# Patient Record
Sex: MALE | Race: WHITE | Employment: OTHER | URBAN - METROPOLITAN AREA
[De-identification: names, ages, dates, MRNs, and addresses within clinical notes are randomized per-mention and may not be internally consistent; named-entity substitution may affect disease eponyms.]

---

## 2020-11-05 ENCOUNTER — HOSPITAL ENCOUNTER (OUTPATIENT)
Age: 75
Discharge: HOME | End: 2020-11-05
Payer: MEDICARE

## 2020-11-05 VITALS
WEIGHT: 230 LBS | TEMPERATURE: 98 F | HEIGHT: 72 IN | SYSTOLIC BLOOD PRESSURE: 125 MMHG | OXYGEN SATURATION: 96 % | DIASTOLIC BLOOD PRESSURE: 72 MMHG | RESPIRATION RATE: 18 BRPM | BODY MASS INDEX: 31.15 KG/M2 | HEART RATE: 66 BPM

## 2020-11-05 DIAGNOSIS — L72.3 INFECTED SEBACEOUS CYST: Primary | ICD-10-CM

## 2020-11-05 DIAGNOSIS — L08.9 INFECTED SEBACEOUS CYST: Primary | ICD-10-CM

## 2020-11-05 PROCEDURE — 10060 I&D ABSCESS SIMPLE/SINGLE: CPT | Performed by: PHYSICIAN ASSISTANT

## 2020-11-05 PROCEDURE — 99214 OFFICE O/P EST MOD 30 MIN: CPT | Performed by: PHYSICIAN ASSISTANT

## 2020-11-05 RX ORDER — HYDROCORTISONE ACETATE 0.5 %
2 CREAM (GRAM) TOPICAL DAILY
COMMUNITY
End: 2021-07-19

## 2020-11-05 RX ORDER — ASCORBIC ACID 500 MG
1 TABLET ORAL DAILY
COMMUNITY
End: 2021-07-19

## 2020-11-05 RX ORDER — UBIDECARENONE 50 MG
1 CAPSULE ORAL DAILY
COMMUNITY
End: 2021-01-26 | Stop reason: ALTCHOICE

## 2020-11-05 RX ORDER — CEPHALEXIN 500 MG/1
500 CAPSULE ORAL 2 TIMES DAILY
Qty: 14 CAPSULE | Refills: 0 | Status: SHIPPED | OUTPATIENT
Start: 2020-11-05 | End: 2020-11-11

## 2020-11-05 RX ORDER — GUARN/MA-HUANG/P.GIN/S.GINSENG
1 TABLET ORAL DAILY
COMMUNITY
End: 2021-07-19

## 2020-11-05 NOTE — ED INITIAL ASSESSMENT (HPI)
The patient is here for evaluation of an abscess/cyst to the right shoulder/upper back area that started draining around Sunday. Patient states he has had a lot of drainage to the area that is white/creamy in color.  He states it has been there for years bu

## 2020-11-06 NOTE — ED PROVIDER NOTES
Patient Seen in: [facility] [address]      History   Patient presents with:  Abscess    Stated Complaint: INFECTED CYST ON BACK/SHOULDER    HPI    Jody Magi is a pleasant 49-year-old male who comes in today regarding an infected sebaceous cyst on h distress. Breath sounds: Normal breath sounds. No wheezing or rales. Skin:     General: Skin is warm and dry. Capillary Refill: Capillary refill takes less than 2 seconds. Coloration: Skin is not pale. Findings: No erythema or rash. Antwan  [address] [facility] [address]  [phone]  In 2 days  For wound re-check          Medications Prescribed:  Discharge Medication List as of 11/5/2020  2:49 PM    START taking these medications    cephALEXin 500 MG Oral Cap  Jose L

## 2020-11-07 ENCOUNTER — HOSPITAL ENCOUNTER (OUTPATIENT)
Age: 75
Discharge: HOME | End: 2020-11-07
Payer: MEDICARE

## 2020-11-07 VITALS
OXYGEN SATURATION: 96 % | HEIGHT: 71 IN | DIASTOLIC BLOOD PRESSURE: 106 MMHG | BODY MASS INDEX: 32.2 KG/M2 | WEIGHT: 230 LBS | TEMPERATURE: 97 F | RESPIRATION RATE: 20 BRPM | HEART RATE: 60 BPM | SYSTOLIC BLOOD PRESSURE: 143 MMHG

## 2020-11-07 DIAGNOSIS — L72.3 INFECTED SEBACEOUS CYST: Primary | ICD-10-CM

## 2020-11-07 DIAGNOSIS — L08.9 INFECTED SEBACEOUS CYST: Primary | ICD-10-CM

## 2020-11-07 PROCEDURE — 99024 POSTOP FOLLOW-UP VISIT: CPT | Performed by: PHYSICIAN ASSISTANT

## 2020-11-07 NOTE — ED INITIAL ASSESSMENT (HPI)
Pt here for follow up on Abscess to right upper back that was I & D & packed here 2 days ago at [facility]. Denies any complaints with it.

## 2020-11-07 NOTE — ED PROVIDER NOTES
Patient Seen in: [facility]      History   Patient presents with:   Follow - Up    Stated Complaint: follow up from appt x 2 days    HPI    CHIEF COMPLAINT: Follow-up sebaceous cyst drainage    HISTORY OF PRESENT ILLNESS: Patient is a Current:BP (!) 143/106   Pulse 60   Temp 97 °F (36.1 °C) (Temporal)   Resp 20   SpO2 96%         Physical Exam  Vitals signs and nursing note reviewed. Constitutional:       Appearance: He is well-developed.    Musculoskeletal:        Arms:    Skin:     G Schedule an appointment as soon as possible for a visit in 3 days  For reevaluation          Medications Prescribed:  Current Discharge Medication List

## 2020-11-08 NOTE — PROGRESS NOTES
Pt. notified of test results. States feeling better, wound healing. Instructed to complete antibiotic  tx plan and follow-up care.

## 2020-11-11 ENCOUNTER — OFFICE VISIT (OUTPATIENT)
Dept: INTERNAL MEDICINE CLINIC | Facility: CLINIC | Age: 75
End: 2020-11-11
Payer: MEDICARE

## 2020-11-11 VITALS
HEIGHT: 71.25 IN | DIASTOLIC BLOOD PRESSURE: 80 MMHG | RESPIRATION RATE: 16 BRPM | OXYGEN SATURATION: 95 % | TEMPERATURE: 96 F | WEIGHT: 229.38 LBS | HEART RATE: 59 BPM | SYSTOLIC BLOOD PRESSURE: 144 MMHG | BODY MASS INDEX: 31.76 KG/M2

## 2020-11-11 DIAGNOSIS — F41.9 ANXIETY: ICD-10-CM

## 2020-11-11 DIAGNOSIS — Z13.29 SCREENING FOR THYROID DISORDER: ICD-10-CM

## 2020-11-11 DIAGNOSIS — Z12.5 SCREENING FOR PROSTATE CANCER: ICD-10-CM

## 2020-11-11 DIAGNOSIS — I10 BENIGN ESSENTIAL HTN: ICD-10-CM

## 2020-11-11 DIAGNOSIS — Z00.00 PHYSICAL EXAM: ICD-10-CM

## 2020-11-11 DIAGNOSIS — Z13.220 SCREENING FOR CHOLESTEROL LEVEL: ICD-10-CM

## 2020-11-11 DIAGNOSIS — L72.3 SEBACEOUS CYST: Primary | ICD-10-CM

## 2020-11-11 PROBLEM — M15.9 PRIMARY OSTEOARTHRITIS INVOLVING MULTIPLE JOINTS: Status: ACTIVE | Noted: 2020-11-11

## 2020-11-11 PROBLEM — M15.0 PRIMARY OSTEOARTHRITIS INVOLVING MULTIPLE JOINTS: Status: ACTIVE | Noted: 2020-11-11

## 2020-11-11 PROCEDURE — 99204 OFFICE O/P NEW MOD 45 MIN: CPT | Performed by: NURSE PRACTITIONER

## 2020-11-11 RX ORDER — CEPHALEXIN 500 MG/1
500 CAPSULE ORAL 2 TIMES DAILY
Qty: 14 CAPSULE | Refills: 0 | Status: SHIPPED | OUTPATIENT
Start: 2020-11-11 | End: 2020-11-18

## 2020-11-11 RX ORDER — CHOLECALCIFEROL (VITAMIN D3) 50 MCG
1 TABLET ORAL DAILY
COMMUNITY
End: 2021-07-19

## 2020-11-11 NOTE — PROGRESS NOTES
[address] [facility]    CHIEF COMPLAINT:  Patient presents with:   Follow - Up: UC 11/5/2020 Cyst on Upper Right Back - Below Right Shoulder and BP CK        HISTORY OF PRESENT ILLNESS:  The patient is a 66year old year old male new to our office who prese • ascorbic acid 500 MG Oral Tab Take 1 tablet by mouth 2 (two) times daily. • Coenzyme Q10 50 MG Oral Cap Take 1 capsule by mouth daily. • cephALEXin 500 MG Oral Cap Take 1 capsule (500 mg total) by mouth 2 (two) times daily for 7 days.  14 capsule Physically abused: Not on file        Forced sexual activity: Not on file    Other Topics      Concerns:        Caffeine Concern: Yes        Exercise: No        Seat Belt: Yes        Special Diet: No        Stress Concern: Yes        Weight Concern: EXTREMITIES: no cyanosis, clubbing or edema  NEURO: Oriented times three          Labs:   No results found for: WBC, HGB, PLT   No results found for: GLU, NA, K, CL, CO2, CREATSERUM, CA, ALB, TP, ALKPHO, AST, ALT, BILT, TSH, T4F     No results found for: C - CBC WITH DIFFERENTIAL WITH PLATELET; Future  - COMP METABOLIC PANEL (14); Future    7.  Screening for prostate cancer  - PSA SCREEN; Future        Moe Joaquin APRN

## 2020-11-11 NOTE — PATIENT INSTRUCTIONS
Continue your antibiotic. Take antibiotic completely as ordered     Take antibiotic with food    Eat yogurt twice a day while on antibiotic take an oral probiotic    Monitor for diarrhea    See general surgery if your wound is not improving.      If it i · Elevated blood pressure is systolic of 293 to 152 and diastolic less than 80  · Stage 1 high blood pressure is systolic is 349 to 375 or diastolic between 80 to 89  · Stage 2 high blood pressure is when systolic is 659 or higher or the diastolic is 90 or · Stop smoking. If you are a long-time smoker, this can be hard. Enroll in a stop-smoking program to make it more likely that you will quit for good. · Learn how to handle stress. This is an important part of any program to lower blood pressure.  Learn abo · Call your provider if you have several high readings. Don't be frightened by a single high blood pressure reading, but if you get several high readings, check in with your healthcare provider.   · Note: When blood pressure reaches a systolic (top number)

## 2020-11-17 ENCOUNTER — LAB ENCOUNTER (OUTPATIENT)
Dept: LAB | Age: 75
End: 2020-11-17
Attending: NURSE PRACTITIONER
Payer: MEDICARE

## 2020-11-17 DIAGNOSIS — Z13.220 SCREENING FOR CHOLESTEROL LEVEL: ICD-10-CM

## 2020-11-17 DIAGNOSIS — Z00.00 PHYSICAL EXAM: ICD-10-CM

## 2020-11-17 DIAGNOSIS — Z12.5 SCREENING FOR PROSTATE CANCER: ICD-10-CM

## 2020-11-17 DIAGNOSIS — Z13.29 SCREENING FOR THYROID DISORDER: ICD-10-CM

## 2020-11-17 PROCEDURE — 80061 LIPID PANEL: CPT

## 2020-11-17 PROCEDURE — 36415 COLL VENOUS BLD VENIPUNCTURE: CPT

## 2020-11-17 PROCEDURE — 80053 COMPREHEN METABOLIC PANEL: CPT

## 2020-11-17 PROCEDURE — 84443 ASSAY THYROID STIM HORMONE: CPT

## 2020-11-17 PROCEDURE — 85025 COMPLETE CBC W/AUTO DIFF WBC: CPT

## 2020-11-18 ENCOUNTER — TELEPHONE (OUTPATIENT)
Dept: INTERNAL MEDICINE CLINIC | Facility: CLINIC | Age: 75
End: 2020-11-18

## 2020-11-18 NOTE — TELEPHONE ENCOUNTER
Per Tereza's request, patient scheduled an appt for an AWV and to discuss labs for 1/26/21 with Dr Mata Big

## 2020-11-24 ENCOUNTER — OFFICE VISIT (OUTPATIENT)
Dept: INTERNAL MEDICINE CLINIC | Facility: CLINIC | Age: 75
End: 2020-11-24
Payer: MEDICARE

## 2020-11-24 VITALS
SYSTOLIC BLOOD PRESSURE: 126 MMHG | TEMPERATURE: 97 F | BODY MASS INDEX: 31.9 KG/M2 | HEIGHT: 71.25 IN | DIASTOLIC BLOOD PRESSURE: 66 MMHG | WEIGHT: 230.38 LBS | OXYGEN SATURATION: 94 % | HEART RATE: 51 BPM | RESPIRATION RATE: 14 BRPM

## 2020-11-24 DIAGNOSIS — E78.00 PURE HYPERCHOLESTEROLEMIA: ICD-10-CM

## 2020-11-24 DIAGNOSIS — L72.3 SEBACEOUS CYST: ICD-10-CM

## 2020-11-24 DIAGNOSIS — R35.1 BPH ASSOCIATED WITH NOCTURIA: ICD-10-CM

## 2020-11-24 DIAGNOSIS — I10 BENIGN ESSENTIAL HTN: Primary | ICD-10-CM

## 2020-11-24 DIAGNOSIS — N40.1 BPH ASSOCIATED WITH NOCTURIA: ICD-10-CM

## 2020-11-24 PROCEDURE — 99214 OFFICE O/P EST MOD 30 MIN: CPT | Performed by: NURSE PRACTITIONER

## 2020-11-24 RX ORDER — TAMSULOSIN HYDROCHLORIDE 0.4 MG/1
0.4 CAPSULE ORAL DAILY
Qty: 30 CAPSULE | Refills: 0 | Status: SHIPPED | OUTPATIENT
Start: 2020-11-24 | End: 2020-12-21

## 2020-11-24 NOTE — PROGRESS NOTES
Tawanna Cheatham is a 66year old male. CHIEF COMPLAINT   Urine frequency at night, urgency, BP and cyst FU    HPI:   The patient is here for FU for BP. Also had labs done. He has been taking his BP daily. He is cutting down on his caffeine intake.  No vision /66 (BP Location: Left arm, Patient Position: Sitting, Cuff Size: adult)   Pulse 51   Temp 97.3 °F (36.3 °C) (Temporal)   Resp 14   Ht 71.25\"   Wt 230 lb 6.4 oz (104.5 kg)   SpO2 94%   BMI 31.91 kg/m²   Body mass index is 31.91 kg/m².    GENERAL: wel -The patient has history of elevated blood pressure. He brought his blood pressure readings today that are in the 744J systolically at times and as high as 160 other times. On average it is around 130/80.   Today at this appointment he is 126/66 which is -The sebaceous cyst on his back is healing. There is no open wound at this time there is no wheezing noted on exam.  He did not see general surgery yet. He feels like it is getting better and he does not need to.  -He was advised to continue to monitor.

## 2020-11-24 NOTE — PATIENT INSTRUCTIONS
Start Flomax in the evening once a day. Monitor for effectiveness and side effects. If your urine flow improves please start hydrating better.     Monitor your blood pressure daily in the morning after you have urinated for the first time in your sittin

## 2020-12-21 DIAGNOSIS — N40.1 BPH ASSOCIATED WITH NOCTURIA: ICD-10-CM

## 2020-12-21 DIAGNOSIS — R35.1 BPH ASSOCIATED WITH NOCTURIA: ICD-10-CM

## 2020-12-21 RX ORDER — TAMSULOSIN HYDROCHLORIDE 0.4 MG/1
0.4 CAPSULE ORAL DAILY
Qty: 30 CAPSULE | Refills: 0 | Status: SHIPPED | OUTPATIENT
Start: 2020-12-21 | End: 2021-01-22

## 2020-12-21 NOTE — TELEPHONE ENCOUNTER
Last refill #30 on 11/24/2020  Last office visit pertaining to refill on 11/24/2020  Future Appointments   Date Time Provider Rachel Arevalo   1/26/2021 12:40 PM Ezio Arroyo MD [facility]

## 2021-01-21 DIAGNOSIS — N40.1 BPH ASSOCIATED WITH NOCTURIA: ICD-10-CM

## 2021-01-21 DIAGNOSIS — R35.1 BPH ASSOCIATED WITH NOCTURIA: ICD-10-CM

## 2021-01-22 RX ORDER — TAMSULOSIN HYDROCHLORIDE 0.4 MG/1
0.4 CAPSULE ORAL DAILY
Qty: 30 CAPSULE | Refills: 0 | Status: SHIPPED | OUTPATIENT
Start: 2021-01-22 | End: 2021-02-21

## 2021-01-22 NOTE — TELEPHONE ENCOUNTER
Last OV relevant to medication: 11/24/2020, FU  Last refill date: 12/21/2020     #/refills: 30-0  When pt was asked to return for OV: return in Jan for AWV  Upcoming appt/reason: 1/26/21, AWV

## 2021-01-26 ENCOUNTER — OFFICE VISIT (OUTPATIENT)
Dept: INTERNAL MEDICINE CLINIC | Facility: CLINIC | Age: 76
End: 2021-01-26
Payer: MEDICARE

## 2021-01-26 VITALS
BODY MASS INDEX: 31.15 KG/M2 | TEMPERATURE: 98 F | WEIGHT: 225 LBS | RESPIRATION RATE: 16 BRPM | SYSTOLIC BLOOD PRESSURE: 108 MMHG | HEIGHT: 71.1 IN | OXYGEN SATURATION: 100 % | DIASTOLIC BLOOD PRESSURE: 74 MMHG | HEART RATE: 77 BPM

## 2021-01-26 DIAGNOSIS — Z00.00 ENCOUNTER FOR ANNUAL HEALTH EXAMINATION: Primary | ICD-10-CM

## 2021-01-26 DIAGNOSIS — R39.9 LOWER URINARY TRACT SYMPTOMS (LUTS): ICD-10-CM

## 2021-01-26 PROBLEM — L72.3 SEBACEOUS CYST: Status: RESOLVED | Noted: 2020-11-11 | Resolved: 2021-01-26

## 2021-01-26 PROBLEM — I10 BENIGN ESSENTIAL HTN: Status: RESOLVED | Noted: 2020-11-11 | Resolved: 2021-01-26

## 2021-01-26 PROBLEM — E78.00 PURE HYPERCHOLESTEROLEMIA: Status: RESOLVED | Noted: 2020-11-24 | Resolved: 2021-01-26

## 2021-01-26 PROCEDURE — G0438 PPPS, INITIAL VISIT: HCPCS | Performed by: INTERNAL MEDICINE

## 2021-01-26 NOTE — PROGRESS NOTES
HPI:   Karina Perry is a 66year old male who presents for a Medicare Initial Annual Wellness visit (Once after 12 month Medicare anniversary) .     His main complaint is joint stiffness in his knees, left hip  He uses 2 -3 aleve daily  He has been monit Smoking status: Former Smoker        Packs/day: 1.50        Years: 50.00        Pack years: 76      Smokeless tobacco: Never Used      Tobacco comment: Has not smoked for about 20 years         CAGE Alcohol screening   Gabrielle Estrin was screened for Al He  has a past medical history of Anxiety, Arthritis, Calculus of kidney, and Essential hypertension. He  has a past surgical history that includes appendectomy; appendectomy; cyst removal (N/A, 1990); cyst removal (Right, 11/05/2020); and colonoscopy. Administered Date(s) Administered   • Fluzone Vaccine Medicare () 10/18/2013, 09/20/2014, 10/09/2015, 09/13/2016, 11/06/2017, 09/11/2018, 10/09/2019   • Influenza 10/07/2008   • Pneumococcal (Prevnar 13) 10/09/2015   • Pneumovax 23 09/11/2018        A EKG - w/ Initial Preventative Physical Exam only, or if medically necessary Electrocardiogram date    Colorectal Cancer Screening      Colonoscopy Screen every 10 years There are no preventive care reminders to display for this patient.  Update [employer]

## 2021-01-26 NOTE — PATIENT INSTRUCTIONS
Alexsander Orellana's SCREENING SCHEDULE   Tests on this list are recommended by your physician but may not be covered, or covered at this frequency, by your insurer. Please check with your insurance carrier before scheduling to verify coverage.     PREVENTATIV abnormal There are no preventive care reminders to display for this patient. Update Health Maintenance if applicable    Flex Sigmoidoscopy Screen  Covered every 5 years No results found for this or any previous visit. No flowsheet data found.      Fecal Occ with your prescription benefits, but Medicare does not cover unless Medically needed    Zoster (Not covered by Medicare Part B) No orders found for this or any previous visit.  This may be covered with your pharmacy  prescription benefits     Recommended We intervals for prediabetic patients        Cardiovascular Disease Screening     Cholesterol, covered every 5 yrs including Total, LDL and Trigs LDL Cholesterol (mg/dL)   Date Value   11/17/2020 122 (H)     Cholesterol, Total (mg/dL)   Date Value   11/17/202 provided on same date    Immunizations      Influenza  Covered Annually No orders found for this or any previous visit. Please get every year    Pneumococcal 13 (Prevnar)  Covered Once after 65 No orders found for this or any previous visit.  Please get onc

## 2021-03-09 DIAGNOSIS — Z23 NEED FOR VACCINATION: ICD-10-CM

## 2021-03-12 ENCOUNTER — IMMUNIZATION (OUTPATIENT)
Dept: LAB | Facility: HOSPITAL | Age: 76
End: 2021-03-12
Attending: HOSPITALIST
Payer: MEDICARE

## 2021-03-12 DIAGNOSIS — Z23 NEED FOR VACCINATION: Primary | ICD-10-CM

## 2021-03-12 PROCEDURE — 0011A SARSCOV2 VAC 100MCG/0.5ML IM: CPT

## 2021-04-09 ENCOUNTER — IMMUNIZATION (OUTPATIENT)
Dept: LAB | Facility: HOSPITAL | Age: 76
End: 2021-04-09
Attending: EMERGENCY MEDICINE
Payer: MEDICARE

## 2021-04-09 DIAGNOSIS — Z23 NEED FOR VACCINATION: Primary | ICD-10-CM

## 2021-04-09 PROCEDURE — 0012A SARSCOV2 VAC 100MCG/0.5ML IM: CPT

## 2021-06-18 ENCOUNTER — MED REC SCAN ONLY (OUTPATIENT)
Dept: INTERNAL MEDICINE CLINIC | Facility: CLINIC | Age: 76
End: 2021-06-18

## 2021-07-15 ENCOUNTER — TELEPHONE (OUTPATIENT)
Dept: INTERNAL MEDICINE CLINIC | Facility: CLINIC | Age: 76
End: 2021-07-15

## 2021-07-15 NOTE — TELEPHONE ENCOUNTER
Noted below. Dr Griselda Stuart, does pt need appt for handicap placard? He is scheduled for this only on 7/19/21. Noted he has OA and stiffness in knees. Due for AWV January 2022.

## 2021-07-15 NOTE — TELEPHONE ENCOUNTER
PT calling and said that he would like to apply for a handicap placard because his mobility is not the greatest.    Please advise    Thank you

## 2021-07-19 ENCOUNTER — OFFICE VISIT (OUTPATIENT)
Dept: INTERNAL MEDICINE CLINIC | Facility: CLINIC | Age: 76
End: 2021-07-19
Payer: MEDICARE

## 2021-07-19 VITALS
DIASTOLIC BLOOD PRESSURE: 68 MMHG | SYSTOLIC BLOOD PRESSURE: 116 MMHG | HEART RATE: 88 BPM | HEIGHT: 71 IN | BODY MASS INDEX: 30.75 KG/M2 | RESPIRATION RATE: 14 BRPM | TEMPERATURE: 98 F | OXYGEN SATURATION: 96 % | WEIGHT: 219.63 LBS

## 2021-07-19 DIAGNOSIS — R26.9 GAIT DISORDER: ICD-10-CM
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
## 2021-07-19 DIAGNOSIS — M25.561 ACUTE PAIN OF BOTH KNEES: ICD-10-CM

## 2021-07-19 DIAGNOSIS — M25.562 ACUTE PAIN OF BOTH KNEES: ICD-10-CM

## 2021-07-19 PROCEDURE — 99214 OFFICE O/P EST MOD 30 MIN: CPT | Performed by: NURSE PRACTITIONER

## 2021-07-19 NOTE — PROGRESS NOTES
Sherren Brunner is a 64year old male. CHIEF COMPLAINT   Fall, knee pain    HPI:   Harriett Sharma out of bed a couple months ago. He reports acute knee pain to both knees since then but also had chronic pain previously to his bilateral knees.   His pain currently is or edema  NEURO: Oriented times three    LABS:      Lab Results   Component Value Date    WBC 8.6 11/17/2020    RBC 4.82 11/17/2020    HGB 14.7 11/17/2020    HCT 44.0 11/17/2020    MCV 91.3 11/17/2020    MCH 30.5 11/17/2020    MCHC 33.4 11/17/2020    RDW 1 plan.  The patient is asked to return in 6 months for annual visit or sooner as needed

## 2021-07-26 ENCOUNTER — MED REC SCAN ONLY (OUTPATIENT)
Dept: INTERNAL MEDICINE CLINIC | Facility: CLINIC | Age: 76
End: 2021-07-26

## 2021-09-11 ENCOUNTER — TELEPHONE (OUTPATIENT)
Dept: INTERNAL MEDICINE CLINIC | Facility: CLINIC | Age: 76
End: 2021-09-11

## 2021-09-11 NOTE — TELEPHONE ENCOUNTER
Incoming (mail or fax):  fax  Received from:  [employer]  Documentation given to:  Triage incoming bin    Physical Therapy recertification

## 2021-09-13 NOTE — TELEPHONE ENCOUNTER
Received PT Recert Note from [employer] for review & signature(s). To fax back to # [phone] & send to scanning.

## 2021-09-15 ENCOUNTER — MED REC SCAN ONLY (OUTPATIENT)
Dept: INTERNAL MEDICINE CLINIC | Facility: CLINIC | Age: 76
End: 2021-09-15

## 2022-02-21 ENCOUNTER — TELEPHONE (OUTPATIENT)
Dept: INTERNAL MEDICINE CLINIC | Facility: CLINIC | Age: 77
End: 2022-02-21

## 2022-02-21 NOTE — TELEPHONE ENCOUNTER
Provider's Name?:  Rich Peters Specialty?: urology   Reason for Visit?:  First visit   Diagnosis?:  frequent urination  Number of Visits Requested?: 4   Last Visit with Specialist?:  none  Is Appt.  Already Scheduled?:  No        If so, Date?:    Does pt need call back?: yes

## 2022-02-21 NOTE — TELEPHONE ENCOUNTER
Patient was previously referred to Dr. Maher, reentered referral. Patient notified, verbalized understanding.

## 2022-03-04 ENCOUNTER — OFFICE VISIT (OUTPATIENT)
Dept: INTERNAL MEDICINE CLINIC | Facility: CLINIC | Age: 77
End: 2022-03-04
Payer: MEDICARE

## 2022-03-04 ENCOUNTER — TELEPHONE (OUTPATIENT)
Dept: INTERNAL MEDICINE CLINIC | Facility: CLINIC | Age: 77
End: 2022-03-04

## 2022-03-04 VITALS
HEART RATE: 91 BPM | OXYGEN SATURATION: 97 % | SYSTOLIC BLOOD PRESSURE: 126 MMHG | HEIGHT: 71 IN | WEIGHT: 214 LBS | DIASTOLIC BLOOD PRESSURE: 80 MMHG | BODY MASS INDEX: 29.96 KG/M2 | RESPIRATION RATE: 16 BRPM | TEMPERATURE: 97 F

## 2022-03-04 DIAGNOSIS — K40.90 NON-RECURRENT UNILATERAL INGUINAL HERNIA WITHOUT OBSTRUCTION OR GANGRENE: ICD-10-CM

## 2022-03-04 DIAGNOSIS — Z12.5 ENCOUNTER FOR PROSTATE CANCER SCREENING: ICD-10-CM

## 2022-03-04 DIAGNOSIS — Z12.11 COLON CANCER SCREENING: ICD-10-CM

## 2022-03-04 DIAGNOSIS — Z00.00 ENCOUNTER FOR ANNUAL HEALTH EXAMINATION: Primary | ICD-10-CM

## 2022-03-04 PROCEDURE — 99213 OFFICE O/P EST LOW 20 MIN: CPT | Performed by: INTERNAL MEDICINE

## 2022-03-04 PROCEDURE — G0439 PPPS, SUBSEQ VISIT: HCPCS | Performed by: INTERNAL MEDICINE

## 2022-03-04 RX ORDER — ACETAMINOPHEN 500 MG
500 TABLET ORAL EVERY 6 HOURS PRN
COMMUNITY
End: 2022-04-04

## 2022-03-04 NOTE — TELEPHONE ENCOUNTER
Spoke w/pt   Informed pt of CT and advised to schedule   Gave pt number for central scheduling   Pt v/u and had no additional questions

## 2022-03-11 ENCOUNTER — HOSPITAL ENCOUNTER (OUTPATIENT)
Dept: CT IMAGING | Age: 77
Discharge: HOME | End: 2022-03-11
Attending: INTERNAL MEDICINE
Payer: MEDICARE

## 2022-03-11 DIAGNOSIS — K40.90 NON-RECURRENT UNILATERAL INGUINAL HERNIA WITHOUT OBSTRUCTION OR GANGRENE: ICD-10-CM

## 2022-03-11 PROCEDURE — 74176 CT ABD & PELVIS W/O CONTRAST: CPT | Performed by: INTERNAL MEDICINE

## 2022-03-14 ENCOUNTER — LAB ENCOUNTER (OUTPATIENT)
Dept: LAB | Age: 77
End: 2022-03-14
Attending: INTERNAL MEDICINE
Payer: MEDICARE

## 2022-03-14 DIAGNOSIS — Z12.5 ENCOUNTER FOR PROSTATE CANCER SCREENING: ICD-10-CM

## 2022-03-14 DIAGNOSIS — K40.90 NON-RECURRENT UNILATERAL INGUINAL HERNIA WITHOUT OBSTRUCTION OR GANGRENE: ICD-10-CM

## 2022-03-14 LAB
ALBUMIN SERPL-MCNC: 3.7 G/DL (ref 3.4–5)
ALBUMIN/GLOB SERPL: 1.2 {RATIO} (ref 1–2)
ALP LIVER SERPL-CCNC: 63 U/L
ALT SERPL-CCNC: 24 U/L
ANION GAP SERPL CALC-SCNC: 6 MMOL/L (ref 0–18)
AST SERPL-CCNC: 13 U/L (ref 15–37)
BASOPHILS # BLD AUTO: 0.03 X10(3) UL (ref 0–0.2)
BASOPHILS NFR BLD AUTO: 0.4 %
BILIRUB SERPL-MCNC: 0.7 MG/DL (ref 0.1–2)
BUN BLD-MCNC: 23 MG/DL (ref 7–18)
CALCIUM BLD-MCNC: 9 MG/DL (ref 8.5–10.1)
CHLORIDE SERPL-SCNC: 106 MMOL/L (ref 98–112)
CO2 SERPL-SCNC: 30 MMOL/L (ref 21–32)
COMPLEXED PSA SERPL-MCNC: 2.69 NG/ML (ref ?–4)
EOSINOPHIL # BLD AUTO: 0.14 X10(3) UL (ref 0–0.7)
EOSINOPHIL NFR BLD AUTO: 1.9 %
ERYTHROCYTE [DISTWIDTH] IN BLOOD BY AUTOMATED COUNT: 13 %
FASTING STATUS PATIENT QL REPORTED: NO
GLOBULIN PLAS-MCNC: 3 G/DL (ref 2.8–4.4)
GLUCOSE BLD-MCNC: 104 MG/DL (ref 70–99)
HCT VFR BLD AUTO: 47.1 %
HGB BLD-MCNC: 15.8 G/DL
IMM GRANULOCYTES # BLD AUTO: 0.02 X10(3) UL (ref 0–1)
IMM GRANULOCYTES NFR BLD: 0.3 %
LYMPHOCYTES # BLD AUTO: 1.42 X10(3) UL (ref 1–4)
LYMPHOCYTES NFR BLD AUTO: 19.2 %
MCH RBC QN AUTO: 31.1 PG (ref 26–34)
MCHC RBC AUTO-ENTMCNC: 33.5 G/DL (ref 31–37)
MCV RBC AUTO: 92.7 FL
MONOCYTES # BLD AUTO: 0.75 X10(3) UL (ref 0.1–1)
MONOCYTES NFR BLD AUTO: 10.2 %
NEUTROPHILS # BLD AUTO: 5.02 X10 (3) UL (ref 1.5–7.7)
NEUTROPHILS # BLD AUTO: 5.02 X10(3) UL (ref 1.5–7.7)
NEUTROPHILS NFR BLD AUTO: 68 %
OSMOLALITY SERPL CALC.SUM OF ELEC: 298 MOSM/KG (ref 275–295)
PLATELET # BLD AUTO: 275 10(3)UL (ref 150–450)
POTASSIUM SERPL-SCNC: 4.5 MMOL/L (ref 3.5–5.1)
PROT SERPL-MCNC: 6.7 G/DL (ref 6.4–8.2)
RBC # BLD AUTO: 5.08 X10(6)UL
SODIUM SERPL-SCNC: 142 MMOL/L (ref 136–145)
WBC # BLD AUTO: 7.4 X10(3) UL (ref 4–11)

## 2022-03-14 PROCEDURE — 36415 COLL VENOUS BLD VENIPUNCTURE: CPT

## 2022-03-14 PROCEDURE — 85025 COMPLETE CBC W/AUTO DIFF WBC: CPT

## 2022-03-14 PROCEDURE — 80053 COMPREHEN METABOLIC PANEL: CPT

## 2022-03-29 ENCOUNTER — OFFICE VISIT (OUTPATIENT)
Dept: SURGERY | Facility: CLINIC | Age: 77
End: 2022-03-29
Payer: MEDICARE

## 2022-03-29 DIAGNOSIS — R82.90 URINE FINDING: Primary | ICD-10-CM

## 2022-03-29 DIAGNOSIS — R39.12 WEAK URINARY STREAM: ICD-10-CM

## 2022-03-29 DIAGNOSIS — N40.0 ENLARGED PROSTATE: ICD-10-CM

## 2022-03-29 DIAGNOSIS — R39.9 LOWER URINARY TRACT SYMPTOMS: ICD-10-CM

## 2022-03-29 DIAGNOSIS — N20.0 RENAL CALCULUS, LEFT: ICD-10-CM

## 2022-03-29 DIAGNOSIS — R35.1 NOCTURIA: ICD-10-CM

## 2022-03-29 LAB
APPEARANCE: CLEAR
BILIRUB UR QL: NEGATIVE
BILIRUBIN: NEGATIVE
CLARITY UR: CLEAR
COLOR UR: YELLOW
GLUCOSE (URINE DIPSTICK): NEGATIVE MG/DL
GLUCOSE UR-MCNC: NEGATIVE MG/DL
KETONES (URINE DIPSTICK): NEGATIVE MG/DL
KETONES UR-MCNC: NEGATIVE MG/DL
LEUKOCYTE ESTERASE UR QL STRIP.AUTO: NEGATIVE
LEUKOCYTES: NEGATIVE
MULTISTIX LOT#: ABNORMAL NUMERIC
NITRITE UR QL STRIP.AUTO: NEGATIVE
NITRITE, URINE: NEGATIVE
PH UR: 5 [PH] (ref 5–8)
PH, URINE: 5.5 (ref 4.5–8)
PROT UR-MCNC: NEGATIVE MG/DL
PROTEIN (URINE DIPSTICK): NEGATIVE MG/DL
RBC #/AREA URNS AUTO: >10 /HPF
SP GR UR STRIP: 1.02 (ref 1–1.03)
SPECIFIC GRAVITY: 1.02 (ref 1–1.03)
URINE-COLOR: YELLOW
UROBILINOGEN UR STRIP-ACNC: <2
UROBILINOGEN,SEMI-QN: 0.2 MG/DL (ref 0–1.9)
VIT C UR-MCNC: NEGATIVE MG/DL

## 2022-03-29 PROCEDURE — 51798 US URINE CAPACITY MEASURE: CPT | Performed by: UROLOGY

## 2022-03-29 PROCEDURE — 99203 OFFICE O/P NEW LOW 30 MIN: CPT | Performed by: UROLOGY

## 2022-03-29 PROCEDURE — 81003 URINALYSIS AUTO W/O SCOPE: CPT | Performed by: UROLOGY

## 2022-03-29 RX ORDER — ACETAMINOPHEN 500 MG
500 TABLET ORAL DAILY
COMMUNITY
Start: 2022-03-01

## 2022-04-04 ENCOUNTER — OFFICE VISIT (OUTPATIENT)
Dept: SURGERY | Facility: CLINIC | Age: 77
End: 2022-04-04
Payer: MEDICARE

## 2022-04-04 VITALS — HEART RATE: 88 BPM | DIASTOLIC BLOOD PRESSURE: 83 MMHG | SYSTOLIC BLOOD PRESSURE: 131 MMHG | TEMPERATURE: 97 F

## 2022-04-04 DIAGNOSIS — K40.20 BILATERAL INGUINAL HERNIA WITHOUT OBSTRUCTION OR GANGRENE, RECURRENCE NOT SPECIFIED: Primary | ICD-10-CM

## 2022-04-04 DIAGNOSIS — Z12.11 ENCOUNTER FOR SCREENING COLONOSCOPY: ICD-10-CM

## 2022-04-04 PROCEDURE — 99204 OFFICE O/P NEW MOD 45 MIN: CPT | Performed by: COLON & RECTAL SURGERY

## 2022-04-04 RX ORDER — GARLIC EXTRACT 500 MG
1 CAPSULE ORAL DAILY
COMMUNITY

## 2022-04-04 RX ORDER — POLYETHYLENE GLYCOL 3350, SODIUM CHLORIDE, SODIUM BICARBONATE, POTASSIUM CHLORIDE 420; 11.2; 5.72; 1.48 G/4L; G/4L; G/4L; G/4L
POWDER, FOR SOLUTION ORAL
Qty: 1 EACH | Refills: 0 | Status: SHIPPED | OUTPATIENT
Start: 2022-04-04

## 2022-04-04 NOTE — PATIENT INSTRUCTIONS
The patient presents in consultation of Dr. Nettie Burt for a colonoscopy and for bilateral inguinal hernias. The patient states he has not had a colonoscopy in 12 years. His last colonoscopy was performed by me. He has no history of colon polyps or family history of colon polyps/cancer. The patient denies blood, mucus, black/tarry stools. The patient states he is chronically constipated. He typically has 2-3 bowel movements per week. He takes a combination of MiraLAX, Dulcolax and Metamucil for his constipation. He states he only takes the stool softeners approximately 1 time per week. The patient denies abdominal pain or distention. He denies fevers, chills, nausea or vomiting. The patient is also here for bilateral inguinal hernias. The patient is unsure how long he has had these hernias, but states that he became symptomatic in January 2022. The patient states his right inguinal hernia is more symptomatic than his left inguinal hernia. He has intermittent achy pain. The pain does not radiate. The pain is worse after sleeping on his abdomen or right side. He takes Tylenol for his pain. The patient states he does notice a small bulge in his groin bilaterally. He states he is unsure if it has gotten bigger. The bulge reduces manually. He denies strangulation of his hernias. The patient had a CT of his abdomen and pelvis on 3/11/2022. I have personally reviewed his CT scan and provided my own interpretation. He has large bilateral inguinal hernias. His left inguinal hernia contains a portion of the sigmoid colon. His right renal hernia contains portions of the small bowel and is significantly larger than the left inguinal hernia. There is no evidence of strangulation or obstruction. The patient has a past medical history significant for benign prostatic hyperplasia, hypertension and arthritis. The patient has had an appendectomy.   He has had no other prior abdominal operations. He is not currently taking any blood thinners. The patient has no first or second-degree relatives with a history of colon polyps or colon cancer. The patient has no first or second-degree relatives with a history of uterine cancer. Clinical examination of the abdomen reveals it to be soft, nondistended, nontender, bowel sounds are normal activity normal pitch. There  is no rebounding tenderness or guarding. There are no signs of ascites or peritonitis. The liver and spleen are nonpalpable. There are no palpable masses. There are no umbilical or incisional hernias. The patient has bilateral inguinal hernias, with the right being larger than the left. Testes are descended, equal, and normal size bilaterally. Penis is normal and circumcised. He has well-healed laparoscopic incisions from a prior appendectomy. The patient will be scheduled to undergo a colonoscopy. Following his colonoscopy he will be scheduled to undergo a laparoscopic bilateral inguinal hernia repair with mesh. All risks, benefits, complications and alternatives to the proposed procedure(s) were fully discussed with the patient. All questions from the patient were answered in detail. A description of the procedure(s) possible outcomes was fully discussed. The patient seemed to understand the conversation and its details. Consent for the procedure(s) was confirmed with the patient.

## 2022-04-28 ENCOUNTER — PROCEDURE (OUTPATIENT)
Dept: SURGERY | Facility: CLINIC | Age: 77
End: 2022-04-28
Payer: MEDICARE

## 2022-04-28 DIAGNOSIS — N40.0 ENLARGED PROSTATE: ICD-10-CM

## 2022-04-28 DIAGNOSIS — R39.12 WEAK URINARY STREAM: ICD-10-CM

## 2022-04-28 DIAGNOSIS — R82.90 URINE FINDING: Primary | ICD-10-CM

## 2022-04-28 DIAGNOSIS — R39.9 LOWER URINARY TRACT SYMPTOMS: ICD-10-CM

## 2022-04-28 LAB
BILIRUBIN: NEGATIVE
GLUCOSE (URINE DIPSTICK): NEGATIVE MG/DL
KETONES (URINE DIPSTICK): NEGATIVE MG/DL
LEUKOCYTES: NEGATIVE
MULTISTIX LOT#: ABNORMAL NUMERIC
NITRITE, URINE: NEGATIVE
PH, URINE: 5 (ref 4.5–8)
PROTEIN (URINE DIPSTICK): NEGATIVE MG/DL
SPECIFIC GRAVITY: >=1.03 (ref 1–1.03)
UROBILINOGEN,SEMI-QN: 0.2 MG/DL (ref 0–1.9)

## 2022-04-28 RX ORDER — CIPROFLOXACIN 500 MG/1
500 TABLET, FILM COATED ORAL ONCE
Status: COMPLETED | OUTPATIENT
Start: 2022-04-28 | End: 2022-04-28

## 2022-04-28 RX ADMIN — CIPROFLOXACIN 500 MG: 500 TABLET, FILM COATED ORAL at 08:37:00

## 2022-05-02 ENCOUNTER — TELEPHONE (OUTPATIENT)
Dept: SURGERY | Facility: CLINIC | Age: 77
End: 2022-05-02

## 2022-05-02 RX ORDER — TAMSULOSIN HYDROCHLORIDE 0.4 MG/1
0.4 CAPSULE ORAL DAILY
Qty: 30 CAPSULE | Refills: 0 | Status: SHIPPED | OUTPATIENT
Start: 2022-05-02 | End: 2022-06-01

## 2022-05-02 RX ORDER — MIRABEGRON 50 MG/1
50 TABLET, FILM COATED, EXTENDED RELEASE ORAL DAILY
Qty: 30 TABLET | Refills: 3 | Status: SHIPPED | OUTPATIENT
Start: 2022-05-02 | End: 2022-06-01

## 2022-05-02 NOTE — TELEPHONE ENCOUNTER
Per pt had a cystoscopy 4/28 and a prescription was supposed to be sent to [facility], but they have not received any.  Please advise

## 2022-05-03 ENCOUNTER — ANESTHESIA (OUTPATIENT)
Dept: ENDOSCOPY | Facility: HOSPITAL | Age: 77
End: 2022-05-03
Payer: MEDICARE

## 2022-05-03 ENCOUNTER — APPOINTMENT (OUTPATIENT)
Dept: GENERAL RADIOLOGY | Facility: HOSPITAL | Age: 77
End: 2022-05-03
Attending: INTERNAL MEDICINE
Payer: MEDICARE

## 2022-05-03 ENCOUNTER — HOSPITAL ENCOUNTER (OUTPATIENT)
Facility: HOSPITAL | Age: 77
Setting detail: OBSERVATION
LOS: 1 days | Discharge: HOME | End: 2022-05-04
Attending: COLON & RECTAL SURGERY | Admitting: COLON & RECTAL SURGERY
Payer: MEDICARE

## 2022-05-03 ENCOUNTER — ANESTHESIA EVENT (OUTPATIENT)
Dept: ENDOSCOPY | Facility: HOSPITAL | Age: 77
End: 2022-05-03
Payer: MEDICARE

## 2022-05-03 DIAGNOSIS — Z12.11 ENCOUNTER FOR SCREENING COLONOSCOPY: ICD-10-CM

## 2022-05-03 PROBLEM — I48.91 ATRIAL FIBRILLATION WITH RVR (HCC): Status: ACTIVE | Noted: 2022-05-03

## 2022-05-03 PROBLEM — K55.20 ARTERIOVENOUS MALFORMATION OF COLON: Status: ACTIVE | Noted: 2022-05-03

## 2022-05-03 PROBLEM — I10 HYPERTENSION: Status: ACTIVE | Noted: 2020-11-11

## 2022-05-03 LAB
ALBUMIN SERPL-MCNC: 3.3 G/DL (ref 3.4–5)
ALBUMIN/GLOB SERPL: 1.1 {RATIO} (ref 1–2)
ALP LIVER SERPL-CCNC: 58 U/L
ALT SERPL-CCNC: 24 U/L
ANION GAP SERPL CALC-SCNC: 4 MMOL/L (ref 0–18)
APTT PPP: 33.1 SECONDS (ref 23.3–35.6)
APTT PPP: 93.7 SECONDS (ref 23.3–35.6)
AST SERPL-CCNC: 16 U/L (ref 15–37)
ATRIAL RATE: 113 BPM
BASOPHILS # BLD AUTO: 0.02 X10(3) UL (ref 0–0.2)
BASOPHILS NFR BLD AUTO: 0.2 %
BILIRUB SERPL-MCNC: 0.7 MG/DL (ref 0.1–2)
BUN BLD-MCNC: 17 MG/DL (ref 7–18)
CALCIUM BLD-MCNC: 8.6 MG/DL (ref 8.5–10.1)
CHLORIDE SERPL-SCNC: 109 MMOL/L (ref 98–112)
CO2 SERPL-SCNC: 26 MMOL/L (ref 21–32)
CREAT BLD-MCNC: 1.08 MG/DL
EOSINOPHIL # BLD AUTO: 0.05 X10(3) UL (ref 0–0.7)
EOSINOPHIL NFR BLD AUTO: 0.6 %
ERYTHROCYTE [DISTWIDTH] IN BLOOD BY AUTOMATED COUNT: 12.8 %
GLOBULIN PLAS-MCNC: 3.1 G/DL (ref 2.8–4.4)
GLUCOSE BLD-MCNC: 86 MG/DL (ref 70–99)
HCT VFR BLD AUTO: 49.5 %
HGB BLD-MCNC: 15.9 G/DL
IMM GRANULOCYTES # BLD AUTO: 0.03 X10(3) UL (ref 0–1)
IMM GRANULOCYTES NFR BLD: 0.4 %
INR BLD: 1.15 (ref 0.8–1.2)
LYMPHOCYTES # BLD AUTO: 1.24 X10(3) UL (ref 1–4)
LYMPHOCYTES NFR BLD AUTO: 15 %
MAGNESIUM SERPL-MCNC: 2.1 MG/DL (ref 1.6–2.6)
MCH RBC QN AUTO: 31.3 PG (ref 26–34)
MCHC RBC AUTO-ENTMCNC: 32.1 G/DL (ref 31–37)
MCV RBC AUTO: 97.4 FL
MONOCYTES # BLD AUTO: 0.52 X10(3) UL (ref 0.1–1)
MONOCYTES NFR BLD AUTO: 6.3 %
NEUTROPHILS # BLD AUTO: 6.41 X10 (3) UL (ref 1.5–7.7)
NEUTROPHILS # BLD AUTO: 6.41 X10(3) UL (ref 1.5–7.7)
NEUTROPHILS NFR BLD AUTO: 77.5 %
OSMOLALITY SERPL CALC.SUM OF ELEC: 289 MOSM/KG (ref 275–295)
PLATELET # BLD AUTO: 216 10(3)UL (ref 150–450)
POTASSIUM SERPL-SCNC: 4.7 MMOL/L (ref 3.5–5.1)
PROT SERPL-MCNC: 6.4 G/DL (ref 6.4–8.2)
PROTHROMBIN TIME: 14.7 SECONDS (ref 11.6–14.8)
Q-T INTERVAL: 344 MS
QRS DURATION: 78 MS
QTC CALCULATION (BEZET): 450 MS
R AXIS: 90 DEGREES
RBC # BLD AUTO: 5.08 X10(6)UL
SODIUM SERPL-SCNC: 139 MMOL/L (ref 136–145)
T AXIS: 62 DEGREES
TSI SER-ACNC: 0.49 MIU/ML (ref 0.36–3.74)
VENTRICULAR RATE: 103 BPM
WBC # BLD AUTO: 8.3 X10(3) UL (ref 4–11)

## 2022-05-03 PROCEDURE — 0DJD8ZZ INSPECTION OF LOWER INTESTINAL TRACT, VIA NATURAL OR ARTIFICIAL OPENING ENDOSCOPIC: ICD-10-PCS | Performed by: COLON & RECTAL SURGERY

## 2022-05-03 PROCEDURE — 71045 X-RAY EXAM CHEST 1 VIEW: CPT | Performed by: INTERNAL MEDICINE

## 2022-05-03 PROCEDURE — 74018 RADEX ABDOMEN 1 VIEW: CPT | Performed by: INTERNAL MEDICINE

## 2022-05-03 PROCEDURE — 99220 INITIAL OBSERVATION CARE,LEVL III: CPT | Performed by: INTERNAL MEDICINE

## 2022-05-03 RX ORDER — METOPROLOL TARTRATE 5 MG/5ML
5 INJECTION INTRAVENOUS ONCE
Status: DISCONTINUED | OUTPATIENT
Start: 2022-05-03 | End: 2022-05-03

## 2022-05-03 RX ORDER — HEPARIN SODIUM AND DEXTROSE 10000; 5 [USP'U]/100ML; G/100ML
1000 INJECTION INTRAVENOUS ONCE
Status: COMPLETED | OUTPATIENT
Start: 2022-05-03 | End: 2022-05-03

## 2022-05-03 RX ORDER — ACETAMINOPHEN 325 MG/1
650 TABLET ORAL EVERY 6 HOURS PRN
Status: DISCONTINUED | OUTPATIENT
Start: 2022-05-03 | End: 2022-05-04

## 2022-05-03 RX ORDER — ONDANSETRON 2 MG/ML
4 INJECTION INTRAMUSCULAR; INTRAVENOUS EVERY 6 HOURS PRN
Status: DISCONTINUED | OUTPATIENT
Start: 2022-05-03 | End: 2022-05-04

## 2022-05-03 RX ORDER — NALOXONE HYDROCHLORIDE 0.4 MG/ML
80 INJECTION, SOLUTION INTRAMUSCULAR; INTRAVENOUS; SUBCUTANEOUS AS NEEDED
Status: DISCONTINUED | OUTPATIENT
Start: 2022-05-03 | End: 2022-05-03 | Stop reason: HOSPADM

## 2022-05-03 RX ORDER — ESMOLOL HYDROCHLORIDE 10 MG/ML
INJECTION INTRAVENOUS AS NEEDED
Status: DISCONTINUED | OUTPATIENT
Start: 2022-05-03 | End: 2022-05-03 | Stop reason: SURG

## 2022-05-03 RX ORDER — SODIUM CHLORIDE, SODIUM LACTATE, POTASSIUM CHLORIDE, CALCIUM CHLORIDE 600; 310; 30; 20 MG/100ML; MG/100ML; MG/100ML; MG/100ML
INJECTION, SOLUTION INTRAVENOUS CONTINUOUS
Status: DISCONTINUED | OUTPATIENT
Start: 2022-05-03 | End: 2022-05-03

## 2022-05-03 RX ORDER — TAMSULOSIN HYDROCHLORIDE 0.4 MG/1
0.4 CAPSULE ORAL DAILY
Status: DISCONTINUED | OUTPATIENT
Start: 2022-05-03 | End: 2022-05-04

## 2022-05-03 RX ORDER — HEPARIN SODIUM 1000 [USP'U]/ML
5000 INJECTION, SOLUTION INTRAVENOUS; SUBCUTANEOUS ONCE
Status: COMPLETED | OUTPATIENT
Start: 2022-05-03 | End: 2022-05-03

## 2022-05-03 RX ORDER — LIDOCAINE HYDROCHLORIDE 10 MG/ML
INJECTION, SOLUTION EPIDURAL; INFILTRATION; INTRACAUDAL; PERINEURAL AS NEEDED
Status: DISCONTINUED | OUTPATIENT
Start: 2022-05-03 | End: 2022-05-03 | Stop reason: SURG

## 2022-05-03 RX ORDER — ALPRAZOLAM 0.25 MG/1
0.25 TABLET ORAL 2 TIMES DAILY PRN
Status: DISCONTINUED | OUTPATIENT
Start: 2022-05-03 | End: 2022-05-04

## 2022-05-03 RX ORDER — LABETALOL HYDROCHLORIDE 5 MG/ML
INJECTION, SOLUTION INTRAVENOUS AS NEEDED
Status: DISCONTINUED | OUTPATIENT
Start: 2022-05-03 | End: 2022-05-03 | Stop reason: SURG

## 2022-05-03 RX ORDER — HEPARIN SODIUM AND DEXTROSE 10000; 5 [USP'U]/100ML; G/100ML
INJECTION INTRAVENOUS CONTINUOUS
Status: DISCONTINUED | OUTPATIENT
Start: 2022-05-03 | End: 2022-05-04

## 2022-05-03 RX ADMIN — LIDOCAINE HYDROCHLORIDE 25 MG: 10 INJECTION, SOLUTION EPIDURAL; INFILTRATION; INTRACAUDAL; PERINEURAL at 11:09:00

## 2022-05-03 RX ADMIN — SODIUM CHLORIDE, SODIUM LACTATE, POTASSIUM CHLORIDE, CALCIUM CHLORIDE: 600; 310; 30; 20 INJECTION, SOLUTION INTRAVENOUS at 11:06:00

## 2022-05-03 RX ADMIN — ESMOLOL HYDROCHLORIDE 20 MG: 10 INJECTION INTRAVENOUS at 11:17:00

## 2022-05-03 RX ADMIN — LABETALOL HYDROCHLORIDE 10 MG: 5 INJECTION, SOLUTION INTRAVENOUS at 11:19:00

## 2022-05-03 NOTE — ANESTHESIA POSTPROCEDURE EVALUATION
[address] [facility] Patient Status:  Hospital Outpatient Surgery   Age/Gender 76year old male MRN [id number]   Location [address] [address] Attending Gregg Horvath MD   [facility] Day # 0 PCP Romy Marks MD       Anesthesia Post-op Note    COLONOSCOPY    Procedure Summary     Date: 05/03/22 Room / Location: [facility] ENDOSCOPY 04 / [facility] ENDOSCOPY    Anesthesia Start: 1107 Anesthesia Stop: 9348    Procedure: COLONOSCOPY (N/A ) Diagnosis:       Encounter for screening colonoscopy      (diverticulosis, ascending AVM)    Surgeons: Gregg Horvath MD Anesthesiologist: Elmer Peraza MD    Anesthesia Type: MAC ASA Status: 2          Anesthesia Type: MAC    Vitals Value Taken Time   /65 05/03/22 1135   Temp  05/03/22 1135   Pulse 48 05/03/22 1134   Resp 17 05/03/22 1134   SpO2 99 % 05/03/22 1134   Vitals shown include unvalidated device data. Patient Location: Endoscopy    Anesthesia Type: MAC    Airway Patency: patent    Postop Pain Control: adequate    Mental Status: preanesthetic baseline    Nausea/Vomiting: none    Cardiopulmonary/Hydration status: stable euvolemic    Complications: no apparent anesthesia related complications    Postop vital signs: stable    Dental Exam: Unchanged from Preop    Patient to be discharged from PACU when criteria met.

## 2022-05-03 NOTE — INTERVAL H&P NOTE
Pre-op Diagnosis: Encounter for screening colonoscopy [Z12.11]    The above referenced H&P was reviewed by Hollice Schaumann, MD on 5/3/2022, the patient was examined and no significant changes have occurred in the patient's condition since the H&P was performed. I discussed with the patient and/or legal representative the potential benefits, risks and side effects of this procedure; the likelihood of the patient achieving goals; and potential problems that might occur during recuperation. I discussed reasonable alternatives to the procedure, including risks, benefits and side effects related to the alternatives and risks related to not receiving this procedure. We will proceed with procedure as planned.

## 2022-05-03 NOTE — PLAN OF CARE
Admitted, vitals stable, patient was here for OP colonoscopy and went into afib rvr afterwards. Started on a cardizem gtt at 10mg/hr per cards, titrated down to 5mg/hr for HR in the 70's. Tolerating well,  systolic, -018 when standing upon admission for orthostatic bp's. Started heparin gtt as well, echo ordered for AM.  Also needs abdominal and chest xray. Wife and daughter present in the room for the entire admission and rest of the shift, all questions answered, frequent rounding, bed alarm on.         Problem: Patient/Family Goals  Goal: Patient/Family Long Term Goal  Description: Patient's Long Term Goal: get hernia surgery in a couple weeks as planned    Interventions:  - general surgery on consult, they just completed his pre-op colonoscopy, but patient went into new afib after.    -Surgery okay with AC dosed by cardiology, they will adjust hernia sx accordingly.  - See additional Care Plan goals for specific interventions  5/3/2022 1539 by Tadeo Webster RN  Outcome: Progressing  5/3/2022 1539 by Tadeo Webster RN  Outcome: Progressing  Goal: Patient/Family Short Term Goal  Description: Patient's Short Term Goal: get answers on afib    Interventions:   - HR already responding to cardizem gtt at 10mg/hr, HR upon admission was 100-106, currently 70bpm a few hours after gtt placement  - See additional Care Plan goals for specific interventions  5/3/2022 1539 by Tadeo Webster RN  Outcome: Progressing  5/3/2022 1539 by Tadeo Webster RN  Outcome: Progressing     Problem: CARDIOVASCULAR - ADULT  Goal: Maintains optimal cardiac output and hemodynamic stability  Description: INTERVENTIONS:  - Monitor vital signs, rhythm, and trends  - Monitor for bleeding, hypotension and signs of decreased cardiac output  - Evaluate effectiveness of vasoactive medications to optimize hemodynamic stability  - Monitor arterial and/or venous puncture sites for bleeding and/or hematoma  - Assess quality of pulses, skin color and temperature  - Assess for signs of decreased coronary artery perfusion - ex.  Angina  - Evaluate fluid balance, assess for edema, trend weights  Outcome: Progressing  Goal: Absence of cardiac arrhythmias or at baseline  Description: INTERVENTIONS:  - Continuous cardiac monitoring, monitor vital signs, obtain 12 lead EKG if indicated  - Evaluate effectiveness of antiarrhythmic and heart rate control medications as ordered  - Initiate emergency measures for life threatening arrhythmias  - Monitor electrolytes and administer replacement therapy as ordered  Outcome: Progressing

## 2022-05-03 NOTE — OPERATIVE REPORT
[facility]  Op Note    Anneliese Bender Location: OR   [facility] [id number] MRN [id number]   Admission Date 5/3/2022 Operation Date 5/3/2022   Attending Physician Holly Severe, MD Operating Physician Sharan Rowe MD     Pre-Operative Diagnosis: Encounter for screening colonoscopy [Z12.11]    Post-Operative Diagnosis: Arteriovenous malformation ascending colon, minimal diverticulosis    Procedure Performed: Colonoscopy    Surgeon(s) and Role:     Miles Ramos MD - Primary    Anesthesia: MAC       History of present illness: The patient states he has not had a colonoscopy in 12 years. His last colonoscopy was performed by me. He has no history of colon polyps or family history of colon polyps/cancer. The patient denies blood, mucus, black/tarry stools. The patient states he is chronically constipated. He typically has 2-3 bowel movements per week. He takes a combination of MiraLAX, Dulcolax and Metamucil for his constipation. He states he only takes the stool softeners approximately 1 time per week. The patient denies abdominal pain or distention. He denies fevers, chills, nausea or vomiting. ASA score: 2  1. Normal healthy patient  2. Patient with mild systemic disease  3. Patient with severe systemic disease  4. Patient with severe systemic disease that is a constant threat to life  5. Moribund patient who is not expected to survive without the procedure    Operative findings:  Small patch of arteriovenous malformation 1 fold above the ileocecal valve. It is mainly on the arterial side with arterial telangiectasias. It is a slightly unusual appearance, I will want to see it again in 1 year. It is approximately 1.5 cm. There are few scattered diverticuli in the descending and sigmoid colon. There is small pocket. No polyps, colitis, neoplasms, or inflammation.     Operative summary:  Following adequate IV sedation, the patient was placed in the left lateral decubitus position on the operating room table. Digital rectal examination was performed. The colonoscope was inserted, and passed to the end of the colon. Upon withdrawal of the scope, the prep was rated as follows and the [facility] bowel prep scale:  3    0. Unprepared colon segment with stool but cannot be cleared  1. Portion of mucosa in segments seen after cleaning, but other areas not seen because of retained material  2. Minor residual material after cleaning, but mucosa of segment generally well seen  3. Entire mucosa of segment well seen after cleaning        Digital rectal examination was performed, the rectal exam had the following findings:   Normal    Landmarks were identified confirming the location of the colonoscope in the cecum, including the transverse cecal fold, and the ileocecal valve. Upon withdrawal of the scope, the patient had the following findings:  The patient does not have polyps. The patient does have diverticulosis. The patient does not have inflammation. This patient has not had a previous colon resection. The scope was retroverted in the anal canal, the anal canal had the following findings:    The patient does not have internal hemorrhoids. The patient does not have anal canal polyps. The patient does not have hypertrophied anal papillae. 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 I will want to see this again in 1 year. Minimal scattered diverticuli of the distal descending and sigmoid colon. The scope was withdrawn, the patient was taken to the recovery room in stable postoperative condition. Pathologic specimens:  None    Complications: None      Addendum: This patient will require further colonoscopy in 1 years based on the history of an unusual AVM in the ascending colon.        Barry Aguillon MD  5/3/2022  11:30 AM

## 2022-05-04 ENCOUNTER — APPOINTMENT (OUTPATIENT)
Dept: CV DIAGNOSTICS | Facility: HOSPITAL | Age: 77
End: 2022-05-04
Attending: NURSE PRACTITIONER
Payer: MEDICARE

## 2022-05-04 VITALS
HEIGHT: 71 IN | RESPIRATION RATE: 18 BRPM | DIASTOLIC BLOOD PRESSURE: 74 MMHG | HEART RATE: 74 BPM | BODY MASS INDEX: 29.4 KG/M2 | SYSTOLIC BLOOD PRESSURE: 123 MMHG | TEMPERATURE: 98 F | WEIGHT: 210 LBS | OXYGEN SATURATION: 96 %

## 2022-05-04 LAB
ANION GAP SERPL CALC-SCNC: 6 MMOL/L (ref 0–18)
APTT PPP: 53.4 SECONDS (ref 23.3–35.6)
BUN BLD-MCNC: 23 MG/DL (ref 7–18)
CALCIUM BLD-MCNC: 8.8 MG/DL (ref 8.5–10.1)
CHLORIDE SERPL-SCNC: 109 MMOL/L (ref 98–112)
CO2 SERPL-SCNC: 26 MMOL/L (ref 21–32)
CREAT BLD-MCNC: 1.09 MG/DL
ERYTHROCYTE [DISTWIDTH] IN BLOOD BY AUTOMATED COUNT: 12.7 %
GLUCOSE BLD-MCNC: 109 MG/DL (ref 70–99)
HCT VFR BLD AUTO: 41.2 %
HGB BLD-MCNC: 13.7 G/DL
MAGNESIUM SERPL-MCNC: 2.2 MG/DL (ref 1.6–2.6)
MCH RBC QN AUTO: 31.1 PG (ref 26–34)
MCHC RBC AUTO-ENTMCNC: 33.3 G/DL (ref 31–37)
MCV RBC AUTO: 93.4 FL
OSMOLALITY SERPL CALC.SUM OF ELEC: 296 MOSM/KG (ref 275–295)
PHOSPHATE SERPL-MCNC: 3.2 MG/DL (ref 2.5–4.9)
PLATELET # BLD AUTO: 216 10(3)UL (ref 150–450)
POTASSIUM SERPL-SCNC: 4 MMOL/L (ref 3.5–5.1)
RBC # BLD AUTO: 4.41 X10(6)UL
SODIUM SERPL-SCNC: 141 MMOL/L (ref 136–145)
WBC # BLD AUTO: 8.1 X10(3) UL (ref 4–11)

## 2022-05-04 PROCEDURE — 99217 OBSERVATION CARE DISCHARGE: CPT | Performed by: INTERNAL MEDICINE

## 2022-05-04 PROCEDURE — 93306 TTE W/DOPPLER COMPLETE: CPT | Performed by: NURSE PRACTITIONER

## 2022-05-04 RX ORDER — ESCITALOPRAM OXALATE 5 MG/1
5 TABLET ORAL DAILY
Status: DISCONTINUED | OUTPATIENT
Start: 2022-05-04 | End: 2022-05-04

## 2022-05-04 RX ORDER — ESCITALOPRAM OXALATE 5 MG/1
5 TABLET ORAL DAILY
Qty: 30 TABLET | Refills: 0 | Status: SHIPPED | OUTPATIENT
Start: 2022-05-04 | End: 2022-05-09

## 2022-05-04 RX ORDER — ALPRAZOLAM 0.25 MG/1
0.25 TABLET ORAL 2 TIMES DAILY PRN
Qty: 10 TABLET | Refills: 0 | Status: SHIPPED | OUTPATIENT
Start: 2022-05-04

## 2022-05-04 NOTE — PLAN OF CARE
Assumed care of patient at 1. Pt A/Ox 4. O2 sats maintained on room air. Afib on tele. HR between 60-70s. Heparin drip infusing per protocol. Cardizem drip infusing at 2.5mg/hr per MD orders. Last BM today. Voiding without difficulty. Pt denies any pain. Pt up standby assist. Pt updated on plan of care. Care needs met. Bed in lowest position, Call light within reach. Bed alarm on. Problem: Patient/Family Goals  Goal: Patient/Family Long Term Goal  Description: Patient's Long Term Goal: get hernia surgery in a couple weeks as planned    Interventions:  - general surgery on consult, they just completed his pre-op colonoscopy, but patient went into new afib after.    -Surgery okay with AC dosed by cardiology, they will adjust hernia sx accordingly.  - See additional Care Plan goals for specific interventions  Outcome: Progressing  Goal: Patient/Family Short Term Goal  Description: Patient's Short Term Goal: get answers on afib    Interventions:   - Heparin and Cardizem drip  - See additional Care Plan goals for specific interventions  Outcome: Progressing     Problem: CARDIOVASCULAR - ADULT  Goal: Maintains optimal cardiac output and hemodynamic stability  Description: INTERVENTIONS:  - Monitor vital signs, rhythm, and trends  - Monitor for bleeding, hypotension and signs of decreased cardiac output  - Evaluate effectiveness of vasoactive medications to optimize hemodynamic stability  - Monitor arterial and/or venous puncture sites for bleeding and/or hematoma  - Assess quality of pulses, skin color and temperature  - Assess for signs of decreased coronary artery perfusion - ex.  Angina  - Evaluate fluid balance, assess for edema, trend weights  Outcome: Progressing  Goal: Absence of cardiac arrhythmias or at baseline  Description: INTERVENTIONS:  - Continuous cardiac monitoring, monitor vital signs, obtain 12 lead EKG if indicated  - Evaluate effectiveness of antiarrhythmic and heart rate control medications as ordered  - Initiate emergency measures for life threatening arrhythmias  - Monitor electrolytes and administer replacement therapy as ordered  Outcome: Progressing

## 2022-05-04 NOTE — CM/SW NOTE
Xarelto priced at [employer] [employer] [facility] 30 day coupon provided over the phone. Rep at [facility] said the coupon discounted the entire medication so it is free. Will give coupon card to RN to give to pt.      HAFSA Ko, [facility]   / Discharge Planner  [id number]

## 2022-05-04 NOTE — PLAN OF CARE
Assumed care of patient at 0730. Alert and oriented. Vital signs stable. Atrial fibrillation on telemetry. Rec'd patient with heparin gtt and cardizem gtt infusing. Echo at bedside. Cardizem gtt stopped after oral beta blocker given. Plan of care updated with patient and family. Will cont to monitor. Problem: Patient/Family Goals  Goal: Patient/Family Long Term Goal  Description: Patient's Long Term Goal: get hernia surgery in a couple weeks as planned    Interventions:  - general surgery on consult, they just completed his pre-op colonoscopy, but patient went into new afib after.    -Surgery okay with AC dosed by cardiology, they will adjust hernia sx accordingly.  - See additional Care Plan goals for specific interventions  Outcome: Progressing  Goal: Patient/Family Short Term Goal  Description: Patient's Short Term Goal: get answers on afib    Interventions:   - Heparin and Cardizem drip  - See additional Care Plan goals for specific interventions  Outcome: Progressing     Problem: CARDIOVASCULAR - ADULT  Goal: Maintains optimal cardiac output and hemodynamic stability  Description: INTERVENTIONS:  - Monitor vital signs, rhythm, and trends  - Monitor for bleeding, hypotension and signs of decreased cardiac output  - Evaluate effectiveness of vasoactive medications to optimize hemodynamic stability  - Monitor arterial and/or venous puncture sites for bleeding and/or hematoma  - Assess quality of pulses, skin color and temperature  - Assess for signs of decreased coronary artery perfusion - ex.  Angina  - Evaluate fluid balance, assess for edema, trend weights  Outcome: Progressing  Goal: Absence of cardiac arrhythmias or at baseline  Description: INTERVENTIONS:  - Continuous cardiac monitoring, monitor vital signs, obtain 12 lead EKG if indicated  - Evaluate effectiveness of antiarrhythmic and heart rate control medications as ordered  - Initiate emergency measures for life threatening arrhythmias  - Monitor electrolytes and administer replacement therapy as ordered  Outcome: Progressing

## 2022-05-04 NOTE — CM/SW NOTE
Patient failed inpatient criteria. Second level of review completed and supports observation. UR committee in agreement. Discussed with Dr. Magi Bear  who approves observation status. MOON given to the patient and order written.

## 2022-05-05 ENCOUNTER — PATIENT OUTREACH (OUTPATIENT)
Dept: CASE MANAGEMENT | Age: 77
End: 2022-05-05

## 2022-05-06 ENCOUNTER — PATIENT MESSAGE (OUTPATIENT)
Dept: INTERNAL MEDICINE CLINIC | Facility: CLINIC | Age: 77
End: 2022-05-06

## 2022-05-09 ENCOUNTER — OFFICE VISIT (OUTPATIENT)
Dept: INTERNAL MEDICINE CLINIC | Facility: CLINIC | Age: 77
End: 2022-05-09
Payer: MEDICARE

## 2022-05-09 VITALS
OXYGEN SATURATION: 98 % | BODY MASS INDEX: 29.4 KG/M2 | RESPIRATION RATE: 16 BRPM | WEIGHT: 210 LBS | DIASTOLIC BLOOD PRESSURE: 76 MMHG | HEIGHT: 71 IN | TEMPERATURE: 97 F | HEART RATE: 78 BPM | SYSTOLIC BLOOD PRESSURE: 122 MMHG

## 2022-05-09 DIAGNOSIS — I48.91 ATRIAL FIBRILLATION WITH RVR (HCC): ICD-10-CM

## 2022-05-09 DIAGNOSIS — K40.20 BILATERAL INGUINAL HERNIA WITHOUT OBSTRUCTION OR GANGRENE, RECURRENCE NOT SPECIFIED: ICD-10-CM

## 2022-05-09 DIAGNOSIS — N40.1 BPH WITH OBSTRUCTION/LOWER URINARY TRACT SYMPTOMS: ICD-10-CM

## 2022-05-09 DIAGNOSIS — N13.8 BPH WITH OBSTRUCTION/LOWER URINARY TRACT SYMPTOMS: ICD-10-CM

## 2022-05-09 DIAGNOSIS — Z09 HOSPITAL DISCHARGE FOLLOW-UP: Primary | ICD-10-CM

## 2022-05-09 DIAGNOSIS — K55.20 ARTERIOVENOUS MALFORMATION OF COLON: ICD-10-CM

## 2022-05-09 DIAGNOSIS — F41.9 ANXIETY: ICD-10-CM

## 2022-05-09 DIAGNOSIS — I10 PRIMARY HYPERTENSION: ICD-10-CM

## 2022-05-09 DIAGNOSIS — K55.20 AVM (ARTERIOVENOUS MALFORMATION) OF COLON: ICD-10-CM

## 2022-05-09 RX ORDER — ESCITALOPRAM OXALATE 5 MG/1
10 TABLET ORAL DAILY
Qty: 30 TABLET | Refills: 0 | COMMUNITY
Start: 2022-05-09

## 2022-05-09 RX ORDER — ESCITALOPRAM OXALATE 10 MG/1
10 TABLET ORAL DAILY
Qty: 30 TABLET | Refills: 0 | Status: SHIPPED | OUTPATIENT
Start: 2022-05-09

## 2022-05-12 NOTE — TELEPHONE ENCOUNTER
Pt has appoitment with cardiologist on 5/13 for clearance and management of eloquis. Pt to inform us on Monday if he needs to reschedule his surgery for Wed. 5-18

## 2022-05-16 ENCOUNTER — MED REC SCAN ONLY (OUTPATIENT)
Dept: INTERNAL MEDICINE CLINIC | Facility: CLINIC | Age: 77
End: 2022-05-16

## 2022-05-16 ENCOUNTER — TELEPHONE (OUTPATIENT)
Dept: SURGERY | Facility: CLINIC | Age: 77
End: 2022-05-16

## 2022-05-18 DIAGNOSIS — K40.20 BILATERAL INGUINAL HERNIA WITHOUT OBSTRUCTION OR GANGRENE, RECURRENCE NOT SPECIFIED: Primary | ICD-10-CM

## 2022-05-27 DIAGNOSIS — R35.1 NOCTURIA: ICD-10-CM

## 2022-05-27 DIAGNOSIS — R39.9 LOWER URINARY TRACT SYMPTOMS: ICD-10-CM

## 2022-05-27 DIAGNOSIS — R39.12 WEAK URINARY STREAM: ICD-10-CM

## 2022-05-27 DIAGNOSIS — N40.0 ENLARGED PROSTATE: ICD-10-CM

## 2022-05-31 DIAGNOSIS — R39.12 WEAK URINARY STREAM: ICD-10-CM

## 2022-05-31 DIAGNOSIS — R35.1 NOCTURIA: ICD-10-CM

## 2022-05-31 DIAGNOSIS — N40.0 ENLARGED PROSTATE: ICD-10-CM

## 2022-05-31 DIAGNOSIS — R39.9 LOWER URINARY TRACT SYMPTOMS: ICD-10-CM

## 2022-05-31 NOTE — TELEPHONE ENCOUNTER
Last OV relevant to medication: 5/9/22  Last refill date: 5/9/22     #/refills: 30/0  When pt was asked to return for OV: . Return in about 4 weeks (around 6/6/2022) for med check, Anxiety- increase lexapro to 10 mg , take 2 of the 5 mg tabs until finished then fill 10 mg , f/u 1 month  Upcoming appt/reason:   Future Appointments   Date Time Provider Rachel Arevalo   6/6/2022  1:20 PM Russell Arroyo MD EMG 29 EMG N Zandra   6/9/2022  1:00 PM Trish Spencer., PAARI [id number] EC Nap 4     Was pt informed of any over due labs: n/a    Would you like to send this rx now or have med refilled at appt on 6/6/22

## 2022-06-06 ENCOUNTER — TELEPHONE (OUTPATIENT)
Dept: INTERNAL MEDICINE CLINIC | Facility: CLINIC | Age: 77
End: 2022-06-06

## 2022-06-06 PROBLEM — K40.90 NON-RECURRENT UNILATERAL INGUINAL HERNIA WITHOUT OBSTRUCTION OR GANGRENE: Status: RESOLVED | Noted: 2022-03-04 | Resolved: 2022-06-06

## 2022-06-06 PROBLEM — Z12.11 ENCOUNTER FOR SCREENING COLONOSCOPY: Status: RESOLVED | Noted: 2022-04-04 | Resolved: 2022-06-06

## 2022-06-06 RX ORDER — TAMSULOSIN HYDROCHLORIDE 0.4 MG/1
0.4 CAPSULE ORAL DAILY
Qty: 90 CAPSULE | Refills: 3 | Status: SHIPPED | OUTPATIENT
Start: 2022-06-06 | End: 2023-06-01

## 2022-06-06 RX ORDER — TAMSULOSIN HYDROCHLORIDE 0.4 MG/1
CAPSULE ORAL
Qty: 30 CAPSULE | Refills: 0 | OUTPATIENT
Start: 2022-06-06

## 2022-06-06 NOTE — TELEPHONE ENCOUNTER
Refill request for Tamsulosin was sent in 6/6/2022. This is a duplicate request and will be denied at this time.

## 2022-06-07 NOTE — TELEPHONE ENCOUNTER
Faxed medical record request to [facility] to retrieve recent ov notes for pt   [phone], confirmation received   Awaiting records

## 2022-06-08 NOTE — TELEPHONE ENCOUNTER
Incoming (mail or fax):  fax  Received from:  [facility]  Documentation given to:  Triage

## 2022-06-08 NOTE — TELEPHONE ENCOUNTER
Received recent ov notes for pt from [facility] 5/13/22  Copy sent to scan   To dr Loren Bennett for review   Recent ekg is attached (last page)

## 2022-06-09 ENCOUNTER — OFFICE VISIT (OUTPATIENT)
Dept: SURGERY | Facility: CLINIC | Age: 77
End: 2022-06-09
Payer: MEDICARE

## 2022-06-09 DIAGNOSIS — N40.1 BPH WITH OBSTRUCTION/LOWER URINARY TRACT SYMPTOMS: Primary | ICD-10-CM

## 2022-06-09 DIAGNOSIS — N13.8 BPH WITH OBSTRUCTION/LOWER URINARY TRACT SYMPTOMS: Primary | ICD-10-CM

## 2022-06-09 DIAGNOSIS — R82.90 URINE FINDING: ICD-10-CM

## 2022-06-09 LAB
APPEARANCE: CLEAR
BILIRUBIN: NEGATIVE
GLUCOSE (URINE DIPSTICK): NEGATIVE MG/DL
KETONES (URINE DIPSTICK): NEGATIVE MG/DL
LEUKOCYTES: NEGATIVE
MULTISTIX LOT#: ABNORMAL NUMERIC
NITRITE, URINE: NEGATIVE
PH, URINE: 6 (ref 4.5–8)
PROTEIN (URINE DIPSTICK): NEGATIVE MG/DL
SPECIFIC GRAVITY: 1.02 (ref 1–1.03)
URINE-COLOR: YELLOW
UROBILINOGEN,SEMI-QN: 0.2 MG/DL (ref 0–1.9)

## 2022-06-09 PROCEDURE — 99213 OFFICE O/P EST LOW 20 MIN: CPT | Performed by: PHYSICIAN ASSISTANT

## 2022-06-09 PROCEDURE — 81003 URINALYSIS AUTO W/O SCOPE: CPT | Performed by: PHYSICIAN ASSISTANT

## 2022-06-09 RX ORDER — TAMSULOSIN HYDROCHLORIDE 0.4 MG/1
0.8 CAPSULE ORAL EVERY EVENING
Qty: 180 CAPSULE | Refills: 5 | Status: SHIPPED | OUTPATIENT
Start: 2022-06-09 | End: 2022-09-07

## 2022-06-09 NOTE — PATIENT INSTRUCTIONS
tamsulosin  - increase to 2 capsules daily. Take miralax, one capful twice daily until having daily soft bowel movements. Then decrease to taking it once daily with breakfast.  Take supplemental fiber daily (Citrucel or similar) with breakfast.  Increase vegetables and fruit in the diet, and try to decrease meat and dairy. Drink plenty of water. Try to keep the urine clear or light yellow during the day. Remember if the bowels are not moving well, the bladder will not work well either. Follow up in 6-8 weeks.

## 2022-06-14 ENCOUNTER — TELEPHONE (OUTPATIENT)
Dept: INTERNAL MEDICINE CLINIC | Facility: CLINIC | Age: 77
End: 2022-06-14

## 2022-06-14 ENCOUNTER — MED REC SCAN ONLY (OUTPATIENT)
Dept: INTERNAL MEDICINE CLINIC | Facility: CLINIC | Age: 77
End: 2022-06-14

## 2022-06-14 NOTE — TELEPHONE ENCOUNTER
Incoming (mail or fax):  fax  Received from:  [facility]  Documentation given to:  Triage incoming bin    Request for home health orders

## 2022-06-15 NOTE — TELEPHONE ENCOUNTER
Received request for home health care orders from [facility] home care  form is requesting we send recent ov notes to [phone]  Are you ok with pt having new home health orders? This fax was slightly difficult to read   There is a pending folder I will put this in for you to look at tomorrow.  It should be next to where I was sitting today

## 2022-06-16 NOTE — TELEPHONE ENCOUNTER
Received request for home health care orders from [facility] home care  form is requesting we send recent ov notes to [phone]  Are you ok with pt having new home health orders?  This patient was recently seen so home health care is requesting the most recent ov notes to be sent as well as a order for home health services

## 2022-06-22 ENCOUNTER — ANESTHESIA EVENT (OUTPATIENT)
Dept: SURGERY | Facility: HOSPITAL | Age: 77
End: 2022-06-22
Payer: MEDICARE

## 2022-06-22 ENCOUNTER — LAB ENCOUNTER (OUTPATIENT)
Dept: LAB | Facility: HOSPITAL | Age: 77
End: 2022-06-22
Attending: COLON & RECTAL SURGERY
Payer: MEDICARE

## 2022-06-22 DIAGNOSIS — Z01.812 ENCOUNTER FOR PREOPERATIVE SCREENING LABORATORY TESTING FOR COVID-19 VIRUS: ICD-10-CM

## 2022-06-22 DIAGNOSIS — Z20.822 ENCOUNTER FOR PREOPERATIVE SCREENING LABORATORY TESTING FOR COVID-19 VIRUS: ICD-10-CM

## 2022-06-22 NOTE — TELEPHONE ENCOUNTER
Called and spoke with willy at [address] [place] [address] home care and informed her we do not have a medicare diagnosis that would qualify the patient for home health. She informed me the Pts daughter reached out to their company to have home health start. She stated understanding and has no further questions at this time since there is no qualifying diagnosis that can be applied for the patient.

## 2022-06-23 LAB — SARS-COV-2 RNA RESP QL NAA+PROBE: NOT DETECTED

## 2022-06-24 ENCOUNTER — HOSPITAL ENCOUNTER (OUTPATIENT)
Facility: HOSPITAL | Age: 77
Setting detail: HOSPITAL OUTPATIENT SURGERY
Discharge: HOME | End: 2022-06-24
Attending: COLON & RECTAL SURGERY | Admitting: COLON & RECTAL SURGERY
Payer: MEDICARE

## 2022-06-24 ENCOUNTER — ANESTHESIA (OUTPATIENT)
Dept: SURGERY | Facility: HOSPITAL | Age: 77
End: 2022-06-24
Payer: MEDICARE

## 2022-06-24 VITALS
TEMPERATURE: 97 F | SYSTOLIC BLOOD PRESSURE: 154 MMHG | OXYGEN SATURATION: 98 % | HEIGHT: 71 IN | HEART RATE: 103 BPM | DIASTOLIC BLOOD PRESSURE: 85 MMHG | RESPIRATION RATE: 15 BRPM | BODY MASS INDEX: 29.2 KG/M2 | WEIGHT: 208.56 LBS

## 2022-06-24 DIAGNOSIS — Z01.812 ENCOUNTER FOR PREOPERATIVE SCREENING LABORATORY TESTING FOR COVID-19 VIRUS: Primary | ICD-10-CM

## 2022-06-24 DIAGNOSIS — K40.20 BILATERAL INGUINAL HERNIA WITHOUT OBSTRUCTION OR GANGRENE, RECURRENCE NOT SPECIFIED: ICD-10-CM

## 2022-06-24 DIAGNOSIS — Z20.822 ENCOUNTER FOR PREOPERATIVE SCREENING LABORATORY TESTING FOR COVID-19 VIRUS: Primary | ICD-10-CM

## 2022-06-24 PROCEDURE — 0YUA4JZ SUPPLEMENT BILATERAL INGUINAL REGION WITH SYNTHETIC SUBSTITUTE, PERCUTANEOUS ENDOSCOPIC APPROACH: ICD-10-PCS | Performed by: COLON & RECTAL SURGERY

## 2022-06-24 DEVICE — BARD MESH
Type: IMPLANTABLE DEVICE | Site: GROIN | Status: FUNCTIONAL
Brand: BARD MESH

## 2022-06-24 RX ORDER — GLYCOPYRROLATE 0.2 MG/ML
INJECTION, SOLUTION INTRAMUSCULAR; INTRAVENOUS AS NEEDED
Status: DISCONTINUED | OUTPATIENT
Start: 2022-06-24 | End: 2022-06-24 | Stop reason: SURG

## 2022-06-24 RX ORDER — MIDAZOLAM HYDROCHLORIDE 1 MG/ML
1 INJECTION INTRAMUSCULAR; INTRAVENOUS EVERY 5 MIN PRN
Status: DISCONTINUED | OUTPATIENT
Start: 2022-06-24 | End: 2022-06-24

## 2022-06-24 RX ORDER — ACETAMINOPHEN 500 MG
1000 TABLET ORAL ONCE AS NEEDED
Status: COMPLETED | OUTPATIENT
Start: 2022-06-24 | End: 2022-06-24

## 2022-06-24 RX ORDER — MEPERIDINE HYDROCHLORIDE 25 MG/ML
12.5 INJECTION INTRAMUSCULAR; INTRAVENOUS; SUBCUTANEOUS AS NEEDED
Status: DISCONTINUED | OUTPATIENT
Start: 2022-06-24 | End: 2022-06-24

## 2022-06-24 RX ORDER — HYDROCODONE BITARTRATE AND ACETAMINOPHEN 5; 325 MG/1; MG/1
1 TABLET ORAL ONCE AS NEEDED
Status: COMPLETED | OUTPATIENT
Start: 2022-06-24 | End: 2022-06-24

## 2022-06-24 RX ORDER — DEXAMETHASONE SODIUM PHOSPHATE 4 MG/ML
VIAL (ML) INJECTION AS NEEDED
Status: DISCONTINUED | OUTPATIENT
Start: 2022-06-24 | End: 2022-06-24 | Stop reason: SURG

## 2022-06-24 RX ORDER — HYDROMORPHONE HYDROCHLORIDE 1 MG/ML
0.6 INJECTION, SOLUTION INTRAMUSCULAR; INTRAVENOUS; SUBCUTANEOUS EVERY 5 MIN PRN
Status: DISCONTINUED | OUTPATIENT
Start: 2022-06-24 | End: 2022-06-24

## 2022-06-24 RX ORDER — HEPARIN SODIUM 5000 [USP'U]/ML
5000 INJECTION, SOLUTION INTRAVENOUS; SUBCUTANEOUS ONCE
Status: COMPLETED | OUTPATIENT
Start: 2022-06-24 | End: 2022-06-24

## 2022-06-24 RX ORDER — HYDROCODONE BITARTRATE AND ACETAMINOPHEN 5; 325 MG/1; MG/1
2 TABLET ORAL ONCE AS NEEDED
Status: COMPLETED | OUTPATIENT
Start: 2022-06-24 | End: 2022-06-24

## 2022-06-24 RX ORDER — ROCURONIUM BROMIDE 10 MG/ML
INJECTION, SOLUTION INTRAVENOUS AS NEEDED
Status: DISCONTINUED | OUTPATIENT
Start: 2022-06-24 | End: 2022-06-24 | Stop reason: SURG

## 2022-06-24 RX ORDER — BUPIVACAINE HYDROCHLORIDE AND EPINEPHRINE 5; 5 MG/ML; UG/ML
INJECTION, SOLUTION EPIDURAL; INTRACAUDAL; PERINEURAL AS NEEDED
Status: DISCONTINUED | OUTPATIENT
Start: 2022-06-24 | End: 2022-06-24 | Stop reason: HOSPADM

## 2022-06-24 RX ORDER — PHENYLEPHRINE HCL 10 MG/ML
VIAL (ML) INJECTION AS NEEDED
Status: DISCONTINUED | OUTPATIENT
Start: 2022-06-24 | End: 2022-06-24 | Stop reason: SURG

## 2022-06-24 RX ORDER — METOPROLOL TARTRATE 5 MG/5ML
INJECTION INTRAVENOUS
Status: DISCONTINUED
Start: 2022-06-24 | End: 2022-06-24 | Stop reason: WASHOUT

## 2022-06-24 RX ORDER — SODIUM CHLORIDE, SODIUM LACTATE, POTASSIUM CHLORIDE, CALCIUM CHLORIDE 600; 310; 30; 20 MG/100ML; MG/100ML; MG/100ML; MG/100ML
INJECTION, SOLUTION INTRAVENOUS CONTINUOUS
Status: DISCONTINUED | OUTPATIENT
Start: 2022-06-24 | End: 2022-06-24

## 2022-06-24 RX ORDER — DIPHENHYDRAMINE HYDROCHLORIDE 50 MG/ML
12.5 INJECTION INTRAMUSCULAR; INTRAVENOUS AS NEEDED
Status: DISCONTINUED | OUTPATIENT
Start: 2022-06-24 | End: 2022-06-24

## 2022-06-24 RX ORDER — METOPROLOL TARTRATE 5 MG/5ML
INJECTION INTRAVENOUS AS NEEDED
Status: DISCONTINUED | OUTPATIENT
Start: 2022-06-24 | End: 2022-06-24 | Stop reason: SURG

## 2022-06-24 RX ORDER — NEOSTIGMINE METHYLSULFATE 1 MG/ML
INJECTION INTRAVENOUS AS NEEDED
Status: DISCONTINUED | OUTPATIENT
Start: 2022-06-24 | End: 2022-06-24 | Stop reason: SURG

## 2022-06-24 RX ORDER — CEFAZOLIN SODIUM/WATER 2 G/20 ML
2 SYRINGE (ML) INTRAVENOUS ONCE
Status: COMPLETED | OUTPATIENT
Start: 2022-06-24 | End: 2022-06-24

## 2022-06-24 RX ORDER — HYDROMORPHONE HYDROCHLORIDE 1 MG/ML
0.4 INJECTION, SOLUTION INTRAMUSCULAR; INTRAVENOUS; SUBCUTANEOUS EVERY 5 MIN PRN
Status: DISCONTINUED | OUTPATIENT
Start: 2022-06-24 | End: 2022-06-24

## 2022-06-24 RX ORDER — ACETAMINOPHEN 500 MG
1000 TABLET ORAL ONCE
Status: DISCONTINUED | OUTPATIENT
Start: 2022-06-24 | End: 2022-06-24 | Stop reason: HOSPADM

## 2022-06-24 RX ORDER — ONDANSETRON 2 MG/ML
4 INJECTION INTRAMUSCULAR; INTRAVENOUS EVERY 6 HOURS PRN
Status: DISCONTINUED | OUTPATIENT
Start: 2022-06-24 | End: 2022-06-24

## 2022-06-24 RX ORDER — HYDROCODONE BITARTRATE AND ACETAMINOPHEN 5; 325 MG/1; MG/1
1 TABLET ORAL EVERY 6 HOURS PRN
Qty: 15 TABLET | Refills: 0 | Status: SHIPPED | OUTPATIENT
Start: 2022-06-24

## 2022-06-24 RX ORDER — HYDROMORPHONE HYDROCHLORIDE 1 MG/ML
0.2 INJECTION, SOLUTION INTRAMUSCULAR; INTRAVENOUS; SUBCUTANEOUS EVERY 5 MIN PRN
Status: DISCONTINUED | OUTPATIENT
Start: 2022-06-24 | End: 2022-06-24

## 2022-06-24 RX ORDER — LABETALOL HYDROCHLORIDE 5 MG/ML
10 INJECTION, SOLUTION INTRAVENOUS EVERY 10 MIN PRN
Status: DISCONTINUED | OUTPATIENT
Start: 2022-06-24 | End: 2022-06-24

## 2022-06-24 RX ORDER — HYDROMORPHONE HYDROCHLORIDE 1 MG/ML
INJECTION, SOLUTION INTRAMUSCULAR; INTRAVENOUS; SUBCUTANEOUS
Status: COMPLETED
Start: 2022-06-24 | End: 2022-06-24

## 2022-06-24 RX ORDER — METOCLOPRAMIDE HYDROCHLORIDE 5 MG/ML
10 INJECTION INTRAMUSCULAR; INTRAVENOUS EVERY 8 HOURS PRN
Status: DISCONTINUED | OUTPATIENT
Start: 2022-06-24 | End: 2022-06-24

## 2022-06-24 RX ORDER — ONDANSETRON 2 MG/ML
INJECTION INTRAMUSCULAR; INTRAVENOUS AS NEEDED
Status: DISCONTINUED | OUTPATIENT
Start: 2022-06-24 | End: 2022-06-24 | Stop reason: SURG

## 2022-06-24 RX ORDER — NALOXONE HYDROCHLORIDE 0.4 MG/ML
80 INJECTION, SOLUTION INTRAMUSCULAR; INTRAVENOUS; SUBCUTANEOUS AS NEEDED
Status: DISCONTINUED | OUTPATIENT
Start: 2022-06-24 | End: 2022-06-24

## 2022-06-24 RX ORDER — LIDOCAINE HYDROCHLORIDE 10 MG/ML
INJECTION, SOLUTION EPIDURAL; INFILTRATION; INTRACAUDAL; PERINEURAL AS NEEDED
Status: DISCONTINUED | OUTPATIENT
Start: 2022-06-24 | End: 2022-06-24 | Stop reason: SURG

## 2022-06-24 RX ORDER — ESMOLOL HYDROCHLORIDE 10 MG/ML
INJECTION INTRAVENOUS AS NEEDED
Status: DISCONTINUED | OUTPATIENT
Start: 2022-06-24 | End: 2022-06-24 | Stop reason: SURG

## 2022-06-24 RX ORDER — METOPROLOL TARTRATE 5 MG/5ML
2.5 INJECTION INTRAVENOUS ONCE
Status: DISCONTINUED | OUTPATIENT
Start: 2022-06-24 | End: 2022-06-24

## 2022-06-24 RX ADMIN — ROCURONIUM BROMIDE 40 MG: 10 INJECTION, SOLUTION INTRAVENOUS at 09:43:00

## 2022-06-24 RX ADMIN — PHENYLEPHRINE HCL 100 MCG: 10 MG/ML VIAL (ML) INJECTION at 09:59:00

## 2022-06-24 RX ADMIN — PHENYLEPHRINE HCL 100 MCG: 10 MG/ML VIAL (ML) INJECTION at 10:02:00

## 2022-06-24 RX ADMIN — CEFAZOLIN SODIUM/WATER 2 G: 2 G/20 ML SYRINGE (ML) INTRAVENOUS at 09:50:00

## 2022-06-24 RX ADMIN — ONDANSETRON 4 MG: 2 INJECTION INTRAMUSCULAR; INTRAVENOUS at 11:02:00

## 2022-06-24 RX ADMIN — METOPROLOL TARTRATE 3 MG: 5 INJECTION INTRAVENOUS at 10:20:00

## 2022-06-24 RX ADMIN — LIDOCAINE HYDROCHLORIDE 50 MG: 10 INJECTION, SOLUTION EPIDURAL; INFILTRATION; INTRACAUDAL; PERINEURAL at 09:43:00

## 2022-06-24 RX ADMIN — DEXAMETHASONE SODIUM PHOSPHATE 4 MG: 4 MG/ML VIAL (ML) INJECTION at 09:54:00

## 2022-06-24 RX ADMIN — ESMOLOL HYDROCHLORIDE 20 MG: 10 INJECTION INTRAVENOUS at 10:15:00

## 2022-06-24 RX ADMIN — NEOSTIGMINE METHYLSULFATE 3 MG: 1 INJECTION INTRAVENOUS at 11:02:00

## 2022-06-24 RX ADMIN — ROCURONIUM BROMIDE 10 MG: 10 INJECTION, SOLUTION INTRAVENOUS at 10:10:00

## 2022-06-24 RX ADMIN — GLYCOPYRROLATE 0.4 MG: 0.2 INJECTION, SOLUTION INTRAMUSCULAR; INTRAVENOUS at 11:02:00

## 2022-06-24 RX ADMIN — METOPROLOL TARTRATE 2 MG: 5 INJECTION INTRAVENOUS at 11:06:00

## 2022-06-24 NOTE — OPERATIVE REPORT
[facility]  Operative Note    Elva Dey Location: OR   CSN [id number] MRN [id number]   Admission Date 6/24/2022 Operation Date 6/24/2022   Attending Physician Rhett No MD Operating Physician Cassie Hicks MD     Pre-Operative Diagnosis: Bilateral inguinal hernia without obstruction or gangrene, recurrence not specified [K40.20]    Post-Operative Diagnosis: Same as above    Procedure Performed:  Bilateral Laparoscopic Inguinal Hernia Repair with Mesh    Surgeon(s) and Role:     Andrew Small MD - Primary    Assistant: Dillon Ren. LESVIA Mason  The assistance of the physician assistant was absolutely essential to the proper conduct of this case and further assisted with exact proper positioning of the mesh during tac application and complex dissection within the groin structures and anatomy. Anesthesia: General    History of Present Illness: This patient has bilateral inguinal hernias that are becoming symptomatic. Operative Findings: This patient was found to have a right indirect incarcerated inguinal hernia with small bowel contents; and a left indirect sliding inguinal hernia with incarcerated sigmoid colon  Diagnostic laparoscopy revealed no significant other findings  Liver within normal limits  No significant adhesions  The patient was delivered to recovery room in stable condition    Description of Procedure: Following adequate general anesthesia, the patient was placed in the supine position on the operating room table. The abdomen was scrubbed with Chlorhexidine. Sterile drapes were placed with wide exposure of the abdomen from xiphoid to pubis. The umbilicus was inverted. A supraumbilical incision was made. A Veress needle was inserted into the abdominal cavity and allowed to insufflate with CO2. An 11-mm trocar was placed via this incision and used for diagnostic laparoscopy. The laparoscopic findings are noted above.       Two other trocars were placed in a symmetric fashion lateral to the rectus sheath near the level of the umbilicus. Each hernia was approached in an identical fashion. We began each procedure with a transverse incision through the peritoneum at the level of the pelvic floor, above the hernia defect, and above the pelvic floor structures. The peritoneum was then dissected back, inverting the hernia sac into the abdominal cavity. We then performed careful dissection identifying the pubic tubercle, Jacques's ligament, the iliopubic tract, and the posterior rectus fascia. The Marlex mesh grafts were then fashioned to fit the hernia defect. They were secured medially to the pubic tubercle, posteriorly to Jacques's ligament, laterally above the iliopubic tract, and anteriorly to the posterior rectus fascia. Once this was accomplished, the peritoneum was then closed over the mesh repair. All of this was accomplished using the hernia tacker device. We then began with closure of the abdomen. All laparoscopic instruments were removed from the patient and accounted for on a side table. The CO2 was suctioned from the abdominal cavity. The umbilical fascial defect was closed with 0 Maxon. The skin ports were all closed with 5-0 undyed Vicryl in a subcuticular fashion, and 0.5% Marcaine with epinephrine was injected into the wound margins. Sterile dressings were placed, and the patient was transported to recovery in stable postoperative condition.       Complications: None    EBL: 5 cc    Pathologic specimens:  none    Barry Aguillon MD  6/24/2022  11:02 AM

## 2022-06-24 NOTE — ANESTHESIA PROCEDURE NOTES
Airway  Date/Time: 6/24/2022 9:45 AM  Urgency: elective    Airway not difficult    General Information and Staff    Patient location during procedure: OR  Anesthesiologist: Cony Sykes MD  Performed: anesthesiologist     Indications and Patient Condition  Indications for airway management: anesthesia  Sedation level: deep  Preoxygenated: yes  Patient position: sniffing  Mask difficulty assessment: 1 - vent by mask    Final Airway Details  Final airway type: endotracheal airway      Successful airway: ETT  Cuffed: yes   Successful intubation technique: direct laryngoscopy  Endotracheal tube insertion site: oral  Blade: Santos  Blade size: #3  ETT size (mm): 7.5    Cormack-Lehane Classification: grade I - full view of glottis  Placement verified by: chest auscultation and capnometry   Cuff volume (mL): 8  Measured from: lips  ETT to lips (cm): 21  Number of attempts at approach: 1

## 2022-06-27 ENCOUNTER — TELEPHONE (OUTPATIENT)
Facility: LOCATION | Age: 77
End: 2022-06-27

## 2022-06-30 ENCOUNTER — OFFICE VISIT (OUTPATIENT)
Facility: LOCATION | Age: 77
End: 2022-06-30

## 2022-06-30 VITALS
HEART RATE: 62 BPM | SYSTOLIC BLOOD PRESSURE: 125 MMHG | TEMPERATURE: 97 F | WEIGHT: 208 LBS | BODY MASS INDEX: 29.12 KG/M2 | HEIGHT: 71 IN | DIASTOLIC BLOOD PRESSURE: 85 MMHG

## 2022-06-30 DIAGNOSIS — K59.00 CONSTIPATION, UNSPECIFIED CONSTIPATION TYPE: ICD-10-CM

## 2022-06-30 DIAGNOSIS — Z98.890 POST-OPERATIVE STATE: Primary | ICD-10-CM

## 2022-06-30 DIAGNOSIS — K40.20 BILATERAL INGUINAL HERNIA WITHOUT OBSTRUCTION OR GANGRENE, RECURRENCE NOT SPECIFIED: ICD-10-CM

## 2022-06-30 PROCEDURE — 99024 POSTOP FOLLOW-UP VISIT: CPT

## 2022-07-30 ENCOUNTER — MED REC SCAN ONLY (OUTPATIENT)
Dept: INTERNAL MEDICINE CLINIC | Facility: CLINIC | Age: 77
End: 2022-07-30

## 2022-08-04 ENCOUNTER — OFFICE VISIT (OUTPATIENT)
Dept: SURGERY | Facility: CLINIC | Age: 77
End: 2022-08-04
Payer: MEDICARE

## 2022-08-04 DIAGNOSIS — R82.90 URINE FINDING: ICD-10-CM

## 2022-08-04 DIAGNOSIS — N13.8 BPH WITH OBSTRUCTION/LOWER URINARY TRACT SYMPTOMS: Primary | ICD-10-CM

## 2022-08-04 DIAGNOSIS — N40.1 BPH WITH OBSTRUCTION/LOWER URINARY TRACT SYMPTOMS: Primary | ICD-10-CM

## 2022-08-04 LAB
APPEARANCE: CLEAR
BILIRUBIN: NEGATIVE
GLUCOSE (URINE DIPSTICK): NEGATIVE MG/DL
KETONES (URINE DIPSTICK): NEGATIVE MG/DL
LEUKOCYTES: NEGATIVE
MULTISTIX LOT#: ABNORMAL NUMERIC
NITRITE, URINE: NEGATIVE
PH, URINE: 5.5 (ref 4.5–8)
SPECIFIC GRAVITY: 1.02 (ref 1–1.03)
URINE-COLOR: YELLOW
UROBILINOGEN,SEMI-QN: 0.2 MG/DL (ref 0–1.9)

## 2022-08-04 PROCEDURE — 81003 URINALYSIS AUTO W/O SCOPE: CPT | Performed by: PHYSICIAN ASSISTANT

## 2022-08-04 PROCEDURE — 99213 OFFICE O/P EST LOW 20 MIN: CPT | Performed by: PHYSICIAN ASSISTANT

## 2022-08-04 RX ORDER — FINASTERIDE 5 MG/1
5 TABLET, FILM COATED ORAL DAILY
Qty: 90 TABLET | Refills: 3 | Status: SHIPPED | OUTPATIENT
Start: 2022-08-04

## 2022-08-29 ENCOUNTER — TELEPHONE (OUTPATIENT)
Dept: INTERNAL MEDICINE CLINIC | Facility: CLINIC | Age: 77
End: 2022-08-29

## 2022-08-29 NOTE — TELEPHONE ENCOUNTER
PSR attempted to call patient to move appt from 9/1/22 to 8/30/22 per Dr Mayra Bonilla request.  No answer so PSR was unabe to leave a message.

## 2022-10-28 ENCOUNTER — TELEPHONE (OUTPATIENT)
Dept: SURGERY | Facility: CLINIC | Age: 77
End: 2022-10-28

## 2022-11-29 ENCOUNTER — LAB ENCOUNTER (OUTPATIENT)
Dept: LAB | Age: 77
End: 2022-11-29
Attending: NURSE PRACTITIONER
Payer: MEDICARE

## 2022-11-29 DIAGNOSIS — Z13.29 SCREENING FOR THYROID DISORDER: ICD-10-CM

## 2022-11-29 DIAGNOSIS — I48.91 ATRIAL FIBRILLATION, UNSPECIFIED TYPE (HCC): ICD-10-CM

## 2022-11-29 DIAGNOSIS — E55.9 VITAMIN D DEFICIENCY: ICD-10-CM

## 2022-11-29 DIAGNOSIS — R53.83 FATIGUE, UNSPECIFIED TYPE: ICD-10-CM

## 2022-11-29 PROBLEM — R00.2 PALPITATIONS: Status: ACTIVE | Noted: 2022-11-14

## 2022-11-29 PROBLEM — K55.20: Status: ACTIVE | Noted: 2022-05-13

## 2022-11-29 LAB
BASOPHILS # BLD AUTO: 0.03 X10(3) UL (ref 0–0.2)
BASOPHILS NFR BLD AUTO: 0.4 %
DEPRECATED HBV CORE AB SER IA-ACNC: 238 NG/ML
EOSINOPHIL # BLD AUTO: 0.14 X10(3) UL (ref 0–0.7)
EOSINOPHIL NFR BLD AUTO: 1.9 %
ERYTHROCYTE [DISTWIDTH] IN BLOOD BY AUTOMATED COUNT: 13.1 %
HCT VFR BLD AUTO: 41.7 %
HGB BLD-MCNC: 14 G/DL
IMM GRANULOCYTES # BLD AUTO: 0.03 X10(3) UL (ref 0–1)
IMM GRANULOCYTES NFR BLD: 0.4 %
IRON SATN MFR SERPL: 37 %
IRON SERPL-MCNC: 115 UG/DL
LYMPHOCYTES # BLD AUTO: 1.32 X10(3) UL (ref 1–4)
LYMPHOCYTES NFR BLD AUTO: 17.9 %
MCH RBC QN AUTO: 31.4 PG (ref 26–34)
MCHC RBC AUTO-ENTMCNC: 33.6 G/DL (ref 31–37)
MCV RBC AUTO: 93.5 FL
MONOCYTES # BLD AUTO: 0.65 X10(3) UL (ref 0.1–1)
MONOCYTES NFR BLD AUTO: 8.8 %
NEUTROPHILS # BLD AUTO: 5.22 X10 (3) UL (ref 1.5–7.7)
NEUTROPHILS # BLD AUTO: 5.22 X10(3) UL (ref 1.5–7.7)
NEUTROPHILS NFR BLD AUTO: 70.6 %
PLATELET # BLD AUTO: 226 10(3)UL (ref 150–450)
RBC # BLD AUTO: 4.46 X10(6)UL
TIBC SERPL-MCNC: 308 UG/DL (ref 240–450)
TRANSFERRIN SERPL-MCNC: 207 MG/DL (ref 200–360)
TSI SER-ACNC: 0.59 MIU/ML (ref 0.36–3.74)
VIT B12 SERPL-MCNC: 372 PG/ML (ref 193–986)
VIT D+METAB SERPL-MCNC: 24.5 NG/ML (ref 30–100)
WBC # BLD AUTO: 7.4 X10(3) UL (ref 4–11)

## 2022-11-29 PROCEDURE — 85025 COMPLETE CBC W/AUTO DIFF WBC: CPT

## 2022-11-29 PROCEDURE — 82306 VITAMIN D 25 HYDROXY: CPT

## 2022-11-29 PROCEDURE — 36415 COLL VENOUS BLD VENIPUNCTURE: CPT

## 2022-11-29 PROCEDURE — 84443 ASSAY THYROID STIM HORMONE: CPT

## 2022-11-29 PROCEDURE — 83540 ASSAY OF IRON: CPT

## 2022-11-29 PROCEDURE — 83550 IRON BINDING TEST: CPT

## 2022-11-29 PROCEDURE — 82728 ASSAY OF FERRITIN: CPT

## 2022-11-29 PROCEDURE — 82607 VITAMIN B-12: CPT

## 2022-12-29 ENCOUNTER — OFFICE VISIT (OUTPATIENT)
Dept: SURGERY | Facility: CLINIC | Age: 77
End: 2022-12-29
Payer: COMMERCIAL

## 2022-12-29 DIAGNOSIS — N40.1 BPH WITH OBSTRUCTION/LOWER URINARY TRACT SYMPTOMS: Primary | ICD-10-CM

## 2022-12-29 DIAGNOSIS — N13.8 BPH WITH OBSTRUCTION/LOWER URINARY TRACT SYMPTOMS: Primary | ICD-10-CM

## 2022-12-29 DIAGNOSIS — R82.90 URINE FINDING: ICD-10-CM

## 2022-12-29 LAB — RBC #/AREA URNS AUTO: >10 /HPF

## 2022-12-29 PROCEDURE — 99212 OFFICE O/P EST SF 10 MIN: CPT | Performed by: PHYSICIAN ASSISTANT

## 2023-01-19 ENCOUNTER — TELEPHONE (OUTPATIENT)
Dept: INTERNAL MEDICINE CLINIC | Facility: CLINIC | Age: 78
End: 2023-01-19

## 2023-01-19 NOTE — TELEPHONE ENCOUNTER
Date of pre-op appt:  TBGISELL  Provider pre-op scheduled with: TBD  Surgeon's name:  Dr Hiro Brady    Phone #:  [phone]   Fax #:  [phone]  Surgery date:  2/6/23  Procedure/diagnosis:  Rt eye cataract     LM for Pt to CB to schedule pre op.  Placed in pre op bin

## 2023-01-23 NOTE — TELEPHONE ENCOUNTER
Incoming (mail or fax):  fax  Received from:  Dr Joanie Crawford office  Documentation given to:  Triage preop bin

## 2023-01-23 NOTE — TELEPHONE ENCOUNTER
Future Appointments   Date Time Provider Rachel Arevalo   1/25/2023  3:40 PM Ksenia Hoffman, DREW EMG 29 EMG N Lanie Jackson   1/30/2023  2:40 PM Kalyn Hoffman APRN EMG 29 EMG N Zandra   4/6/2023  3:00 PM Naina Zabala, LESVIA [id number] EC Nap 4     In [facility]

## 2023-01-25 ENCOUNTER — OFFICE VISIT (OUTPATIENT)
Dept: INTERNAL MEDICINE CLINIC | Facility: CLINIC | Age: 78
End: 2023-01-25
Payer: MEDICARE

## 2023-01-25 VITALS
TEMPERATURE: 97 F | RESPIRATION RATE: 22 BRPM | HEART RATE: 90 BPM | WEIGHT: 213 LBS | HEIGHT: 71 IN | SYSTOLIC BLOOD PRESSURE: 128 MMHG | DIASTOLIC BLOOD PRESSURE: 76 MMHG | BODY MASS INDEX: 29.82 KG/M2 | OXYGEN SATURATION: 97 %

## 2023-01-25 DIAGNOSIS — Z01.818 PREOP EXAM FOR INTERNAL MEDICINE: Primary | ICD-10-CM

## 2023-01-25 DIAGNOSIS — N13.8 BPH WITH OBSTRUCTION/LOWER URINARY TRACT SYMPTOMS: ICD-10-CM

## 2023-01-25 DIAGNOSIS — N40.1 BPH WITH OBSTRUCTION/LOWER URINARY TRACT SYMPTOMS: ICD-10-CM

## 2023-01-25 DIAGNOSIS — H25.9 SENILE CATARACT OF RIGHT EYE, UNSPECIFIED AGE-RELATED CATARACT TYPE: ICD-10-CM

## 2023-01-25 DIAGNOSIS — H54.7 POOR VISION: ICD-10-CM

## 2023-01-25 DIAGNOSIS — M15.9 PRIMARY OSTEOARTHRITIS INVOLVING MULTIPLE JOINTS: ICD-10-CM

## 2023-01-25 DIAGNOSIS — I10 PRIMARY HYPERTENSION: ICD-10-CM

## 2023-01-25 DIAGNOSIS — I48.91 ATRIAL FIBRILLATION WITH RVR (HCC): ICD-10-CM

## 2023-01-25 PROCEDURE — 99215 OFFICE O/P EST HI 40 MIN: CPT | Performed by: NURSE PRACTITIONER

## 2023-01-25 RX ORDER — PREDNISOLONE/MOXIFLOXACIN HCL 1 %-0.5 %
1 SUSPENSION, DROPS(FINAL DOSAGE FORM)(ML) OPHTHALMIC (EYE)
COMMUNITY
Start: 2023-01-19

## 2023-01-27 ENCOUNTER — MED REC SCAN ONLY (OUTPATIENT)
Dept: INTERNAL MEDICINE CLINIC | Facility: CLINIC | Age: 78
End: 2023-01-27

## 2023-01-30 ENCOUNTER — OFFICE VISIT (OUTPATIENT)
Dept: INTERNAL MEDICINE CLINIC | Facility: CLINIC | Age: 78
End: 2023-01-30
Payer: MEDICARE

## 2023-01-30 VITALS
BODY MASS INDEX: 29.82 KG/M2 | HEIGHT: 71 IN | RESPIRATION RATE: 14 BRPM | HEART RATE: 67 BPM | WEIGHT: 213 LBS | SYSTOLIC BLOOD PRESSURE: 120 MMHG | TEMPERATURE: 98 F | OXYGEN SATURATION: 98 % | DIASTOLIC BLOOD PRESSURE: 70 MMHG

## 2023-01-30 DIAGNOSIS — F32.A ANXIETY AND DEPRESSION: ICD-10-CM

## 2023-01-30 DIAGNOSIS — F41.9 ANXIETY AND DEPRESSION: ICD-10-CM

## 2023-01-30 DIAGNOSIS — R53.83 FATIGUE, UNSPECIFIED TYPE: ICD-10-CM

## 2023-01-30 DIAGNOSIS — R41.3 MEMORY CHANGE: Primary | ICD-10-CM

## 2023-01-30 PROCEDURE — 99214 OFFICE O/P EST MOD 30 MIN: CPT | Performed by: NURSE PRACTITIONER

## 2023-01-31 NOTE — PATIENT INSTRUCTIONS
Monitor the memory change closely. If improved once fatigue is better after heart procedure and anxiety and depression are stable no need to see neurology.  If continues with less fatigue, anxiety and depression and is ongoing then see neurology for further eval.     Follow up in March for your annual visit or sooner as needed

## 2023-02-27 ENCOUNTER — ORDER TRANSCRIPTION (OUTPATIENT)
Dept: ADMINISTRATIVE | Facility: HOSPITAL | Age: 78
End: 2023-02-27

## 2023-02-27 DIAGNOSIS — Z01.818 PRE-OP TESTING: Primary | ICD-10-CM

## 2023-03-01 RX ORDER — MULTIVIT-MIN/IRON FUM/FOLIC AC 7.5 MG-4
1 TABLET ORAL DAILY
COMMUNITY

## 2023-03-01 RX ORDER — DIPHENHYDRAMINE HCL 25 MG
25 TABLET ORAL NIGHTLY PRN
COMMUNITY

## 2023-03-03 ENCOUNTER — LAB ENCOUNTER (OUTPATIENT)
Dept: LAB | Age: 78
End: 2023-03-03
Attending: INTERNAL MEDICINE
Payer: MEDICARE

## 2023-03-03 DIAGNOSIS — Z01.818 PRE-OP TESTING: ICD-10-CM

## 2023-03-03 LAB
ANION GAP SERPL CALC-SCNC: 2 MMOL/L (ref 0–18)
BUN BLD-MCNC: 23 MG/DL (ref 7–18)
CALCIUM BLD-MCNC: 9.2 MG/DL (ref 8.5–10.1)
CHLORIDE SERPL-SCNC: 108 MMOL/L (ref 98–112)
CO2 SERPL-SCNC: 30 MMOL/L (ref 21–32)
CREAT BLD-MCNC: 1.07 MG/DL
FASTING STATUS PATIENT QL REPORTED: YES
GFR SERPLBLD BASED ON 1.73 SQ M-ARVRAT: 71 ML/MIN/1.73M2 (ref 60–?)
GLUCOSE BLD-MCNC: 100 MG/DL (ref 70–99)
OSMOLALITY SERPL CALC.SUM OF ELEC: 294 MOSM/KG (ref 275–295)
POTASSIUM SERPL-SCNC: 4.9 MMOL/L (ref 3.5–5.1)
SODIUM SERPL-SCNC: 140 MMOL/L (ref 136–145)

## 2023-03-03 PROCEDURE — 80048 BASIC METABOLIC PNL TOTAL CA: CPT

## 2023-03-03 PROCEDURE — 36415 COLL VENOUS BLD VENIPUNCTURE: CPT

## 2023-03-04 LAB — SARS-COV-2 RNA RESP QL NAA+PROBE: NOT DETECTED

## 2023-03-06 ENCOUNTER — HOSPITAL ENCOUNTER (OUTPATIENT)
Dept: INTERVENTIONAL RADIOLOGY/VASCULAR | Facility: HOSPITAL | Age: 78
Discharge: HOME | End: 2023-03-06
Attending: INTERNAL MEDICINE | Admitting: INTERNAL MEDICINE
Payer: MEDICARE

## 2023-03-06 VITALS
SYSTOLIC BLOOD PRESSURE: 160 MMHG | HEART RATE: 64 BPM | HEIGHT: 71 IN | TEMPERATURE: 97 F | DIASTOLIC BLOOD PRESSURE: 90 MMHG | BODY MASS INDEX: 29.4 KG/M2 | OXYGEN SATURATION: 94 % | WEIGHT: 210 LBS | RESPIRATION RATE: 21 BRPM

## 2023-03-06 DIAGNOSIS — I48.91 ATRIAL FIBRILLATION, UNSPECIFIED TYPE (HCC): ICD-10-CM

## 2023-03-06 PROCEDURE — 5A2204Z RESTORATION OF CARDIAC RHYTHM, SINGLE: ICD-10-PCS | Performed by: INTERNAL MEDICINE

## 2023-03-06 PROCEDURE — 92960 CARDIOVERSION ELECTRIC EXT: CPT | Performed by: INTERNAL MEDICINE

## 2023-03-06 RX ORDER — SODIUM CHLORIDE 9 MG/ML
INJECTION, SOLUTION INTRAVENOUS CONTINUOUS
Status: DISCONTINUED | OUTPATIENT
Start: 2023-03-06 | End: 2023-03-06

## 2023-03-06 RX ADMIN — Medication 40 MG: at 12:22:00

## 2023-03-06 RX ADMIN — SODIUM CHLORIDE: 9 INJECTION, SOLUTION INTRAVENOUS at 11:39:00

## 2023-03-06 NOTE — PROGRESS NOTES
Pt received for bedside CV with Dr. Bin Roque. Procedure completed. EKG done to confirm SB. Recovery completed. Discharge instructions given to pt and pt's wife and son in law. IV discontinued, pt taken down to [facility] via wheelchair for discharge. Pt's son in law driving pt home.

## 2023-03-06 NOTE — PROCEDURES
Electrophysiology Procedure Note    Amaury Sosa Location: Cath Lab   CSN [id number] MRN [id number]   Admission Date 3/6/2023  Operation Date 03/06/23    Attending Physician Issac Medina MD Operating Physician Issac Medina MD     Pre-Operative Diagnosis: Afib    Post-Operative Diagnosis: Same as above  PROCEDURE(S) PERFORMED:    1. Cardioversion. 2.     Sedation      :  Issac Medina MD     ANESTHESIA:  IV sedation. Moderate conscious sedation for this procedure was administered and personally monitored. Brevital 40 mg  Sedation time: 10 minutes     INDICATION:  Persistent Atrial Fibrillation     COMPLICATIONS:  None. METHODS:  The patient was brought to the outpatient cardiac telemetry unit in a fasting and nonsedated state after providing informed consent. IV sedation was administered during continuous ECG, pulse oximeter and noninvasive hemodynamic monitoring. After administering IV Brevital in intermittent boluses, the desired level of sedation was achieved. Cardioversion Energy:  200J    Pad Position: [place]  Pre- Cardioversion Rhythm- Afib  Post Cardioversion Rhythm - NSR    CONCLUSIONS:  1.     Successful cardioversion       Plan:  1- Rhythm/Rate Control Meds: No changes  2) Anticoagulation- No changes  3) Follow Up- same day                    Issac Medina MD     Cardiac Electrophysiololgy  [address] [place] [address]  7/14/2021

## 2023-03-20 ENCOUNTER — HOSPITAL ENCOUNTER (OUTPATIENT)
Facility: HOSPITAL | Age: 78
Setting detail: OBSERVATION
Discharge: HOME | End: 2023-03-21
Attending: EMERGENCY MEDICINE | Admitting: INTERNAL MEDICINE
Payer: MEDICARE

## 2023-03-20 ENCOUNTER — APPOINTMENT (OUTPATIENT)
Dept: GENERAL RADIOLOGY | Facility: HOSPITAL | Age: 78
End: 2023-03-20
Attending: INTERNAL MEDICINE
Payer: MEDICARE

## 2023-03-20 ENCOUNTER — APPOINTMENT (OUTPATIENT)
Dept: CT IMAGING | Facility: HOSPITAL | Age: 78
End: 2023-03-20
Attending: EMERGENCY MEDICINE
Payer: MEDICARE

## 2023-03-20 DIAGNOSIS — G45.9 TIA (TRANSIENT ISCHEMIC ATTACK): Primary | ICD-10-CM

## 2023-03-20 LAB
ALBUMIN SERPL-MCNC: 3.3 G/DL (ref 3.4–5)
ALBUMIN/GLOB SERPL: 1.1 {RATIO} (ref 1–2)
ALP LIVER SERPL-CCNC: 47 U/L
ALT SERPL-CCNC: 33 U/L
ANION GAP SERPL CALC-SCNC: 5 MMOL/L (ref 0–18)
AST SERPL-CCNC: 29 U/L (ref 15–37)
BASOPHILS # BLD AUTO: 0.03 X10(3) UL (ref 0–0.2)
BASOPHILS NFR BLD AUTO: 0.4 %
BILIRUB SERPL-MCNC: 0.6 MG/DL (ref 0.1–2)
BUN BLD-MCNC: 24 MG/DL (ref 7–18)
CALCIUM BLD-MCNC: 8.8 MG/DL (ref 8.5–10.1)
CHLORIDE SERPL-SCNC: 108 MMOL/L (ref 98–112)
CHOLEST SERPL-MCNC: 148 MG/DL (ref ?–200)
CO2 SERPL-SCNC: 28 MMOL/L (ref 21–32)
CREAT BLD-MCNC: 0.95 MG/DL
EOSINOPHIL # BLD AUTO: 0.17 X10(3) UL (ref 0–0.7)
EOSINOPHIL NFR BLD AUTO: 2.5 %
ERYTHROCYTE [DISTWIDTH] IN BLOOD BY AUTOMATED COUNT: 12.9 %
EST. AVERAGE GLUCOSE BLD GHB EST-MCNC: 117 MG/DL (ref 68–126)
GFR SERPLBLD BASED ON 1.73 SQ M-ARVRAT: 81 ML/MIN/1.73M2 (ref 60–?)
GLOBULIN PLAS-MCNC: 3.1 G/DL (ref 2.8–4.4)
GLUCOSE BLD-MCNC: 74 MG/DL (ref 70–99)
GLUCOSE BLD-MCNC: 92 MG/DL (ref 70–99)
GLUCOSE BLD-MCNC: 93 MG/DL (ref 70–99)
HBA1C MFR BLD: 5.7 % (ref ?–5.7)
HCT VFR BLD AUTO: 44 %
HDLC SERPL-MCNC: 47 MG/DL (ref 40–59)
HGB BLD-MCNC: 14.7 G/DL
IMM GRANULOCYTES # BLD AUTO: 0.02 X10(3) UL (ref 0–1)
IMM GRANULOCYTES NFR BLD: 0.3 %
LDLC SERPL CALC-MCNC: 90 MG/DL (ref ?–100)
LYMPHOCYTES # BLD AUTO: 1.25 X10(3) UL (ref 1–4)
LYMPHOCYTES NFR BLD AUTO: 18.6 %
MCH RBC QN AUTO: 31.3 PG (ref 26–34)
MCHC RBC AUTO-ENTMCNC: 33.4 G/DL (ref 31–37)
MCV RBC AUTO: 93.8 FL
MONOCYTES # BLD AUTO: 0.68 X10(3) UL (ref 0.1–1)
MONOCYTES NFR BLD AUTO: 10.1 %
NEUTROPHILS # BLD AUTO: 4.58 X10 (3) UL (ref 1.5–7.7)
NEUTROPHILS # BLD AUTO: 4.58 X10(3) UL (ref 1.5–7.7)
NEUTROPHILS NFR BLD AUTO: 68.1 %
NONHDLC SERPL-MCNC: 101 MG/DL (ref ?–130)
OSMOLALITY SERPL CALC.SUM OF ELEC: 296 MOSM/KG (ref 275–295)
PLATELET # BLD AUTO: 212 10(3)UL (ref 150–450)
POTASSIUM SERPL-SCNC: 4.8 MMOL/L (ref 3.5–5.1)
PROT SERPL-MCNC: 6.4 G/DL (ref 6.4–8.2)
RBC # BLD AUTO: 4.69 X10(6)UL
SARS-COV-2 RNA RESP QL NAA+PROBE: NOT DETECTED
SODIUM SERPL-SCNC: 141 MMOL/L (ref 136–145)
TRIGL SERPL-MCNC: 49 MG/DL (ref 30–149)
TSI SER-ACNC: 0.65 MIU/ML (ref 0.36–3.74)
VLDLC SERPL CALC-MCNC: 8 MG/DL (ref 0–30)
WBC # BLD AUTO: 6.7 X10(3) UL (ref 4–11)

## 2023-03-20 PROCEDURE — 70450 CT HEAD/BRAIN W/O DYE: CPT | Performed by: EMERGENCY MEDICINE

## 2023-03-20 PROCEDURE — 99223 1ST HOSP IP/OBS HIGH 75: CPT | Performed by: INTERNAL MEDICINE

## 2023-03-20 PROCEDURE — 71046 X-RAY EXAM CHEST 2 VIEWS: CPT | Performed by: INTERNAL MEDICINE

## 2023-03-20 RX ORDER — SODIUM PHOSPHATE, DIBASIC AND SODIUM PHOSPHATE, MONOBASIC 7; 19 G/133ML; G/133ML
1 ENEMA RECTAL ONCE AS NEEDED
Status: DISCONTINUED | OUTPATIENT
Start: 2023-03-20 | End: 2023-03-21

## 2023-03-20 RX ORDER — BISACODYL 10 MG
10 SUPPOSITORY, RECTAL RECTAL
Status: DISCONTINUED | OUTPATIENT
Start: 2023-03-20 | End: 2023-03-21

## 2023-03-20 RX ORDER — ACETAMINOPHEN 650 MG/1
650 SUPPOSITORY RECTAL EVERY 4 HOURS PRN
Status: DISCONTINUED | OUTPATIENT
Start: 2023-03-20 | End: 2023-03-21

## 2023-03-20 RX ORDER — POLYETHYLENE GLYCOL 3350 17 G/17G
17 POWDER, FOR SOLUTION ORAL DAILY PRN
Status: DISCONTINUED | OUTPATIENT
Start: 2023-03-20 | End: 2023-03-21

## 2023-03-20 RX ORDER — MELATONIN
3 NIGHTLY PRN
Status: DISCONTINUED | OUTPATIENT
Start: 2023-03-20 | End: 2023-03-21

## 2023-03-20 RX ORDER — ACETAMINOPHEN 500 MG
500 TABLET ORAL EVERY 4 HOURS PRN
Status: DISCONTINUED | OUTPATIENT
Start: 2023-03-20 | End: 2023-03-21

## 2023-03-20 RX ORDER — SENNOSIDES 8.6 MG
17.2 TABLET ORAL NIGHTLY PRN
Status: DISCONTINUED | OUTPATIENT
Start: 2023-03-20 | End: 2023-03-21

## 2023-03-20 RX ORDER — ASPIRIN 325 MG
325 TABLET ORAL DAILY
Status: DISCONTINUED | OUTPATIENT
Start: 2023-03-20 | End: 2023-03-21

## 2023-03-20 RX ORDER — HYDRALAZINE HYDROCHLORIDE 20 MG/ML
10 INJECTION INTRAMUSCULAR; INTRAVENOUS EVERY 2 HOUR PRN
Status: DISCONTINUED | OUTPATIENT
Start: 2023-03-20 | End: 2023-03-21

## 2023-03-20 RX ORDER — ACETAMINOPHEN 325 MG/1
650 TABLET ORAL EVERY 4 HOURS PRN
Status: DISCONTINUED | OUTPATIENT
Start: 2023-03-20 | End: 2023-03-21

## 2023-03-20 RX ORDER — METOCLOPRAMIDE HYDROCHLORIDE 5 MG/ML
10 INJECTION INTRAMUSCULAR; INTRAVENOUS EVERY 8 HOURS PRN
Status: DISCONTINUED | OUTPATIENT
Start: 2023-03-20 | End: 2023-03-21

## 2023-03-20 RX ORDER — TAMSULOSIN HYDROCHLORIDE 0.4 MG/1
0.4 CAPSULE ORAL DAILY
Status: DISCONTINUED | OUTPATIENT
Start: 2023-03-20 | End: 2023-03-21

## 2023-03-20 RX ORDER — ASPIRIN 300 MG/1
300 SUPPOSITORY RECTAL DAILY
Status: DISCONTINUED | OUTPATIENT
Start: 2023-03-20 | End: 2023-03-21

## 2023-03-20 RX ORDER — LABETALOL HYDROCHLORIDE 5 MG/ML
10 INJECTION, SOLUTION INTRAVENOUS EVERY 10 MIN PRN
Status: DISCONTINUED | OUTPATIENT
Start: 2023-03-20 | End: 2023-03-21

## 2023-03-20 RX ORDER — ESCITALOPRAM OXALATE 10 MG/1
10 TABLET ORAL DAILY
Status: DISCONTINUED | OUTPATIENT
Start: 2023-03-20 | End: 2023-03-21

## 2023-03-20 RX ORDER — ONDANSETRON 2 MG/ML
4 INJECTION INTRAMUSCULAR; INTRAVENOUS EVERY 6 HOURS PRN
Status: DISCONTINUED | OUTPATIENT
Start: 2023-03-20 | End: 2023-03-21

## 2023-03-20 RX ORDER — ONDANSETRON 2 MG/ML
4 INJECTION INTRAMUSCULAR; INTRAVENOUS EVERY 6 HOURS PRN
Status: DISCONTINUED | OUTPATIENT
Start: 2023-03-20 | End: 2023-03-20

## 2023-03-20 RX ORDER — FINASTERIDE 5 MG/1
5 TABLET, FILM COATED ORAL DAILY
Status: DISCONTINUED | OUTPATIENT
Start: 2023-03-20 | End: 2023-03-21

## 2023-03-20 RX ORDER — AMLODIPINE BESYLATE 5 MG/1
5 TABLET ORAL ONCE
Status: COMPLETED | OUTPATIENT
Start: 2023-03-20 | End: 2023-03-20

## 2023-03-20 RX ORDER — SODIUM CHLORIDE 9 MG/ML
INJECTION, SOLUTION INTRAVENOUS CONTINUOUS
Status: DISCONTINUED | OUTPATIENT
Start: 2023-03-20 | End: 2023-03-21

## 2023-03-20 NOTE — ED INITIAL ASSESSMENT (HPI)
Reports around 1200 pt had difficulty finding words, was babbling. Wife states this lasted about 5-10 minutes. AxKIRAN x4 at this time.

## 2023-03-20 NOTE — ED QUICK NOTES
Orders for admission, patient is aware of plan and ready to go upstairs. Any questions, please call ED RN alicia at extension [phone]. Patient Covid vaccination status: Fully vaccinated     COVID Test Ordered in ED: Rapid SARS-CoV-2 by PCR    COVID Suspicion at Admission: Low clinical suspicion for COVID    Running Infusions:  None    Mental Status/LOC at time of transport: AxO X4    Other pertinent information: On room air. Continent x2.  Up with supervision  CIWA score: N/A   NIH score:  0

## 2023-03-21 ENCOUNTER — APPOINTMENT (OUTPATIENT)
Dept: MRI IMAGING | Facility: HOSPITAL | Age: 78
End: 2023-03-21
Attending: INTERNAL MEDICINE
Payer: MEDICARE

## 2023-03-21 ENCOUNTER — APPOINTMENT (OUTPATIENT)
Dept: MRI IMAGING | Facility: HOSPITAL | Age: 78
End: 2023-03-21
Attending: NURSE PRACTITIONER
Payer: MEDICARE

## 2023-03-21 ENCOUNTER — APPOINTMENT (OUTPATIENT)
Dept: CV DIAGNOSTICS | Facility: HOSPITAL | Age: 78
End: 2023-03-21
Attending: INTERNAL MEDICINE
Payer: MEDICARE

## 2023-03-21 ENCOUNTER — TELEPHONE (OUTPATIENT)
Dept: NEUROLOGY | Facility: CLINIC | Age: 78
End: 2023-03-21

## 2023-03-21 VITALS
TEMPERATURE: 99 F | OXYGEN SATURATION: 98 % | DIASTOLIC BLOOD PRESSURE: 96 MMHG | HEIGHT: 70 IN | RESPIRATION RATE: 18 BRPM | WEIGHT: 208 LBS | HEART RATE: 57 BPM | BODY MASS INDEX: 29.78 KG/M2 | SYSTOLIC BLOOD PRESSURE: 117 MMHG

## 2023-03-21 LAB
ALBUMIN SERPL-MCNC: 3.3 G/DL (ref 3.4–5)
ALBUMIN/GLOB SERPL: 1.1 {RATIO} (ref 1–2)
ALP LIVER SERPL-CCNC: 45 U/L
ALT SERPL-CCNC: 29 U/L
ANION GAP SERPL CALC-SCNC: 7 MMOL/L (ref 0–18)
AST SERPL-CCNC: 12 U/L (ref 15–37)
ATRIAL RATE: 53 BPM
BILIRUB SERPL-MCNC: 0.6 MG/DL (ref 0.1–2)
BUN BLD-MCNC: 22 MG/DL (ref 7–18)
CALCIUM BLD-MCNC: 8.7 MG/DL (ref 8.5–10.1)
CHLORIDE SERPL-SCNC: 108 MMOL/L (ref 98–112)
CO2 SERPL-SCNC: 26 MMOL/L (ref 21–32)
CREAT BLD-MCNC: 0.92 MG/DL
ERYTHROCYTE [DISTWIDTH] IN BLOOD BY AUTOMATED COUNT: 13 %
GFR SERPLBLD BASED ON 1.73 SQ M-ARVRAT: 85 ML/MIN/1.73M2 (ref 60–?)
GLOBULIN PLAS-MCNC: 2.9 G/DL (ref 2.8–4.4)
GLUCOSE BLD-MCNC: 79 MG/DL (ref 70–99)
GLUCOSE BLD-MCNC: 86 MG/DL (ref 70–99)
GLUCOSE BLD-MCNC: 97 MG/DL (ref 70–99)
HCT VFR BLD AUTO: 38.3 %
HGB BLD-MCNC: 13.4 G/DL
MAGNESIUM SERPL-MCNC: 2.1 MG/DL (ref 1.6–2.6)
MCH RBC QN AUTO: 32.1 PG (ref 26–34)
MCHC RBC AUTO-ENTMCNC: 35 G/DL (ref 31–37)
MCV RBC AUTO: 91.8 FL
OSMOLALITY SERPL CALC.SUM OF ELEC: 295 MOSM/KG (ref 275–295)
P AXIS: 79 DEGREES
P-R INTERVAL: 172 MS
PHOSPHATE SERPL-MCNC: 3 MG/DL (ref 2.5–4.9)
PLATELET # BLD AUTO: 197 10(3)UL (ref 150–450)
POTASSIUM SERPL-SCNC: 4 MMOL/L (ref 3.5–5.1)
PROT SERPL-MCNC: 6.2 G/DL (ref 6.4–8.2)
Q-T INTERVAL: 432 MS
QRS DURATION: 78 MS
QTC CALCULATION (BEZET): 434 MS
R AXIS: 88 DEGREES
RBC # BLD AUTO: 4.17 X10(6)UL
SODIUM SERPL-SCNC: 141 MMOL/L (ref 136–145)
T AXIS: 61 DEGREES
VENTRICULAR RATE: 61 BPM
WBC # BLD AUTO: 6.4 X10(3) UL (ref 4–11)

## 2023-03-21 PROCEDURE — 70551 MRI BRAIN STEM W/O DYE: CPT | Performed by: INTERNAL MEDICINE

## 2023-03-21 PROCEDURE — 99223 1ST HOSP IP/OBS HIGH 75: CPT | Performed by: OTHER

## 2023-03-21 PROCEDURE — 93306 TTE W/DOPPLER COMPLETE: CPT | Performed by: INTERNAL MEDICINE

## 2023-03-21 PROCEDURE — 70544 MR ANGIOGRAPHY HEAD W/O DYE: CPT | Performed by: INTERNAL MEDICINE

## 2023-03-21 PROCEDURE — 70547 MR ANGIOGRAPHY NECK W/O DYE: CPT | Performed by: NURSE PRACTITIONER

## 2023-03-21 PROCEDURE — 99239 HOSP IP/OBS DSCHRG MGMT >30: CPT | Performed by: INTERNAL MEDICINE

## 2023-03-21 RX ORDER — ASPIRIN 81 MG/1
81 TABLET ORAL DAILY
Qty: 90 TABLET | Refills: 1 | Status: SHIPPED | OUTPATIENT
Start: 2023-03-22

## 2023-03-21 RX ORDER — ATORVASTATIN CALCIUM 40 MG/1
40 TABLET, FILM COATED ORAL NIGHTLY
Qty: 90 TABLET | Refills: 1 | Status: SHIPPED | OUTPATIENT
Start: 2023-03-21

## 2023-03-21 RX ORDER — ATORVASTATIN CALCIUM 40 MG/1
40 TABLET, FILM COATED ORAL NIGHTLY
Status: DISCONTINUED | OUTPATIENT
Start: 2023-03-21 | End: 2023-03-21

## 2023-03-21 RX ORDER — ASPIRIN 81 MG/1
81 TABLET ORAL DAILY
Status: DISCONTINUED | OUTPATIENT
Start: 2023-03-21 | End: 2023-03-21

## 2023-03-21 NOTE — PLAN OF CARE
Assumed care of pt 0730  A+Ox4, RA, NSR/SB, no c/o pain  MRI screening complete  NIH 0, neuro checks  MRI complete  Family updated on discharge plan   -ASA/Xarelto per neuro  AVS reviewed, all questions answered        NURSING DISCHARGE NOTE    Discharged Home via Wheelchair. Accompanied by Family member and Support staff  Belongings Taken by patient/family.

## 2023-03-21 NOTE — PLAN OF CARE
Assumed care at [address] [place] [address]. Assessment complete. Admission complete. Neuro q2h. No new deficits. Passed dysphagia screen. Aox4. NSR on tele. VSS. RA. No c/o pain at this time. No acute distress noted. Safety precautions in place. Bed lowered and locked. Call light in reach. All questions and concerns addressed. Cont with current POC.

## 2023-03-21 NOTE — OCCUPATIONAL THERAPY NOTE
OT orders received, chart reviewed. Attempted to see patient for OT eval this am, however patient currently off floor at MRI. Will reattempt as able and as patient appropriate.

## 2023-03-21 NOTE — PHYSICAL THERAPY NOTE
Order received for PT eval and chart reviewed. Attempted to see Pt this am, however, Pt currently off floor for MRI. PT will continue to follow.

## 2023-03-21 NOTE — PLAN OF CARE
Assumed care of pt 1830  A+Ox4, RA, NSR, no c/o pain   Admission navigator complete  Hospitalist consult  Per ED phsx note, Deepak smileyc'd TIA workup  Echo TBD  NPO  Stat CXR  Family/pt updated on plan of care  Report given to oncoming nurse

## 2023-03-21 NOTE — TELEPHONE ENCOUNTER
TIA CLINIC SCREENING    1. Date of ED visit/TIA diagnosis:  03/20/2023   Time of discharge from ED:      2. Is patient currently admitted? Yes   If YES - TIA Clinic Appointment not required.

## 2023-03-22 ENCOUNTER — PATIENT OUTREACH (OUTPATIENT)
Dept: CASE MANAGEMENT | Age: 78
End: 2023-03-22

## 2023-03-22 DIAGNOSIS — Z02.9 ENCOUNTERS FOR UNSPECIFIED ADMINISTRATIVE PURPOSE: ICD-10-CM

## 2023-03-22 PROCEDURE — 1111F DSCHRG MED/CURRENT MED MERGE: CPT

## 2023-03-24 ENCOUNTER — PATIENT OUTREACH (OUTPATIENT)
Dept: CASE MANAGEMENT | Age: 78
End: 2023-03-24

## 2023-03-24 NOTE — PROGRESS NOTES
Called pts to offer TCC and Neuro f/u apts and he is requesting we call on Monday and speak to his wife Kris Jane about scheduling apts.

## 2023-03-27 NOTE — PROGRESS NOTES
[facility]  [facility] Juanita Good [address] [facility] [address] [phone]  Apt made for Wed. March 29th @2pm    Corey RUSSELL  [address] [facility]  [address] [address] [phone]  Apt made for Wed.  May 3rd @2pm    Patient and wife Binh Alvarenga notified

## 2023-03-29 ENCOUNTER — OFFICE VISIT (OUTPATIENT)
Dept: INTERNAL MEDICINE CLINIC | Facility: CLINIC | Age: 78
End: 2023-03-29
Payer: MEDICARE

## 2023-03-29 VITALS
RESPIRATION RATE: 18 BRPM | WEIGHT: 207 LBS | SYSTOLIC BLOOD PRESSURE: 114 MMHG | OXYGEN SATURATION: 96 % | HEART RATE: 51 BPM | HEIGHT: 71 IN | BODY MASS INDEX: 28.98 KG/M2 | TEMPERATURE: 98 F | DIASTOLIC BLOOD PRESSURE: 60 MMHG

## 2023-03-29 DIAGNOSIS — R47.1 DYSARTHRIA: ICD-10-CM

## 2023-03-29 DIAGNOSIS — I48.20 CHRONIC ATRIAL FIBRILLATION (HCC): ICD-10-CM

## 2023-03-29 DIAGNOSIS — K59.09 INTERMITTENT CONSTIPATION: ICD-10-CM

## 2023-03-29 DIAGNOSIS — I10 PRIMARY HYPERTENSION: ICD-10-CM

## 2023-03-29 DIAGNOSIS — E78.49 OTHER HYPERLIPIDEMIA: ICD-10-CM
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
## 2023-03-29 DIAGNOSIS — G45.9 TIA (TRANSIENT ISCHEMIC ATTACK): Primary | ICD-10-CM

## 2023-03-29 PROCEDURE — 99495 TRANSJ CARE MGMT MOD F2F 14D: CPT | Performed by: NURSE PRACTITIONER

## 2023-03-29 PROCEDURE — 1111F DSCHRG MED/CURRENT MED MERGE: CPT | Performed by: NURSE PRACTITIONER

## 2023-03-29 NOTE — PROGRESS NOTES
Multiple attempts to reach pt and messages left with no return call. Patient went in for HFU appt with TCC on 3/29/23. Encounter closing.

## 2023-04-07 ENCOUNTER — TELEPHONE (OUTPATIENT)
Dept: INTERNAL MEDICINE CLINIC | Facility: CLINIC | Age: 78
End: 2023-04-07

## 2023-04-07 PROBLEM — R47.1 DYSARTHRIA: Status: ACTIVE | Noted: 2023-04-07

## 2023-04-07 PROBLEM — E78.49 OTHER HYPERLIPIDEMIA: Status: ACTIVE | Noted: 2023-04-07

## 2023-04-07 PROBLEM — I48.91 ATRIAL FIBRILLATION (HCC): Status: ACTIVE | Noted: 2023-04-07

## 2023-04-07 PROBLEM — K59.09 INTERMITTENT CONSTIPATION: Status: ACTIVE | Noted: 2023-04-07

## 2023-04-07 NOTE — TELEPHONE ENCOUNTER
Incoming (mail or fax):  fax  Received from:  [employer]  Documentation given to:  Triage incoming     Rehabilitation orders needing signature

## 2023-04-07 NOTE — TELEPHONE ENCOUNTER
Received PT plan of care orders from [facility]. In Tereza's bin for signature, thanks!

## 2023-04-14 ENCOUNTER — OFFICE VISIT (OUTPATIENT)
Dept: SURGERY | Facility: CLINIC | Age: 78
End: 2023-04-14

## 2023-04-14 DIAGNOSIS — N13.8 BPH WITH OBSTRUCTION/LOWER URINARY TRACT SYMPTOMS: Primary | ICD-10-CM

## 2023-04-14 DIAGNOSIS — R39.15 URINARY URGENCY: ICD-10-CM

## 2023-04-14 DIAGNOSIS — R82.90 URINE FINDING: ICD-10-CM

## 2023-04-14 DIAGNOSIS — N40.1 BPH WITH OBSTRUCTION/LOWER URINARY TRACT SYMPTOMS: Primary | ICD-10-CM

## 2023-04-14 LAB
APPEARANCE: CLEAR
BILIRUBIN: NEGATIVE
GLUCOSE (URINE DIPSTICK): NEGATIVE MG/DL
HYALINE CASTS #/AREA URNS AUTO: PRESENT /LPF
KETONES (URINE DIPSTICK): NEGATIVE MG/DL
LEUKOCYTES: NEGATIVE
MULTISTIX LOT#: ABNORMAL NUMERIC
NITRITE, URINE: NEGATIVE
PH, URINE: 5.5 (ref 4.5–8)
RBC #/AREA URNS AUTO: >10 /HPF
SPECIFIC GRAVITY: 1.03 (ref 1–1.03)
URINE-COLOR: YELLOW
UROBILINOGEN,SEMI-QN: 0.2 MG/DL (ref 0–1.9)

## 2023-04-14 PROCEDURE — 99213 OFFICE O/P EST LOW 20 MIN: CPT | Performed by: PHYSICIAN ASSISTANT

## 2023-04-14 PROCEDURE — 81003 URINALYSIS AUTO W/O SCOPE: CPT | Performed by: PHYSICIAN ASSISTANT

## 2023-04-14 PROCEDURE — 1111F DSCHRG MED/CURRENT MED MERGE: CPT | Performed by: PHYSICIAN ASSISTANT

## 2023-04-21 ENCOUNTER — TELEPHONE (OUTPATIENT)
Dept: INTERNAL MEDICINE CLINIC | Facility: CLINIC | Age: 78
End: 2023-04-21

## 2023-04-21 NOTE — TELEPHONE ENCOUNTER
Received PT re-certification orders requiring signature from [address] [place] [address]. In Tereza's bin for signature, thanks!

## 2023-04-21 NOTE — TELEPHONE ENCOUNTER
Incoming (mail or fax):  fax  Received from:  [facility] [employer]  Documentation given to:  Triage incoming    Therapy forms need signature

## 2023-05-01 ENCOUNTER — MED REC SCAN ONLY (OUTPATIENT)
Dept: INTERNAL MEDICINE CLINIC | Facility: CLINIC | Age: 78
End: 2023-05-01

## 2023-05-08 ENCOUNTER — OFFICE VISIT (OUTPATIENT)
Dept: INTERNAL MEDICINE CLINIC | Facility: CLINIC | Age: 78
End: 2023-05-08
Payer: MEDICARE

## 2023-05-08 VITALS
DIASTOLIC BLOOD PRESSURE: 68 MMHG | WEIGHT: 214.31 LBS | SYSTOLIC BLOOD PRESSURE: 126 MMHG | RESPIRATION RATE: 16 BRPM | BODY MASS INDEX: 30 KG/M2 | HEART RATE: 60 BPM | TEMPERATURE: 97 F | HEIGHT: 71 IN

## 2023-05-08 DIAGNOSIS — N40.1 BPH WITH OBSTRUCTION/LOWER URINARY TRACT SYMPTOMS: ICD-10-CM

## 2023-05-08 DIAGNOSIS — E78.49 OTHER HYPERLIPIDEMIA: ICD-10-CM

## 2023-05-08 DIAGNOSIS — G45.9 TIA (TRANSIENT ISCHEMIC ATTACK): Primary | ICD-10-CM

## 2023-05-08 DIAGNOSIS — N13.8 BPH WITH OBSTRUCTION/LOWER URINARY TRACT SYMPTOMS: ICD-10-CM

## 2023-05-08 DIAGNOSIS — I48.20 CHRONIC ATRIAL FIBRILLATION (HCC): ICD-10-CM

## 2023-05-08 DIAGNOSIS — R06.09 DOE (DYSPNEA ON EXERTION): ICD-10-CM

## 2023-05-08 DIAGNOSIS — F41.9 ANXIETY: ICD-10-CM

## 2023-05-08 DIAGNOSIS — I10 PRIMARY HYPERTENSION: ICD-10-CM

## 2023-05-08 PROBLEM — I48.91 ATRIAL FIBRILLATION WITH RVR (HCC): Status: RESOLVED | Noted: 2022-05-03 | Resolved: 2023-05-08

## 2023-05-08 PROCEDURE — 1111F DSCHRG MED/CURRENT MED MERGE: CPT | Performed by: INTERNAL MEDICINE

## 2023-05-08 PROCEDURE — 99214 OFFICE O/P EST MOD 30 MIN: CPT | Performed by: INTERNAL MEDICINE

## 2023-05-08 RX ORDER — FLUTICASONE FUROATE, UMECLIDINIUM BROMIDE AND VILANTEROL TRIFENATATE 100; 62.5; 25 UG/1; UG/1; UG/1
1 POWDER RESPIRATORY (INHALATION) DAILY
Qty: 2 EACH | Refills: 0 | COMMUNITY
Start: 2023-05-08

## 2023-05-19 ENCOUNTER — TELEPHONE (OUTPATIENT)
Dept: INTERNAL MEDICINE CLINIC | Facility: CLINIC | Age: 78
End: 2023-05-19

## 2023-05-19 NOTE — TELEPHONE ENCOUNTER
Incoming (mail or fax):  fax  Received from:  [address]   Documentation given to:  [facility]      PT Recertification

## 2023-06-01 ENCOUNTER — OFFICE VISIT (OUTPATIENT)
Dept: NEUROLOGY | Facility: CLINIC | Age: 78
End: 2023-06-01
Payer: MEDICARE

## 2023-06-01 VITALS
HEIGHT: 71 IN | SYSTOLIC BLOOD PRESSURE: 105 MMHG | RESPIRATION RATE: 17 BRPM | HEART RATE: 49 BPM | BODY MASS INDEX: 29.4 KG/M2 | WEIGHT: 210 LBS | DIASTOLIC BLOOD PRESSURE: 50 MMHG

## 2023-06-01 DIAGNOSIS — R00.1 BRADYCARDIA: ICD-10-CM

## 2023-06-01 DIAGNOSIS — G45.9 TIA (TRANSIENT ISCHEMIC ATTACK): ICD-10-CM

## 2023-06-01 DIAGNOSIS — R41.3 COMPLAINTS OF MEMORY DISTURBANCE: Primary | ICD-10-CM

## 2023-06-01 DIAGNOSIS — H53.2 DOUBLE VISION: ICD-10-CM

## 2023-06-01 PROBLEM — J44.9 CHRONIC OBSTRUCTIVE PULMONARY DISEASE, UNSPECIFIED (HCC): Status: ACTIVE | Noted: 2023-06-01

## 2023-06-01 PROCEDURE — 99213 OFFICE O/P EST LOW 20 MIN: CPT | Performed by: PHYSICIAN ASSISTANT

## 2023-06-06 ENCOUNTER — TELEPHONE (OUTPATIENT)
Dept: NEUROLOGY | Facility: CLINIC | Age: 78
End: 2023-06-06

## 2023-06-06 ENCOUNTER — LAB ENCOUNTER (OUTPATIENT)
Dept: LAB | Age: 78
End: 2023-06-06
Attending: PHYSICIAN ASSISTANT
Payer: MEDICARE

## 2023-06-06 DIAGNOSIS — R41.3 COMPLAINTS OF MEMORY DISTURBANCE: Primary | ICD-10-CM

## 2023-06-06 DIAGNOSIS — R90.89 ABNORMAL FINDING ON MRI OF BRAIN: ICD-10-CM

## 2023-06-06 DIAGNOSIS — R41.3 COMPLAINTS OF MEMORY DISTURBANCE: ICD-10-CM

## 2023-06-06 LAB
FOLATE SERPL-MCNC: 25 NG/ML (ref 8.7–?)
TSI SER-ACNC: 0.72 MIU/ML (ref 0.36–3.74)
VIT B12 SERPL-MCNC: 498 PG/ML (ref 193–986)

## 2023-06-06 PROCEDURE — 86381 MITOCHONDRIAL ANTIBODY EACH: CPT

## 2023-06-06 PROCEDURE — 84443 ASSAY THYROID STIM HORMONE: CPT

## 2023-06-06 PROCEDURE — 82607 VITAMIN B-12: CPT

## 2023-06-06 PROCEDURE — 82746 ASSAY OF FOLIC ACID SERUM: CPT

## 2023-06-06 PROCEDURE — 83519 RIA NONANTIBODY: CPT

## 2023-06-06 PROCEDURE — 36415 COLL VENOUS BLD VENIPUNCTURE: CPT

## 2023-06-06 NOTE — TELEPHONE ENCOUNTER
[facility] cardiology calling to clarify request to hold Xarelto. Per OV notes:    Reviewed MRI Brain images with Dr. Rae Garcia and there is enlargement of the ventricles seen. He does not have significant gait abnormality so to further evaluate would recommend cisternogram. Explained to patient that we would need clearance from cardiology for him to hold the xarelto to complete the cisternogram.    Please fax request form to [phone] [employer].

## 2023-06-09 NOTE — TELEPHONE ENCOUNTER
Request letter faxed to [facility] Cardiology for clearance letter to hold Gabe Man for CISTERNOGRAM.  Will await clearance letter.

## 2023-06-14 NOTE — TELEPHONE ENCOUNTER
[facility] who states clearance letter faxed to this office on 6/9/2023. Received pre-op eval via fax at this time. Spoke with Linda @ [facility] who states they do not need copy of this. Scanned to patient chart. Notified patient to call to schedule CISTERNOGRAM and LP with XR and to hold Brandon Beckers 48-72 hours prior to procedure.

## 2023-06-14 NOTE — TELEPHONE ENCOUNTER
Spoke with daughter Barbi Temple (per consent) regarding clearance received and ok to call to schedule. Daughter states family has questions regarding procedure and wife had many questions after OV. Main questions; What are they looking for with CISTERNOGRAM and what might potential treatment plan be per results. Are looking for risk vs benefits of testing. Would like to know what supplements, behaviors can patient be doing now for memory while waiting for testing. Neuro-psych testing scheduled for September 2023 per daughter Barbi Temple. Routed to provider.

## 2023-06-15 RX ORDER — DONEPEZIL HYDROCHLORIDE 5 MG/1
TABLET, FILM COATED ORAL
Qty: 30 TABLET | Refills: 1 | Status: SHIPPED | OUTPATIENT
Start: 2023-06-15

## 2023-06-15 NOTE — TELEPHONE ENCOUNTER
Spoke to daughter and explained that purpose of the cisternogram and that if shows NPH then would be the consideration of a shunt. She felt at this time they would not proceed with doing the shunt and thus they are going to hold off on the cisternogram. They have scheduled neuropsychological testing in September. The daughter would like to try something for his memory in the mean time.  Will start him on Aricept 5mg daily

## 2023-06-25 LAB
ACHR BINDING ABS: <0.03 NMOL/L
ACHR BLOCKING ABS: 18 %
ACHR-MODULATING AB: 0 %
MUSK ABS, SERUM: <1 U/ML
STRIATIONAL ABS, SERUM: NEGATIVE

## 2023-06-30 ENCOUNTER — TELEPHONE (OUTPATIENT)
Dept: INTERNAL MEDICINE CLINIC | Facility: CLINIC | Age: 78
End: 2023-06-30

## 2023-06-30 NOTE — TELEPHONE ENCOUNTER
Incoming (mail or fax):  fax   Received from:  [address]   Documentation given to:  Triage incoming bin    PT Recertification Form

## 2023-07-05 ENCOUNTER — HOSPITAL ENCOUNTER (OUTPATIENT)
Dept: GENERAL RADIOLOGY | Age: 78
Discharge: HOME | End: 2023-07-05
Attending: INTERNAL MEDICINE
Payer: MEDICARE

## 2023-07-05 ENCOUNTER — LAB ENCOUNTER (OUTPATIENT)
Dept: LAB | Age: 78
End: 2023-07-05
Attending: INTERNAL MEDICINE
Payer: MEDICARE

## 2023-07-05 DIAGNOSIS — I48.91 ATRIAL FIBRILLATION WITH RAPID VENTRICULAR RESPONSE (HCC): Primary | ICD-10-CM

## 2023-07-05 DIAGNOSIS — I48.91 ATRIAL FIBRILLATION (HCC): ICD-10-CM

## 2023-07-05 DIAGNOSIS — I10 HYPERTENSION: ICD-10-CM

## 2023-07-05 DIAGNOSIS — G45.9 TIA (TRANSIENT ISCHEMIC ATTACK): ICD-10-CM

## 2023-07-05 DIAGNOSIS — Z01.818 PREOP EXAMINATION: ICD-10-CM

## 2023-07-05 DIAGNOSIS — G45.9 TRANSIENT ISCHEMIC ATTACK: ICD-10-CM

## 2023-07-05 DIAGNOSIS — R00.2 PALPITATIONS: ICD-10-CM

## 2023-07-05 LAB
ANION GAP SERPL CALC-SCNC: 8 MMOL/L (ref 0–18)
BUN BLD-MCNC: 22 MG/DL (ref 7–18)
CALCIUM BLD-MCNC: 8.8 MG/DL (ref 8.5–10.1)
CHLORIDE SERPL-SCNC: 107 MMOL/L (ref 98–112)
CO2 SERPL-SCNC: 26 MMOL/L (ref 21–32)
CREAT BLD-MCNC: 1.2 MG/DL
FASTING STATUS PATIENT QL REPORTED: NO
GFR SERPLBLD BASED ON 1.73 SQ M-ARVRAT: 61 ML/MIN/1.73M2 (ref 60–?)
GLUCOSE BLD-MCNC: 96 MG/DL (ref 70–99)
OSMOLALITY SERPL CALC.SUM OF ELEC: 295 MOSM/KG (ref 275–295)
POTASSIUM SERPL-SCNC: 4.4 MMOL/L (ref 3.5–5.1)
SODIUM SERPL-SCNC: 141 MMOL/L (ref 136–145)

## 2023-07-05 PROCEDURE — 71046 X-RAY EXAM CHEST 2 VIEWS: CPT | Performed by: INTERNAL MEDICINE

## 2023-07-05 PROCEDURE — 80048 BASIC METABOLIC PNL TOTAL CA: CPT

## 2023-07-05 PROCEDURE — 36415 COLL VENOUS BLD VENIPUNCTURE: CPT

## 2023-07-08 ENCOUNTER — TELEPHONE (OUTPATIENT)
Dept: INTERNAL MEDICINE CLINIC | Facility: CLINIC | Age: 78
End: 2023-07-08

## 2023-07-09 NOTE — TELEPHONE ENCOUNTER
Paged by pt's family. Wife, daughter and pt all on the phone. Pt tested positive for covid on home test today. Symptoms started 7/5/2023 in the evening. Runny nose, congestion. Had a lot of fatigue. No fever. No diarrhea, no nausea, no vomiting. No shortness of breath. No wheezing. Just tired. They do not have a pulse oximeter. Pt with cardiac problems, copd, HR in the 40s and pacemaker has been recommended, he is waiting to follow up with cardiology for this. Per daughter he also had a tia. Asking for medications for covid. Today pt feels a bit better. Less tired than yesterday, has a bit more energy. He is on xarelto for afib. Does not qualify for paxlovid. Discussed molnupiravir. If interested offered to do a video visit once I was able to connect to a computer. Advised re molnupiravir having EUA at this time and not full FDA approval.   After discussion re potential adverse effects some of which we may not have data on and the fact that he is feeling a bit better today family prefers to watch him overnight and call me tomorrow morning with an update. If improved again tomorrow am we can hold off on molnupiravir. If worsens overnight or has chest pain or shortness of breath he should go to the ER, he is at high risk of complications from covid given his age and comorbid conditions. Pt's family verbalizes understanding and is in agreement with this plan. They will page me tomorrow morning.

## 2023-07-10 ENCOUNTER — TELEPHONE (OUTPATIENT)
Dept: INTERNAL MEDICINE CLINIC | Facility: CLINIC | Age: 78
End: 2023-07-10

## 2023-07-10 NOTE — TELEPHONE ENCOUNTER
Ordered by Dr Joe Cristina   Xray chest:   New right basilant pleural parenchymal opacity since 3/20/23.  In Dr bin for review

## 2023-07-10 NOTE — TELEPHONE ENCOUNTER
Talked to pt's wife. Pt feeling better. Denies SOB and chest pain.  Advised to call cardio  and Felecia Hardy dr regarding the xray chest     Patient's wife  verbalized understanding

## 2023-07-10 NOTE — TELEPHONE ENCOUNTER
Incoming (mail or fax):  fax  Received from:  ? Sent by Anselmo Riggs  Documentation given to:  triage test results    Xray results for provider review? ?

## 2023-07-10 NOTE — TELEPHONE ENCOUNTER
Ordered by cardiology but abnormal finding on chest xray done 7/5/2023. See me note from the weekend. He tested positive for covid over the weekend. Please make sure no worsening of symptoms, no shortness of breath. If yes go to the ER. This should be sent to his pulmonologist as well, he sees them for copd and emphysema. He should contact his pulmonologist for further management of these findings in the setting of recent covid. This is not how a covid pneumonia typically presents so unlikely related to that, especially as this cxr was done 7/5/2023.

## 2023-07-12 ENCOUNTER — TELEPHONE (OUTPATIENT)
Dept: NEUROLOGY | Facility: CLINIC | Age: 78
End: 2023-07-12

## 2023-07-12 NOTE — TELEPHONE ENCOUNTER
Spoke with wife per consent, states patient is tolerating ARICEPT. No notable difference in memory at this time to indicate medication is therapeutic at this dose. Wife would like to know if dose will be increased. Routed to provider. Current Outpatient Medications:     donepezil (ARICEPT) 5 MG Oral Tab, Take 1 tab po daily, Disp: 30 tablet, Rfl: 1    fluticasone-umeclidin-vilant (TRELEGY ELLIPTA) 100-62.5-25 MCG/ACT Inhalation Aerosol Powder, Breath Activated, Inhale 1 puff into the lungs daily. LOT-[id number] EXP-10/2024 UGI-1224-2618-30, Disp: 2 each, Rfl: 0    aspirin 81 MG Oral Tab EC, Take 1 tablet (81 mg total) by mouth daily. , Disp: 90 tablet, Rfl: 1    atorvastatin 40 MG Oral Tab, Take 1 tablet (40 mg total) by mouth nightly., Disp: 90 tablet, Rfl: 1    Multiple Vitamins-Minerals (MULTI-VITAMIN/MINERALS) Oral Tab, Take 1 tablet by mouth daily. , Disp: , Rfl:     escitalopram 10 MG Oral Tab, Take 1 tablet (10 mg total) by mouth daily. , Disp: 90 tablet, Rfl: 1    finasteride 5 MG Oral Tab, Take 1 tablet (5 mg total) by mouth daily. , Disp: 90 tablet, Rfl: 3    rivaroxaban 20 MG Oral Tab, Take 1 tablet (20 mg total) by mouth nightly., Disp: 30 tablet, Rfl: 3    metoprolol tartrate 25 MG Oral Tab, Take 1 tablet (25 mg total) by mouth 2x Daily(Beta Blocker). , Disp: 60 tablet, Rfl: 3    Acidophilus/Pectin Oral Cap, Take 1 capsule by mouth daily. , Disp: , Rfl:     acetaminophen 500 MG Oral Tab, Take 1 tablet (500 mg total) by mouth every 6 (six) hours as needed. , Disp: , Rfl:

## 2023-07-13 RX ORDER — DONEPEZIL HYDROCHLORIDE 10 MG/1
10 TABLET, FILM COATED ORAL NIGHTLY
Qty: 90 TABLET | Refills: 1 | Status: SHIPPED | OUTPATIENT
Start: 2023-07-13

## 2023-07-26 RX ORDER — CHOLECALCIFEROL (VITAMIN D3) 125 MCG
500 CAPSULE ORAL DAILY
COMMUNITY

## 2023-07-26 NOTE — PAT NURSING NOTE
Sent Via [employer] message to pt. Preprocedure Instructions     Visitor Instructions     Adult Patients: 2 Adult Care Partners can accompany the patient day of procedure. 2 Care Partners may visit [phone] during inpatient stay     PreOp Instructions     You are scheduled for: a Cardiac Procedure     Date of Procedure: 08/02/23 Wednesday     Diet Instructions: Do not eat or drink anything after midnight     Medications: Medications you are allowed to take can be taken with a sip of water the morning of your procedure     Do not take the following Blood Thinner(s): Do NOT take your Xarelto doses Monday 7/31, Tuesday 8/1, and Wednesday 8/2 morning of procedure     Medications to Stop: Hold herbal supplements and vitamins     Skin Prep: Shower with antibacterial soap using a clean washcloth, prior to procedure. Do not shave your chest or neck. Arrival Time: You will receive a call the afternoon (Tuesday) before your procedure after 3 pm on what time you should arrive the day of your procedure    Driving After Procedure: If sedation is given, you WILL NOT be able to drive home. You will need a responsible adult  to drive you home. ;Cannot take uber or cab unless approved by physician     Discharge Teaching: Your nurse will give you specific instructions before discharge; Any questions, please call the physician's office; Most people can resume normal activities in 2-3 days         parking is available starting at 6 am or park in the [address] [facility]. Check in at the [place] reception desk. Our  will be there to check you in for your procedure. Please bring your insurance cards and ID with you. Please DO NOT respond to this message, the inbasket is not monitored for messages. For any questions, please call the physician's office.

## 2023-07-31 ENCOUNTER — OFFICE VISIT (OUTPATIENT)
Dept: INTERNAL MEDICINE CLINIC | Facility: CLINIC | Age: 78
End: 2023-07-31
Payer: MEDICARE

## 2023-07-31 VITALS
DIASTOLIC BLOOD PRESSURE: 70 MMHG | HEIGHT: 71 IN | SYSTOLIC BLOOD PRESSURE: 132 MMHG | WEIGHT: 213.31 LBS | HEART RATE: 46 BPM | RESPIRATION RATE: 12 BRPM | BODY MASS INDEX: 29.86 KG/M2 | TEMPERATURE: 98 F

## 2023-07-31 DIAGNOSIS — E78.49 OTHER HYPERLIPIDEMIA: ICD-10-CM

## 2023-07-31 DIAGNOSIS — Z12.11 SCREENING FOR COLON CANCER: ICD-10-CM

## 2023-07-31 DIAGNOSIS — N13.8 BPH WITH OBSTRUCTION/LOWER URINARY TRACT SYMPTOMS: ICD-10-CM

## 2023-07-31 DIAGNOSIS — I10 PRIMARY HYPERTENSION: ICD-10-CM

## 2023-07-31 DIAGNOSIS — R41.3 MEMORY CHANGES: ICD-10-CM

## 2023-07-31 DIAGNOSIS — J44.9 CHRONIC OBSTRUCTIVE PULMONARY DISEASE, UNSPECIFIED COPD TYPE (HCC): ICD-10-CM

## 2023-07-31 DIAGNOSIS — I48.20 CHRONIC ATRIAL FIBRILLATION (HCC): ICD-10-CM

## 2023-07-31 DIAGNOSIS — F41.9 ANXIETY: ICD-10-CM

## 2023-07-31 DIAGNOSIS — Z00.00 ENCOUNTER FOR ANNUAL HEALTH EXAMINATION: ICD-10-CM

## 2023-07-31 DIAGNOSIS — N40.1 BPH WITH OBSTRUCTION/LOWER URINARY TRACT SYMPTOMS: ICD-10-CM

## 2023-07-31 RX ORDER — ESCITALOPRAM OXALATE 10 MG/1
10 TABLET ORAL DAILY
Qty: 90 TABLET | Refills: 0 | Status: SHIPPED | OUTPATIENT
Start: 2023-07-31

## 2023-07-31 RX ORDER — FINASTERIDE 5 MG/1
5 TABLET, FILM COATED ORAL DAILY
Qty: 90 TABLET | Refills: 0 | Status: SHIPPED | OUTPATIENT
Start: 2023-07-31

## 2023-07-31 NOTE — TELEPHONE ENCOUNTER
Last OV relevant to medication: 5/8/23  Last refill date: 11/29/22 #90/refills: 1  When pt was asked to return for OV: 8/8/23  Upcoming appt/reason:   Future Appointments   Date Time Provider Rachel Arevalo   7/31/2023  4:40 PM Tiffany Mohan MD [facility] [place]   8/2/2023  1:00 PM [address] [facility] IVS RM 2 EP [address] IVS Chely Deep   8/18/2023 12:30 PM [address] [facility] CT MAIN RM3 [address] [facility] CT Chely Deep   9/21/2023  2:40 PM Lynwood Aase, MD [facility] [place]   Was pt informed of any over due labs: michael

## 2023-08-02 ENCOUNTER — APPOINTMENT (OUTPATIENT)
Dept: GENERAL RADIOLOGY | Facility: HOSPITAL | Age: 78
End: 2023-08-02
Attending: INTERNAL MEDICINE
Payer: MEDICARE

## 2023-08-02 ENCOUNTER — HOSPITAL ENCOUNTER (OUTPATIENT)
Dept: INTERVENTIONAL RADIOLOGY/VASCULAR | Facility: HOSPITAL | Age: 78
Discharge: HOME | End: 2023-08-02
Attending: INTERNAL MEDICINE | Admitting: INTERNAL MEDICINE
Payer: MEDICARE

## 2023-08-02 VITALS
TEMPERATURE: 98 F | BODY MASS INDEX: 30.1 KG/M2 | HEIGHT: 71 IN | HEART RATE: 60 BPM | DIASTOLIC BLOOD PRESSURE: 123 MMHG | OXYGEN SATURATION: 95 % | SYSTOLIC BLOOD PRESSURE: 188 MMHG | RESPIRATION RATE: 16 BRPM | WEIGHT: 215 LBS

## 2023-08-02 DIAGNOSIS — I49.5 SINUS NODE DYSFUNCTION (HCC): ICD-10-CM

## 2023-08-02 LAB
BASOPHILS # BLD AUTO: 0.02 X10(3) UL (ref 0–0.2)
BASOPHILS NFR BLD AUTO: 0.3 %
EOSINOPHIL # BLD AUTO: 0.13 X10(3) UL (ref 0–0.7)
EOSINOPHIL NFR BLD AUTO: 1.7 %
ERYTHROCYTE [DISTWIDTH] IN BLOOD BY AUTOMATED COUNT: 13.1 %
HCT VFR BLD AUTO: 41 %
HGB BLD-MCNC: 13.8 G/DL
IMM GRANULOCYTES # BLD AUTO: 0.04 X10(3) UL (ref 0–1)
IMM GRANULOCYTES NFR BLD: 0.5 %
LYMPHOCYTES # BLD AUTO: 1.17 X10(3) UL (ref 1–4)
LYMPHOCYTES NFR BLD AUTO: 15 %
MCH RBC QN AUTO: 30.7 PG (ref 26–34)
MCHC RBC AUTO-ENTMCNC: 33.7 G/DL (ref 31–37)
MCV RBC AUTO: 91.1 FL
MONOCYTES # BLD AUTO: 0.63 X10(3) UL (ref 0.1–1)
MONOCYTES NFR BLD AUTO: 8.1 %
NEUTROPHILS # BLD AUTO: 5.81 X10 (3) UL (ref 1.5–7.7)
NEUTROPHILS # BLD AUTO: 5.81 X10(3) UL (ref 1.5–7.7)
NEUTROPHILS NFR BLD AUTO: 74.4 %
PLATELET # BLD AUTO: 223 10(3)UL (ref 150–450)
RBC # BLD AUTO: 4.5 X10(6)UL
WBC # BLD AUTO: 7.8 X10(3) UL (ref 4–11)

## 2023-08-02 PROCEDURE — 93005 ELECTROCARDIOGRAM TRACING: CPT

## 2023-08-02 PROCEDURE — 99153 MOD SED SAME PHYS/QHP EA: CPT | Performed by: INTERNAL MEDICINE

## 2023-08-02 PROCEDURE — 3E0132A INTRODUCTION OF ANTI-INFECTIVE ENVELOPE INTO SUBCUTANEOUS TISSUE, PERCUTANEOUS APPROACH: ICD-10-PCS | Performed by: INTERNAL MEDICINE

## 2023-08-02 PROCEDURE — 71046 X-RAY EXAM CHEST 2 VIEWS: CPT | Performed by: INTERNAL MEDICINE

## 2023-08-02 PROCEDURE — 85025 COMPLETE CBC W/AUTO DIFF WBC: CPT | Performed by: INTERNAL MEDICINE

## 2023-08-02 PROCEDURE — 0JH606Z INSERTION OF PACEMAKER, DUAL CHAMBER INTO CHEST SUBCUTANEOUS TISSUE AND FASCIA, OPEN APPROACH: ICD-10-PCS | Performed by: INTERNAL MEDICINE

## 2023-08-02 PROCEDURE — 02HK3JZ INSERTION OF PACEMAKER LEAD INTO RIGHT VENTRICLE, PERCUTANEOUS APPROACH: ICD-10-PCS | Performed by: INTERNAL MEDICINE

## 2023-08-02 PROCEDURE — 02H63JZ INSERTION OF PACEMAKER LEAD INTO RIGHT ATRIUM, PERCUTANEOUS APPROACH: ICD-10-PCS | Performed by: INTERNAL MEDICINE

## 2023-08-02 PROCEDURE — 99152 MOD SED SAME PHYS/QHP 5/>YRS: CPT | Performed by: INTERNAL MEDICINE

## 2023-08-02 PROCEDURE — 33208 INSRT HEART PM ATRIAL & VENT: CPT | Performed by: INTERNAL MEDICINE

## 2023-08-02 PROCEDURE — 93010 ELECTROCARDIOGRAM REPORT: CPT | Performed by: INTERNAL MEDICINE

## 2023-08-02 RX ORDER — SODIUM CHLORIDE 9 MG/ML
INJECTION, SOLUTION INTRAVENOUS
Status: COMPLETED | OUTPATIENT
Start: 2023-08-02 | End: 2023-08-02

## 2023-08-02 RX ORDER — CEFAZOLIN SODIUM/WATER 2 G/20 ML
SYRINGE (ML) INTRAVENOUS
Status: COMPLETED
Start: 2023-08-02 | End: 2023-08-02

## 2023-08-02 RX ORDER — CEFAZOLIN SODIUM/WATER 2 G/20 ML
2 SYRINGE (ML) INTRAVENOUS
Status: DISCONTINUED | OUTPATIENT
Start: 2023-08-02 | End: 2023-08-02

## 2023-08-02 RX ORDER — CEFAZOLIN SODIUM/WATER 2 G/20 ML
2 SYRINGE (ML) INTRAVENOUS EVERY 8 HOURS
Status: DISCONTINUED | OUTPATIENT
Start: 2023-08-02 | End: 2023-08-02

## 2023-08-02 RX ORDER — LIDOCAINE HYDROCHLORIDE 10 MG/ML
INJECTION, SOLUTION EPIDURAL; INFILTRATION; INTRACAUDAL; PERINEURAL
Status: COMPLETED
Start: 2023-08-02 | End: 2023-08-02

## 2023-08-02 RX ORDER — ONDANSETRON 2 MG/ML
4 INJECTION INTRAMUSCULAR; INTRAVENOUS EVERY 6 HOURS PRN
Status: DISCONTINUED | OUTPATIENT
Start: 2023-08-02 | End: 2023-08-02

## 2023-08-02 RX ORDER — CHLORHEXIDINE GLUCONATE 4 G/100ML
30 SOLUTION TOPICAL
Status: COMPLETED | OUTPATIENT
Start: 2023-08-02 | End: 2023-08-02

## 2023-08-02 RX ORDER — VANCOMYCIN HYDROCHLORIDE 1 G/20ML
INJECTION, POWDER, LYOPHILIZED, FOR SOLUTION INTRAVENOUS
Status: COMPLETED
Start: 2023-08-02 | End: 2023-08-02

## 2023-08-02 RX ORDER — MIDAZOLAM HYDROCHLORIDE 1 MG/ML
INJECTION INTRAMUSCULAR; INTRAVENOUS
Status: COMPLETED
Start: 2023-08-02 | End: 2023-08-02

## 2023-08-02 RX ADMIN — CHLORHEXIDINE GLUCONATE 30 ML: 4 SOLUTION TOPICAL at 12:30:00

## 2023-08-02 RX ADMIN — SODIUM CHLORIDE: 9 INJECTION, SOLUTION INTRAVENOUS at 12:30:00

## 2023-08-02 NOTE — IVS NOTE
Patient discharged home post PPM implant in stable condition. PO intake tolerated. VSS. EKG completed. Patient went down for PA and lateral XRAY. No pneumothorax noted on report. AVS reviewed with patient, wife and daughter. PIV removed. Discharge via wheelchair with all belongings.

## 2023-08-02 NOTE — DISCHARGE INSTRUCTIONS
Pacemaker and Defibrillator Discharge Instructions    Resume Xarelto Friday night  For tonight only aviva [phone] with questions or concerns     Activity  Plan to rest tonight and tomorrow. Avoid drinking alcohol for next 24 hours. Do not drive after procedure until 1-week device check appointment, unless otherwise instructed by your cardiologist.   Wear sling for next 24 hours, then only at night as needed for 30 days to help assist with arm restrictions while sleeping. Arm Restrictions: For 30 days after implant:  do not lift arm with implanted device above your head or behind your back. Arm is to remain below your shoulder and in front of your body. do not lift more than 10 lbs with affected arm   do not perform repetitive cycling motion with affected arm (swimming, golfing, bowling, tennis, exercise machines, raking, vacuuming, snow shoveling). Incision Care/Bathing  Keep incision site completely dry for 5 days after surgery. After day 5, you can remove top dressing and shower, letting clean soapy water run over incision area, patting dry. The white steri-strips placed directly over the incision will gradually fall off over the next few weeks and can be physically removed after 3 weeks. Do not take them off before this time. (If you have a zipline dressing, this will be removed by device nurse or MD in 4 weeks)   Keep procedure site clean and dry, do not apply any ointments, creams, lotions to the incision. Look at your incision daily to monitor for signs and symptoms of infection (redness, swelling, drainage, foul odor from procedure site)  Do not cover incision with extra dressings or gauze unless otherwise instructed. Do not submerge incision in water, take whirlpool baths or swim for 30 days or until site is completely healed.      When to notify your physician:   If you have chest pain, shortness of breath or persistent cough  If you experience any signs of fever (temperature >101), chills, infection (redness, swelling, thick yellow drainage, foul order from procedure site)  New or worsening swelling of arm with implanted device   If an ICD was inserted and you receive a shock and have no symptoms, call [facility] device clinic. If you have symptoms (fainting, near fainting, dizziness, lightheadedness, chest pain, shortness of breath, palpitations), call [phone] [address] Direct Line: [phone]. Monday-Friday.  8:30AM-4PM.   [facility] Office: [phone] Monday- Friday 8AM-5PM   [address] [place] [address] Office: [phone] Monday-Friday 8AM-5PM

## 2023-08-02 NOTE — PROCEDURES
[facility]  Pacemaker Implantation Procedure Note    Billy Hampton Patient Status:  Outpatient    1945 MRN [id number]   Location [address] [facility] Attending Ravi Isaacs MD   Hosp Day # 0 PCP Woody Weber MD     OPERATION(S) PERFORMED:   1. Abbott Dual-chamber pacemaker implant for sinus node dysfunction   2. Chest fluoroscopy. 3. Left upper extremity venography. : Gumaro Echols MD  INDICATION: Symptomatic sinus node dysfunction  COMPLICATIONS: None      ESTIMATED BLOOD LOSS: Minimal.  SEDATION: IV was maintained by RN. Patient was assessed by myself and the nursing staff, IV sedation (versed 4 mg and fentanyl 75 mcg) were administered during continuous ECG, pulse oximeter, and non-invasive hemodynamic monitoring. I was present from the time of sedation being started to the end of the procedure (2:56 PM to 4:30 PM). METHODS: The patient was brought to the EP lab in a fasting and nonsedated state after providing informed consent. IV sedation was administered during continuous ECG, pulse oximeter, and noninvasive hemodynamic monitoring. After administering 1% lidocaine for local anesthesia, an incision was made parallel to the left clavicle. The plane of the incision was extended to the prepectoral fascia. A pocket was formed. Access to the axillary vein was achieved via the extrathoracic approach with two separate punctures. The guidewires were advanced to the IVC. A right ventricular lead was positioned in the left bundle branch area. We achieved a QR in V1 and an LVAD of 78 with a QRS duration of 96 ms. An atrial lead was positioned in the RA appendage. Local electrograms and pacing thresholds were measured. Pacing was performed at 10 v to rule out phrenic nerve stimulation. The leads were tied to the pre-pectoral fascia with 2-0 Ethibond. The pocket was irrigated with antibiotic solution.  Bleeding was sought for until hemostasis was achieved. The leads were connected to a pulse generator. They were coiled and placed into the pocket along with the pulse generator. The incision was closed in 3 layers using 2-0 and 3-0 Vicryl and a 4-0 subcuticular. Steri-Strips were applied followed by a sterile dressing. The left arm was placed in a sling and the patient was transported to telemetry in stable condition. There were no apparent intraoperative complications. A Kangaroo pouch was used     CONCLUSIONS:   1. Status post successful implant of a dual-chamber permanent pacemaker-Abbott device with ventricular lead in the left bundle branch area for conduction system pacing   2. Adequate RA, RV pacing and sensing thresholds. 3.  Lateral chest x-rays a twelve-lead prior to discharge  4.   Resume Xarelto in 48 hours         Sima Morillo MD  Cardiac Electrophysiology  [address] [place] [address]

## 2023-08-03 LAB
ATRIAL RATE: 60 BPM
P AXIS: 68 DEGREES
P-R INTERVAL: 218 MS
Q-T INTERVAL: 456 MS
QRS DURATION: 128 MS
QTC CALCULATION (BEZET): 456 MS
R AXIS: 93 DEGREES
T AXIS: 37 DEGREES
VENTRICULAR RATE: 60 BPM

## 2023-08-18 ENCOUNTER — HOSPITAL ENCOUNTER (OUTPATIENT)
Dept: CT IMAGING | Facility: HOSPITAL | Age: 78
Discharge: HOME | End: 2023-08-18
Attending: INTERNAL MEDICINE
Payer: MEDICARE

## 2023-08-18 DIAGNOSIS — R93.89 ABNORMAL CXR: ICD-10-CM

## 2023-08-18 DIAGNOSIS — J90 PLEURAL EFFUSION: ICD-10-CM

## 2023-08-18 LAB
CREAT BLD-MCNC: 1.2 MG/DL
EGFRCR SERPLBLD CKD-EPI 2021: 61 ML/MIN/1.73M2 (ref 60–?)

## 2023-08-18 PROCEDURE — 82565 ASSAY OF CREATININE: CPT

## 2023-08-18 PROCEDURE — 71260 CT THORAX DX C+: CPT | Performed by: INTERNAL MEDICINE

## 2023-08-25 ENCOUNTER — TELEPHONE (OUTPATIENT)
Dept: INTERNAL MEDICINE CLINIC | Facility: CLINIC | Age: 78
End: 2023-08-25

## 2023-08-25 NOTE — TELEPHONE ENCOUNTER
Date of pre-op appt:  9/25/23  Provider pre-op scheduled with: Anthony Sanches  Surgeon's name: Dr Kandace Mahoney. Sara   Phone #:  [phone]   Fax #:  [phone]  Surgery date:  10/9/23  Procedure/diagnosis:  Nuclear sclerotic cataract of left eye.

## 2023-09-11 RX ORDER — ATORVASTATIN CALCIUM 40 MG/1
40 TABLET, FILM COATED ORAL NIGHTLY
Qty: 90 TABLET | Refills: 0 | Status: SHIPPED | OUTPATIENT
Start: 2023-09-11

## 2023-09-11 NOTE — TELEPHONE ENCOUNTER
Future Appointments   Date Time Provider Rachel Arevalo   9/21/2023  2:40 PM Mi Landaverde MD [facility] [place]   9/25/2023  2:00 PM Milton Hoffman, DREW [facility] [address] [place]     Per Dr note on 7/31/23:   4. Other hyperlipidemia  Continue statin.    Bobby pended

## 2023-09-11 NOTE — TELEPHONE ENCOUNTER
Pt wife would like to know if dr Orly Cruz will refill atorvastatin 40 MG Oral Tab.  Pt wife said it was prescribed at hospital.

## 2023-09-11 NOTE — TELEPHONE ENCOUNTER
Cholesterol Medication Protocol [id number]/11/2023 11:18 AM   Protocol Details ALT < 80    ALT resulted within past year    Lipid panel within past 12 months    Appointment within past 12 or next 3 months       Future Appointments   Date Time Provider Rachel Arevalo   9/21/2023  2:40 PM MD HERON Manzo [facility] [place]   9/25/2023  2:00 PM Giuliana Hoffman, DREW [facility] [place] to fill ?

## 2023-09-21 ENCOUNTER — OFFICE VISIT (OUTPATIENT)
Dept: NEUROLOGY | Facility: CLINIC | Age: 78
End: 2023-09-21
Payer: MEDICARE

## 2023-09-21 VITALS
SYSTOLIC BLOOD PRESSURE: 128 MMHG | RESPIRATION RATE: 16 BRPM | DIASTOLIC BLOOD PRESSURE: 60 MMHG | BODY MASS INDEX: 30.1 KG/M2 | HEART RATE: 74 BPM | WEIGHT: 215 LBS | HEIGHT: 71 IN

## 2023-09-21 DIAGNOSIS — R53.83 FEELING TIRED: ICD-10-CM

## 2023-09-21 DIAGNOSIS — R90.89 ABNORMAL FINDING ON MRI OF BRAIN: ICD-10-CM

## 2023-09-21 DIAGNOSIS — R41.3 COMPLAINTS OF MEMORY DISTURBANCE: Primary | ICD-10-CM

## 2023-09-21 DIAGNOSIS — G45.9 TIA (TRANSIENT ISCHEMIC ATTACK): ICD-10-CM

## 2023-09-21 PROCEDURE — 99213 OFFICE O/P EST LOW 20 MIN: CPT | Performed by: OTHER

## 2023-09-25 ENCOUNTER — OFFICE VISIT (OUTPATIENT)
Dept: INTERNAL MEDICINE CLINIC | Facility: CLINIC | Age: 78
End: 2023-09-25
Payer: MEDICARE

## 2023-09-25 VITALS
BODY MASS INDEX: 28.31 KG/M2 | SYSTOLIC BLOOD PRESSURE: 118 MMHG | HEART RATE: 76 BPM | HEIGHT: 72 IN | TEMPERATURE: 98 F | WEIGHT: 209 LBS | RESPIRATION RATE: 18 BRPM | OXYGEN SATURATION: 97 % | DIASTOLIC BLOOD PRESSURE: 60 MMHG

## 2023-09-25 DIAGNOSIS — I48.20 CHRONIC ATRIAL FIBRILLATION (HCC): ICD-10-CM

## 2023-09-25 DIAGNOSIS — N40.1 BPH WITH OBSTRUCTION/LOWER URINARY TRACT SYMPTOMS: ICD-10-CM

## 2023-09-25 DIAGNOSIS — G45.9 TIA (TRANSIENT ISCHEMIC ATTACK): ICD-10-CM

## 2023-09-25 DIAGNOSIS — J44.9 CHRONIC OBSTRUCTIVE PULMONARY DISEASE, UNSPECIFIED COPD TYPE (HCC): ICD-10-CM

## 2023-09-25 DIAGNOSIS — Z95.0 PACEMAKER: ICD-10-CM

## 2023-09-25 DIAGNOSIS — H25.9 SENILE CATARACT OF LEFT EYE, UNSPECIFIED AGE-RELATED CATARACT TYPE: ICD-10-CM

## 2023-09-25 DIAGNOSIS — E78.49 OTHER HYPERLIPIDEMIA: ICD-10-CM

## 2023-09-25 DIAGNOSIS — I10 PRIMARY HYPERTENSION: ICD-10-CM

## 2023-09-25 DIAGNOSIS — N13.8 BPH WITH OBSTRUCTION/LOWER URINARY TRACT SYMPTOMS: ICD-10-CM

## 2023-09-25 DIAGNOSIS — F39 MOOD DISORDER (HCC): ICD-10-CM

## 2023-09-25 DIAGNOSIS — Z01.818 PREOP EXAM FOR INTERNAL MEDICINE: Primary | ICD-10-CM

## 2023-09-25 PROCEDURE — 99215 OFFICE O/P EST HI 40 MIN: CPT | Performed by: NURSE PRACTITIONER

## 2023-09-25 RX ORDER — TAMSULOSIN HYDROCHLORIDE 0.4 MG/1
0.8 CAPSULE ORAL
COMMUNITY
Start: 2023-08-24

## 2023-09-25 RX ORDER — MOXIFLOXACIN 5 MG/ML
1 SOLUTION/ DROPS OPHTHALMIC 4 TIMES DAILY
COMMUNITY
Start: 2023-08-24

## 2023-09-25 RX ORDER — FLUTICASONE PROPIONATE 50 MCG
2 SPRAY, SUSPENSION (ML) NASAL DAILY
Qty: 1 EACH | Refills: 0 | Status: SHIPPED | OUTPATIENT
Start: 2023-09-25

## 2023-09-25 RX ORDER — PREDNISOLONE ACETATE 10 MG/ML
SUSPENSION/ DROPS OPHTHALMIC
COMMUNITY
Start: 2023-08-24

## 2023-09-25 NOTE — PATIENT INSTRUCTIONS
Start flonase for runny nose    Continue to see cardiology    Follow up in January for your med check or sooner as needed

## 2023-09-26 PROBLEM — F39 MOOD DISORDER: Status: ACTIVE | Noted: 2023-09-26

## 2023-09-26 PROBLEM — F39 MOOD DISORDER (HCC): Status: ACTIVE | Noted: 2023-09-26

## 2023-10-25 RX ORDER — ESCITALOPRAM OXALATE 10 MG/1
10 TABLET ORAL DAILY
Qty: 90 TABLET | Refills: 0 | Status: SHIPPED | OUTPATIENT
Start: 2023-10-25

## 2023-10-25 NOTE — TELEPHONE ENCOUNTER
Last OV relevant to medication: 9/25/23  Last refill date: 7/31/23 #90/refills: 0  When pt was asked to return for OV: January for med check  Upcoming appt/reason: no future apts at [facility]  Was pt informed of any over due labs: michael

## 2023-10-26 RX ORDER — FINASTERIDE 5 MG/1
5 TABLET, FILM COATED ORAL DAILY
Qty: 90 TABLET | Refills: 2 | Status: SHIPPED | OUTPATIENT
Start: 2023-10-26

## 2023-11-16 ENCOUNTER — HOSPITAL ENCOUNTER (OUTPATIENT)
Dept: LAB | Facility: HOSPITAL | Age: 78
Discharge: HOME | End: 2023-11-16
Attending: INTERNAL MEDICINE
Payer: MEDICARE

## 2023-11-16 LAB
ANION GAP SERPL CALC-SCNC: 2 MMOL/L (ref 0–18)
BUN BLD-MCNC: 26 MG/DL (ref 9–23)
CALCIUM BLD-MCNC: 9.1 MG/DL (ref 8.5–10.1)
CHLORIDE SERPL-SCNC: 110 MMOL/L (ref 98–112)
CO2 SERPL-SCNC: 32 MMOL/L (ref 21–32)
CREAT BLD-MCNC: 1.23 MG/DL
EGFRCR SERPLBLD CKD-EPI 2021: 59 ML/MIN/1.73M2 (ref 60–?)
FASTING STATUS PATIENT QL REPORTED: NO
GLUCOSE BLD-MCNC: 99 MG/DL (ref 70–99)
OSMOLALITY SERPL CALC.SUM OF ELEC: 303 MOSM/KG (ref 275–295)
POTASSIUM SERPL-SCNC: 4.4 MMOL/L (ref 3.5–5.1)
SODIUM SERPL-SCNC: 144 MMOL/L (ref 136–145)

## 2023-11-16 PROCEDURE — 36415 COLL VENOUS BLD VENIPUNCTURE: CPT | Performed by: INTERNAL MEDICINE

## 2023-11-16 PROCEDURE — 80048 BASIC METABOLIC PNL TOTAL CA: CPT | Performed by: INTERNAL MEDICINE

## 2023-11-17 RX ORDER — TAMSULOSIN HYDROCHLORIDE 0.4 MG/1
0.8 CAPSULE ORAL
Qty: 180 CAPSULE | Refills: 0 | Status: SHIPPED | OUTPATIENT
Start: 2023-11-17

## 2023-11-22 ENCOUNTER — TELEPHONE (OUTPATIENT)
Dept: INTERNAL MEDICINE CLINIC | Facility: CLINIC | Age: 78
End: 2023-11-22

## 2023-11-22 NOTE — TELEPHONE ENCOUNTER
I'm unable to update care everywhere today to view notes from cardiology. He has been on lexapro for a while. Flecainide is a new medication for him. He should reach out to cardiology for further advice regarding this interaction. I am not sure if they have concern for this, if so they may want to do an EKG for the qt prolongation risk.

## 2023-11-22 NOTE — TELEPHONE ENCOUNTER
Form faxed back with this note and   left detailed message for the pt to call cardiologist regarding this

## 2023-11-28 NOTE — TELEPHONE ENCOUNTER
He can make an appt to discuss new medication. At this time due to the interaction he can wean off the lexapro. Take every other day for 2 weeks then stop. New medication can be discussed at his visit.

## 2023-11-28 NOTE — TELEPHONE ENCOUNTER
Future Appointments   Date Time Provider Rachel Arevalo   11/30/2023 10:20 AM Jose Hoffman, DREW [facility] [address] [place]

## 2023-11-28 NOTE — TELEPHONE ENCOUNTER
Pt's wife calling back. Maximino Garsia that they talked to cardiologist and they said they are not changing the new medication. They want PCP to change the lexapro. Please advise   Pt denies any issues or symptoms now.

## 2023-11-29 LAB
ATRIAL RATE: 59 BPM
P AXIS: 88 DEGREES
P-R INTERVAL: 200 MS
Q-T INTERVAL: 442 MS
QRS DURATION: 82 MS
QTC CALCULATION (BEZET): 437 MS
R AXIS: 90 DEGREES
T AXIS: 74 DEGREES
VENTRICULAR RATE: 59 BPM

## 2023-12-07 RX ORDER — FLUTICASONE FUROATE, UMECLIDINIUM BROMIDE AND VILANTEROL TRIFENATATE 100; 62.5; 25 UG/1; UG/1; UG/1
1 POWDER RESPIRATORY (INHALATION) DAILY
COMMUNITY

## 2023-12-07 NOTE — PAT NURSING NOTE
Preprocedure Instructions     Pre-Procedure Instructions: Encouraged to check in electronically via [employer]     Visitor Instructions     Adult Patients: 2 Adult Care Partners can accompany the patient day of procedure. 2 Care Partners may visit [phone] during inpatient stay     PreOp Instructions     You are scheduled for: a Cardiac Procedure     Date of Procedure: 12/11/23- Monday     Diet Instructions: Do not eat or drink anything after midnight     Medications: Medications you are allowed to take can be taken with a sip of water the morning of your procedure     Medications to Stop: Hold herbal supplements and vitamins on day of procedure     Other Medications: do NOT miss any doses of Xarelto prior to cardioversion. Please notify Dr. Stephan Webb office for any missed doses before procedure. Sleep Apnea: If you have sleep apnea, please bring your mask and tubing     Arrival Time: You will receive a call the afternoon before your procedure after 3 pm on what time you should arrive the day of your procedure    Driving After Procedure: If sedation is given, you WILL NOT be able to drive home. You will need a responsible adult  to drive you home. ;Cannot take uber or cab unless approved by physician     Discharge Teaching: Your nurse will give you specific instructions before discharge; Most people can resume normal activities in 2-3 days; Any questions, please call the physician's office     [employer] parking is available starting at 6 am or park in the [place] parking garage at [facility] [address] in at the [place] reception desk. Our  will be there to check you in for your procedure. Please bring your insurance cards and ID with you. Please DO NOT respond to this message, the inbasket is not monitored for messages.  For any questions, please call the physician's office.

## 2023-12-11 ENCOUNTER — HOSPITAL ENCOUNTER (OUTPATIENT)
Dept: INTERVENTIONAL RADIOLOGY/VASCULAR | Facility: HOSPITAL | Age: 78
Discharge: HOME | End: 2023-12-11
Attending: INTERNAL MEDICINE | Admitting: INTERNAL MEDICINE
Payer: MEDICARE

## 2023-12-11 VITALS
TEMPERATURE: 98 F | DIASTOLIC BLOOD PRESSURE: 80 MMHG | RESPIRATION RATE: 17 BRPM | HEART RATE: 60 BPM | OXYGEN SATURATION: 99 % | WEIGHT: 205 LBS | BODY MASS INDEX: 28.7 KG/M2 | SYSTOLIC BLOOD PRESSURE: 141 MMHG | HEIGHT: 71 IN

## 2023-12-11 DIAGNOSIS — I48.91 A-FIB (HCC): ICD-10-CM

## 2023-12-11 LAB
ATRIAL RATE: 64 BPM
P AXIS: -14 DEGREES
P-R INTERVAL: 266 MS
Q-T INTERVAL: 492 MS
QRS DURATION: 136 MS
QTC CALCULATION (BEZET): 507 MS
R AXIS: 85 DEGREES
T AXIS: 113 DEGREES
VENTRICULAR RATE: 64 BPM

## 2023-12-11 PROCEDURE — 93005 ELECTROCARDIOGRAM TRACING: CPT

## 2023-12-11 PROCEDURE — 92960 CARDIOVERSION ELECTRIC EXT: CPT | Performed by: INTERNAL MEDICINE

## 2023-12-11 PROCEDURE — 5A2204Z RESTORATION OF CARDIAC RHYTHM, SINGLE: ICD-10-PCS | Performed by: INTERNAL MEDICINE

## 2023-12-11 PROCEDURE — 93010 ELECTROCARDIOGRAM REPORT: CPT | Performed by: INTERNAL MEDICINE

## 2023-12-11 RX ORDER — SODIUM CHLORIDE 9 MG/ML
INJECTION, SOLUTION INTRAVENOUS CONTINUOUS
Status: DISCONTINUED | OUTPATIENT
Start: 2023-12-11 | End: 2023-12-15

## 2023-12-11 RX ORDER — METHOHEXITAL IN WATER/PF 100MG/10ML
SYRINGE (ML) INTRAVENOUS
Status: COMPLETED
Start: 2023-12-11 | End: 2023-12-11

## 2023-12-11 NOTE — DISCHARGE INSTRUCTIONS
HOME CARE INSTRUCTIONS FOLLOWING CARDIOVERSION OR ICD EVALUATION      Activity:  - DO NOT drive after the procedure. You may resume driving after 24 hours. - DO NOT operate any machinery (including kitchen appliances or power tools). - Avoid drinking alcohol for 24 hours. - You may resume your normal activity after 24 hours. What is Normal:  - Your skin may be red where the patches were placed. - The redness may last 2 to 3 days and feel like a \"sunburn\". - You may treat the area with an over-the-counter cream that is used for sunburned skin. Special Instructions:  - If you were taking the medication Eliquis, Xarelto, Pradaxa or Coumadin, it is very important to continue taking it until your follow-up appointment with your physician. When you should NOTIFY YOUR PHYSICIAN:  - If you feel that you have returned to an irregular rhythm  - If you have an ICD, any time your ICD device fires    Other:  - You may resume your present diet, unless otherwise specified by your physician.  - You may resume all of your medications as prescribed, unless otherwise directed by your physician. - A list of your medications was provided to you at discharge. - Please call your physician's office for a follow-up appointment. You should be seen in 2 weeks.

## 2023-12-11 NOTE — PROGRESS NOTES
Pt post cardioversion    TO at 1227- after adequate sedation per Dr. Vernon Palomino, pt cardioverted. Pt in underlying NSR per PPM interrogation. Vss. Pt tolerated well. Pt awake, tolerating po intake. Recovery complete per protocol. Vss. Discharge instructions reviewed, iv dc'd and pt discharged home with wife driving.

## 2023-12-11 NOTE — PROCEDURES
Electrophysiology Procedure Note    Fidel Bauman Location: Cath Lab   CSN [id number] MRN [id number]   Admission Date 12/11/2023  Operation Date 12/11/23    Attending Physician Casi Dash MD Operating Physician Casi Dash MD     Pre-Operative Diagnosis: Atrial fibrillation    Post-Operative Diagnosis: Same as above  PROCEDURE(S) PERFORMED:    1. Cardioversion. 2.     Sedation      :  Casi Dash MD     ANESTHESIA:  IV sedation. Moderate conscious sedation for this procedure was administered and personally monitored. Brevital 40 mg  Sedation time: 10 minutes     INDICATION:  Persistent Atrial Fibrillation     COMPLICATIONS:  None. METHODS:  The patient was brought to the outpatient cardiac telemetry unit in a fasting and nonsedated state after providing informed consent. IV sedation was administered during continuous ECG, pulse oximeter and noninvasive hemodynamic monitoring. After administering IV Brevital in intermittent boluses, the desired level of sedation was achieved. Cardioversion Energy:  200J    Pad Position: [place]  Pre- Cardioversion Rhythm-atrial fibrillation  Post Cardioversion Rhythm -a paced V paced    -Patient device was checked post and precardioversion. Parameters were all stable    CONCLUSIONS:  1.     Successful cardioversion       Plan:  1- Rhythm/Rate Control Meds: No changes  2) Anticoagulation-no changes  3) Follow Up- 1 month                    Casi Dash MD     Cardiac Electrophysiololgy  [address] [place] [address]

## 2023-12-11 NOTE — H&P
[facility]  Pre Procedure History and Physical    Pramod Gale Patient Status:  Outpatient    1945 MRN [id number]   Location [address] Attending Andres Grullon MD   Hosp Day # 0 PCP Naida Guy MD     Consults      History of Present Illness:  Pramod Gale is a a(n) [de-identified]year old male here for DCCV      Prior H/P Reviewed with no changes    Abbott PPM      History:  Past Medical History:   Diagnosis Date    Anxiety     Arrhythmia     Arthritis     Bilateral inguinal hernia without obstruction or gangrene 2022    BPH (benign prostatic hyperplasia)     Calculus of kidney     Essential hypertension     High blood pressure     High cholesterol      Past Surgical History:   Procedure Laterality Date    ANESTH,PACEMAKER INSERTION  2023    APPENDECTOMY      APPENDECTOMY      COLONOSCOPY      COLONOSCOPY N/A 2022    Procedure: COLONOSCOPY;  Surgeon: Rene Kraus MD;  Location: [facility] ENDOSCOPY    COLONOSCOPY      CYST REMOVAL N/A     Cyst removed behind back of neck    CYST REMOVAL Right 2020    Removed from right upper back - just below shoulder    HERNIA SURGERY  2022    SKIN SURGERY      VASECTOMY       Family History   Problem Relation Age of Onset    Heart Attack Father     Diabetes Mother     Other (DM) Sister     Other (ALS) Brother     No Known Problems Maternal Grandmother     No Known Problems Maternal Grandfather     No Known Problems Paternal Grandmother     No Known Problems Paternal Grandfather     Other (smoker) Brother     Other (MS) Sister     No Known Problems Son     No Known Problems Daughter       reports that he quit smoking about 13 years ago. His smoking use included cigarettes. He has a 117.00 pack-year smoking history. He has never been exposed to tobacco smoke. He has never used smokeless tobacco. He reports that he does not drink alcohol and does not use drugs. Allergies:   Allergies   Allergen Reactions    Kiwi Extract ITCHING     Inner ears itch       Medications:  No current facility-administered medications for this encounter. OBJECTIVE      Physical Exam:  Physical Exam   Height 5' 11\" (1.803 m), weight 205 lb (93 kg). No data recorded. Wt Readings from Last 3 Encounters:   12/07/23 205 lb (93 kg)   11/29/23 200 lb 3.2 oz (90.8 kg)   09/25/23 209 lb (94.8 kg)       NAD  PERRLA/EOMI  Neck veins not elevated  Carotids- no bruits  CTA bilaterally  Cardiac- irreg irreg  Abdomen- Soft,Nontender, normal BS  Extremities- pulses normal  Edema- none  Mood /Affect Congruent  Skin- no lesions          Results:   No results for input(s): \"GLU\", \"BUN\", \"CREATSERUM\", \"GFRAA\", \"GFRNAA\", \"EGFRCR\", \"CA\", \"NA\", \"K\", \"CL\", \"CO2\" in the last 168 hours. No results for input(s): \"RBC\", \"HGB\", \"HCT\", \"MCV\", \"MCH\", \"MCHC\", \"RDW\", \"NEPRELIM\", \"WBC\", \"PLT\" in the last 168 hours. [unfilled]  No results for input(s): \"BNP\" in the last 168 hours. Lab Results   Component Value Date    INR 1.15 05/03/2022     No results found for: \"TROP\"      No results found. 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  Reverified on the day of the procedure    Procedure Planned: DCCV    Sedation Plan: brevital    Discharge Plan: Same day      Tonya Jackson MD  Cardiac Electrophysiology  [facility]  12/11/2023  7:56 AM

## 2023-12-19 ENCOUNTER — EXTERNAL FACILITY (OUTPATIENT)
Dept: FAMILY MEDICINE CLINIC | Facility: CLINIC | Age: 78
End: 2023-12-19

## 2023-12-19 VITALS
SYSTOLIC BLOOD PRESSURE: 124 MMHG | TEMPERATURE: 97 F | RESPIRATION RATE: 16 BRPM | DIASTOLIC BLOOD PRESSURE: 62 MMHG | HEART RATE: 60 BPM | OXYGEN SATURATION: 95 %

## 2023-12-19 DIAGNOSIS — E03.9 HYPOTHYROIDISM, UNSPECIFIED TYPE: ICD-10-CM

## 2023-12-19 DIAGNOSIS — J44.9 CHRONIC OBSTRUCTIVE PULMONARY DISEASE, UNSPECIFIED COPD TYPE (HCC): ICD-10-CM

## 2023-12-19 DIAGNOSIS — I10 PRIMARY HYPERTENSION: ICD-10-CM

## 2023-12-19 DIAGNOSIS — M15.9 PRIMARY OSTEOARTHRITIS INVOLVING MULTIPLE JOINTS: ICD-10-CM

## 2023-12-19 DIAGNOSIS — I48.20 CHRONIC ATRIAL FIBRILLATION (HCC): ICD-10-CM

## 2023-12-19 DIAGNOSIS — R53.1 GENERALIZED WEAKNESS: Primary | ICD-10-CM

## 2023-12-19 PROCEDURE — 1111F DSCHRG MED/CURRENT MED MERGE: CPT | Performed by: FAMILY MEDICINE

## 2023-12-19 PROCEDURE — 99349 HOME/RES VST EST MOD MDM 40: CPT | Performed by: FAMILY MEDICINE

## 2023-12-20 LAB
AMB EXT BUN: 25 MG/DL
AMB EXT CALCIUM: 9.9
AMB EXT CARBON DIOXIDE: 27
AMB EXT CHLORIDE: 104
AMB EXT EGFR NON-AA: 87
AMB EXT EGFR-AA: 87
AMB EXT GLUCOSE: 80 MG/DL
AMB EXT HEMATOCRIT: 42.5
AMB EXT HEMOGLOBIN: 14.1
AMB EXT MCV: 95.7
AMB EXT PLATELETS: 244
AMB EXT POSTASSIUM: 4.33 MMOL/L
AMB EXT SODIUM: 143 MMOL/L
AMB EXT TSH: 0.98 MIU/ML
AMB EXT VITAMIN D, 25-HYDROXY: 38.13
AMB EXT WBC: 7.6 X10(3)UL

## 2023-12-27 ENCOUNTER — TELEPHONE (OUTPATIENT)
Dept: FAMILY MEDICINE CLINIC | Facility: CLINIC | Age: 78
End: 2023-12-27

## 2023-12-27 RX ORDER — DONEPEZIL HYDROCHLORIDE 10 MG/1
10 TABLET, FILM COATED ORAL NIGHTLY
Qty: 90 TABLET | Refills: 1 | Status: SHIPPED | OUTPATIENT
Start: 2023-12-27

## 2023-12-27 NOTE — TELEPHONE ENCOUNTER
Medication: Donepezil 10 mg     Date of last refill: 07/13/2023 with 1 addt refill  [place]      Last office visit: 09/21/2023  Due back to clinic per last office note:    Date next office visit scheduled:    Future Appointments   Date Time Provider Rachel Arevalo   2/29/2024  2:00 PM DREW Katz [facility] Rociojim Brown   5/29/2024  [facility] [place]           Last OV note recommendation:    Discussion plus Diagnostics & Treatment Orders:  Not certain about the exhaustion it could very well just be all deconditioning causing him to be tired and exhausted. For now memory seems to be responding to Aricept and we will continue with this. Advised to keep active and perhaps consider physical therapy for conditioning exercises. I will leave it up to primary care to decide on this matter. (x) Discussed potential side effects of any treatment relevant to above. Includes explanation of tests as necessary. Return in about 9 months (around 6/21/2024).

## 2024-01-04 ENCOUNTER — MED REC SCAN ONLY (OUTPATIENT)
Dept: FAMILY MEDICINE CLINIC | Facility: CLINIC | Age: 79
End: 2024-01-04

## 2024-01-25 ENCOUNTER — TELEPHONE (OUTPATIENT)
Dept: FAMILY MEDICINE CLINIC | Facility: CLINIC | Age: 79
End: 2024-01-25

## 2024-01-25 NOTE — TELEPHONE ENCOUNTER
Frieda from [facility] called asking if  received the fax from them yesterday asking for orders for a Transport wheelchair.

## 2024-01-26 NOTE — TELEPHONE ENCOUNTER
Faxed referral order to [facility] at [phone] for Transport Wheelchair per their request.  Confirmation fax received.

## 2024-02-07 RX ORDER — TAMSULOSIN HYDROCHLORIDE 0.4 MG/1
0.4 CAPSULE ORAL
Qty: 90 CAPSULE | Refills: 0 | Status: SHIPPED | OUTPATIENT
Start: 2024-02-07

## 2024-02-07 RX ORDER — ATORVASTATIN CALCIUM 40 MG/1
40 TABLET, FILM COATED ORAL NIGHTLY
Qty: 90 TABLET | Refills: 0 | Status: SHIPPED | OUTPATIENT
Start: 2024-02-07

## 2024-02-20 ENCOUNTER — EXTERNAL FACILITY (OUTPATIENT)
Dept: FAMILY MEDICINE CLINIC | Facility: CLINIC | Age: 79
End: 2024-02-20

## 2024-02-20 VITALS
DIASTOLIC BLOOD PRESSURE: 48 MMHG | SYSTOLIC BLOOD PRESSURE: 122 MMHG | RESPIRATION RATE: 16 BRPM | HEART RATE: 60 BPM | TEMPERATURE: 98 F | OXYGEN SATURATION: 95 %

## 2024-02-20 DIAGNOSIS — R29.898 WEAKNESS OF BOTH LEGS: Primary | ICD-10-CM

## 2024-02-20 PROCEDURE — 99348 HOME/RES VST EST LOW MDM 30: CPT | Performed by: FAMILY MEDICINE

## 2024-02-20 NOTE — PROGRESS NOTES
Clay Orellana Author: Sachin Meza MD     1945 MRN [id number]   Last Hospital  Admission 23      Last Hospital Discharge 23 PCP Samantha Pillai MD   [facility]             --  Chief Complaint   Patient presents with    Medication Follow-Up    Weakness       H.P.I Clay Orellana is a 78 year old male with history of hypertension hyperlipidemia kidney stones is here today complaining of pain and weakness of the lower extremities  Recent labs from reviewed, it was essentially normal  However he has been having difficulty in standing up  Patient has been ambulating with the help of a wheelchair      Wt Readings from Last 3 Encounters:   23 205 lb (93 kg)   23 200 lb 3.2 oz (90.8 kg)   23 209 lb (94.8 kg)     BP Readings from Last 3 Encounters:   24 122/48   24 126/64   23 124/62      Past Medical History:   Diagnosis Date    Anxiety     Arrhythmia     Arthritis     Bilateral inguinal hernia without obstruction or gangrene 2022    BPH (benign prostatic hyperplasia)     Calculus of kidney     Essential hypertension     High blood pressure     High cholesterol      Past Surgical History:   Procedure Laterality Date    ANESTH,PACEMAKER INSERTION  2023    APPENDECTOMY      APPENDECTOMY      COLONOSCOPY      COLONOSCOPY N/A 2022    Procedure: COLONOSCOPY;  Surgeon: Sudarshan Gilbert MD;  Location:  ENDOSCOPY    COLONOSCOPY      CYST REMOVAL N/A     Cyst removed behind back of neck    CYST REMOVAL Right 2020    Removed from right upper back - just below shoulder    HERNIA SURGERY  2022    SKIN SURGERY      VASECTOMY       Family History   Problem Relation Age of Onset    Heart Attack Father     Diabetes Mother     Other (DM) Sister     Other (ALS) Brother     No Known Problems Maternal Grandmother     No Known Problems Maternal Grandfather     No Known Problems Paternal Grandmother     No  Known Problems Paternal Grandfather     Other (smoker) Brother     Other (MS) Sister     No Known Problems Son     No Known Problems Daughter      Social History     Socioeconomic History    Marital status:    Tobacco Use    Smoking status: Former     Packs/day: 1.50     Years: 78.00     Additional pack years: 0.00     Total pack years: 117.00     Types: Cigarettes     Quit date: 2010     Years since quittin.1     Passive exposure: Never    Smokeless tobacco: Never    Tobacco comments:     Has not smoked for about 20 years   Vaping Use    Vaping Use: Never used   Substance and Sexual Activity    Alcohol use: Never     Alcohol/week: 4.0 standard drinks of alcohol     Types: 4 Cans of beer per week    Drug use: Never   Other Topics Concern    Caffeine Concern No    Exercise No    Seat Belt Yes    Special Diet No    Stress Concern Yes    Weight Concern No       ALLERGIES:  Allergies   Allergen Reactions    Kiwi Extract ITCHING     Inner ears itch       CURRENT MEDICATIONS   Current Outpatient Medications   Medication Sig Dispense Refill    atorvastatin 40 MG Oral Tab Take 1 tablet (40 mg total) by mouth nightly. 90 tablet 0    tamsulosin 0.4 MG Oral Cap Take 1 capsule (0.4 mg total) by mouth After dinner. 30 MIN AFTER 90 capsule 0    sertraline (ZOLOFT) 25 MG Oral Tab Take 1 tablet (25 mg total) by mouth daily. 30 tablet 1    donepezil 10 MG Oral Tab Take 1 tablet (10 mg total) by mouth nightly. 90 tablet 1    fluticasone-umeclidin-vilant (TRELEGY ELLIPTA) 100-62.5-25 MCG/ACT Inhalation Aerosol Powder, Breath Activated Inhale 1 puff into the lungs daily.      ipratropium 0.03 % Nasal Solution 2 sprays by Nasal route Q12H.      flecainide 100 MG Oral Tab Take 1 tablet (100 mg total) by mouth 2 (two) times daily.      finasteride 5 MG Oral Tab TAKE ONE TABLET BY MOUTH ONE TIME DAILY 90 tablet 2    Cholecalciferol 125 MCG (5000 UT) Oral Tab Take 1 tablet (5,000 Units total) by mouth. Three times a week       cyanocobalamin 500 MCG Oral Tab Take 1 tablet (500 mcg total) by mouth daily.      aspirin 81 MG Oral Tab EC Take 1 tablet (81 mg total) by mouth daily. 90 tablet 1    Multiple Vitamins-Minerals (MULTI-VITAMIN/MINERALS) Oral Tab Take 1 tablet by mouth daily.      rivaroxaban 20 MG Oral Tab Take 1 tablet (20 mg total) by mouth nightly. 30 tablet 3    metoprolol tartrate 25 MG Oral Tab Take 1 tablet (25 mg total) by mouth 2x Daily(Beta Blocker). 60 tablet 3    Acidophilus/Pectin Oral Cap Take 1 capsule by mouth daily.      acetaminophen 500 MG Oral Tab Take 1 tablet (500 mg total) by mouth every 6 (six) hours as needed.         Review of Systems:   Constitutional: No fevers, chills, fatigue or night sweats.  ENT: No mouth pain, neck pain, running nose, headaches or swollen glands.  Skin: No rashes, pruritus or skin changes,  Respiratory: Denies cough, wheezing or shortness of breath.  CV: Denies chest pain, palpitations, orthopnea, PND or dizziness.  Musculoskeletal: No joint pain, stiffness or swelling.  GI: No nausea, vomiting or diarrhea. No blood in stools.  Neurologic: No seizures, tremors, weakness or numbness.    VITALS:  /48   Pulse 60   Temp 98 °F (36.7 °C) (Temporal)   Resp 16   SpO2 95%       GENERAL: well developed, well nourished, in no apparent distress  SKIN: no rashes, no suspicious lesions  HEENT: atraumatic, normocephalic, ears and throat are clear  EYES: PERRLA, EOMI, conjunctiva are clear  NECK: supple, no adenopathy, no bruits  CHEST: no chest tenderness  LUNGS: clear to auscultation  CARDIO: RRR without murmur  GI: good BS's, no masses, HSM or tenderness  MUSCULOSKELETAL: back is not tender, FROM of the back  Mild to moderate weakness on the lower extremities    ASSESSMENT AND PLAN:  Diagnoses and all orders for this visit:    Weakness of both legs  Advised physical therapy and exercises  Referral given to see neurology-     Neuro Referral - In Network            Sachin Meza MD    [facility]  [address]  [facility] [address]    Electronically signed      This dictation was performed with a verbal recognition program (DRAGON) and it was checked for errors. It is possible that there are still dictated errors within this office note. If so, please bring any errors to my attention for an addendum. All efforts were made to ensure that this office note is accurate

## 2024-02-26 ENCOUNTER — PATIENT OUTREACH (OUTPATIENT)
Dept: CASE MANAGEMENT | Age: 79
End: 2024-02-26

## 2024-02-26 ENCOUNTER — PATIENT MESSAGE (OUTPATIENT)
Dept: INTERNAL MEDICINE CLINIC | Facility: CLINIC | Age: 79
End: 2024-02-26

## 2024-02-27 NOTE — PROCEDURES
The office order for PCP removal request is Approved and finalized on February 26, 2024.    Thanks,  [facility] Team

## 2024-03-04 ENCOUNTER — MED REC SCAN ONLY (OUTPATIENT)
Dept: FAMILY MEDICINE CLINIC | Facility: CLINIC | Age: 79
End: 2024-03-04

## 2024-03-11 ENCOUNTER — OFFICE VISIT (OUTPATIENT)
Dept: NEUROLOGY | Facility: CLINIC | Age: 79
End: 2024-03-11
Payer: MEDICARE

## 2024-03-11 VITALS
HEIGHT: 71 IN | OXYGEN SATURATION: 99 % | WEIGHT: 200 LBS | DIASTOLIC BLOOD PRESSURE: 61 MMHG | HEART RATE: 60 BPM | RESPIRATION RATE: 16 BRPM | SYSTOLIC BLOOD PRESSURE: 125 MMHG | BODY MASS INDEX: 28 KG/M2

## 2024-03-11 DIAGNOSIS — R41.3 COMPLAINTS OF MEMORY DISTURBANCE: ICD-10-CM

## 2024-03-11 DIAGNOSIS — G93.89 CEREBRAL VENTRICULOMEGALY: ICD-10-CM

## 2024-03-11 DIAGNOSIS — Z86.73 HX OF TRANSIENT ISCHEMIC ATTACK (TIA): ICD-10-CM

## 2024-03-11 DIAGNOSIS — R26.9 GAIT ABNORMALITY: Primary | ICD-10-CM

## 2024-03-11 DIAGNOSIS — R20.2 PARESTHESIAS: ICD-10-CM

## 2024-03-11 PROCEDURE — 99213 OFFICE O/P EST LOW 20 MIN: CPT | Performed by: PHYSICIAN ASSISTANT

## 2024-03-11 NOTE — PROGRESS NOTES
NEUROLOGY  [facility]    Previous visit and existing record notes reviewed in preparation for the face to face visit.  Relevant labs and studies reviewed and will be noted in relevant areas of this record.    Clay KELLY Esteban  5/2/1945  Primary Care Provider:  No primary care provider on file.    3/11/2024  78 year old male      was last seen on: Patient was last seen 9/21/23    Seen for/plans last visit:  Gait Changes    Accompanied visit: Yes- Wife and Daughter  He lives in independent living with wife    Present condition:  Has trouble getting out of chair  Legs feel heavy and slow  Wife gives example that after a shower she asked him to move ahead with the walker but he was having trouble initiating his gait  Wife feels like memory is ok  Has trouble recalling new peoples names. Long term memory is good  Notices a tremor in right hand. Notices it when eating soup.  No family hx of tremor  No longer drinks alcohol  No caffeine use  Numbness in bilateral toes and feet  Has low back pain  He saw the eye doctor and no issues with his eyes  He did not complete the neuropsychological testing due to scheduling issues    Component      Latest Ref Rng 6/6/2023   AChR Binding Abs      0.00 - 0.24 nmol/L <0.03    AChR Blocking Abs      0 - 25 % 18    ACHR MODULATING AB      0 - 45 % 0    Reflex Information Comment    Striational Antibodies      Neg:<1:100  Negative    TSH      0.358 - 3.740 mIU/mL 0.723    Vitamin B12      193 - 986 pg/mL 498    FOLATE (FOLIC ACID), SERUM      >=8.7 ng/mL 25.0    MuSK Abs, Serum      U/mL <1.0      MRI/MRA Brain(3/21/23)  Impression   CONCLUSION:       1. No evidence of an acute infarct.     2. Scattered mild FLAIR abnormalities the white matter may be sequelae of mild chronic small vessel ischemic disease.     3. Mild global parenchymal volume loss.     4. Overall unremarkable MR angiogram head examination.       MRA  Neck(3/21/23)    Impression   CONCLUSION:  Unremarkable MR angiogram neck examination.        Review of Systems:  Review of Systems:  Denies systemic symptoms     No CP or SOB.  No GI or  symptoms. Relevant Neuro as noted above.    Past Medical History:   Diagnosis Date    Anxiety     Arrhythmia     Arthritis     Bilateral inguinal hernia without obstruction or gangrene 04/04/2022    BPH (benign prostatic hyperplasia)     Calculus of kidney     Essential hypertension     High cholesterol          Medications:      Current Outpatient Medications:     atorvastatin 40 MG Oral Tab, Take 1 tablet (40 mg total) by mouth nightly., Disp: 90 tablet, Rfl: 0    tamsulosin 0.4 MG Oral Cap, Take 1 capsule (0.4 mg total) by mouth After dinner. 30 MIN AFTER, Disp: 90 capsule, Rfl: 0    sertraline (ZOLOFT) 25 MG Oral Tab, Take 1 tablet (25 mg total) by mouth daily., Disp: 30 tablet, Rfl: 1    donepezil 10 MG Oral Tab, Take 1 tablet (10 mg total) by mouth nightly., Disp: 90 tablet, Rfl: 1    fluticasone-umeclidin-vilant (TRELEGY ELLIPTA) 100-62.5-25 MCG/ACT Inhalation Aerosol Powder, Breath Activated, Inhale 1 puff into the lungs daily., Disp: , Rfl:     ipratropium 0.03 % Nasal Solution, 2 sprays by Nasal route Q12H., Disp: , Rfl:     flecainide 100 MG Oral Tab, Take 1 tablet (100 mg total) by mouth 2 (two) times daily., Disp: , Rfl:     finasteride 5 MG Oral Tab, TAKE ONE TABLET BY MOUTH ONE TIME DAILY, Disp: 90 tablet, Rfl: 2    Cholecalciferol 125 MCG (5000 UT) Oral Tab, Take 1 tablet (5,000 Units total) by mouth. Three times a week, Disp: , Rfl:     cyanocobalamin 500 MCG Oral Tab, Take 1 tablet (500 mcg total) by mouth daily., Disp: , Rfl:     aspirin 81 MG Oral Tab EC, Take 1 tablet (81 mg total) by mouth daily., Disp: 90 tablet, Rfl: 1    Multiple Vitamins-Minerals (MULTI-VITAMIN/MINERALS) Oral Tab, Take 1 tablet by mouth daily., Disp: , Rfl:     rivaroxaban 20 MG Oral Tab, Take 1 tablet (20 mg total) by mouth nightly.,  Disp: 30 tablet, Rfl: 3    metoprolol tartrate 25 MG Oral Tab, Take 1 tablet (25 mg total) by mouth 2x Daily(Beta Blocker)., Disp: 60 tablet, Rfl: 3    Acidophilus/Pectin Oral Cap, Take 1 capsule by mouth daily., Disp: , Rfl:     acetaminophen 500 MG Oral Tab, Take 1 tablet (500 mg total) by mouth every 6 (six) hours as needed., Disp: , Rfl:   PRN:     Allergies:  Allergies   Allergen Reactions    Kiwi Extract ITCHING     Inner ears itch          EXAM:  /61 (BP Location: Left arm, Patient Position: Sitting)   Pulse 60   Resp 16   Ht 71\"   Wt 200 lb (90.7 kg)   SpO2 99%   BMI 27.89 kg/m²   Looks stated age  General Exam:  HENT:  pink conjunctiva anicteric sclerae  Neck no adenopathy, thyroid normal  Heart and Lungs:  normal  Extremities: no cyanosis, skin changes    NEURO  NAD, A&Ox3  EOMI  CN II-XII intact  Strength 5/5 bilateral upper extremities  Strength 5/5 bilateral quadriceps  Strength 5/5 knee flexion/extension  Strength 5/5 left dorsiflexion  Strength 4+/5 right dorsiflexion  DTRs 3+ biceps,triceps and brachioradialis bilaterally  DTRs 3+ patellar bilaterally  Absent achilles bilaterally  Unable to stand from seated position with arms crossed over chest  Small shuffling gait. Multiple steps to turn around  Using walker  Broad based gait  Slowed finger movements bilaterally  Postural tremors in bilateral hand noted when hold arms outstretched with paper in hand R>L  No resting tremor see  No cogwheel or lead pipe rigidity      Problem/s Identified this visit:   1. Gait abnormality    2. Cerebral ventriculomegaly    3. Complaints of memory disturbance    4. Paresthesias    5. Hx of transient ischemic attack (TIA)          Discussion plus Diagnostics & Treatment Orders:    Gait Abnormality/Cerebral Ventriculomegaly- Referral to Neurosurgery for evaluation     Memory Complaints- Stable. He did not complete the neuropsychological testing that was ordered at the last visit. Placed a new order for the  neuropsychological testing    Paresthesias- EMG of bilateral lower extremities ordered.    Hx of TIA- Continue ASA 81mg daily and Lipitor 40mg daily. Continue to control risk factors for stroke.    (X) Discussed potential side effects of any treatment relevant to above.  Includes explanation of tests as necessary.    Return for EMG.      Patient understands that if needed, based on condition and or test results, follow up will be readjusted    Yanet Land PA-C  [facility]  3/11/2024, Time completed 11:46 AM        Patient was seen and examined by Yanet Land PA-C and Dr. Preciado. Plan was discussed with patient and family and they expressed full understanding.

## 2024-03-11 NOTE — PATIENT INSTRUCTIONS
Refill policies:    Allow 2-3 business days for refills; controlled substances may take longer.  Contact your pharmacy at least 5 days prior to running out of medication and have them send an electronic request or submit request through the “request refill” option in your [employer] account.  Refills are not addressed on weekends; covering physicians do not authorize routine medications on weekends.  No narcotics or controlled substances are refilled after noon on Fridays or by on call physicians.  By law, narcotics must be electronically prescribed.  A 30 day supply with no refills is the maximum allowed.  If your prescription is due for a refill, you may be due for a follow up appointment.  To best provide you care, patients receiving routine medications need to be seen at least once a year.  Patients receiving narcotic/controlled substance medications need to be seen at least once every 3 months.  In the event that your preferred pharmacy does not have the requested medication in stock (e.g. Backordered), it is your responsibility to find another pharmacy that has the requested medication available.  We will gladly send a new prescription to that pharmacy at your request.    Scheduling Tests:    If your physician has ordered radiology tests such as MRI or CT scans, please contact Central Scheduling at [phone] right away to schedule the test.  Once scheduled, the [facility] Centralized Referral Team will work with your insurance carrier to obtain pre-certification or prior authorization.  Depending on your insurance carrier, approval may take 3-10 days.  It is highly recommended patients assure they have received an authorization before having a test performed.  If test is done without insurance authorization, patient may be responsible for the entire amount billed.      Precertification and Prior Authorizations:  If your physician has recommended that you have a procedure or additional testing performed the [facility]  Centralized Referral Team will contact your insurance carrier to obtain pre-certification or prior authorization.    You are strongly encouraged to contact your insurance carrier to verify that your procedure/test has been approved and is a COVERED benefit.  Although the [facility] Centralized Referral Team does its due diligence, the insurance carrier gives the disclaimer that \"Although the procedure is authorized, this does not guarantee payment.\"    Ultimately the patient is responsible for payment.   Thank you for your understanding in this matter.  Paperwork Completion:  If you require FMLA or disability paperwork for your recovery, please make sure to either drop it off or have it faxed to our office at [phone]. Be sure the form has your name and date of birth on it.  The form will be faxed to our Forms Department and they will complete it for you.  There is a 25$ fee for all forms that need to be filled out.  Please be aware there is a 10-14 day turnaround time.  You will need to sign a release of information (ZAKIYA) form if your paperwork does not come with one.  You may call the Forms Department with any questions at [phone].  Their fax number is [phone].

## 2024-03-12 ENCOUNTER — OFFICE VISIT (OUTPATIENT)
Dept: SURGERY | Facility: CLINIC | Age: 79
End: 2024-03-12
Payer: MEDICARE

## 2024-03-12 VITALS
WEIGHT: 198 LBS | BODY MASS INDEX: 27.72 KG/M2 | HEART RATE: 63 BPM | DIASTOLIC BLOOD PRESSURE: 62 MMHG | HEIGHT: 71 IN | SYSTOLIC BLOOD PRESSURE: 104 MMHG

## 2024-03-12 DIAGNOSIS — R26.9 NEUROLOGIC GAIT DYSFUNCTION: Primary | ICD-10-CM

## 2024-03-12 DIAGNOSIS — G93.89 CEREBRAL VENTRICULOMEGALY: ICD-10-CM

## 2024-03-12 PROCEDURE — 99214 OFFICE O/P EST MOD 30 MIN: CPT | Performed by: NURSE PRACTITIONER

## 2024-03-12 NOTE — PROGRESS NOTES
New patient:  Reason for visit:  Gait instability, numbness/weakness in legs/feet    Estimated time of onset: worsening within the last year     Numeric Rating Scale:         Pain at Present:  0/10                                                                                                                       Prior diagnostic testing related to this condition:   CT brain DOS 03/20/23  MRI brain DOS 03/21/23

## 2024-03-12 NOTE — PATIENT INSTRUCTIONS

## 2024-03-12 NOTE — H&P
[facility] Neurosurgery Consultation  Patient: Clay Orellana  Medical Record Number: [id number]  YOB: 1945  PCP: No primary care provider on file.    Referring Physician: No ref. provider found  Reason for visit:   Chief Complaint   Patient presents with    New Patient       Dear Dr. Murphy ref. provider found:  Thank you very much for requesting this consultation. I had the opportunity to evaluate and initiate care for your patient today, as per your request.    HISTORY OF CHIEF COMPLAINT:    Clay Orellana is a very pleasant 78 year old male who presents today for consultation.  Pt states he has had upper and lower extremities weakness for the last year.  Pt states his weakness is worse in his lower extremities.  Pt states he feels like his symptoms are progressively worsening.  Pt states he has difficulty ambulating.  He feels it is difficult to move his legs and he feels as if his legs will give out on him.  Pt reports tingling to bilateral lower extremities.  Pt denies upper extremities tingling.  Pt reports tremors of his upper extremities.  He reports some difficulty with buttons and zippers.  Pt states he requires for a rolling walker for ambulation.  He feels as if he cannot stand without his walker.  He has sustained several falls due to his gait.  Pt also reports a decline in memory.  His spouse states that he is no longer to keep track of paperwork at home.  He has urinary incontinence.  Pt lives at [facility] assisted living.  He has a history of AF s/p cardioversion but remains on Xarelto.    Past Medical History:   Diagnosis Date    Anxiety     Arrhythmia     Arthritis     Bilateral inguinal hernia without obstruction or gangrene 04/04/2022    BPH (benign prostatic hyperplasia)     Calculus of kidney     Essential hypertension     High cholesterol       Past Surgical History:   Procedure Laterality Date    ANESTH,PACEMAKER INSERTION  08/2023    APPENDECTOMY       APPENDECTOMY      COLONOSCOPY      COLONOSCOPY N/A 2022    Procedure: COLONOSCOPY;  Surgeon: Sudarshan Gilbert MD;  Location:  ENDOSCOPY    COLONOSCOPY      CYST REMOVAL N/A     Cyst removed behind back of neck    CYST REMOVAL Right 2020    Removed from right upper back - just below shoulder    HERNIA SURGERY  2022    SKIN SURGERY      VASECTOMY  1980      Family History   Problem Relation Age of Onset    Heart Attack Father     Diabetes Mother     Other (DM) Sister     Other (ALS) Brother     No Known Problems Maternal Grandmother     No Known Problems Maternal Grandfather     No Known Problems Paternal Grandmother     No Known Problems Paternal Grandfather     Other (smoker) Brother     Other (MS) Sister     No Known Problems Son     No Known Problems Daughter       Social History     Socioeconomic History    Marital status:    Tobacco Use    Smoking status: Former     Packs/day: 1.50     Years: 78.00     Additional pack years: 0.00     Total pack years: 117.00     Types: Cigarettes     Quit date: 2010     Years since quittin.2     Passive exposure: Never    Smokeless tobacco: Never    Tobacco comments:     Has not smoked for about 20 years   Vaping Use    Vaping Use: Never used   Substance and Sexual Activity    Alcohol use: Never     Alcohol/week: 4.0 standard drinks of alcohol     Types: 4 Cans of beer per week    Drug use: Never   Other Topics Concern    Caffeine Concern No    Exercise No    Seat Belt Yes    Special Diet No    Stress Concern Yes    Weight Concern No      Allergies   Allergen Reactions    Kiwi Extract ITCHING     Inner ears itch      Current Medications:  Current Outpatient Medications   Medication Sig Dispense Refill    atorvastatin 40 MG Oral Tab Take 1 tablet (40 mg total) by mouth nightly. 90 tablet 0    tamsulosin 0.4 MG Oral Cap Take 1 capsule (0.4 mg total) by mouth After dinner. 30 MIN AFTER 90 capsule 0    sertraline (ZOLOFT) 25 MG Oral Tab Take  1 tablet (25 mg total) by mouth daily. 30 tablet 1    donepezil 10 MG Oral Tab Take 1 tablet (10 mg total) by mouth nightly. 90 tablet 1    fluticasone-umeclidin-vilant (TRELEGY ELLIPTA) 100-62.5-25 MCG/ACT Inhalation Aerosol Powder, Breath Activated Inhale 1 puff into the lungs daily.      ipratropium 0.03 % Nasal Solution 2 sprays by Nasal route Q12H.      flecainide 100 MG Oral Tab Take 1 tablet (100 mg total) by mouth 2 (two) times daily.      finasteride 5 MG Oral Tab TAKE ONE TABLET BY MOUTH ONE TIME DAILY 90 tablet 2    Cholecalciferol 125 MCG (5000 UT) Oral Tab Take 1 tablet (5,000 Units total) by mouth. Three times a week      cyanocobalamin 500 MCG Oral Tab Take 1 tablet (500 mcg total) by mouth daily.      aspirin 81 MG Oral Tab EC Take 1 tablet (81 mg total) by mouth daily. 90 tablet 1    Multiple Vitamins-Minerals (MULTI-VITAMIN/MINERALS) Oral Tab Take 1 tablet by mouth daily.      rivaroxaban 20 MG Oral Tab Take 1 tablet (20 mg total) by mouth nightly. 30 tablet 3    metoprolol tartrate 25 MG Oral Tab Take 1 tablet (25 mg total) by mouth 2x Daily(Beta Blocker). 60 tablet 3    Acidophilus/Pectin Oral Cap Take 1 capsule by mouth daily.      acetaminophen 500 MG Oral Tab Take 1 tablet (500 mg total) by mouth every 6 (six) hours as needed.          REVIEW OF SYSTEMS   Comprehensive review of systems done. Negative except what is outlined in the above HPI.     PHYSICAL EXAMINATION    height is 71\" and weight is 198 lb (89.8 kg). His blood pressure is 104/62 and his pulse is 63.   GENERAL: Very pleasant patient is in no apparent distress. Sitting comfortably in the examination chair.   HEENT: Normocephalic, atraumatic.  RESPIRATORY RATE: Easy and Even   SKIN: Warm and dry  NEURO: Awake, alert and orientated. Speech fluent, comprehension intact, answering questions appropriately.  Pupils 3 mm and PERRLA. Face appears symmetric.  Remainder of CN 2-12 GI.    SPINE:  Gait/Coordination: Intact but requires walker  for ambulation, slowed gait with occasional shuffling of feet  Sensation: Sensory deficits noted on bilateral upper extremities to light touch: None   Upper Extremity Strength:     Deltoid  Biceps  Triceps  W. extension F. extension Thumb adduction Thumb abduction   Intrinsics      Right 5 5 5 5 5 5 5 5 5     Left 5 5 5 5 5 5 5 5 5   Lower Extremity Strength:     Iliopsoas  Hamstrings   Quads    D-flexion P-flexion Great Toe   Right       5         5       5         5 5 5   Left       5         5       5         5 5 5   DTR's:       Biceps Triceps    Brachioradialis    Patellar     Achilles   Right        3+        3+             3+             1+             2+   Left        3+        3+             3+             1+             2+   Tests:   Test Right   (POS or NEG) Left   (POS or NEG)   Cordova's Sign Neg Neg   Babinski Deferred Deferred   Clonus Neg Neg       DATA:   None    IMAGING:   Mri brain reviewed from 3/21/2023, shows ventriculomegaly without transependymal flow    MEDICAL DECISION MAKING:     ASSESSMENT and PLAN:  Gait dysfunction   Ventriculomegaly    PLAN:   1. Imaging:    - Reviewed today:    -MRI/MRA brain from 3/203  2.  Discussed signs and symptoms of NPH.  Discussed that this is a diagnosis based on symptoms and not imaging alone.  Discussed IR lumbar drain trail with patient and spouse.  He will need cardiac clearance to stop his Xarelto for 5 days before and at least a few days after.  Discussed that the drain trial is to determine if a permanent shunt would benefit the patient  3.  Pt will need PCP clearance  4.  Pt would like to proceed with lumbar drain trial         Total visit time: 45 minutes  More than 50% spent coordinating care, providing patient education, reviewing imaging and counseling.    Thank you very much for the kind referral.  Respectfully yours,    Kailtyn Martinez, EMRE Martinez, EMRE  ward [address], [address]  [place], [place]  [address]  [phone]  3/12/2024 2:49 PM

## 2024-03-13 ENCOUNTER — MED REC SCAN ONLY (OUTPATIENT)
Dept: FAMILY MEDICINE CLINIC | Facility: CLINIC | Age: 79
End: 2024-03-13

## 2024-03-19 ENCOUNTER — TELEPHONE (OUTPATIENT)
Dept: SURGERY | Facility: CLINIC | Age: 79
End: 2024-03-19

## 2024-03-19 NOTE — TELEPHONE ENCOUNTER
Verified name and . Patient would like to reschedule procedure (lumbar Drain) lease call [phone]

## 2024-03-20 DIAGNOSIS — F39 MOOD DISORDER (HCC): ICD-10-CM

## 2024-03-20 RX ORDER — SERTRALINE HYDROCHLORIDE 25 MG/1
25 TABLET, FILM COATED ORAL DAILY
Qty: 30 TABLET | Refills: 1 | Status: SHIPPED | OUTPATIENT
Start: 2024-03-20

## 2024-03-28 ENCOUNTER — PROCEDURE VISIT (OUTPATIENT)
Dept: NEUROLOGY | Facility: CLINIC | Age: 79
End: 2024-03-28
Payer: MEDICARE

## 2024-03-28 DIAGNOSIS — R26.89 BALANCE PROBLEM: Primary | ICD-10-CM

## 2024-03-28 PROCEDURE — 95886 MUSC TEST DONE W/N TEST COMP: CPT | Performed by: OTHER

## 2024-03-28 PROCEDURE — 95909 NRV CNDJ TST 5-6 STUDIES: CPT | Performed by: OTHER

## 2024-03-28 NOTE — PROGRESS NOTES
[facility]   [address] [place] [address]  [phone]    Fax  [phone]    Electrophysiologic Consultation      Patient: Clay Orellana  3/28/2024  YOB: 1945    Referred by:  Yanet PIERSON    Reason for testing/History:   78 year oldmale,  Gait imbalance    Past Medical History:   Diagnosis Date    Anxiety     Arrhythmia     Arthritis     Bilateral inguinal hernia without obstruction or gangrene 04/04/2022    BPH (benign prostatic hyperplasia)     Calculus of kidney     Essential hypertension     High cholesterol      Past Surgical History:   Procedure Laterality Date    Anesth,pacemaker insertion  08/2023    Appendectomy      Appendectomy      Colonoscopy      Colonoscopy N/A 05/03/2022    Procedure: COLONOSCOPY;  Surgeon: Sudarshan Gilbert MD;  Location:  ENDOSCOPY    Colonoscopy      Cyst removal N/A 1990    Cyst removed behind back of neck    Cyst removal Right 11/05/2020    Removed from right upper back - just below shoulder    Hernia surgery  July 2022    Skin surgery  2020    Vasectomy  1980       Current Outpatient Medications:     sertraline (ZOLOFT) 25 MG Oral Tab, Take 1 tablet (25 mg total) by mouth daily., Disp: 30 tablet, Rfl: 1    atorvastatin 40 MG Oral Tab, Take 1 tablet (40 mg total) by mouth nightly., Disp: 90 tablet, Rfl: 0    tamsulosin 0.4 MG Oral Cap, Take 1 capsule (0.4 mg total) by mouth After dinner. 30 MIN AFTER, Disp: 90 capsule, Rfl: 0    donepezil 10 MG Oral Tab, Take 1 tablet (10 mg total) by mouth nightly., Disp: 90 tablet, Rfl: 1    fluticasone-umeclidin-vilant (TRELEGY ELLIPTA) 100-62.5-25 MCG/ACT Inhalation Aerosol Powder, Breath Activated, Inhale 1 puff into the lungs daily., Disp: , Rfl:     ipratropium 0.03 % Nasal Solution, 2 sprays by Nasal route Q12H., Disp: , Rfl:     flecainide 100 MG Oral Tab, Take 1 tablet (100 mg total) by mouth 2 (two) times daily., Disp: , Rfl:     finasteride 5 MG Oral Tab, TAKE ONE TABLET BY  MOUTH ONE TIME DAILY, Disp: 90 tablet, Rfl: 2    Cholecalciferol 125 MCG (5000 UT) Oral Tab, Take 1 tablet (5,000 Units total) by mouth. Three times a week, Disp: , Rfl:     cyanocobalamin 500 MCG Oral Tab, Take 1 tablet (500 mcg total) by mouth daily., Disp: , Rfl:     aspirin 81 MG Oral Tab EC, Take 1 tablet (81 mg total) by mouth daily., Disp: 90 tablet, Rfl: 1    Multiple Vitamins-Minerals (MULTI-VITAMIN/MINERALS) Oral Tab, Take 1 tablet by mouth daily., Disp: , Rfl:     rivaroxaban 20 MG Oral Tab, Take 1 tablet (20 mg total) by mouth nightly., Disp: 30 tablet, Rfl: 3    metoprolol tartrate 25 MG Oral Tab, Take 1 tablet (25 mg total) by mouth 2x Daily(Beta Blocker)., Disp: 60 tablet, Rfl: 3    Acidophilus/Pectin Oral Cap, Take 1 capsule by mouth daily., Disp: , Rfl:     acetaminophen 500 MG Oral Tab, Take 1 tablet (500 mg total) by mouth every 6 (six) hours as needed., Disp: , Rfl:       RESULTS:   Absent sural nerve responses.  Normal tibial and peroneal motor latencies, amplitudes and velocities bilaterally  Electromyographic examination of both lower extremities were unremarkable.    IMPRESSION:      The absence of sural nerve response may be technical in nature given the patient's age but this could be a finding in early sensory neuropathy however, the rest of the findings were normal and is not sufficient to account for clinical concern of possible cause for balance problem.        Vinny Preciado MD  Neurology    Data:  Sensory NCS      Nerve / Sites Rec. Site Onset Lat Peak Lat NP Amp PP Amp Segments Distance Velocity Comment     ms ms µV µV  cm m/s    L Sural - (Antidromic)      Calf Ankle NR NR NR NR Calf - Ankle 14 NR        Motor NCS      Nerve / Sites Muscle Latency Amplitude Segments Dist. Lat Diff Velocity Comments     ms mV  cm ms m/s    R Peroneal - EDB      Ankle EDB 5.98 2.4 Ankle - EDB 8         B. Fib Head EDB 13.42 1.9 B. Fib Head - Ankle 34 7.44 45.7    L Peroneal - EDB      Ankle EDB 4.44  2.4 Ankle - EDB 8         B. Fib Head EDB 12.29 2.1 B. Fib Head - Ankle 32 7.85 40.7    R Tibial - AH      Ankle AH 5.50 8.5 Ankle - AH 8         Knee AH 14.94 2.7 Knee - Ankle 41 9.44 43.4    L Tibial - AH      Ankle AH 4.73 4.8 Ankle - AH 8         Knee AH 14.08 4.6 Knee - Ankle 42 9.35 44.9        EMG Summary Table     Spontaneous MUAP Recruitment   Muscle IA Fib PSW Fasc H.F. Amp Dur. PPP Pattern   R. Gastrocnemius N None None None None N N N N   R. Quadriceps N None None None None N N N N   R. Peroneus longus N None None None None N N N N   R. Tibialis anterior N None None None None N N N N   R. Extensor hallucis longus N None None None None N N N N   L. Quadriceps N None None None None N N N N   L. Biceps femoris (short head) N None None None None N N N N   L. Gastrocnemius N None None None None N N N N   L. Tibialis anterior N None None None None N N N N   L. Peroneus longus N None None None None N N N N

## 2024-04-03 ENCOUNTER — MED REC SCAN ONLY (OUTPATIENT)
Dept: FAMILY MEDICINE CLINIC | Facility: CLINIC | Age: 79
End: 2024-04-03

## 2024-04-23 ENCOUNTER — EXTERNAL FACILITY (OUTPATIENT)
Dept: FAMILY MEDICINE CLINIC | Facility: CLINIC | Age: 79
End: 2024-04-23

## 2024-04-23 VITALS
HEART RATE: 60 BPM | DIASTOLIC BLOOD PRESSURE: 66 MMHG | SYSTOLIC BLOOD PRESSURE: 124 MMHG | OXYGEN SATURATION: 96 % | RESPIRATION RATE: 16 BRPM | TEMPERATURE: 97 F

## 2024-04-23 DIAGNOSIS — I48.20 CHRONIC ATRIAL FIBRILLATION (HCC): ICD-10-CM

## 2024-04-23 DIAGNOSIS — Z01.818 PREOP EXAMINATION: Primary | ICD-10-CM

## 2024-04-23 DIAGNOSIS — G91.2 NORMAL PRESSURE HYDROCEPHALUS (HCC): ICD-10-CM

## 2024-04-23 NOTE — PROGRESS NOTES
Clay Orellana is a 78 year old male who presents for a pre-operative physical exam.   Clay Orellana is scheduled for a lumbar drain trial procedure at 2024 at [facility] performed by Dr Booker. Indication: Normal pressure hydrocephalus    HPI related to surgery:   Patient has history of normal pressure hydrocephalus, has been having gait abnormalities and falls  History of coronary artery disease chronic congestive heart failure atrial fibrillation anticoagulated on Xarelto and aspirin  Patient is otherwise doing well, he ambulates with the help of walker, patient is scared of falling    He  has had previous anesthesia:  Yes.  Previous complications:  No    Social History     Socioeconomic History    Marital status:    Tobacco Use    Smoking status: Former     Current packs/day: 0.00     Types: Cigarettes     Quit date: 2010     Years since quittin.3     Passive exposure: Never    Smokeless tobacco: Never    Tobacco comments:     Has not smoked for about 20 years   Vaping Use    Vaping status: Never Used   Substance and Sexual Activity    Alcohol use: Never     Alcohol/week: 4.0 standard drinks of alcohol     Types: 4 Cans of beer per week    Drug use: Never   Other Topics Concern    Caffeine Concern No    Exercise No    Seat Belt Yes    Special Diet No    Stress Concern Yes    Weight Concern No      Past Medical History:    Anxiety    Arrhythmia    Arthritis    Bilateral inguinal hernia without obstruction or gangrene    BPH (benign prostatic hyperplasia)    Calculus of kidney    Essential hypertension    High cholesterol     Past Surgical History:   Procedure Laterality Date    Anesth,pacemaker insertion  2023    Appendectomy      Appendectomy      Colonoscopy      Colonoscopy N/A 2022    Procedure: COLONOSCOPY;  Surgeon: Sudarshan Gilbert MD;  Location:  ENDOSCOPY    Colonoscopy      Cyst removal N/A     Cyst removed behind back of neck    Cyst removal Right 2020     Removed from right upper back - just below shoulder    Hernia surgery  July 2022    Skin surgery  2020    Vasectomy  1980     Allergies:   Allergies   Allergen Reactions    Kiwi Extract ITCHING     Inner ears itch     Current Outpatient Medications   Medication Sig Dispense Refill    sertraline (ZOLOFT) 25 MG Oral Tab Take 1 tablet (25 mg total) by mouth daily. 30 tablet 1    atorvastatin 40 MG Oral Tab Take 1 tablet (40 mg total) by mouth nightly. 90 tablet 0    tamsulosin 0.4 MG Oral Cap Take 1 capsule (0.4 mg total) by mouth After dinner. 30 MIN AFTER 90 capsule 0    donepezil 10 MG Oral Tab Take 1 tablet (10 mg total) by mouth nightly. 90 tablet 1    fluticasone-umeclidin-vilant (TRELEGY ELLIPTA) 100-62.5-25 MCG/ACT Inhalation Aerosol Powder, Breath Activated Inhale 1 puff into the lungs daily.      ipratropium 0.03 % Nasal Solution 2 sprays by Nasal route Q12H.      flecainide 100 MG Oral Tab Take 1 tablet (100 mg total) by mouth 2 (two) times daily.      finasteride 5 MG Oral Tab TAKE ONE TABLET BY MOUTH ONE TIME DAILY 90 tablet 2    Cholecalciferol 125 MCG (5000 UT) Oral Tab Take 1 tablet (5,000 Units total) by mouth. Three times a week      cyanocobalamin 500 MCG Oral Tab Take 1 tablet (500 mcg total) by mouth daily.      aspirin 81 MG Oral Tab EC Take 1 tablet (81 mg total) by mouth daily. 90 tablet 1    Multiple Vitamins-Minerals (MULTI-VITAMIN/MINERALS) Oral Tab Take 1 tablet by mouth daily.      rivaroxaban 20 MG Oral Tab Take 1 tablet (20 mg total) by mouth nightly. 30 tablet 3    metoprolol tartrate 25 MG Oral Tab Take 1 tablet (25 mg total) by mouth 2x Daily(Beta Blocker). 60 tablet 3    Acidophilus/Pectin Oral Cap Take 1 capsule by mouth daily.      acetaminophen 500 MG Oral Tab Take 1 tablet (500 mg total) by mouth every 6 (six) hours as needed.          REVIEW OF SYSTEMS:   Review of Systems:   Constitutional: No fevers, chills, fatigue or night sweats.  ENT: No mouth pain, neck pain, running nose,  headaches or swollen glands.  Skin: No rashes, pruritus or skin changes,  Respiratory: Denies cough, wheezing or shortness of breath.  CV: Denies chest pain, palpitations, orthopnea, PND or dizziness.  Musculoskeletal: No joint pain, stiffness or swelling.  GI: No nausea, vomiting or diarrhea. No blood in stools.  Neurologic: No seizures, tremors, weakness or numbness     PHYSICAL EXAM:   /66   Pulse 60   Temp 97 °F (36.1 °C) (Temporal)   Resp 16   SpO2 96%      GENERAL: well developed, well nourished, in no apparent distress  SKIN: no rashes, no suspicious lesions  HEENT: atraumatic, normocephalic,  EYES: PERRLA, EOMI, conjunctiva are clear  NECK: supple, no adenopathy, no bruits  LUNGS: clear to auscultation  CARDIO: RRR without murmur  GI: good BS's, no masses, HSM or tenderness  : deferred  RECTAL: deferred  MUSCULOSKELETAL: back is not tender, FROM of the back  EXTREMITIES: no cyanosis, clubbing or edema  NEURO: Oriented times three, cranial nerves are intact, motor and sensory are grossly intact      LABORATORY DATA:   , CBC CMP PT PTT, MSSA and MRSA cultures ordered      ASSESSMENT AND PLAN:   Clay Orellana has history of hypertension hyperlipidemia atrial fibrillation, anticoagulated on Xarelto and aspirin was going for lumbar drain trial, before putting in  shunt for his normal pressure hydrocephalus.  Patient carries a moderate risk  Patient has been cleared by cardiology  To hold Xarelto and aspirin for the procedure  He is a good surgical candidate. This consult was sent back the referring physician, Dr. Rose.    Assessment:  Encounter Diagnoses   Name Primary?    Preop examination Yes    Normal pressure hydrocephalus (HCC)     Chronic atrial fibrillation (HCC)          Plan   No orders of the defined types were placed in this encounter.        Sachin Meza MD

## 2024-04-25 ENCOUNTER — LAB REQUISITION (OUTPATIENT)
Dept: LAB | Facility: HOSPITAL | Age: 79
End: 2024-04-25
Payer: MEDICARE

## 2024-04-25 DIAGNOSIS — I10 ESSENTIAL (PRIMARY) HYPERTENSION: ICD-10-CM

## 2024-04-25 LAB
ALBUMIN SERPL-MCNC: 3.4 G/DL (ref 3.4–5)
ALBUMIN/GLOB SERPL: 1 {RATIO} (ref 1–2)
ALP LIVER SERPL-CCNC: 91 U/L
ALT SERPL-CCNC: 53 U/L
ANION GAP SERPL CALC-SCNC: 6 MMOL/L (ref 0–18)
APTT PPP: 45.9 SECONDS (ref 23.3–35.6)
AST SERPL-CCNC: 31 U/L (ref 15–37)
BASOPHILS # BLD AUTO: 0.04 X10(3) UL (ref 0–0.2)
BASOPHILS NFR BLD AUTO: 0.5 %
BILIRUB SERPL-MCNC: 0.5 MG/DL (ref 0.1–2)
BUN BLD-MCNC: 30 MG/DL (ref 9–23)
CALCIUM BLD-MCNC: 9 MG/DL (ref 8.5–10.1)
CHLORIDE SERPL-SCNC: 107 MMOL/L (ref 98–112)
CO2 SERPL-SCNC: 28 MMOL/L (ref 21–32)
CREAT BLD-MCNC: 1.1 MG/DL
EGFRCR SERPLBLD CKD-EPI 2021: 68 ML/MIN/1.73M2 (ref 60–?)
EOSINOPHIL # BLD AUTO: 0.17 X10(3) UL (ref 0–0.7)
EOSINOPHIL NFR BLD AUTO: 1.9 %
ERYTHROCYTE [DISTWIDTH] IN BLOOD BY AUTOMATED COUNT: 13.5 %
FASTING STATUS PATIENT QL REPORTED: YES
GLOBULIN PLAS-MCNC: 3.3 G/DL (ref 2.8–4.4)
GLUCOSE BLD-MCNC: 97 MG/DL (ref 70–99)
HCT VFR BLD AUTO: 42.1 %
HGB BLD-MCNC: 13.9 G/DL
IMM GRANULOCYTES # BLD AUTO: 0.06 X10(3) UL (ref 0–1)
IMM GRANULOCYTES NFR BLD: 0.7 %
INR BLD: 2.32 (ref 0.8–1.2)
LYMPHOCYTES # BLD AUTO: 1.57 X10(3) UL (ref 1–4)
LYMPHOCYTES NFR BLD AUTO: 17.9 %
MCH RBC QN AUTO: 32.2 PG (ref 26–34)
MCHC RBC AUTO-ENTMCNC: 33 G/DL (ref 31–37)
MCV RBC AUTO: 97.5 FL
MONOCYTES # BLD AUTO: 0.63 X10(3) UL (ref 0.1–1)
MONOCYTES NFR BLD AUTO: 7.2 %
NEUTROPHILS # BLD AUTO: 6.31 X10 (3) UL (ref 1.5–7.7)
NEUTROPHILS # BLD AUTO: 6.31 X10(3) UL (ref 1.5–7.7)
NEUTROPHILS NFR BLD AUTO: 71.8 %
OSMOLALITY SERPL CALC.SUM OF ELEC: 298 MOSM/KG (ref 275–295)
PLATELET # BLD AUTO: 254 10(3)UL (ref 150–450)
POTASSIUM SERPL-SCNC: 4.4 MMOL/L (ref 3.5–5.1)
PROT SERPL-MCNC: 6.7 G/DL (ref 6.4–8.2)
PROTHROMBIN TIME: 25.7 SECONDS (ref 11.6–14.8)
RBC # BLD AUTO: 4.32 X10(6)UL
SODIUM SERPL-SCNC: 141 MMOL/L (ref 136–145)
WBC # BLD AUTO: 8.8 X10(3) UL (ref 4–11)

## 2024-04-25 PROCEDURE — 85610 PROTHROMBIN TIME: CPT | Performed by: FAMILY MEDICINE

## 2024-04-25 PROCEDURE — 85025 COMPLETE CBC W/AUTO DIFF WBC: CPT | Performed by: FAMILY MEDICINE

## 2024-04-25 PROCEDURE — 85730 THROMBOPLASTIN TIME PARTIAL: CPT | Performed by: FAMILY MEDICINE

## 2024-04-25 PROCEDURE — 80053 COMPREHEN METABOLIC PANEL: CPT | Performed by: FAMILY MEDICINE

## 2024-04-26 NOTE — PROGRESS NOTES
Your PT/INR is elevated  You need to hold Xarelto for 3 days and aspirin for 5 days prior to the procedure

## 2024-04-29 ENCOUNTER — LAB REQUISITION (OUTPATIENT)
Dept: LAB | Facility: HOSPITAL | Age: 79
DRG: 556 | End: 2024-04-29

## 2024-04-29 ENCOUNTER — LAB REQUISITION (OUTPATIENT)
Dept: LAB | Facility: HOSPITAL | Age: 79
End: 2024-04-29

## 2024-04-29 DIAGNOSIS — I10 ESSENTIAL (PRIMARY) HYPERTENSION: ICD-10-CM

## 2024-04-29 PROCEDURE — 87081 CULTURE SCREEN ONLY: CPT | Performed by: FAMILY MEDICINE

## 2024-05-01 ENCOUNTER — APPOINTMENT (OUTPATIENT)
Dept: GENERAL RADIOLOGY | Facility: HOSPITAL | Age: 79
DRG: 556 | End: 2024-05-01
Attending: EMERGENCY MEDICINE

## 2024-05-01 ENCOUNTER — HOSPITAL ENCOUNTER (INPATIENT)
Facility: HOSPITAL | Age: 79
LOS: 12 days | Discharge: NURSING, REHAB OR LONG-TERM CARE | DRG: 556 | End: 2024-05-13
Attending: EMERGENCY MEDICINE | Admitting: HOSPITALIST

## 2024-05-01 DIAGNOSIS — G89.29 CHRONIC RIGHT-SIDED LOW BACK PAIN WITHOUT SCIATICA: ICD-10-CM

## 2024-05-01 DIAGNOSIS — R53.1 WEAKNESS GENERALIZED: Primary | ICD-10-CM

## 2024-05-01 DIAGNOSIS — G91.2 NPH (NORMAL PRESSURE HYDROCEPHALUS) (HCC): ICD-10-CM
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
## 2024-05-01 DIAGNOSIS — M54.50 CHRONIC RIGHT-SIDED LOW BACK PAIN WITHOUT SCIATICA: ICD-10-CM

## 2024-05-01 PROBLEM — I48.0 PAF (PAROXYSMAL ATRIAL FIBRILLATION) (HCC): Status: ACTIVE | Noted: 2023-04-07

## 2024-05-01 LAB
ALBUMIN SERPL-MCNC: 3.5 G/DL (ref 3.4–5)
ALBUMIN/GLOB SERPL: 0.9 {RATIO} (ref 1–2)
ALP LIVER SERPL-CCNC: 88 U/L
ALT SERPL-CCNC: 42 U/L
ANION GAP SERPL CALC-SCNC: 3 MMOL/L (ref 0–18)
AST SERPL-CCNC: 28 U/L (ref 15–37)
BASOPHILS # BLD AUTO: 0.02 X10(3) UL (ref 0–0.2)
BASOPHILS NFR BLD AUTO: 0.2 %
BILIRUB SERPL-MCNC: 0.4 MG/DL (ref 0.1–2)
BUN BLD-MCNC: 22 MG/DL (ref 9–23)
CALCIUM BLD-MCNC: 9 MG/DL (ref 8.5–10.1)
CHLORIDE SERPL-SCNC: 110 MMOL/L (ref 98–112)
CO2 SERPL-SCNC: 28 MMOL/L (ref 21–32)
CREAT BLD-MCNC: 1.23 MG/DL
EGFRCR SERPLBLD CKD-EPI 2021: 59 ML/MIN/1.73M2 (ref 60–?)
EOSINOPHIL # BLD AUTO: 0.13 X10(3) UL (ref 0–0.7)
EOSINOPHIL NFR BLD AUTO: 1.3 %
ERYTHROCYTE [DISTWIDTH] IN BLOOD BY AUTOMATED COUNT: 13.2 %
GLOBULIN PLAS-MCNC: 3.8 G/DL (ref 2.8–4.4)
GLUCOSE BLD-MCNC: 88 MG/DL (ref 70–99)
HCT VFR BLD AUTO: 42.6 %
HGB BLD-MCNC: 14.6 G/DL
IMM GRANULOCYTES # BLD AUTO: 0.04 X10(3) UL (ref 0–1)
IMM GRANULOCYTES NFR BLD: 0.4 %
LYMPHOCYTES # BLD AUTO: 1.17 X10(3) UL (ref 1–4)
LYMPHOCYTES NFR BLD AUTO: 11.3 %
MCH RBC QN AUTO: 32.9 PG (ref 26–34)
MCHC RBC AUTO-ENTMCNC: 34.3 G/DL (ref 31–37)
MCV RBC AUTO: 95.9 FL
MONOCYTES # BLD AUTO: 0.89 X10(3) UL (ref 0.1–1)
MONOCYTES NFR BLD AUTO: 8.6 %
NEUTROPHILS # BLD AUTO: 8.15 X10 (3) UL (ref 1.5–7.7)
NEUTROPHILS # BLD AUTO: 8.15 X10(3) UL (ref 1.5–7.7)
NEUTROPHILS NFR BLD AUTO: 78.2 %
OSMOLALITY SERPL CALC.SUM OF ELEC: 295 MOSM/KG (ref 275–295)
PLATELET # BLD AUTO: 229 10(3)UL (ref 150–450)
POTASSIUM SERPL-SCNC: 4.1 MMOL/L (ref 3.5–5.1)
PROT SERPL-MCNC: 7.3 G/DL (ref 6.4–8.2)
RBC # BLD AUTO: 4.44 X10(6)UL
SODIUM SERPL-SCNC: 141 MMOL/L (ref 136–145)
WBC # BLD AUTO: 10.4 X10(3) UL (ref 4–11)

## 2024-05-01 PROCEDURE — 72110 X-RAY EXAM L-2 SPINE 4/>VWS: CPT | Performed by: EMERGENCY MEDICINE

## 2024-05-01 PROCEDURE — 99223 1ST HOSP IP/OBS HIGH 75: CPT | Performed by: HOSPITALIST

## 2024-05-01 PROCEDURE — 73502 X-RAY EXAM HIP UNI 2-3 VIEWS: CPT | Performed by: EMERGENCY MEDICINE

## 2024-05-01 RX ORDER — ACETAMINOPHEN 500 MG
500 TABLET ORAL EVERY 4 HOURS PRN
Status: DISCONTINUED | OUTPATIENT
Start: 2024-05-01 | End: 2024-05-13

## 2024-05-01 RX ORDER — HYDROCODONE BITARTRATE AND ACETAMINOPHEN 5; 325 MG/1; MG/1
1 TABLET ORAL ONCE
Status: COMPLETED | OUTPATIENT
Start: 2024-05-01 | End: 2024-05-01

## 2024-05-01 RX ORDER — BISACODYL 10 MG
10 SUPPOSITORY, RECTAL RECTAL
Status: DISCONTINUED | OUTPATIENT
Start: 2024-05-01 | End: 2024-05-13

## 2024-05-01 RX ORDER — POLYETHYLENE GLYCOL 3350 17 G/17G
17 POWDER, FOR SOLUTION ORAL DAILY PRN
Status: DISCONTINUED | OUTPATIENT
Start: 2024-05-01 | End: 2024-05-13

## 2024-05-01 RX ORDER — ATORVASTATIN CALCIUM 40 MG/1
40 TABLET, FILM COATED ORAL NIGHTLY
Status: DISCONTINUED | OUTPATIENT
Start: 2024-05-01 | End: 2024-05-13

## 2024-05-01 RX ORDER — ASPIRIN 81 MG/1
81 TABLET ORAL DAILY
Status: DISCONTINUED | OUTPATIENT
Start: 2024-05-01 | End: 2024-05-06

## 2024-05-01 RX ORDER — ENOXAPARIN SODIUM 100 MG/ML
40 INJECTION SUBCUTANEOUS DAILY
Status: CANCELLED | OUTPATIENT
Start: 2024-05-02

## 2024-05-01 RX ORDER — DONEPEZIL HYDROCHLORIDE 10 MG/1
10 TABLET, FILM COATED ORAL NIGHTLY
Status: DISCONTINUED | OUTPATIENT
Start: 2024-05-01 | End: 2024-05-13

## 2024-05-01 RX ORDER — ENEMA 19; 7 G/133ML; G/133ML
1 ENEMA RECTAL ONCE AS NEEDED
Status: DISCONTINUED | OUTPATIENT
Start: 2024-05-01 | End: 2024-05-13

## 2024-05-01 RX ORDER — FLECAINIDE ACETATE 100 MG/1
100 TABLET ORAL 2 TIMES DAILY
Status: DISCONTINUED | OUTPATIENT
Start: 2024-05-01 | End: 2024-05-13

## 2024-05-01 RX ORDER — TAMSULOSIN HYDROCHLORIDE 0.4 MG/1
0.4 CAPSULE ORAL
Status: DISCONTINUED | OUTPATIENT
Start: 2024-05-01 | End: 2024-05-12

## 2024-05-01 RX ORDER — FINASTERIDE 5 MG/1
5 TABLET, FILM COATED ORAL DAILY
Status: DISCONTINUED | OUTPATIENT
Start: 2024-05-01 | End: 2024-05-13

## 2024-05-01 RX ORDER — ONDANSETRON 2 MG/ML
4 INJECTION INTRAMUSCULAR; INTRAVENOUS EVERY 6 HOURS PRN
Status: DISCONTINUED | OUTPATIENT
Start: 2024-05-01 | End: 2024-05-13

## 2024-05-01 RX ORDER — MELATONIN
3 NIGHTLY PRN
Status: DISCONTINUED | OUTPATIENT
Start: 2024-05-01 | End: 2024-05-13

## 2024-05-01 RX ORDER — SENNOSIDES 8.6 MG
17.2 TABLET ORAL NIGHTLY PRN
Status: DISCONTINUED | OUTPATIENT
Start: 2024-05-01 | End: 2024-05-13

## 2024-05-01 RX ORDER — IPRATROPIUM BROMIDE 21 UG/1
2 SPRAY, METERED NASAL EVERY 12 HOURS
Status: DISCONTINUED | OUTPATIENT
Start: 2024-05-02 | End: 2024-05-13

## 2024-05-01 RX ORDER — METOCLOPRAMIDE HYDROCHLORIDE 5 MG/ML
10 INJECTION INTRAMUSCULAR; INTRAVENOUS EVERY 8 HOURS PRN
Status: DISCONTINUED | OUTPATIENT
Start: 2024-05-01 | End: 2024-05-13

## 2024-05-01 RX ORDER — SERTRALINE HYDROCHLORIDE 25 MG/1
25 TABLET, FILM COATED ORAL DAILY
Status: DISCONTINUED | OUTPATIENT
Start: 2024-05-01 | End: 2024-05-13

## 2024-05-01 NOTE — ED PROVIDER NOTES
Patient Seen in: [facility] Emergency Department      History     Chief Complaint   Patient presents with    Fall     Stated Complaint: fall R leg hip pain    Subjective:   78-year-old male, history of hydrocephalus, scheduled for drain next week, presents with right low back pain and fall.  Patient states he has some recent right low back pain.  States that he has been having more pain in the last day or 2.  Patient states he did not go to bed today because of the pain.  Then he finally got up to use the restroom and because of the pain in the right leg gave out and he fell onto his buttocks.  No head injury.  No LOC.  No prodromal symptoms.  States the pain is better now but states he never bears weight he has pain to the right lower extremity.            Objective:   Past Medical History:    Anxiety    Arrhythmia    Arthritis    Bilateral inguinal hernia without obstruction or gangrene    BPH (benign prostatic hyperplasia)    Calculus of kidney    Essential hypertension    High cholesterol              No pertinent past surgical history.              No pertinent social history.            Review of Systems   Constitutional:  Negative for fever.   Respiratory:  Negative for shortness of breath.    Cardiovascular:  Negative for chest pain.   Gastrointestinal:  Negative for abdominal pain.   Musculoskeletal:  Positive for arthralgias, back pain and gait problem.   Neurological:  Negative for numbness.       Positive for stated complaint: fall R leg hip pain  Other systems are as noted in HPI.  Constitutional and vital signs reviewed.      All other systems reviewed and negative except as noted above.    Physical Exam     ED Triage Vitals   BP 05/01/24 1659 129/53   Pulse 05/01/24 1659 60   Resp 05/01/24 1659 20   Temp 05/01/24 1700 97.2 °F (36.2 °C)   Temp src --    SpO2 05/01/24 1659 98 %   O2 Device 05/01/24 1659 None (Room air)       Current:BP (!) 170/78   Pulse 59   Temp 97.2 °F (36.2 °C)   Resp 14    Wt 89.9 kg   SpO2 97%   BMI 27.64 kg/m²         Physical Exam  Vitals and nursing note reviewed.   Constitutional:       Appearance: He is not toxic-appearing.   HENT:      Head: Normocephalic.   Cardiovascular:      Rate and Rhythm: Normal rate.      Pulses: Normal pulses.   Pulmonary:      Effort: Pulmonary effort is normal. No respiratory distress.   Abdominal:      Palpations: Abdomen is soft.   Musculoskeletal:      Cervical back: Neck supple.      Comments: Full active and passive range of motion of the right lower extremity.  No pain in the foot ankle knee or hip.  Logroll and heel strike are negative.  When I palpate his low back he has some mild pain in the right SI region.  No significant pain.  No bruising.  No rashes.  No skin changes including crepitus or gangrene.  No midline pain.  No saddle anesthesia.  Reflex intact.  Strength intact.  Dermatomes intact   Skin:     General: Skin is warm and dry.   Neurological:      General: No focal deficit present.      Mental Status: He is alert.   Psychiatric:         Mood and Affect: Mood normal.         Behavior: Behavior normal.               ED Course     Labs Reviewed   COMP METABOLIC PANEL (14) - Abnormal; Notable for the following components:       Result Value    eGFR-Cr 59 (*)     A/G Ratio 0.9 (*)     All other components within normal limits   CBC W/ DIFFERENTIAL - Abnormal; Notable for the following components:    Neutrophil Absolute Prelim 8.15 (*)     Neutrophil Absolute 8.15 (*)     All other components within normal limits   CBC WITH DIFFERENTIAL WITH PLATELET    Narrative:     The following orders were created for panel order CBC With Differential With Platelet.  Procedure                               Abnormality         Status                     ---------                               -----------         ------                     CBC W/ DIFFERENTIAL[[id number]]          Abnormal            Final result                 Please view results  for these tests on the individual orders.   URINALYSIS, ROUTINE                      [facility]      XR LUMBAR SPINE (MIN 4 VIEWS) (CPT=72110)    Result Date: 5/1/2024  CONCLUSION:  1. There is no acute abnormality in the lumbar spine. 2. There are degenerative changes noted above.   LOCATION:  [facility]   Dictated by (CST): Arias Mckeon MD on 5/01/2024 at 6:02 PM     Finalized by (CST): Arias Mckeon MD on 5/01/2024 at 6:03 PM       XR HIP W OR WO PELVIS 2 OR 3 VIEWS, RIGHT (CPT=73502)    Result Date: 5/1/2024  CONCLUSION:  There is no acute fracture detected in the right hip.   LOCATION:  [facility]   Dictated by (CST): Arias Mckeon MD on 5/01/2024 at 5:58 PM     Finalized by (CST): Arias Mckeon MD on 5/01/2024 at 6:01 PM        I independently interpreted the x-ray of the hip without any obvious signs of acute fracture or dislocation    Family at bedside helpful to provide information on the history of presenting illness    Differential diagnosis includes, but is not limited to, normal pressure hydrocephalus, failure to thrive, dehydration, fracture, sprain, strain, contusion    External chart review demonstrates his recent neurosurgical consultations for his NPH, recent outpatient clearance by his PCP as well    78-year-old male presents with acute on chronic right low back pain, mild fall today.  He is not altered.  Has some overall generalized weakness which is per chart review seems to be getting worse.  Could have been the cause of the fall.  After pain control and IV fluid try to get him to stand here and he just overall generally weak and not a safe disposition home.  He has no focal deficits.  Discussed with neurosurgery, Dr. aLyne does not need any further brain imaging.  He will see in consultation tomorrow.  Patient and family are both in agreement that plan.  Awaiting bed assignment.    Admission disposition: 5/1/2024  9:37 PM                                        Medical Decision Making      Disposition and  Plan     Clinical Impression:  1. Weakness generalized    2. NPH (normal pressure hydrocephalus) (HCC)    3. Fall, initial encounter    4. Chronic right-sided low back pain without sciatica         Disposition:  Admit  5/1/2024  9:37 pm    Follow-up:  No follow-up provider specified.        Medications Prescribed:  Current Discharge Medication List                            Hospital Problems       Present on Admission  Date Reviewed: 3/28/2024            ICD-10-CM Noted POA    * (Principal) Weakness generalized R53.1 5/1/2024 Unknown

## 2024-05-01 NOTE — ED INITIAL ASSESSMENT (HPI)
78YM c/c of fall Pt state that he was going to the bathroom and his leg gave out Pt state that he now having R hip pain and cannot bear wt on the leg

## 2024-05-02 ENCOUNTER — APPOINTMENT (OUTPATIENT)
Dept: CT IMAGING | Facility: HOSPITAL | Age: 79
DRG: 556 | End: 2024-05-02
Attending: HOSPITALIST

## 2024-05-02 ENCOUNTER — HOME HEALTH CHARGES (OUTPATIENT)
Dept: FAMILY MEDICINE CLINIC | Facility: CLINIC | Age: 79
End: 2024-05-02

## 2024-05-02 DIAGNOSIS — I48.20 CHRONIC ATRIAL FIBRILLATION (HCC): ICD-10-CM

## 2024-05-02 DIAGNOSIS — I10 PRIMARY HYPERTENSION: Primary | ICD-10-CM

## 2024-05-02 PROBLEM — R11.2 ACUTE NAUSEA WITH NONBILIOUS VOMITING: Status: ACTIVE | Noted: 2024-05-02

## 2024-05-02 LAB
BILIRUB UR QL STRIP.AUTO: NEGATIVE
CLARITY UR REFRACT.AUTO: CLEAR
GLUCOSE BLD-MCNC: 131 MG/DL (ref 70–99)
GLUCOSE UR STRIP.AUTO-MCNC: NORMAL MG/DL
KETONES UR STRIP.AUTO-MCNC: NEGATIVE MG/DL
LEUKOCYTE ESTERASE UR QL STRIP.AUTO: NEGATIVE
NITRITE UR QL STRIP.AUTO: NEGATIVE
PH UR STRIP.AUTO: 5.5 [PH] (ref 5–8)
PROT UR STRIP.AUTO-MCNC: NEGATIVE MG/DL
RBC UR QL AUTO: NEGATIVE
SP GR UR STRIP.AUTO: 1.02 (ref 1–1.03)
UROBILINOGEN UR STRIP.AUTO-MCNC: NORMAL MG/DL

## 2024-05-02 PROCEDURE — 99221 1ST HOSP IP/OBS SF/LOW 40: CPT | Performed by: NEUROLOGICAL SURGERY

## 2024-05-02 PROCEDURE — 70450 CT HEAD/BRAIN W/O DYE: CPT | Performed by: HOSPITALIST

## 2024-05-02 PROCEDURE — 99232 SBSQ HOSP IP/OBS MODERATE 35: CPT | Performed by: HOSPITALIST

## 2024-05-02 PROCEDURE — 99223 1ST HOSP IP/OBS HIGH 75: CPT | Performed by: OTHER

## 2024-05-02 RX ORDER — HYDROCODONE BITARTRATE AND ACETAMINOPHEN 5; 325 MG/1; MG/1
2 TABLET ORAL EVERY 4 HOURS PRN
Status: DISCONTINUED | OUTPATIENT
Start: 2024-05-02 | End: 2024-05-13

## 2024-05-02 RX ORDER — HYDROCODONE BITARTRATE AND ACETAMINOPHEN 5; 325 MG/1; MG/1
1 TABLET ORAL EVERY 4 HOURS PRN
Status: DISCONTINUED | OUTPATIENT
Start: 2024-05-02 | End: 2024-05-13

## 2024-05-02 RX ORDER — LORAZEPAM 0.5 MG/1
1 TABLET ORAL EVERY 6 HOURS PRN
Status: DISCONTINUED | OUTPATIENT
Start: 2024-05-02 | End: 2024-05-13

## 2024-05-02 RX ORDER — LISINOPRIL 5 MG/1
5 TABLET ORAL DAILY
Status: DISCONTINUED | OUTPATIENT
Start: 2024-05-02 | End: 2024-05-09

## 2024-05-02 NOTE — CM/SW NOTE
Pt admitted from [facility] due to fall. Anticipated therapy need: Intensive Therapy Program, PMR ordered per protocol. Neuro consulted.     KAMALA Mckay

## 2024-05-02 NOTE — PLAN OF CARE
NURSING ADMISSION NOTE      Patient admitted via Cart  Oriented to room.  Safety precautions initiated.  Bed in low position.  Call light in reach.    AOx4, RA  AV paced on tele  VSS/Afebrile  Denies pain  Regular diet  Voiding via brief for incontinence  Safety precaution in place, call light within reach    Plan for PT eval/tx and Neurosurgery to see pt.  Updated patient and family w/ POC

## 2024-05-02 NOTE — PHYSICAL THERAPY NOTE
PHYSICAL THERAPY EVALUATION - INPATIENT     Room Number: 7605/7605-A  Evaluation Date: 5/2/2024  Type of Evaluation: Initial  Physician Order: PT Eval and Treat    Presenting Problem: Fall  Co-Morbidities : NPH, Afib, HTN, TIA, COPD, anxiety  Reason for Therapy: Mobility Dysfunction and Discharge Planning    PHYSICAL THERAPY ASSESSMENT   Patient is currently functioning below baseline with bed mobility, transfers, gait, maintaining seated position, and standing prolonged periods.  Prior to admission, patient's baseline is mod I .  Patient is requiring moderate assist and maximum assist as a result of the following impairments: decreased functional strength, decreased endurance/aerobic capacity, impaired standing balance, impaired motor planning, decreased muscular endurance, and medical status.  Physical Therapy will continue to follow for duration of hospitalization.    Patient will benefit from continued skilled PT Services to facilitate return to prior level of function as patient demonstrates high motivation with excellent tolerance to an intensive therapy program .    PLAN  PT Treatment Plan: Bed mobility;Body mechanics;Endurance;Energy conservation;Patient education;Family education;Gait training;Balance training;Transfer training;Strengthening;Neuromuscular re-educate  Rehab Potential : Fair  Frequency (Obs): 5x/week  Number of Visits to Meet Established Goals: 7      CURRENT GOALS    Goal #1 Patient is able to demonstrate supine - sit EOB @ level: supervision     Goal #2 Patient is able to demonstrate transfers EOB to/from Chair/Wheelchair at assistance level: minimum assistance     Goal #3 Patient is able to ambulate 15 feet with assist device: walker - rolling at assistance level: moderate assistance     Goal #4    Goal #5    Goal #6    Goal Comments: Goals established on 5/2/2024      PHYSICAL THERAPY MEDICAL/SOCIAL HISTORY  History related to current admission: Patient is a 79 year old male  admitted on 2024 from [facility] for fall, weakness.      HOME SITUATION  Type of Home: Independent living facility ([facility])   Home Layout: One level                Lives With: Spouse  Drives: No  Patient Owned Equipment: Cane;Rolling walker;Rollator  Patient Regularly Uses: Glasses    Prior Level of Colony: Pt typically ambulates with rollator walker at hospitals, ambulates to dining sullivan but lately wife has been pushing him in the rollator. Was supposed to have drain trial this upcoming Monday for NPH.     SUBJECTIVE  \"This all happened recently\"       OBJECTIVE  Precautions: Bed/chair alarm  Fall Risk: High fall risk    WEIGHT BEARING RESTRICTION  Weight Bearing Restriction: None                PAIN ASSESSMENT  Ratin          COGNITION  Overall Cognitive Status:  WFL - within functional limits    RANGE OF MOTION AND STRENGTH ASSESSMENT  See OT note for UE assessment    Lower extremity ROM is within functional limits     Lower extremity strength is as follows:   Right Hip flexion  2/5  Left Hip flexion  5/5  Right Knee extension  2/5  Left Knee extension  5/5  Right Knee flexion  5/5  Left Knee flexion  5/5  Right Dorsiflexion  5/5  Left Dorsiflexion  5/5      BALANCE  Static Sitting: Poor +  Dynamic Sitting: Poor  Static Standing: Poor  Dynamic Standing: Poor -    ADDITIONAL TESTS  Additional Tests: Modified Egan              Modified Ashlyn: 4                  ACTIVITY TOLERANCE                         O2 WALK       NEUROLOGICAL FINDINGS  Neurological Findings: Sensation           Sensation: inconsistent sensation through BLE         AM-PAC '6-Clicks' INPATIENT SHORT FORM - BASIC MOBILITY  How much difficulty does the patient currently have...  Patient Difficulty: Turning over in bed (including adjusting bedclothes, sheets and blankets)?: A Lot   Patient Difficulty: Sitting down on and standing up from a chair with arms (e.g., wheelchair, bedside commode, etc.): A Lot   Patient  Difficulty: Moving from lying on back to sitting on the side of the bed?: A Lot   How much help from another person does the patient currently need...   Help from Another: Moving to and from a bed to a chair (including a wheelchair)?: A Lot   Help from Another: Need to walk in hospital room?: A Lot   Help from Another: Climbing 3-5 steps with a railing?: A Lot       AM-PAC Score:  Raw Score: 12   Approx Degree of Impairment: 68.66%   Standardized Score (AM-PAC Scale): 35.33   CMS Modifier (G-Code): CL    FUNCTIONAL ABILITY STATUS  Gait Assessment   Functional Mobility/Gait Assessment  Gait Assistance: Maximum assistance  Distance (ft): 0    Skilled Therapy Provided     Bed Mobility:  Rolling: NT  Supine to sit: modA   Sit to supine: NT     Transfer Mobility:  Sit to stand: modA   Stand to sit: modA  Gait = maxA for attempted march/side step    Therapist's Comments: RN cleared for session. Pt agreeable for therapy, received supine. Pt cued on log roll technique for supine>sit, req assistance with BLE to EOB and trunk elevation, pt cued on scooting anteriorly, able to partially perform indep. In sitting pt demo increased R sided lean, able to partially correct when cued, most difficulty with dynamic balance (ie when performing MMT or ROM testing). Inconsistent sensation testing of BLEs. Sitting /80. Pt attempted sit to stand with 2 assist and RLE knee blocked and back of chair positioned in front of pt for UE support from grab bar. Pt unable to perform LLE step due to difficulty maintaining R knee extension. Pt with increased diaphoresis and nausea. Returned to sitting, /87. Returned to supine. RN notified. MD notified, orders entered for CT and MRI brain.     Exercise/Education Provided:  Bed mobility  Body mechanics  Energy conservation  Functional activity tolerated  Gait training  Posture  Strengthening  Transfer training    Patient End of Session: In bed;Needs met;Call light within reach;RN aware of  session/findings;All patient questions and concerns addressed;Alarm set;Discussed recommendations with /      Patient Evaluation Complexity Level:  History High - 3 or more personal factors and/or co-morbidities   Examination of body systems Moderate - addressing a total of 3 or more elements   Clinical Presentation Moderate - Evolving   Clinical Decision Making Moderate - Evolving       PT Session Time: 30 minutes  Gait Trainin minutes  Therapeutic Activity: 14 minutes

## 2024-05-02 NOTE — CONSULTS
.[facility]   NEUROLOGY   CONSULT NOTE    Admission date: 5/1/2024  Reason for Consult: TIA  Chief Complaint:   Chief Complaint   Patient presents with    Fall   ________________________________________________________________    History     History of Presenting Illness  79 year old male with hypertension, hyperlipidemia, dementia, BPH and recently diagnosed NPH, coronary artery disease with history of paroxysmal atrial fibrillation currently on DOAC consulted for an episode of nausea while patient's stood up.  Episode was not associated with vomiting dizziness, lightheadedness, photophobia, phonophobia, vertigo, weakness, numbness, paresthesias, confusion or loss of consciousness.  Patient has unsteady gait because of his NPH and was due to be evaluated for possible definitive treatment.  Patient currently back to his baseline.    History obtained from patient, his wife and daughter and chart review.    Past Medical History:    Anxiety    Arrhythmia    Arthritis    Bilateral inguinal hernia without obstruction or gangrene    BPH (benign prostatic hyperplasia)    Calculus of kidney    Essential hypertension    High cholesterol     Past Surgical History:   Procedure Laterality Date    Anesth,pacemaker insertion  08/2023    Appendectomy      Appendectomy      Colonoscopy      Colonoscopy N/A 05/03/2022    Procedure: COLONOSCOPY;  Surgeon: Sudarshan Gilbert MD;  Location:  ENDOSCOPY    Colonoscopy      Cyst removal N/A 1990    Cyst removed behind back of neck    Cyst removal Right 11/05/2020    Removed from right upper back - just below shoulder    Hernia surgery  July 2022    Skin surgery  2020    Vasectomy  1980     Social History     Socioeconomic History    Marital status:    Tobacco Use    Smoking status: Former     Types: Cigarettes     Passive exposure: Never    Smokeless tobacco: Never    Tobacco comments:     Has not smoked for about 20 years   Vaping Use    Vaping status: Never Used    Substance and Sexual Activity    Alcohol use: Never     Alcohol/week: 4.0 standard drinks of alcohol     Types: 4 Cans of beer per week    Drug use: Never   Other Topics Concern    Caffeine Concern No    Exercise No    Seat Belt Yes    Special Diet No    Stress Concern Yes    Weight Concern No     Social Determinants of Health     Food Insecurity: No Food Insecurity (5/1/2024)    Food Insecurity     Food Insecurity: Never true   Transportation Needs: No Transportation Needs (5/1/2024)    Transportation Needs     Lack of Transportation: No   Housing Stability: Low Risk  (5/1/2024)    Housing Stability     Housing Instability: No     Family History   Problem Relation Age of Onset    Heart Attack Father     Diabetes Mother     Other (DM) Sister     Other (ALS) Brother     No Known Problems Maternal Grandmother     No Known Problems Maternal Grandfather     No Known Problems Paternal Grandmother     No Known Problems Paternal Grandfather     Other (smoker) Brother     Other (MS) Sister     No Known Problems Son     No Known Problems Daughter      Allergies   Allergies   Allergen Reactions    Kiwi Extract ITCHING     Inner ears itch       Home Meds  Current Outpatient Medications   Medication Instructions    acetaminophen (TYLENOL EXTRA STRENGTH) 500 mg, Oral, Every 6 hours PRN    Acidophilus/Pectin Oral Cap 1 capsule, Oral, Daily    aspirin 81 mg, Oral, Daily    atorvastatin (LIPITOR) 40 mg, Oral, Nightly    Cholecalciferol 125 MCG (5000 UT) Oral Tab 1 tablet, Oral, Three times a week    cyanocobalamin (VITAMIN B12) 500 mcg, Oral, Daily    donepezil (ARICEPT) 10 mg, Oral, Nightly    finasteride (PROSCAR) 5 mg, Oral, Daily    flecainide (TAMBOCOR) 100 mg, Oral, 2 times daily    fluticasone-umeclidin-vilant (TRELEGY ELLIPTA) 100-62.5-25 MCG/ACT Inhalation Aerosol Powder, Breath Activated 1 puff, Inhalation, Daily    ipratropium 0.03 % Nasal Solution 2 sprays, Nasal, Every 12 Hours    metoprolol tartrate (LOPRESSOR) 25  mg, Oral, 2 times daily(Beta Blocker)    Multiple Vitamins-Minerals (MULTI-VITAMIN/MINERALS) Oral Tab 1 tablet, Oral, Daily    rivaroxaban (XARELTO) 20 mg, Oral, Nightly    sertraline (ZOLOFT) 25 mg, Oral, Daily    tamsulosin (FLOMAX) 0.4 mg, Oral, After dinner, 30 MIN AFTER     Scheduled Meds:   lisinopril  5 mg Oral Daily    aspirin  81 mg Oral Daily    atorvastatin  40 mg Oral Nightly    donepezil  10 mg Oral Nightly    finasteride  5 mg Oral Daily    flecainide  100 mg Oral BID    fluticasone-umeclidin-vilant  1 puff Inhalation Daily    ipratropium  2 spray Nasal 2 times per day    metoprolol tartrate  25 mg Oral 2x Daily(Beta Blocker)    sertraline  25 mg Oral Daily    tamsulosin  0.4 mg Oral After dinner    rivaroxaban  20 mg Oral Nightly     Continuous Infusions:  PRN Meds:  HYDROcodone-acetaminophen **OR** HYDROcodone-acetaminophen    LORazepam    melatonin    acetaminophen    polyethylene glycol (PEG 3350)    sennosides    bisacodyl    fleet enema    ondansetron    metoclopramide    OBJECTIVE   VITAL SIGNS:   Temp:  [97.2 °F (36.2 °C)-98.4 °F (36.9 °C)] 98.4 °F (36.9 °C)  Pulse:  [59-62] 60  Resp:  [11-20] 19  BP: (129-177)/() 136/64  SpO2:  [93 %-99 %] 94 %    PHYSICAL EXAM:    NEUROLOGIC:    Mental Status:  A&O x 4, Follows simple commands, no obvious aphasia or dysarthria.  Attention, short-term memory slightly impaired.  Cranial nerves: PERRL.  Visual fields full.  EOMI.  Face symmetric with normal movement bilaterally.  Hearing grossly intact. Tongue midline with normal movements.   Motor: Drift:  Absent; Motor exam is 5 out of 5 in all extremities bilaterally  Sensation: Intact to light touch bilaterally  Cerebellar: Normal Finger-To-Nose test        LABORATORY DATA:  Last 24 hour labs were reviewed in detail.  Recent Labs   Lab 05/01/24 2013      K 4.1      CO2 28.0   GLU 88   BUN 22   CREATSERUM 1.23     Recent Labs   Lab 05/01/24 2012   WBC 10.4   HGB 14.6   .0     Recent  Labs   Lab 05/01/24 2013   ALT 42   AST 28     No results for input(s): \"MG\", \"PHOS\" in the last 168 hours.  Last A1c value was 5.7% done 3/20/2023.     Radiology:    XR LUMBAR SPINE (MIN 4 VIEWS) (CPT=72110)    Result Date: 5/1/2024  CONCLUSION:  1. There is no acute abnormality in the lumbar spine. 2. There are degenerative changes noted above.   LOCATION:  [facility]   Dictated by (CST): Arias Mckeon MD on 5/01/2024 at 6:02 PM     Finalized by (CST): Arias Mckeon MD on 5/01/2024 at 6:03 PM       XR HIP W OR WO PELVIS 2 OR 3 VIEWS, RIGHT (CPT=73502)    Result Date: 5/1/2024  CONCLUSION:  There is no acute fracture detected in the right hip.   LOCATION:  [facility]   Dictated by (CST): Arias Mckeon MD on 5/01/2024 at 5:58 PM     Finalized by (CST): Arias Mckeon MD on 5/01/2024 at 6:01 PM      ASSESSMENT/PLAN   79 year old male with:    Sudden episode of nausea on standing.  Symptoms of unclear etiology CT scan of the head was requested.  Will review results.  Symptoms completely resolved and seems unlikely to be due to TIA/CVA.  Patient already on aspirin, Xarelto and statin for stroke prophylaxis due to his history of paroxysmal atrial fibrillation.  Blood pressure hydrocephalus patient does have symptoms of unsteady gait, urinary incontinence as well as dementia.  He is being considered for further evaluation with lumbar drain by neurosurgery.  Concern for right leg weakness.  Patient has mild right quad weakness.  This could be related to lumbar radiculopathy.  Advised OT/PT.  Will review test results.  Family counseled.  All questions answered.    Principal Problem:    Weakness generalized  Active Problems:    Benign prostatic hyperplasia    PAF (paroxysmal atrial fibrillation) ([facility])    NPH (normal pressure hydrocephalus) ([facility])    Fall, initial encounter    Chronic right-sided low back pain without sciatica       Markel Navarro MD  Neurohospitalist  [facility]    Disclaimer: This record was  dictated using Dragon software. There may be errors due to voice recognition problems that were not realized and corrected during the completion of the note.

## 2024-05-02 NOTE — H&P
[facility]     Clay Orellana Patient Status:  Emergency    1945 MRN [id number]   Location [facility] EMERGENCY DEPARTMENT Attending Gabriel Lange, DO   Hosp Day # 0 PCP Sachin Meza MD     Chief Complaint: Weakness, fall    Subjective:    History of Present Illness:     Clay Orellana is a 78 year old male with a PMH of hydrocephalous presented to ED after suffering a fall. Patient reports he was attempting to get out of bed and go to bathroom when right leg gave out resulting in fall. Patient developed pain to right hip after fall and has been unable to ambulate due to pain and weakness. Denies hitting head. Denies CP/SOB/N/V.     History/Other:    Past Medical History:  Past Medical History:    Anxiety    Arrhythmia    Arthritis    Bilateral inguinal hernia without obstruction or gangrene    BPH (benign prostatic hyperplasia)    Calculus of kidney    Essential hypertension    High cholesterol     Past Surgical History:   Past Surgical History:   Procedure Laterality Date    Anesth,pacemaker insertion  2023    Appendectomy      Appendectomy      Colonoscopy      Colonoscopy N/A 2022    Procedure: COLONOSCOPY;  Surgeon: Sudarshan Gilbert MD;  Location:  ENDOSCOPY    Colonoscopy      Cyst removal N/A     Cyst removed behind back of neck    Cyst removal Right 2020    Removed from right upper back - just below shoulder    Hernia surgery  2022    Skin surgery      Vasectomy        Family History:   Family History   Problem Relation Age of Onset    Heart Attack Father     Diabetes Mother     Other (DM) Sister     Other (ALS) Brother     No Known Problems Maternal Grandmother     No Known Problems Maternal Grandfather     No Known Problems Paternal Grandmother     No Known Problems Paternal Grandfather     Other (smoker) Brother     Other (MS) Sister     No Known Problems Son     No Known Problems Daughter      Social History:    reports that he  has quit smoking. His smoking use included cigarettes. He has never been exposed to tobacco smoke. He has never used smokeless tobacco. He reports that he does not drink alcohol and does not use drugs.     Allergies:   Allergies   Allergen Reactions    Kiwi Extract ITCHING     Inner ears itch       Medications:    No current facility-administered medications on file prior to encounter.     Current Outpatient Medications on File Prior to Encounter   Medication Sig Dispense Refill    sertraline (ZOLOFT) 25 MG Oral Tab Take 1 tablet (25 mg total) by mouth daily. 30 tablet 1    atorvastatin 40 MG Oral Tab Take 1 tablet (40 mg total) by mouth nightly. 90 tablet 0    tamsulosin 0.4 MG Oral Cap Take 1 capsule (0.4 mg total) by mouth After dinner. 30 MIN AFTER 90 capsule 0    donepezil 10 MG Oral Tab Take 1 tablet (10 mg total) by mouth nightly. 90 tablet 1    fluticasone-umeclidin-vilant (TRELEGY ELLIPTA) 100-62.5-25 MCG/ACT Inhalation Aerosol Powder, Breath Activated Inhale 1 puff into the lungs daily.      ipratropium 0.03 % Nasal Solution 2 sprays by Nasal route Q12H.      flecainide 100 MG Oral Tab Take 1 tablet (100 mg total) by mouth 2 (two) times daily.      finasteride 5 MG Oral Tab TAKE ONE TABLET BY MOUTH ONE TIME DAILY 90 tablet 2    Cholecalciferol 125 MCG (5000 UT) Oral Tab Take 1 tablet (5,000 Units total) by mouth. Three times a week      cyanocobalamin 500 MCG Oral Tab Take 1 tablet (500 mcg total) by mouth daily.      aspirin 81 MG Oral Tab EC Take 1 tablet (81 mg total) by mouth daily. 90 tablet 1    Multiple Vitamins-Minerals (MULTI-VITAMIN/MINERALS) Oral Tab Take 1 tablet by mouth daily.      rivaroxaban 20 MG Oral Tab Take 1 tablet (20 mg total) by mouth nightly. 30 tablet 3    metoprolol tartrate 25 MG Oral Tab Take 1 tablet (25 mg total) by mouth 2x Daily(Beta Blocker). 60 tablet 3    Acidophilus/Pectin Oral Cap Take 1 capsule by mouth daily.      acetaminophen 500 MG Oral Tab Take 1 tablet (500 mg  total) by mouth every 6 (six) hours as needed.         Review of Systems:   A comprehensive review of systems was completed.    Pertinent positives and negatives noted in the HPI.    Objective:   Physical Exam:    /67   Pulse 59   Temp 97.2 °F (36.2 °C)   Resp 13   Wt 198 lb 3.1 oz (89.9 kg)   SpO2 97%   BMI 27.64 kg/m²   General: No acute distress, Alert  Respiratory: No rhonchi, no wheezes  Cardiovascular: S1, S2. Regular rate and rhythm  Abdomen: Soft, Non-tender, non-distended, positive bowel sounds  Neuro: No new focal deficits  Extremities: No edema    Results:    Labs:      Labs Last 24 Hours:    Recent Labs   Lab 04/25/24  0810 05/01/24 2012   RBC 4.32 4.44   HGB 13.9 14.6   HCT 42.1 42.6   MCV 97.5 95.9   MCH 32.2 32.9   MCHC 33.0 34.3   RDW 13.5 13.2   NEPRELIM 6.31 8.15*   WBC 8.8 10.4   .0 229.0       Recent Labs   Lab 04/25/24  0810 05/01/24 2013   GLU 97 88   BUN 30* 22   CREATSERUM 1.10 1.23   EGFRCR 68 59*   CA 9.0 9.0   ALB 3.4 3.5    141   K 4.4 4.1    110   CO2 28.0 28.0   ALKPHO 91 88   AST 31 28   ALT 53 42   BILT 0.5 0.4   TP 6.7 7.3       Lab Results   Component Value Date    INR 2.32 (H) 04/25/2024    INR 1.15 05/03/2022       No results for input(s): \"TROP\", \"TROPHS\", \"CK\" in the last 168 hours.    No results for input(s): \"TROP\", \"PBNP\" in the last 168 hours.    No results for input(s): \"PCT\" in the last 168 hours.    Imaging: Imaging data reviewed in Epic.    Assessment & Plan:      #Fall/weakness suspect related to NPH  -Had plans for drain trial Monday  -ED d/w NS - no brain imaging recommended at this time - to see in AM  -XR hip and lumbar spine without acute findings  -PT eval    #Cerebrovascular disease  -ASA/statin     #PAF s/p DCCV  -DOAC/BB    #BPH  -Flomax/finasteride    #Anxiety  -SSRI    Plan of care discussed with patient and ED physician.     Patrice Jacobo DO    Supplementary Documentation:     The 21st Century Cures Act makes medical notes  like these available to patients in the interest of transparency. Please be advised this is a medical document. Medical documents are intended to carry relevant information, facts as evident, and the clinical opinion of the practitioner. The medical note is intended as peer to peer communication and may appear blunt or direct. It is written in medical language and may contain abbreviations or verbiage that are unfamiliar.

## 2024-05-02 NOTE — CONSULTS
[facility]  Neurosurgery Consult    Clay Orellana Patient Status:  Inpatient    1945 MRN [id number]   Location [facility] 7NE-A Attending Angel Cristobal MD   Hosp Day # 1 PCP Sachin Meza MD     REASON FOR CONSULTATION:  Right lower extremity weakness  Possible NPH    HISTORY OF PRESENT ILLNESS:  Clay Orellana is a(n) 79 year old male who is known to our practice.  Patient was previously evaluated for possible neurologic gait dysfunction.  Patient had complaints of diffuse weakness which did not correlate with NPH however he did report a shuffling gait, decline in memory, and urinary incontinence.  Patient was scheduled for a lumbar drain trial on .  Patient had sustained a fall over the night falling out of his bed.  He presented to the ED for evaluation.  Patient was unable to ambulate due to the pain.  He reports pain to his right hip and worsening right lower extremity weakness.  He denies new paresthesias.  Patient had a lumbar x-ray which showed degenerative changes.  Neurosurgery was consulted for this    PAST MEDICAL HISTORY:  Past Medical History:    Anxiety    Arrhythmia    Arthritis    Bilateral inguinal hernia without obstruction or gangrene    BPH (benign prostatic hyperplasia)    Calculus of kidney    Essential hypertension    High cholesterol       PAST SURGICAL HISTORY:  Past Surgical History:   Procedure Laterality Date    Anesth,pacemaker insertion  2023    Appendectomy      Appendectomy      Colonoscopy      Colonoscopy N/A 2022    Procedure: COLONOSCOPY;  Surgeon: Sudarshan Gilbert MD;  Location:  ENDOSCOPY    Colonoscopy      Cyst removal N/A     Cyst removed behind back of neck    Cyst removal Right 2020    Removed from right upper back - just below shoulder    Hernia surgery  2022    Skin surgery      Vasectomy         FAMILY HISTORY:  family history includes ALS in his brother; DM in his sister; Diabetes in his mother; Heart Attack in his  father; MS in his sister; No Known Problems in his daughter, maternal grandfather, maternal grandmother, paternal grandfather, paternal grandmother, and son; smoker in his brother.    SOCIAL HISTORY:   reports that he has quit smoking. His smoking use included cigarettes. He has never been exposed to tobacco smoke. He has never used smokeless tobacco. He reports that he does not drink alcohol and does not use drugs.    ALLERGIES:  Allergies   Allergen Reactions    Kiwi Extract ITCHING     Inner ears itch       MEDICATIONS:  Medications Prior to Admission   Medication Sig Dispense Refill Last Dose    sertraline (ZOLOFT) 25 MG Oral Tab Take 1 tablet (25 mg total) by mouth daily. 30 tablet 1 4/30/2024    atorvastatin 40 MG Oral Tab Take 1 tablet (40 mg total) by mouth nightly. 90 tablet 0 4/30/2024    tamsulosin 0.4 MG Oral Cap Take 1 capsule (0.4 mg total) by mouth After dinner. 30 MIN AFTER 90 capsule 0 4/30/2024    donepezil 10 MG Oral Tab Take 1 tablet (10 mg total) by mouth nightly. 90 tablet 1 4/30/2024    fluticasone-umeclidin-vilant (TRELEGY ELLIPTA) 100-62.5-25 MCG/ACT Inhalation Aerosol Powder, Breath Activated Inhale 1 puff into the lungs daily.   5/1/2024    ipratropium 0.03 % Nasal Solution 2 sprays by Nasal route Q12H.   5/1/2024    finasteride 5 MG Oral Tab TAKE ONE TABLET BY MOUTH ONE TIME DAILY 90 tablet 2 5/1/2024    Cholecalciferol 125 MCG (5000 UT) Oral Tab Take 1 tablet (5,000 Units total) by mouth. Three times a week   4/30/2024    cyanocobalamin 500 MCG Oral Tab Take 1 tablet (500 mcg total) by mouth daily.   4/30/2024    aspirin 81 MG Oral Tab EC Take 1 tablet (81 mg total) by mouth daily. 90 tablet 1 4/30/2024    Multiple Vitamins-Minerals (MULTI-VITAMIN/MINERALS) Oral Tab Take 1 tablet by mouth daily.   4/30/2024    rivaroxaban 20 MG Oral Tab Take 1 tablet (20 mg total) by mouth nightly. 30 tablet 3 4/30/2024    metoprolol tartrate 25 MG Oral Tab Take 1 tablet (25 mg total) by mouth 2x  Daily(Beta Blocker). 60 tablet 3 5/1/2024    Acidophilus/Pectin Oral Cap Take 1 capsule by mouth daily.   5/1/2024    acetaminophen 500 MG Oral Tab Take 1 tablet (500 mg total) by mouth every 6 (six) hours as needed.   5/1/2024    flecainide 100 MG Oral Tab Take 1 tablet (100 mg total) by mouth 2 (two) times daily.        Current Facility-Administered Medications   Medication Dose Route Frequency    aspirin DR tab 81 mg  81 mg Oral Daily    atorvastatin (Lipitor) tab 40 mg  40 mg Oral Nightly    donepezil (Aricept) tab 10 mg  10 mg Oral Nightly    melatonin tab 3 mg  3 mg Oral Nightly PRN    acetaminophen (Tylenol Extra Strength) tab 500 mg  500 mg Oral Q4H PRN    polyethylene glycol (PEG 3350) (Miralax) 17 g oral packet 17 g  17 g Oral Daily PRN    sennosides (Senokot) tab 17.2 mg  17.2 mg Oral Nightly PRN    bisacodyl (Dulcolax) 10 MG rectal suppository 10 mg  10 mg Rectal Daily PRN    fleet enema (Fleet) 7-19 GM/118ML rectal enema 133 mL  1 enema Rectal Once PRN    ondansetron (Zofran) 4 MG/2ML injection 4 mg  4 mg Intravenous Q6H PRN    metoclopramide (Reglan) 5 mg/mL injection 10 mg  10 mg Intravenous Q8H PRN    finasteride (Proscar) tab 5 mg  5 mg Oral Daily    flecainide (Tambocor) tab 100 mg  100 mg Oral BID    fluticasone-umeclidin-vilant (Trelegy Ellipta) 100-62.5-25 MCG/ACT inhaler 1 puff  1 puff Inhalation Daily    ipratropium (Atrovent) 0.03 % nasal solution 2 spray  2 spray Nasal 2 times per day    metoprolol tartrate (Lopressor) tab 25 mg  25 mg Oral 2x Daily(Beta Blocker)    sertraline (Zoloft) tab 25 mg  25 mg Oral Daily    tamsulosin (Flomax) cap 0.4 mg  0.4 mg Oral After dinner    rivaroxaban (Xarelto) tab 20 mg  20 mg Oral Nightly       REVIEW OF SYSTEMS:  A 10-point system was reviewed.  Pertinent positives and negatives are noted in HPI.      PHYSICAL EXAMINATION:  VITAL SIGNS: BP (!) 177/79 (BP Location: Left arm)   Pulse 60   Temp 98 °F (36.7 °C) (Oral)   Resp 17   Wt 198 lb 3.1 oz (89.9  kg)   SpO2 93%   BMI 27.64 kg/m²   GENERAL:  Patient is a 79 year old male in no acute distress.  HEENT:  Normocephalic, atraumatic.  NEUROLOGICAL:  This patient is alert and orientated x 3.  Speech fluent. Comprehension intact.   Face is symmetrical. CN's GI.  Sensation to light touch is intact bilaterally.  LLE 4/5, R IP/H/DF/PF 4/5, R Q 2/5.  Gait deferred      DIAGNOSTIC DATA:   Lab Results   Component Value Date    WBC 10.4 05/01/2024    HGB 14.6 05/01/2024    HCT 42.6 05/01/2024    .0 05/01/2024    CREATSERUM 1.23 05/01/2024    BUN 22 05/01/2024     05/01/2024    K 4.1 05/01/2024     05/01/2024    CO2 28.0 05/01/2024    GLU 88 05/01/2024    CA 9.0 05/01/2024    ALB 3.5 05/01/2024    ALKPHO 88 05/01/2024    BILT 0.4 05/01/2024    TP 7.3 05/01/2024    AST 28 05/01/2024    ALT 42 05/01/2024       IMAGING:  XR LUMBAR SPINE (MIN 4 VIEWS) (CPT=72110)    Result Date: 5/1/2024  CONCLUSION:  1. There is no acute abnormality in the lumbar spine. 2. There are degenerative changes noted above.   LOCATION:  [facility]   Dictated by (CST): Arias Mckeon MD on 5/01/2024 at 6:02 PM     Finalized by (CST): Arias Mckeon MD on 5/01/2024 at 6:03 PM       XR HIP W OR WO PELVIS 2 OR 3 VIEWS, RIGHT (CPT=73502)    Result Date: 5/1/2024  CONCLUSION:  There is no acute fracture detected in the right hip.   LOCATION:  [facility]   Dictated by (CST): Arias Mckeon MD on 5/01/2024 at 5:58 PM     Finalized by (CST): Arias Mckeon MD on 5/01/2024 at 6:01 PM          ASSESSMENT:  Status post fall  Right quadriceps weakness  Possible NPH    Plan:  Patient seen and examined with Dr. Pringle  We do not have a clear cause to his quadriceps weakness  This does not correlate with NPH  Discussed possibly postponing lumbar drain trial as he will need further workup for this  Medical management per medical team        EMRE Ricketts  [facility]  5/2/2024, 9:21 AM       A total of 20 minutes of visit  time (exclusible of billable procedures) was administered.  > 50 % of time spent counseling/coordinating care     Is this a shared or split note between Advanced Practice Provider and Physician? Yes  Patient seen examined nurse practitioner  Case discussed  Patient presents after a fall  Is having some right leg weakness  On examination his right quad is a little weak  Other muscles are good strength  Right leg weakness is not a usual sign of NPH  He is scheduled to have an lumbar drain trial next week  Unsure if he would tolerate the trial at the moment  Defer workup to hospitalist/neurology  He has had an EMG in the past  No acute neurosurgical intervention  Following

## 2024-05-02 NOTE — PLAN OF CARE
Assumed care at approx 0730.  Alert and oriented x4, confused at times. No neuro changes.  On room air, respirations even and unlabored.   on tele.  Unable to bare weight to r leg, attempted to get up to chair.  Pain controlled with meds.  Safety precautions maintained, patient reports needs met, call light within reach.      Problem: PAIN - ADULT  Goal: Verbalizes/displays adequate comfort level or patient's stated pain goal  Description: INTERVENTIONS:  - Encourage pt to monitor pain and request assistance  - Assess pain using appropriate pain scale  - Administer analgesics based on type and severity of pain and evaluate response  - Implement non-pharmacological measures as appropriate and evaluate response  - Consider cultural and social influences on pain and pain management  - Manage/alleviate anxiety  - Utilize distraction and/or relaxation techniques  - Monitor for opioid side effects  - Notify MD/LIP if interventions unsuccessful or patient reports new pain  - Anticipate increased pain with activity and pre-medicate as appropriate  Outcome: Progressing     Problem: Impaired Functional Mobility  Goal: Achieve highest/safest level of mobility/gait  Description: Interventions:  - Assess patient's functional ability and stability  - Promote increasing activity/tolerance for mobility and gait  - Educate and engage patient/family in tolerated activity level and precautions  - Recommend use of chair position in bed 3 times per day  Outcome: Progressing     Problem: MUSCULOSKELETAL - ADULT  Goal: Return mobility to safest level of function  Description: INTERVENTIONS:  - Assess patient stability and activity tolerance for standing, transferring and ambulating w/ or w/o assistive devices  - Assist with transfers and ambulation using safe patient handling equipment as needed  - Ensure adequate protection for wounds/incisions during mobilization  - Obtain PT/OT consults as needed  - Advance activity as  appropriate  - Communicate ordered activity level and limitations with patient/family  Outcome: Progressing  Goal: Maintain proper alignment of affected body part  Description: INTERVENTIONS:  - Support and protect limb and body alignment per provider's orders  - Instruct and reinforce with patient and family use of appropriate assistive device and precautions (e.g. spinal or hip dislocation precautions)  Outcome: Progressing

## 2024-05-02 NOTE — PROGRESS NOTES
[facility]   part of [facility]     Hospitalist Progress Note     Clay Orellana Patient Status:  Inpatient    1945 MRN [id number]   [facility] 7NE-A Attending Angel Cristobal MD   Hosp Day # 1 PCP Sachin Meza MD     Chief Complaint: fall    Subjective:     Patient c/o R>L LE weakness, LBP     Objective:    Review of Systems:   A comprehensive review of systems was completed; pertinent positive and negatives stated in subjective.    Vital signs:  Temp:  [97.2 °F (36.2 °C)-98.4 °F (36.9 °C)] 98.4 °F (36.9 °C)  Pulse:  [59-62] 60  Resp:  [11-20] 19  BP: (129-177)/() 136/64  SpO2:  [93 %-99 %] 94 %    Physical Exam:    General: No acute distress  Respiratory: No wheezes, no rhonchi  Cardiovascular: S1, S2, regular rate and rhythm  Abdomen: Soft, Non-tender, non-distended, positive bowel sounds  Neuro: No new focal deficits.   Extremities: No edema. RLE weakness      Diagnostic Data:    Labs:  Recent Labs   Lab 24   WBC 10.4   HGB 14.6   MCV 95.9   .0       Recent Labs   Lab 24   GLU 88   BUN 22   CREATSERUM 1.23   CA 9.0   ALB 3.5      K 4.1      CO2 28.0   ALKPHO 88   AST 28   ALT 42   BILT 0.4   TP 7.3       Estimated Creatinine Clearance: 50.2 mL/min (based on SCr of 1.23 mg/dL).    No results for input(s): \"TROP\", \"TROPHS\", \"CK\" in the last 168 hours.    No results for input(s): \"PTP\", \"INR\" in the last 168 hours.               Microbiology    No results found for this visit on 24.      Imaging: Reviewed in Epic.    Medications:    lisinopril  5 mg Oral Daily    aspirin  81 mg Oral Daily    atorvastatin  40 mg Oral Nightly    donepezil  10 mg Oral Nightly    finasteride  5 mg Oral Daily    flecainide  100 mg Oral BID    fluticasone-umeclidin-vilant  1 puff Inhalation Daily    ipratropium  2 spray Nasal 2 times per day    metoprolol tartrate  25 mg Oral 2x Daily(Beta Blocker)    sertraline  25 mg Oral Daily    tamsulosin  0.4 mg Oral  After dinner    rivaroxaban  20 mg Oral Nightly       Assessment & Plan:      #Fall/weakness suspect related to NPH  -Had plans for drain trial Monday  -ED d/w NS - no brain imaging recommended at this time - to see in AM  -XR hip and lumbar spine without acute findings  -PT eval    #RLE weakness >LLE  #LBP  -d/w NS  -MRI lumbar spine     #Cerebrovascular disease  -ASA/statin      #PAF s/p DCCV  -DOAC/BB     #BPH  -Flomax/finasteride     #Anxiety  -SSRI    #HTN  -uncontrolled  -add lisinopril      Angel Cristobal MD    ADDENDUM:  PT worked w/ pt and pt w/ R sided weakness and lean. Obtain CTH, if neg, add MRI brain to MRI lumbar spine. Consult neuro.  NS notified    Angel Cristobal MD    Supplementary Documentation:     Quality:  DVT Mechanical Prophylaxis:     Early ambuation  DVT Pharmacologic Prophylaxis   Medication    rivaroxaban (Xarelto) tab 20 mg         DVT Pharmacologic prophylaxis: Aspirin 81 mg      Code Status: Not on file  Dotson: No urinary catheter in place  Dotson Duration (in days):   Central line:    JAQUELINE:     Discharge is dependent on: course  At this point Mr. Orellana is expected to be discharge to: home    The 21st Century Cures Act makes medical notes like these available to patients in the interest of transparency. Please be advised this is a medical document. Medical documents are intended to carry relevant information, facts as evident, and the clinical opinion of the practitioner. The medical note is intended as peer to peer communication and may appear blunt or direct. It is written in medical language and may contain abbreviations or verbiage that are unfamiliar.             **Certification      PHYSICIAN Certification of Need for Inpatient Hospitalization - Initial Certification    Patient will require inpatient services that will reasonably be expected to span two midnight's based on the clinical documentation in H+P.   Based on patients current state of illness, I anticipate that, after  discharge, patient will require TBD.

## 2024-05-02 NOTE — OCCUPATIONAL THERAPY NOTE
OCCUPATIONAL THERAPY EVALUATION - INPATIENT     Room Number: 7605/7605-A  Evaluation Date: 5/2/2024  Type of Evaluation: Initial  Presenting Problem: Weakness, fall    Physician Order: IP Consult to Occupational Therapy  Reason for Therapy: ADL/IADL Dysfunction and Discharge Planning    OCCUPATIONAL THERAPY ASSESSMENT   Patient is currently functioning below baseline with toileting, bathing, upper body dressing, lower body dressing, grooming, eating, bed mobility, and transfers. Prior to admission, patient's baseline is mod I with use of Rollator.  Patient is requiring moderate assist and maximum assist as a result of the following impairments: decreased functional strength, decreased endurance, impaired sitting and standing balance, impaired coordination, and decreased muscular endurance. Occupational Therapy will continue to follow for duration of hospitalization.    Patient will benefit from continued skilled OT Services to facilitate return to prior level of function as patient demonstrates high motivation with excellent tolerance to an intensive therapy program       History Related to Current Admission: Patient is a 79 year old male admitted on 5/1/2024 with Presenting Problem: Weakness, fall. Co-Morbidities : NPH, Afib, HTN, TIA, COPD, anxiety    WEIGHT BEARING RESTRICTION  Weight Bearing Restriction: None                Recommendations for nursing staff:   Transfers: Sera Stedy  Toileting location: commode    EVALUATION SESSION:  Patient Start of Session: supine  FUNCTIONAL TRANSFER ASSESSMENT  Sit to Stand: Edge of Bed; Chair  Edge of Bed: Moderate Assist  Chair: Not Tested    BED MOBILITY  Rolling: Minimal Assist  Supine to Sit : Moderate Assist  Sit to Supine (OT): Moderate Assist  Scooting: Mod A    BALANCE ASSESSMENT  Static Sitting: Minimal Assist  Sitting Unilateral: Moderate Assist  Static Standing: Moderate Assist  Standing Unilateral: Maximum Assist    FUNCTIONAL ADL ASSESSMENT       ACTIVITY  TOLERANCE: Tolerated > 10 min EOB sitting with no c/o fatigue, however noted increased right sided lean.  Patient stood x 1 min w/ c/o fatigue, R>L LE buckling and requesting to sit.  Upon sitting BP: 159/80 and after a few minutes, patient reporting feeling lightheaded, nausea and sweaty.  BP retaken: 214/87.  Patient returned to supine and symptoms improved.  BP once in supine for a few minutes:  156/69  HR: 60-63bpm throughout session        O2 SATURATIONS   94% on room air     COGNITION  Arousal/Alertness:  appropriate responses to stimuli  Attention Span:  appears intact  Orientation Level:  oriented to place, oriented to time, oriented to person, and disoriented to situation  Memory:  impaired working memory  Following Commands:  follows one step commands consistently  Initiation: appears intact  Motor Planning: intact  Perseveration: not present  Safety Judgement:  good awareness of safety precautions  Awareness of Errors:  good awareness of errors made  Awareness of Deficits:  decreased awareness of deficits  Problem Solving:  assistance required to implement solutions    Upper Extremity   ROM: within functional limits except for the following:  Right Shoulder flexion >1/2 active; 3/4 passive w/ pt reporting chronic stiffness B shoulders  Strength: within functional limits except for the following;  Right Shoulder flexion  3/5  Right Elbow flexion  3/5  Coordination  Gross motor: impaired  Fine motor: impaired R>L  Sensation: Light touch:  intact  Proprioception:  intact  Pt did have c/o numbness to B feet, R>L    VISION:  Pt wears glasses  Pt reports recent double vision that comes and goes.  Currently reports no issues.  Able to read clock on wall without difficulty.  Patient able to track, scan and identify objects in all visual quadrants.    EDUCATION PROVIDED  Patient: Role of Occupational Therapy; Discharge Recommendations; Plan of Care; Compensatory ADL Techniques  Patient's Response to Education:  Verbalized Understanding    Equipment used: RW  Demonstrates functional use, Would benefit from additional trial      Therapist comments: OT educated patient on safety,  sequencing, energy conservation, pain management, home modifications and adaptive equipment to increase independence with ADLs.    Pt w/ posterior and right sided lean while EOB and requiring external support to maintain.  Patient required min A of 2 and use of locked chair to pull up into standing from EOB.  Patient required blocking of right knee and mod A to maintain static standing.    Once pt returned to sitting, c/o feeling dizzy, nausea and sweaty.  BP elevated to 214.  Returned pt to supine and nurse notified immediately.  Patient's RN responded right away and was present to complete neuro assessment.  BP returned to 150's and pt reporting improved symtpoms.    Patient End of Session: Up in chair;Needs met;With  staff;Call light within reach;RN aware of session/findings;All patient questions and concerns addressed;Alarm set;Family present;Discussed recommendations with /    OCCUPATIONAL PROFILE    HOME SITUATION  Type of Home: Independent living facility ([facility])  Home Layout: One level  Lives With: Spouse    Toilet and Equipment: Comfort height toilet;Grab bar  Shower/Tub and Equipment: Walk-in shower;Grab bar;Shower chair;Hand-held showerhead  Other Equipment: Other (Comment) (Rollator, walker)    Occupation/Status: retired  Hand Dominance: Right  Drives: No  Patient Regularly Uses: Glasses    Prior Level of Function: Mod I with BADLs and functional mobility with use of Rollator most recently d/t c/o increased BLE weakness and most recently, right LE>LLE weakness.  Patient states he typically does not need any help but his wife has been pushing him in the Rollator to the dining for the past few days d/t legs feeling weak.  Multiple falls reported.    SUBJECTIVE   \"I see double sometimes.\"    PAIN  ASSESSMENT  Rating: Unable to rate  Location: neck  Management Techniques: Other (Comment) (oral pain meds)    OBJECTIVE  Precautions: Bed/chair alarm  Fall Risk: High fall risk      ASSESSMENTS    AM-PAC ‘6-Clicks’ Inpatient Daily Activity Short Form  -   Putting on and taking off regular lower body clothing?: A Lot  -   Bathing (including washing, rinsing, drying)?: A Lot  -   Toileting, which includes using toilet, bedpan or urinal? : A Lot  -   Putting on and taking off regular upper body clothing?: A Lot  -   Taking care of personal grooming such as brushing teeth?: A Little  -   Eating meals?: A Little    AM-PAC Score:  Score: 14  Approx Degree of Impairment: 59.67%  Standardized Score (AM-PAC Scale): 33.39    ADDITIONAL TESTS     NEUROLOGICAL FINDINGS      COGNITION ASSESSMENTS       PLAN  OT Treatment Plan: Balance activities;Energy conservation/work simplification techniques;ADL training;Functional transfer training;UE strengthening/ROM;Endurance training;Patient/Family education;Patient/Family training;Equipment eval/education;Fine motor coordination activities;Neuromuscluar reeducation;Compensatory technique education  Rehab Potential : Good  Frequency: 3-5x/week  Number of Visits to Meet Established Goals: 7    ADL GOALS:  Patient will perform lower body dressing w/ supervision and with adaptive equipment PRN  Patient will perform toileting with supervision and with adaptive equipment PRN.    Functional Transfer Goals:  Patient will transfer from sit to supine:  with supervision  Patient will transfer from supine to sit:  with supervision  Patient will transfer to toilet:  with supervision    Therapist Goals  Visual-perception screen  Cognitive screen    Patient Evaluation Complexity Level:   Occupational Profile/Medical History MODERATE - Expanded review of history including review of medical or therapy record   Specific performance deficits impacting engagement in ADL/IADL MODERATE  3 - 5 performance  deficits   Client Assessment/Performance Deficits MODERATE - Comorbidities and min to mod modifications of tasks    Clinical Decision Making MODERATE - Analysis of occupational profile, detailed assessments, several treatment options    Overall Complexity MODERATE     OT Session Time: 40 minutes  Self-Care Home Management: 15 minutes

## 2024-05-03 PROBLEM — M54.10 RADICULOPATHY: Status: ACTIVE | Noted: 2024-05-03

## 2024-05-03 PROCEDURE — 99233 SBSQ HOSP IP/OBS HIGH 50: CPT | Performed by: HOSPITALIST

## 2024-05-03 PROCEDURE — 99231 SBSQ HOSP IP/OBS SF/LOW 25: CPT | Performed by: NEUROLOGICAL SURGERY

## 2024-05-03 PROCEDURE — 99233 SBSQ HOSP IP/OBS HIGH 50: CPT | Performed by: OTHER

## 2024-05-03 RX ORDER — ENOXAPARIN SODIUM 100 MG/ML
1 INJECTION SUBCUTANEOUS EVERY 12 HOURS SCHEDULED
Status: COMPLETED | OUTPATIENT
Start: 2024-05-03 | End: 2024-05-04

## 2024-05-03 NOTE — PROGRESS NOTES
If pt is to have LP drain placement on Monday. ASA will need to be held for 5 days and xarelto for 3 days prior. Pt to be NPO at midnight Sunday.     RN advised.     Rosalina Carmichael APRODALYS  [facility]  5/3/2024, 3:08 PM  [place] [id number]

## 2024-05-03 NOTE — PROGRESS NOTES
Patient A/Ox4, complaints of pain to right hip medicated PRN. Vitals stable. Pacemaker. Hold Aspirin and Xarelto order for drain placement Monday. Will need to be NPO on Sunday night for procedure Monday.

## 2024-05-03 NOTE — CONSULTS
.[facility]    Clay Orellana Patient Status:  Inpatient    1945 MRN [id number]   Location [facility] 7NE-A Attending Angel Cristobal MD   Hosp Day # 2 PCP Sachin Meza MD     Patient Identification  Clay Orellana is a 79 year old male.  :  1945  Admit Date:  2024  Attending Provider:  Angel Cristobal MD                                  Primary Care Physician:  Sachin Meza MD   Admitting Diagnosis: Weakness generalized [R53.1]  NPH (normal pressure hydrocephalus) (HCC) [G91.2]  Fall, initial encounter [W19.XXXA]  Chronic right-sided low back pain without sciatica [M54.50, G89.29]    Subjective:      Reason for Consultation: Impaired ADL and mobility dysfuction due to Fall/NPH  History of present illness:  Records reviewed, and patient examined.Consult Requested by:     Patient is a 78 year old male with a PMH of hypertension, hyperlipidemia, dementia, BPH and recently diagnosed NPH, coronary artery disease with history of paroxysmal atrial fibrillation currently on DOAC  presented to ED on 24 after suffering a fall. Patient reports he was attempting to get out of bed and go to bathroom when right leg gave out resulting in fall. Patient developed pain to right hip after fall and has been unable to ambulate due to pain and weakness.       XR R hip 23     CONCLUSION:  There is no acute fracture detected in the right hip.       XR Lumbar spine CONCLUSION:    1. There is no acute abnormality in the lumbar spine.     CT Brain 24 FINDINGS:    VENTRICLES/SULCI: Diffuse sulcal and ventricular prominence concordant with age.  Stable ventriculomegaly.   INTRACRANIAL:  Negative for intracranial hemorrhage, mass effect, or acute large vessel transcortical infarct.  Remote lacunar infarct left basal ganglia.  Patchy periventricular white matter hypodensity most in keeping with chronic microvascular   ischemia.  Intracranial atherosclerosis    Seen by Neurology and  Neurosurgery.  Plan is for lumbar drain trial on Monday     Physiatry consult obtained now to assess pt's funtional status and make appropriate recommendations.      HOME SITUATION  Type of Home: Independent living facility ([facility])   Home Layout: One level     Lives With: Spouse  Drives: No  Patient Owned Equipment: Cane;Rolling walker;Rollator  Patient Regularly Uses: Glasses     Prior Level of [place]: Pt typically ambulates with rollator walker at [place], ambulates to dining sullivan but lately wife has been pushing him in the rollator. Was supposed to have drain trial this upcoming Monday for NPH.    Current Functional Status:   Bed Mobility:  Rolling: NT           Supine<>Sit: Mod A          Sit<>Supine: NT                   Transfer Mobility:  Sit<>Stand: Mod A x 2      Stand<>Sit: Mod A x 2      Gait: unable to take functional steps    Past Medical History:  @Medical hx@  Past Medical History:    Acute nausea with nonbilious vomiting    Anxiety    Arrhythmia    Arthritis    Bilateral inguinal hernia without obstruction or gangrene    BPH (benign prostatic hyperplasia)    Calculus of kidney    Essential hypertension    High cholesterol       Past Surgical History:   Procedure Laterality Date    Anesth,pacemaker insertion  08/2023    Appendectomy      Appendectomy      Colonoscopy      Colonoscopy N/A 05/03/2022    Procedure: COLONOSCOPY;  Surgeon: Sudarshan Gilbert MD;  Location:  ENDOSCOPY    Colonoscopy      Cyst removal N/A 1990    Cyst removed behind back of neck    Cyst removal Right 11/05/2020    Removed from right upper back - just below shoulder    Hernia surgery  July 2022    Skin surgery  2020    Vasectomy  1980        [COMPLETED] metoprolol tartrate (Lopressor) tab 25 mg  25 mg Oral Once    lisinopril (Prinivil; Zestril) tab 5 mg  5 mg Oral Daily    HYDROcodone-acetaminophen (Norco) 5-325 MG per tab 1 tablet  1 tablet Oral Q4H PRN    Or    HYDROcodone-acetaminophen (Norco) 5-325 MG per  tab 2 tablet  2 tablet Oral Q4H PRN    LORazepam (Ativan) tab 1 mg  1 mg Oral Q6H PRN    [COMPLETED] HYDROcodone-acetaminophen (Norco) 5-325 MG per tab 1 tablet  1 tablet Oral Once    aspirin DR tab 81 mg  81 mg Oral Daily    atorvastatin (Lipitor) tab 40 mg  40 mg Oral Nightly    donepezil (Aricept) tab 10 mg  10 mg Oral Nightly    melatonin tab 3 mg  3 mg Oral Nightly PRN    acetaminophen (Tylenol Extra Strength) tab 500 mg  500 mg Oral Q4H PRN    polyethylene glycol (PEG 3350) (Miralax) 17 g oral packet 17 g  17 g Oral Daily PRN    sennosides (Senokot) tab 17.2 mg  17.2 mg Oral Nightly PRN    bisacodyl (Dulcolax) 10 MG rectal suppository 10 mg  10 mg Rectal Daily PRN    fleet enema (Fleet) 7-19 GM/118ML rectal enema 133 mL  1 enema Rectal Once PRN    ondansetron (Zofran) 4 MG/2ML injection 4 mg  4 mg Intravenous Q6H PRN    metoclopramide (Reglan) 5 mg/mL injection 10 mg  10 mg Intravenous Q8H PRN    finasteride (Proscar) tab 5 mg  5 mg Oral Daily    flecainide (Tambocor) tab 100 mg  100 mg Oral BID    fluticasone-umeclidin-vilant (Trelegy Ellipta) 100-62.5-25 MCG/ACT inhaler 1 puff  1 puff Inhalation Daily    ipratropium (Atrovent) 0.03 % nasal solution 2 spray  2 spray Nasal 2 times per day    metoprolol tartrate (Lopressor) tab 25 mg  25 mg Oral 2x Daily(Beta Blocker)    sertraline (Zoloft) tab 25 mg  25 mg Oral Daily    tamsulosin (Flomax) cap 0.4 mg  0.4 mg Oral After dinner    rivaroxaban (Xarelto) tab 20 mg  20 mg Oral Nightly       Social History     Tobacco Use    Smoking status: Former     Types: Cigarettes     Passive exposure: Never    Smokeless tobacco: Never    Tobacco comments:     Has not smoked for about 20 years   Substance Use Topics    Alcohol use: Never     Alcohol/week: 4.0 standard drinks of alcohol     Types: 4 Cans of beer per week       Family History   Problem Relation Age of Onset    Heart Attack Father     Diabetes Mother     Other (DM) Sister     Other (ALS) Brother     No Known  Problems Maternal Grandmother     No Known Problems Maternal Grandfather     No Known Problems Paternal Grandmother     No Known Problems Paternal Grandfather     Other (smoker) Brother     Other (MS) Sister     No Known Problems Son     No Known Problems Daughter        Allergies:  Allergies   Allergen Reactions    Kiwi Extract ITCHING     Inner ears itch           Lab Results   Component Value Date    PGLU 131 05/02/2024       Review of Systems:  Complete review of systems completed/14 point.  Negative except as that outlined in HPI    OBJECTIVE:    Blood pressure 117/59, pulse 60, temperature 98.3 °F (36.8 °C), temperature source Oral, resp. rate 21, weight 198 lb 3.1 oz (89.9 kg), SpO2 90%.    Intake/Output Summary (Last 24 hours) at 5/3/2024 1342  Last data filed at 5/3/2024 1200  Gross per 24 hour   Intake 360 ml   Output 100 ml   Net 260 ml       Physical Exam:                                      General: Alert, cooperative, no distress, appears stated age.  Head:  Normocephalic, without obvious abnormality, atraumatic.   Eyes:  Conjunctivae/lids clear. PERRL, EOMs intact. Vision functional.   Ears/Nose/Throat: Hearing intact. Lips, mucosa, and tongue normal. Teeth and gums normal. Moist mucous membranes.     Neck: No neck masses or thyroid enlargement/tenderness/nodules.       Lungs:   Resonant, clear breath sounds, quiet accessory muscles.   Chest wall:  No tenderness or deformity.   Cardiovascular:  Heart with regular rate rhythm, no murmurs appreciated. Radial and pedal pulses good. No cyanosis in all extremities.   Abdomen:   No tenderness guarding or rigidity. Liver and spleen are not enlarged.  Bowel sounds present.                   Musculoskeletal:     Right Upper Extremity:  Strength is 4.  ROM WNL.   Left Upper Extremity:  Strength is 4.  ROM WNL.   Right Lower Extremity:  Strength  is 4 except quads 3-4.   ROM WNL.   Left Lower Extremity: Strength  is 4 except quads 3-4.   ROM WNL.           Neuro: CNII-XII are grossly intact. Sensation to dull touch intact in all extremities.                    Psychiatric: Awake, alert and oriented            Assessment:                                Rehab diagnosis: ADL and  mobility dysfunction due to Fall/NPH    Disposition:     Will follow up post lumbar drain trial on Monday.    Likely will need inpatient rehab. Acute vs [place] to be determined post the lumbar drain trial.    Daily PT/OT      Thank you for the consult.     Jaquelin Cerna MD  [facility].

## 2024-05-03 NOTE — PHYSICAL THERAPY NOTE
PHYSICAL THERAPY TREATMENT NOTE - INPATIENT    Room Number: 7605/7605-A     Session: 1     Number of Visits to Meet Established Goals: 7    Presenting Problem: Fall  Co-Morbidities : NPH, Afib, HTN, TIA, COPD, anxiety    ASSESSMENT   Patient demonstrates limited progress this session, goals  remain in progress.    Patient continues to function below baseline with bed mobility, transfers, and gait.  Contributing factors to remaining limitations include decreased functional strength, decreased endurance/aerobic capacity, pain, impaired sitting/standing balance, impaired coordination, impaired motor planning, and cognitive deficits (delayed responses).  Next session anticipate patient to progress bed mobility and transfers.  Physical Therapy will continue to follow patient for duration of hospitalization.    Patient continues to benefit from continued skilled PT services: to facilitate return to prior level of function as patient demonstrates high motivation with excellent tolerance to an intensive therapy program .    PLAN  PT Treatment Plan: Bed mobility;Body mechanics;Endurance;Energy conservation;Patient education;Family education;Gait training;Balance training;Transfer training;Strengthening;Neuromuscular re-educate  Rehab Potential : Fair  Frequency (Obs): 5x/week    CURRENT GOALS     Goal #1 Patient is able to demonstrate supine - sit EOB @ level: supervision      Goal #2 Patient is able to demonstrate transfers EOB to/from Chair/Wheelchair at assistance level: minimum assistance      Goal #3 Patient is able to ambulate 15 feet with assist device: walker - rolling at assistance level: moderate assistance      Goal #4     Goal #5     Goal #6     Goal Comments: Goals established on 5/2/2024     5/3/2024 all goals ongoing     SUBJECTIVE  \"I am leaning\"    OBJECTIVE  Precautions: Bed/chair alarm    WEIGHT BEARING RESTRICTION  Weight Bearing Restriction: None                PAIN ASSESSMENT   Rating: Unable to  rate  Location: bilateral shoulders, back and neck       BALANCE                                                                                                                       Static Sitting: Poor +  Dynamic Sitting: Poor           Static Standing: Poor  Dynamic Standing: Poor -    ACTIVITY TOLERANCE                         O2 WALK         AM-PAC '6-Clicks' INPATIENT SHORT FORM - BASIC MOBILITY  How much difficulty does the patient currently have...  Patient Difficulty: Turning over in bed (including adjusting bedclothes, sheets and blankets)?: A Lot   Patient Difficulty: Sitting down on and standing up from a chair with arms (e.g., wheelchair, bedside commode, etc.): A Lot   Patient Difficulty: Moving from lying on back to sitting on the side of the bed?: A Lot   How much help from another person does the patient currently need...   Help from Another: Moving to and from a bed to a chair (including a wheelchair)?: A Lot   Help from Another: Need to walk in hospital room?: A Lot   Help from Another: Climbing 3-5 steps with a railing?: Total       AM-PAC Score:  Raw Score: 11   Approx Degree of Impairment: 72.57%   Standardized Score (AM-PAC Scale): 33.86   CMS Modifier (G-Code): CL    FUNCTIONAL ABILITY STATUS  Gait Assessment   Functional Mobility/Gait Assessment  Gait Assistance: Not tested  Distance (ft): 0 (performed lateral transfer to Mod A to block RLE)  Assistive Device: None  Pattern: R Flexed knee (R posterior lean, narrow SCOTTIE, L side inattention)    Skilled Therapy Provided    Bed Mobility:  Rolling: NT   Supine<>Sit: Mod A   Sit<>Supine: NT     Transfer Mobility:  Sit<>Stand: Mod A x 2   Stand<>Sit: Mod A x 2   Gait: unable to take functional steps    Therapist's Comments: continued R posterior lean, double vision, RUE weakness    BP better managed during session - see vitals flowsheet.      Pt able to stand x 3 attempts - c/o \"fear of R knee buckling\" - constant cues to fix R posterior lean - Pt  with >10x major loss of balance, which required physical assist to prevent fall    Pt's spouse and daughter witnessed session.  Encouraged Pt to attend to L side.     THERAPEUTIC EXERCISES  Lower Extremity Alternating marching  Knee extension     Upper Extremity  - open/close     Position Sitting     Repetitions   10   Sets   1     Patient End of Session: Up in chair;Needs met;Call light within reach;RN aware of session/findings;All patient questions and concerns addressed;Alarm set;Family present    PT Session Time: 30 minutes  Gait Trainin minutes  Therapeutic Activity: 8 minutes  Therapeutic Exercise: 0 minutes   Neuromuscular Re-education: 15 minutes

## 2024-05-03 NOTE — PROGRESS NOTES
[facility]  Neurosurgery Progress Note  5/3/2024    Clay Orellana Patient Status:  Inpatient    1945 MRN [id number]   Location [facility] 7NE-A Attending Angel Crsitobal MD   Hosp Day # 2 PCP Sachin Meza MD     SUBJECTIVE:  Clay Orellana is a(n) 79 year old male status post fall with some leg weakness  He feels a little better today  He needs to have a bowel movement        OBJECTIVE / PHYSICAL EXAM:  Vital Signs:  Blood pressure 149/79, pulse 60, temperature 98 °F (36.7 °C), temperature source Oral, resp. rate 18, weight 198 lb 3.1 oz (89.9 kg), SpO2 92%.  Awake alert, x 3  Follows commands x 4  Right quad improved today      Lab Results (last 24 hours):  Recent Results (from the past 24 hour(s))   POCT Glucose    Collection Time: 24  1:56 PM   Result Value Ref Range    POC Glucose 131 (H) 70 - 99 mg/dL       Assessment/Plan:  79-year-old gentleman here after a fall  He is scheduled for lumbar drain trial on Monday  His weakness improved since yesterday  Per medicine if he would be able to participate in lumbar drain trial  Following      Brian Pringle MD   [facility]  5/3/2024  8:58 AM  Dictated but not proofread

## 2024-05-03 NOTE — PROGRESS NOTES
[facility]  GUNNER Neurology Progress Note    Clay Orellana Patient Status:  Inpatient    1945 MRN [id number]   Location [facility] 7NE-A Attending Angel Cristobal MD   Hosp Day # 2 PCP Sachin Meza MD     CC: Right lower extremity weakness    Subjective:  Clay Orellana is an 79 year old male with past medical history significant for hypertension, hyperlipidemia, dementia, BPH and recently diagnosed NPH, coronary artery disease with history of paroxysmal atrial fibrillation currently on DOAC. Neurology was consulted for an episode of nausea while patient's stood up. Episode was not associated with vomiting dizziness, lightheadedness, photophobia, phonophobia, vertigo, weakness, numbness, paresthesias, confusion or loss of consciousness.  Patient has unsteady gait because of his NPH and was due to be evaluated for possible definitive treatment. He reports falling recently a month or so ago as his right leg buckled under him.  He has been followed by Dr. Pringle for neurologic gait dysfunction. Had complaints of diffuse weakness which did not correlate with NPH however he did report a shuffling gait, decline in memory, and urinary incontinence. Planned to have a lumbar drain placement on  morning.   Patient currently back to his baseline, alert and oriented x 4. Still complains of right leg weakness an pain. Awaits MRI of L spine.   Denies headache or changes in vision at this time. No other focal weakness or numbness or tingling. No muscle twitching or seizure like activity. No nausea or dizziness. Family at bedside.         MEDICATIONS:  No current outpatient medications on file.     Current Facility-Administered Medications   Medication Dose Route Frequency    lisinopril (Prinivil; Zestril) tab 5 mg  5 mg Oral Daily    HYDROcodone-acetaminophen (Norco) 5-325 MG per tab 1 tablet  1 tablet Oral Q4H PRN    Or    HYDROcodone-acetaminophen (Norco) 5-325 MG per tab 2 tablet  2 tablet Oral Q4H  PRN    LORazepam (Ativan) tab 1 mg  1 mg Oral Q6H PRN    aspirin DR tab 81 mg  81 mg Oral Daily    atorvastatin (Lipitor) tab 40 mg  40 mg Oral Nightly    donepezil (Aricept) tab 10 mg  10 mg Oral Nightly    melatonin tab 3 mg  3 mg Oral Nightly PRN    acetaminophen (Tylenol Extra Strength) tab 500 mg  500 mg Oral Q4H PRN    polyethylene glycol (PEG 3350) (Miralax) 17 g oral packet 17 g  17 g Oral Daily PRN    sennosides (Senokot) tab 17.2 mg  17.2 mg Oral Nightly PRN    bisacodyl (Dulcolax) 10 MG rectal suppository 10 mg  10 mg Rectal Daily PRN    fleet enema (Fleet) 7-19 GM/118ML rectal enema 133 mL  1 enema Rectal Once PRN    ondansetron (Zofran) 4 MG/2ML injection 4 mg  4 mg Intravenous Q6H PRN    metoclopramide (Reglan) 5 mg/mL injection 10 mg  10 mg Intravenous Q8H PRN    finasteride (Proscar) tab 5 mg  5 mg Oral Daily    flecainide (Tambocor) tab 100 mg  100 mg Oral BID    fluticasone-umeclidin-vilant (Trelegy Ellipta) 100-62.5-25 MCG/ACT inhaler 1 puff  1 puff Inhalation Daily    ipratropium (Atrovent) 0.03 % nasal solution 2 spray  2 spray Nasal 2 times per day    metoprolol tartrate (Lopressor) tab 25 mg  25 mg Oral 2x Daily(Beta Blocker)    sertraline (Zoloft) tab 25 mg  25 mg Oral Daily    tamsulosin (Flomax) cap 0.4 mg  0.4 mg Oral After dinner    rivaroxaban (Xarelto) tab 20 mg  20 mg Oral Nightly       REVIEW OF SYSTEMS:  A 10-point system was reviewed.  Pertinent positives and negatives are noted in HPI.      PHYSICAL EXAMINATION:  VITAL SIGNS: /79 (BP Location: Left arm)   Pulse 60   Temp 98 °F (36.7 °C) (Oral)   Resp 18   Wt 198 lb 3.1 oz (89.9 kg)   SpO2 92%   BMI 27.64 kg/m²   GENERAL:  Patient is a 79 year old male in no acute distress.  HEENT:  Normocephalic, atraumatic  ABD: Soft, non tender  SKIN: Warm, dry, no rashes    NEUROLOGICAL:   Mental status: Oriented to person, place, situation and time   Speech: Fluent, no obvious dysarthria  Memory and comprehension: Appears intact at  the moment  Cranial Nerves: VFF, PERRL 3mm brisk, EOMI, no nystagmus, facial sensation intact, face symmetric, tongue midline, shoulder shrug equal, remainder CN intact  Motor: No drift, no focal arm or leg weakness.  Motor exam is 5 out of 5 in all extremities bilaterally, right leg with strong flexion and dorsiflexion, having hard time to lift off chair and hold it up due to increased back pain. Lumbar radiculopathy likely.   Sensory: Intact to light touch  Coordination: FTN intact  Gait: Deferred      Imaging/Diagnostics:  CT BRAIN OR HEAD (19960)    Result Date: 5/2/2024  CONCLUSION:   No acute intracranial process identified.    LOCATION:  [id number]   Dictated by (CST): Nneka Pope MD on 5/02/2024 at 5:24 PM     Finalized by (CST): Nneka Pope MD on 5/02/2024 at 5:27 PM       XR LUMBAR SPINE (MIN 4 VIEWS) (CPT=72110)    Result Date: 5/1/2024  CONCLUSION:  1. There is no acute abnormality in the lumbar spine. 2. There are degenerative changes noted above.   LOCATION:  [facility]   Dictated by (CST): Arias Mckeon MD on 5/01/2024 at 6:02 PM     Finalized by (CST): Arias Mckeon MD on 5/01/2024 at 6:03 PM       XR HIP W OR WO PELVIS 2 OR 3 VIEWS, RIGHT (CPT=73502)    Result Date: 5/1/2024  CONCLUSION:  There is no acute fracture detected in the right hip.   LOCATION:  [facility]   Dictated by (CST): Arias Mckeon MD on 5/01/2024 at 5:58 PM     Finalized by (CST): Arias Mckeon MD on 5/01/2024 at 6:01 PM          Labs:  Recent Labs   Lab 05/01/24 2012   RBC 4.44   HGB 14.6   HCT 42.6   MCV 95.9   MCH 32.9   MCHC 34.3   RDW 13.2   NEPRELIM 8.15*   WBC 10.4   .0         Recent Labs   Lab 05/01/24 2013   GLU 88   BUN 22   CREATSERUM 1.23   EGFRCR 59*   CA 9.0      K 4.1      CO2 28.0     Assessment & Plan:    An 79 year old male with:     Sudden episode of nausea on standing.  Symptoms of unclear etiology.   - CT scan of the head - no acute findings  - Symptoms completely resolved and seems unlikely to be  due to TIA/CVA.    - Patient already on aspirin, Xarelto and statin for stroke prophylaxis due to his history of paroxysmal atrial fibrillation.  Normal pressure hydrocephalus -  patient does have symptoms of unsteady gait, urinary incontinence as well as  mild dementia.    - Neurosurgery on case, following.  - He is being considered for further evaluation with lumbar drain by neurosurgery on Monday 5/6.  Concern for right leg weakness. Patient has mild right quad weakness. This could be related to lumbar radiculopathy.  - MRI lumbar spine ordered per Medicine - pending  - Advised OT/PT.  Discussed with patient and family. All questions answered.   Discussed with dr. Navarro, to follow with further recommendations if indicated.     Is this a shared or split note between Advanced Practice Provider and Physician? Yes       Meliza Rg Banner  5/3/2024, 9:47 AM   [place] # [id number]    Impression/plan/MDM:  Patient seen and examined personally.  Investigations reviewed.    Sudden onset nausea on standing.  Symptoms resolved.  Unlikely to be due to TIA/CVA.  Patient already on Xarelto and statin for stroke prophylaxis.  Patient has history of paroxysmal atrial fibrillation.  CT scan reviewed.  Normal pressure hydrocephalus.  Further evaluation as per neurosurgery.  Leg pain and weakness.  Most likely due to lumbar radiculopathy.  MRI of the lumbar spine was requested.  Continue OT/PT for pain relief.  Family counseled.  All questions answered.    Time spent 35 minutes.  Greater than 50% time spent in coordinating care and counseling.

## 2024-05-03 NOTE — PROGRESS NOTES
[facility]   part of [facility]     Hospitalist Progress Note     Clay Orellana Patient Status:  Inpatient    1945 MRN [id number]   [facility] 7NE-A Attending Angel Cristobal MD   Hosp Day # 2 PCP Sachin Meza MD     Chief Complaint: fall    Subjective:     Patient c/o R>L LE weakness, LBP     Objective:    Review of Systems:   A comprehensive review of systems was completed; pertinent positive and negatives stated in subjective.    Vital signs:  Temp:  [97.5 °F (36.4 °C)-98.3 °F (36.8 °C)] 98.3 °F (36.8 °C)  Pulse:  [60-64] 60  Resp:  [11-21] 17  BP: (117-178)/(59-79) 117/59  SpO2:  [86 %-96 %] 92 %    Physical Exam:    General: No acute distress  Respiratory: No wheezes, no rhonchi  Cardiovascular: S1, S2, regular rate and rhythm  Abdomen: Soft, Non-tender, non-distended, positive bowel sounds  Neuro: No new focal deficits.   Extremities: No edema. RLE weakness      Diagnostic Data:    Labs:  Recent Labs   Lab 24   WBC 10.4   HGB 14.6   MCV 95.9   .0       Recent Labs   Lab 24   GLU 88   BUN 22   CREATSERUM 1.23   CA 9.0   ALB 3.5      K 4.1      CO2 28.0   ALKPHO 88   AST 28   ALT 42   BILT 0.4   TP 7.3       Estimated Creatinine Clearance: 50.2 mL/min (based on SCr of 1.23 mg/dL).    No results for input(s): \"TROP\", \"TROPHS\", \"CK\" in the last 168 hours.    No results for input(s): \"PTP\", \"INR\" in the last 168 hours.               Microbiology    No results found for this visit on 24.      Imaging: Reviewed in Epic.    Medications:    lisinopril  5 mg Oral Daily    [Held by provider] aspirin  81 mg Oral Daily    atorvastatin  40 mg Oral Nightly    donepezil  10 mg Oral Nightly    finasteride  5 mg Oral Daily    flecainide  100 mg Oral BID    fluticasone-umeclidin-vilant  1 puff Inhalation Daily    ipratropium  2 spray Nasal 2 times per day    metoprolol tartrate  25 mg Oral 2x Daily(Beta Blocker)    sertraline  25 mg Oral Daily     tamsulosin  0.4 mg Oral After dinner    [Held by provider] rivaroxaban  20 mg Oral Nightly       Assessment & Plan:      #Fall/weakness suspect related to NPH  -Had plans for drain trial Monday  -ED d/w NS - no brain imaging recommended at this time   -XR hip and lumbar spine without acute findings  -PT eval    #RLE weakness >LLE  #LBP  -d/w NS  -MRI lumbar spine, planned for Monday originally. Per IR, need to hold asa 5 d, eliquis 3 days. Will hold now. FD lovenox in the interim   -PT concerned w/ R sided lean. D/w neuro, CVA unlikely. CTH neg     #Cerebrovascular disease  -ASA/statin      #PAF s/p DCCV  -DOAC/BB     #BPH  -Flomax/finasteride     #Anxiety  -SSRI    #HTN  -uncontrolled  -add lisinopril    Dispo: [place] recommended by PT. Plan drain next week. May need to stay here until procedure vs going to [place] then returning.       Angel Cristobal MD      Supplementary Documentation:     Quality:  DVT Mechanical Prophylaxis:     Early ambuation  DVT Pharmacologic Prophylaxis   Medication    [Held by provider] rivaroxaban (Xarelto) tab 20 mg         DVT Pharmacologic prophylaxis: Aspirin 81 mg      Code Status: Not on file  Dotson: External urinary catheter in place  Dotson Duration (in days):   Central line:    JAQUELINE:     Discharge is dependent on: course  At this point Mr. Orellana is expected to be discharge to: home    The 21st Century Cures Act makes medical notes like these available to patients in the interest of transparency. Please be advised this is a medical document. Medical documents are intended to carry relevant information, facts as evident, and the clinical opinion of the practitioner. The medical note is intended as peer to peer communication and may appear blunt or direct. It is written in medical language and may contain abbreviations or verbiage that are unfamiliar.             **Certification      PHYSICIAN Certification of Need for Inpatient Hospitalization - Initial Certification    Patient will require  inpatient services that will reasonably be expected to span two midnight's based on the clinical documentation in H+P.   Based on patients current state of illness, I anticipate that, after discharge, patient will require TBD.

## 2024-05-03 NOTE — PLAN OF CARE
Assumed care @ 1930  AOx4, RA  A paced on tele  VSS  BP elevated, MD notified, 1x dose metoprolol PO given  Denies pain overnight  Voiding via ext cath  Safety precaution in place, call light within reach

## 2024-05-04 PROCEDURE — 99231 SBSQ HOSP IP/OBS SF/LOW 25: CPT | Performed by: NEUROLOGICAL SURGERY

## 2024-05-04 PROCEDURE — 99232 SBSQ HOSP IP/OBS MODERATE 35: CPT | Performed by: HOSPITALIST

## 2024-05-04 NOTE — PROGRESS NOTES
[facility]   part of [facility]    Neurosurgery Progress Note  2024    Clay Orellana Patient Status:  Inpatient    1945 MRN [id number]   Location [facility] 7NE-A Attending Angel Cristobal MD   Hosp Day # 3 PCP Sachin Meza MD     SUBJECTIVE:  Clay Orellana is a(n) 79 year old male admitted with back pain-improved.  Plan for LD trial on Monday for NPH.      OBJECTIVE / PHYSICAL EXAM:  Vital Signs:  Blood pressure 108/69, pulse 60, temperature 98.2 °F (36.8 °C), temperature source Oral, resp. rate 14, weight 198 lb 3.1 oz (89.9 kg), SpO2 97%.    Neuro: stable      Lab Results (last 24 hours):  No results found for this or any previous visit (from the past 24 hour(s)).    Review of Imaging  none    Assessment/Plan:  NPH  Plan for LD trial on Monday  NPO  night    Fransisco Meza DO    2024  11:01 AM

## 2024-05-04 NOTE — PLAN OF CARE
Assumed care at 1900.   Alert and oriented x4.   A paced; RA; PRN meds for pain.   External cath in place; no bowel movement noted on shift.   MRI lumbar/spine pending.   Fall precautions in place and call light within reach.     Problem: PAIN - ADULT  Goal: Verbalizes/displays adequate comfort level or patient's stated pain goal  Description: INTERVENTIONS:  - Encourage pt to monitor pain and request assistance  - Assess pain using appropriate pain scale  - Administer analgesics based on type and severity of pain and evaluate response  - Implement non-pharmacological measures as appropriate and evaluate response  - Consider cultural and social influences on pain and pain management  - Manage/alleviate anxiety  - Utilize distraction and/or relaxation techniques  - Monitor for opioid side effects  - Notify MD/LIP if interventions unsuccessful or patient reports new pain  - Anticipate increased pain with activity and pre-medicate as appropriate  Outcome: Progressing     Problem: SAFETY ADULT - FALL  Goal: Free from fall injury  Description: INTERVENTIONS:  - Assess pt frequently for physical needs  - Identify cognitive and physical deficits and behaviors that affect risk of falls.  - [place] fall precautions as indicated by assessment.  - Educate pt/family on patient safety including physical limitations  - Instruct pt to call for assistance with activity based on assessment  - Modify environment to reduce risk of injury  - Provide assistive devices as appropriate  - Consider OT/PT consult to assist with strengthening/mobility  - Encourage toileting schedule  Outcome: Progressing

## 2024-05-04 NOTE — PROGRESS NOTES
[facility]   part of [facility]     Hospitalist Progress Note     Clay Orellana Patient Status:  Inpatient    1945 MRN [id number]   Location [facility] 7NE-A Attending Angel Cristobal MD   Hosp Day # 3 PCP Sachin Meza MD     Chief Complaint: fall    Subjective:     Patient c/o R>L LE weakness, LBP     Objective:    Review of Systems:   A comprehensive review of systems was completed; pertinent positive and negatives stated in subjective.    Vital signs:  Temp:  [97.6 °F (36.4 °C)-98.2 °F (36.8 °C)] 98.2 °F (36.8 °C)  Pulse:  [60] 60  Resp:  [11-17] 14  BP: (108-136)/(58-69) 108/69  SpO2:  [90 %-97 %] 97 %    Physical Exam:    General: No acute distress  Respiratory: No wheezes, no rhonchi  Cardiovascular: S1, S2, regular rate and rhythm  Abdomen: Soft, Non-tender, non-distended, positive bowel sounds  Neuro: No new focal deficits.   Extremities: No edema. RLE weakness      Diagnostic Data:    Labs:  Recent Labs   Lab 24   WBC 10.4   HGB 14.6   MCV 95.9   .0       Recent Labs   Lab 24   GLU 88   BUN 22   CREATSERUM 1.23   CA 9.0   ALB 3.5      K 4.1      CO2 28.0   ALKPHO 88   AST 28   ALT 42   BILT 0.4   TP 7.3       Estimated Creatinine Clearance: 50.2 mL/min (based on SCr of 1.23 mg/dL).    No results for input(s): \"TROP\", \"TROPHS\", \"CK\" in the last 168 hours.    No results for input(s): \"PTP\", \"INR\" in the last 168 hours.               Microbiology    No results found for this visit on 24.      Imaging: Reviewed in Epic.    Medications:    enoxaparin  1 mg/kg Subcutaneous 2 times per day    lisinopril  5 mg Oral Daily    [Held by provider] aspirin  81 mg Oral Daily    atorvastatin  40 mg Oral Nightly    donepezil  10 mg Oral Nightly    finasteride  5 mg Oral Daily    flecainide  100 mg Oral BID    fluticasone-umeclidin-vilant  1 puff Inhalation Daily    ipratropium  2 spray Nasal 2 times per day    metoprolol tartrate  25 mg Oral 2x Daily(Beta  Blocker)    sertraline  25 mg Oral Daily    tamsulosin  0.4 mg Oral After dinner    [Held by provider] rivaroxaban  20 mg Oral Nightly       Assessment & Plan:      #Fall/weakness suspect related to NPH  -Had plans for drain trial Monday  -ED d/w NS - no brain imaging recommended at this time   -XR hip and lumbar spine without acute findings  -PT eval    #RLE weakness >LLE  #LBP  -d/w NS  -MRI lumbar spine, planned for Monday. Per IR, need to hold asa 5 d, eliquis 3 days. Will hold now. FD lovenox in the interim   -PT concerned w/ R sided lean. D/w neuro, CVA unlikely. CTH neg     #Cerebrovascular disease  -ASA/statin      #PAF s/p DCCV  -DOAC/BB     #BPH  -Flomax/finasteride     #Anxiety  -SSRI    #HTN  -uncontrolled  -add lisinopril    Dispo: [place] recommended by PT. Plan drain monday. stay here until procedure then to [place]       Angel Cristobal MD      Supplementary Documentation:     Quality:  DVT Mechanical Prophylaxis:     Early ambuation  DVT Pharmacologic Prophylaxis   Medication    enoxaparin (Lovenox) 100 MG/ML SUBQ injection 90 mg    [Held by provider] rivaroxaban (Xarelto) tab 20 mg         DVT Pharmacologic prophylaxis: Aspirin 81 mg      Code Status: Not on file  Dotson: External urinary catheter in place  Dotson Duration (in days):   Central line:    JAQUELINE:     Discharge is dependent on: course  At this point Mr. Orellana is expected to be discharge to: home    The 21st Century Cures Act makes medical notes like these available to patients in the interest of transparency. Please be advised this is a medical document. Medical documents are intended to carry relevant information, facts as evident, and the clinical opinion of the practitioner. The medical note is intended as peer to peer communication and may appear blunt or direct. It is written in medical language and may contain abbreviations or verbiage that are unfamiliar.             **Certification      PHYSICIAN Certification of Need for Inpatient  Hospitalization - Initial Certification    Patient will require inpatient services that will reasonably be expected to span two midnight's based on the clinical documentation in H+P.   Based on patients current state of illness, I anticipate that, after discharge, patient will require TBD.

## 2024-05-04 NOTE — PROGRESS NOTES
Patient is A/OxVasile, Norco for pain x1, Holding Aspirin and Xarelto, Stopping Lovenox tomorrow. Procedure on Monday for drain placement. Must be NPO at midnight on Sunday night. Patient up to chair with sera steady. Large BM today.

## 2024-05-05 PROBLEM — M25.511 ACUTE PAIN OF RIGHT SHOULDER: Status: ACTIVE | Noted: 2024-05-05

## 2024-05-05 PROCEDURE — 99232 SBSQ HOSP IP/OBS MODERATE 35: CPT | Performed by: HOSPITALIST

## 2024-05-05 RX ORDER — ACETAMINOPHEN 325 MG/1
650 TABLET ORAL ONCE
Status: COMPLETED | OUTPATIENT
Start: 2024-05-05 | End: 2024-05-05

## 2024-05-05 RX ORDER — SODIUM CHLORIDE 9 MG/ML
INJECTION, SOLUTION INTRAVENOUS CONTINUOUS
Status: DISCONTINUED | OUTPATIENT
Start: 2024-05-05 | End: 2024-05-13

## 2024-05-05 NOTE — PLAN OF CARE
Assumed care at 1900.   Alert and oriented x4.   A paced; RA; denies pain.   MRI lumbar/spine pending.   Fall precautions in place and call light within reach.     Problem: PAIN - ADULT  Goal: Verbalizes/displays adequate comfort level or patient's stated pain goal  Description: INTERVENTIONS:  - Encourage pt to monitor pain and request assistance  - Assess pain using appropriate pain scale  - Administer analgesics based on type and severity of pain and evaluate response  - Implement non-pharmacological measures as appropriate and evaluate response  - Consider cultural and social influences on pain and pain management  - Manage/alleviate anxiety  - Utilize distraction and/or relaxation techniques  - Monitor for opioid side effects  - Notify MD/LIP if interventions unsuccessful or patient reports new pain  - Anticipate increased pain with activity and pre-medicate as appropriate  Outcome: Progressing     Problem: SAFETY ADULT - FALL  Goal: Free from fall injury  Description: INTERVENTIONS:  - Assess pt frequently for physical needs  - Identify cognitive and physical deficits and behaviors that affect risk of falls.  - [place] fall precautions as indicated by assessment.  - Educate pt/family on patient safety including physical limitations  - Instruct pt to call for assistance with activity based on assessment  - Modify environment to reduce risk of injury  - Provide assistive devices as appropriate  - Consider OT/PT consult to assist with strengthening/mobility  - Encourage toileting schedule  Outcome: Progressing

## 2024-05-05 NOTE — PROGRESS NOTES
[facility]   part of [facility]     Hospitalist Progress Note     Clay Orellana Patient Status:  Inpatient    1945 MRN [id number]   Location [facility] 7NE-A Attending Angel Cristobal MD   Hosp Day # 4 PCP Sachin Meza MD     Chief Complaint: fall    Subjective:     Right shoulder pain    Objective:    Review of Systems:   A comprehensive review of systems was completed; pertinent positive and negatives stated in subjective.    Vital signs:  Temp:  [97.3 °F (36.3 °C)-99.2 °F (37.3 °C)] 98 °F (36.7 °C)  Pulse:  [60-64] 61  Resp:  [14-17] 15  BP: ()/(46-77) 171/77  SpO2:  [93 %-97 %] 94 %    Physical Exam:    General: No acute distress  Respiratory: No wheezes, no rhonchi  Cardiovascular: S1, S2, regular rate and rhythm  Abdomen: Soft, Non-tender, non-distended  Neuro: No new focal deficits.   Extremities: No edema    Diagnostic Data:    Labs:  Recent Labs   Lab 24   WBC 10.4   HGB 14.6   MCV 95.9   .0       Recent Labs   Lab 24   GLU 88   BUN 22   CREATSERUM 1.23   CA 9.0   ALB 3.5      K 4.1      CO2 28.0   ALKPHO 88   AST 28   ALT 42   BILT 0.4   TP 7.3       Estimated Creatinine Clearance: 50.2 mL/min (based on SCr of 1.23 mg/dL).    No results for input(s): \"TROP\", \"TROPHS\", \"CK\" in the last 168 hours.    No results for input(s): \"PTP\", \"INR\" in the last 168 hours.               Microbiology    No results found for this visit on 24.      Imaging: Reviewed in Epic.    Medications:    acetaminophen  650 mg Oral Once    lisinopril  5 mg Oral Daily    [Held by provider] aspirin  81 mg Oral Daily    atorvastatin  40 mg Oral Nightly    donepezil  10 mg Oral Nightly    finasteride  5 mg Oral Daily    flecainide  100 mg Oral BID    fluticasone-umeclidin-vilant  1 puff Inhalation Daily    ipratropium  2 spray Nasal 2 times per day    metoprolol tartrate  25 mg Oral 2x Daily(Beta Blocker)    sertraline  25 mg Oral Daily    tamsulosin  0.4 mg Oral  After dinner    [Held by provider] rivaroxaban  20 mg Oral Nightly       Assessment & Plan:      #Fall/weakness suspect related to NPH  -plans for drain trial Monday    #RLE weakness >LLE  #LBP  -d/w NS  -MRI lumbar spine, planned for Monday. Per IR, need to hold asa 5 d, eliquis 3 days. Will hold now.  Hold lovenox for now  -PT concerned w/ R sided lean. Dr. Cristobal D/w neuro, CVA unlikely. CTH neg     #Cerebrovascular disease  -ASA/statin      #PAF s/p DCCV  -DOAC/BB; doac held     #BPH  -Flomax/finasteride     #Anxiety  -SSRI    #HTN  -added lisinopril    Dispo: AR recommended by PT. Plan drain monday. stay here until procedure then to [place]     David Bustillo MD  [facility]  Internal Medicine Hospitalist        Supplementary Documentation:     Quality:  DVT Mechanical Prophylaxis:   SCDs, Early ambuation  DVT Pharmacologic Prophylaxis   Medication    [Held by provider] rivaroxaban (Xarelto) tab 20 mg         DVT Pharmacologic prophylaxis: Aspirin 81 mg      Code Status: Not on file  Dotson: External urinary catheter in place  Dotson Duration (in days):   Central line:    JAQUELINE:     Discharge is dependent on: course  At this point Mr. Orellana is expected to be discharge to: home    The 21st Century Cures Act makes medical notes like these available to patients in the interest of transparency. Please be advised this is a medical document. Medical documents are intended to carry relevant information, facts as evident, and the clinical opinion of the practitioner. The medical note is intended as peer to peer communication and may appear blunt or direct. It is written in medical language and may contain abbreviations or verbiage that are unfamiliar.

## 2024-05-05 NOTE — PROGRESS NOTES
A/Ox4, patient up to chair with sera steady. Tolerated well. PRN Norco given per request. Patient NPO at midnight for drain placement tomorrow. IVF to start at midnight when NPO.

## 2024-05-06 ENCOUNTER — APPOINTMENT (OUTPATIENT)
Dept: INTERVENTIONAL RADIOLOGY/VASCULAR | Facility: HOSPITAL | Age: 79
DRG: 556 | End: 2024-05-06

## 2024-05-06 LAB
INR BLD: 1.07 (ref 0.8–1.2)
PROTHROMBIN TIME: 13.9 SECONDS (ref 11.6–14.8)

## 2024-05-06 PROCEDURE — B01BZZZ FLUOROSCOPY OF SPINAL CORD: ICD-10-PCS | Performed by: RADIOLOGY

## 2024-05-06 PROCEDURE — 99291 CRITICAL CARE FIRST HOUR: CPT | Performed by: INTERNAL MEDICINE

## 2024-05-06 PROCEDURE — 009U30Z DRAINAGE OF SPINAL CANAL WITH DRAINAGE DEVICE, PERCUTANEOUS APPROACH: ICD-10-PCS | Performed by: RADIOLOGY

## 2024-05-06 PROCEDURE — 99232 SBSQ HOSP IP/OBS MODERATE 35: CPT | Performed by: HOSPITALIST

## 2024-05-06 RX ORDER — CEFAZOLIN SODIUM/WATER 2 G/20 ML
2 SYRINGE (ML) INTRAVENOUS EVERY 8 HOURS
Status: DISCONTINUED | OUTPATIENT
Start: 2024-05-06 | End: 2024-05-09 | Stop reason: ALTCHOICE

## 2024-05-06 RX ORDER — CEFAZOLIN SODIUM/WATER 2 G/20 ML
SYRINGE (ML) INTRAVENOUS
Status: COMPLETED
Start: 2024-05-06 | End: 2024-05-06

## 2024-05-06 RX ORDER — HYDROCODONE BITARTRATE AND ACETAMINOPHEN 5; 325 MG/1; MG/1
1 TABLET ORAL ONCE
Status: DISCONTINUED | OUTPATIENT
Start: 2024-05-06 | End: 2024-05-06

## 2024-05-06 RX ORDER — HYDRALAZINE HYDROCHLORIDE 20 MG/ML
10 INJECTION INTRAMUSCULAR; INTRAVENOUS EVERY 2 HOUR PRN
Status: DISCONTINUED | OUTPATIENT
Start: 2024-05-06 | End: 2024-05-13

## 2024-05-06 RX ORDER — HYDRALAZINE HYDROCHLORIDE 20 MG/ML
INJECTION INTRAMUSCULAR; INTRAVENOUS
Status: COMPLETED
Start: 2024-05-06 | End: 2024-05-06

## 2024-05-06 RX ORDER — ACETAMINOPHEN AND CODEINE PHOSPHATE 300; 30 MG/1; MG/1
1 TABLET ORAL EVERY 4 HOURS PRN
Status: DISCONTINUED | OUTPATIENT
Start: 2024-05-06 | End: 2024-05-13

## 2024-05-06 NOTE — CONSULTS
[facility]  Neurocritical Care Consult Note    Clay Orellana Patient Status:  Inpatient    1945 MRN [id number]   Location [facility] 6NE-A Attending David Bustillo MD   Hosp Day # 5 PCP Sachin Meza MD       Reason for Consultation:   LD trial    HPI:   Patient is a 79 year old male with a h/o htn, hl, pAF on doac, bph, anxiety do and possible ventriculomegaly now s/p lumbar drain insertion for normal pressure hydrocephalus eval . Pt admitted  with falls c/f due to NPH, thus lumbar drain placed per CASEY to eval for possible  shunt, and pt was transferred to cnicu for further monitoring.     Past Medical History:    Acute nausea with nonbilious vomiting    Anxiety    Arrhythmia    Arthritis    Bilateral inguinal hernia without obstruction or gangrene    BPH (benign prostatic hyperplasia)    Calculus of kidney    Essential hypertension    High cholesterol       Past Surgical History:   Procedure Laterality Date    Anesth,pacemaker insertion  2023    Appendectomy      Appendectomy      Colonoscopy      Colonoscopy N/A 2022    Procedure: COLONOSCOPY;  Surgeon: Sudarshan Gilbert MD;  Location:  ENDOSCOPY    Colonoscopy      Cyst removal N/A     Cyst removed behind back of neck    Cyst removal Right 2020    Removed from right upper back - just below shoulder    Hernia surgery  2022    Skin surgery      Vasectomy         Medications Prior to Admission   Medication Sig Dispense Refill Last Dose    sertraline (ZOLOFT) 25 MG Oral Tab Take 1 tablet (25 mg total) by mouth daily. 30 tablet 1 2024    atorvastatin 40 MG Oral Tab Take 1 tablet (40 mg total) by mouth nightly. 90 tablet 0 2024    tamsulosin 0.4 MG Oral Cap Take 1 capsule (0.4 mg total) by mouth After dinner. 30 MIN AFTER 90 capsule 0 2024    donepezil 10 MG Oral Tab Take 1 tablet (10 mg total) by mouth nightly. 90 tablet 1 2024    fluticasone-umeclidin-vilant (TRELEGY ELLIPTA)  100-62.5-25 MCG/ACT Inhalation Aerosol Powder, Breath Activated Inhale 1 puff into the lungs daily.   5/1/2024    ipratropium 0.03 % Nasal Solution 2 sprays by Nasal route Q12H.   5/1/2024    finasteride 5 MG Oral Tab TAKE ONE TABLET BY MOUTH ONE TIME DAILY 90 tablet 2 5/1/2024    Cholecalciferol 125 MCG (5000 UT) Oral Tab Take 1 tablet (5,000 Units total) by mouth. Three times a week   4/30/2024    cyanocobalamin 500 MCG Oral Tab Take 1 tablet (500 mcg total) by mouth daily.   4/30/2024    aspirin 81 MG Oral Tab EC Take 1 tablet (81 mg total) by mouth daily. 90 tablet 1 4/30/2024    Multiple Vitamins-Minerals (MULTI-VITAMIN/MINERALS) Oral Tab Take 1 tablet by mouth daily.   4/30/2024    rivaroxaban 20 MG Oral Tab Take 1 tablet (20 mg total) by mouth nightly. 30 tablet 3 4/30/2024    metoprolol tartrate 25 MG Oral Tab Take 1 tablet (25 mg total) by mouth 2x Daily(Beta Blocker). 60 tablet 3 5/1/2024    Acidophilus/Pectin Oral Cap Take 1 capsule by mouth daily.   5/1/2024    acetaminophen 500 MG Oral Tab Take 1 tablet (500 mg total) by mouth every 6 (six) hours as needed.   5/1/2024    flecainide 100 MG Oral Tab Take 1 tablet (100 mg total) by mouth 2 (two) times daily.        Allergies   Allergen Reactions    Kiwi Extract ITCHING     Inner ears itch     Social History     Socioeconomic History    Marital status:    Tobacco Use    Smoking status: Former     Types: Cigarettes     Passive exposure: Never    Smokeless tobacco: Never    Tobacco comments:     Has not smoked for about 20 years   Vaping Use    Vaping status: Never Used   Substance and Sexual Activity    Alcohol use: Never     Alcohol/week: 4.0 standard drinks of alcohol     Types: 4 Cans of beer per week    Drug use: Never   Other Topics Concern    Caffeine Concern No    Exercise No    Seat Belt Yes    Special Diet No    Stress Concern Yes    Weight Concern No     Family History   Problem Relation Age of Onset    Heart Attack Father     Diabetes  Mother     Other (DM) Sister     Other (ALS) Brother     No Known Problems Maternal Grandmother     No Known Problems Maternal Grandfather     No Known Problems Paternal Grandmother     No Known Problems Paternal Grandfather     Other (smoker) Brother     Other (MS) Sister     No Known Problems Son     No Known Problems Daughter        Current Meds:  Current Facility-Administered Medications   Medication Dose Route Frequency    HYDROcodone-acetaminophen (Norco) 5-325 MG per tab 1 tablet  1 tablet Oral Once    ceFAZolin (Ancef) 2 g in 20mL IV syringe premix  2 g Intravenous Q8H    sodium chloride 0.9% infusion   Intravenous Continuous    lisinopril (Prinivil; Zestril) tab 5 mg  5 mg Oral Daily    HYDROcodone-acetaminophen (Norco) 5-325 MG per tab 1 tablet  1 tablet Oral Q4H PRN    Or    HYDROcodone-acetaminophen (Norco) 5-325 MG per tab 2 tablet  2 tablet Oral Q4H PRN    LORazepam (Ativan) tab 1 mg  1 mg Oral Q6H PRN    [Held by provider] aspirin DR tab 81 mg  81 mg Oral Daily    atorvastatin (Lipitor) tab 40 mg  40 mg Oral Nightly    donepezil (Aricept) tab 10 mg  10 mg Oral Nightly    melatonin tab 3 mg  3 mg Oral Nightly PRN    acetaminophen (Tylenol Extra Strength) tab 500 mg  500 mg Oral Q4H PRN    polyethylene glycol (PEG 3350) (Miralax) 17 g oral packet 17 g  17 g Oral Daily PRN    sennosides (Senokot) tab 17.2 mg  17.2 mg Oral Nightly PRN    bisacodyl (Dulcolax) 10 MG rectal suppository 10 mg  10 mg Rectal Daily PRN    fleet enema (Fleet) 7-19 GM/118ML rectal enema 133 mL  1 enema Rectal Once PRN    ondansetron (Zofran) 4 MG/2ML injection 4 mg  4 mg Intravenous Q6H PRN    metoclopramide (Reglan) 5 mg/mL injection 10 mg  10 mg Intravenous Q8H PRN    finasteride (Proscar) tab 5 mg  5 mg Oral Daily    flecainide (Tambocor) tab 100 mg  100 mg Oral BID    fluticasone-umeclidin-vilant (Trelegy Ellipta) 100-62.5-25 MCG/ACT inhaler 1 puff  1 puff Inhalation Daily    ipratropium (Atrovent) 0.03 % nasal solution 2  spray  2 spray Nasal 2 times per day    metoprolol tartrate (Lopressor) tab 25 mg  25 mg Oral 2x Daily(Beta Blocker)    sertraline (Zoloft) tab 25 mg  25 mg Oral Daily    tamsulosin (Flomax) cap 0.4 mg  0.4 mg Oral After dinner    [Held by provider] rivaroxaban (Xarelto) tab 20 mg  20 mg Oral Nightly       Review of Systems:     Constitutional:    Denies unusual weight loss or weight gain, fever/chills or night sweats.  HEENT:                Denies changes in vision or difficulty swallowing.  Pulm:                    Denies dyspnea, cough, or sputum production  Cardiac:               Denies chest pain, palpitations or lower extremity edema.  GI:                         Denies constipation, heartburn or melena.  :                       Denies dysuria or hematuria.  Skin:                     Denies rashes or open areas.  Neuro:                  As per HPI    All other systems were reviewed and are negative.    Vitals:   Temp:  [97.3 °F (36.3 °C)-98.4 °F (36.9 °C)] 97.3 °F (36.3 °C)  Pulse:  [60-67] 61  Resp:  [11-24] 20  BP: ()/(57-83) 135/61  SpO2:  [94 %-99 %] 98 %  Body mass index is 16.63 kg/m².    Intake/Output:    Intake/Output Summary (Last 24 hours) at 5/6/2024 1054  Last data filed at 5/6/2024 0500  Gross per 24 hour   Intake 240 ml   Output 1000 ml   Net -760 ml       Physical Examination:   General- No acute distress  CV- RRR  Resp- CTA Bilat  Neuro-  Mental status- awake and alert, regards and follows commands, oriented x3, speech fluent  Cranial nerves- pupils equally round and reactive to light, extraocular muscles intact, face symmetric, visual fields full  Motor- reed x4  Sens-  Intact to light touch    Diagnostics:   IR LUMBAR DRAINAGE    Result Date: 5/6/2024  CONCLUSION:   Successful lumbar drain placement, as detailed above.  LOCATION:  [place]       Dictated by (CST): Martín Booker MD on 5/06/2024 at 10:00 AM     Finalized by (CST): Martín Booker MD on 5/06/2024 at 10:07 AM         Lab Review     Recent Labs   Lab 05/01/24 2013      K 4.1      CO2 28.0   GLU 88   BUN 22   CREATSERUM 1.23     Recent Labs   Lab 05/01/24 2012   WBC 10.4   HGB 14.6   .0       Assesment/Plan:     Neuro:  Possible ventriculomegaly- in setting of gait, urinary and memory difficulties, c/f NPH.   LD trial per Neurosurg.   Cont neurochecks/pt/ot/st.  Cardiac:  Htn/hl/pAF- sbp goal 100-150, cont statin, metop, and zestril. Hold doac until cleared per NS.  Pulmonary:  Stable on RA  Renal:  IVFs, monitor BUN/Cr  GI:  PO as tolerated  Heme/ID:  Afebrile, no leukocytosis  Endocrine/Rheum:  Monitor glucose, sliding scale insulin prn  DVT Prophylaxis:  SCD’s    Goals of the Day: neurochecks   A total of 45 minutes of critical care time (exclusive of billable procedures) was administered to manage and/or prevent neurologic instability. This involved direct patient intervention, complex decision making, and/or extensive discussions with the patient, family, and clinical staff.    Thank you for allowing me to participate in the care of this patient.     Janeth Galvin MD, [facility]  Medical Director of System Neurosciences  Chief, Section of Neurocritical Care  [facility]

## 2024-05-06 NOTE — CM/SW NOTE
Pt seen by PM&R on 5/3 with undetermined outcome. PT to eval again after LP drain placement.     / to remain available for support and/or discharge planning.      EVENS Robertson RN, CM  [phone]

## 2024-05-06 NOTE — PHYSICAL THERAPY NOTE
PHYSICAL THERAPY EVALUATION - INPATIENT     Room Number: 7605/7605-A  Evaluation Date: 5/2/2024  Type of Evaluation: Re-evaluation  Physician Order: PT Eval and Treat    Presenting Problem: Fall  Co-Morbidities : NPH, Afib, HTN, TIA, COPD, anxiety  Reason for Therapy: Mobility Dysfunction and Discharge Planning    PHYSICAL THERAPY ASSESSMENT   Patient is currently functioning below baseline with bed mobility, transfers, gait, maintaining seated position, and standing prolonged periods.  Prior to admission, patient's baseline is mod I .  Patient is requiring moderate assist and maximum assist as a result of the following impairments: decreased functional strength, decreased endurance/aerobic capacity, impaired standing balance, impaired motor planning, decreased muscular endurance, and medical status. Pt continues to demonstrate RLE AND RUE WEAKNESS since admit.  Physical Therapy will continue to follow for duration of hospitalization.    Patient will benefit from continued skilled PT Services to facilitate return to prior level of function as patient demonstrates high motivation with excellent tolerance to an intensive therapy program .    PLAN  PT Treatment Plan: Bed mobility;Body mechanics;Endurance;Energy conservation;Patient education;Family education;Gait training;Balance training;Transfer training;Strengthening;Neuromuscular re-educate  Rehab Potential : Fair  Frequency (Obs): 5x/week  Number of Visits to Meet Established Goals: 7      CURRENT GOALS    Goal #1 Patient is able to demonstrate supine - sit EOB @ level: supervision     Goal #2 Patient is able to demonstrate transfers EOB to/from Chair/Wheelchair at assistance level: minimum assistance     Goal #3 Patient is able to ambulate 15 feet with assist device: walker - rolling at assistance level: moderate assistance     Goal #4    Goal #5    Goal #6    Goal Comments: Goals established on 5/2/2024      PHYSICAL THERAPY MEDICAL/SOCIAL HISTORY  History  related to current admission: Patient is a 79 year old male admitted on 5/1/2024 from [facility] for fall, weakness. s/p lumbar drain insertion for normal pressure hydrocephalus eval 5/6      HOME SITUATION  Type of Home: Independent living facility ([facility])   Home Layout: One level                Lives With: Spouse  Drives: No  Patient Owned Equipment: Cane;Rolling walker;Rollator  Patient Regularly Uses: Glasses    Prior Level of [place]: \"Pt typically ambulates with rollator walker at [facility], ambulates to dining [place] but lately wife has been pushing him in the rollator. Was supposed to have drain trial this upcoming Monday for NPH. \"    SUBJECTIVE  \"This all happened recently\"       OBJECTIVE  Precautions: Bed/chair alarm;Drain(s);Other (Comment) (Lumbar drain, -150)  Fall Risk: High fall risk    WEIGHT BEARING RESTRICTION  Weight Bearing Restriction: None                PAIN ASSESSMENT  Rating: Unable to rate  Location: R shoulder, back  Management Techniques: Activity promotion;Body mechanics;Repositioning    COGNITION  Overall Cognitive Status:  WFL - within functional limits    RANGE OF MOTION AND STRENGTH ASSESSMENT  See OT note for UE assessment    Lower extremity ROM is within functional limits     Lower extremity strength is as follows:   Right Hip flexion  2/5  Left Hip flexion  5/5  Right Knee extension  2/5  Left Knee extension  5/5  Right Knee flexion  4/5  Left Knee flexion  5/5  Right Dorsiflexion  3+/5  Left Dorsiflexion  5/5      BALANCE  Static Sitting: Fair -  Dynamic Sitting: Poor +  Static Standing: Poor  Dynamic Standing: Poor    ADDITIONAL TESTS  Additional Tests: Modified [place]              Modified [place]: 4                  ACTIVITY TOLERANCE                         O2 WALK       NEUROLOGICAL FINDINGS  Neurological Findings: Sensation           Sensation: inconsistent sensation through BLE         AM-PAC '6-Clicks' INPATIENT SHORT FORM - BASIC MOBILITY  How  much difficulty does the patient currently have...  Patient Difficulty: Turning over in bed (including adjusting bedclothes, sheets and blankets)?: A Little   Patient Difficulty: Sitting down on and standing up from a chair with arms (e.g., wheelchair, bedside commode, etc.): A Little   Patient Difficulty: Moving from lying on back to sitting on the side of the bed?: A Little   How much help from another person does the patient currently need...   Help from Another: Moving to and from a bed to a chair (including a wheelchair)?: A Lot   Help from Another: Need to walk in hospital room?: A Lot   Help from Another: Climbing 3-5 steps with a railing?: Total       AM-PAC Score:  Raw Score: 14   Approx Degree of Impairment: 61.29%   Standardized Score (AM-PAC Scale): 38.1   CMS Modifier (G-Code): CL    FUNCTIONAL ABILITY STATUS  Gait Assessment   Functional Mobility/Gait Assessment  Gait Assistance: Moderate assistance  Distance (ft): 4  Assistive Device: Rolling walker  Pattern: R Decreased stance time (RLE buckling)    Skilled Therapy Provided     Bed Mobility:  Rolling: min A  Supine to sit: min A     Transfer Mobility:  Sit to stand: modA consistent throughout session, required vc for UE placement does best with LUE on surface and RUE on RW handle    Stand to sit: mod A - poor eccentric lowering control, required vc for LUE reaching back for bedside chair with some improvement noted  Gait =Performed lateral stepping along edge of bed with mod A x2, tactile and verbal cuing for TKE on the RIGHT which was first practiced during standing marching, intermittent buckling demonstrated however able to transfer to bedside chair which is an improvement compared to previous sessions where he has been using the sera steady for transfers    Therapist's Comments: RN cleared for session. Pt agreeable for therapy, received supine. Pt cued on log roll technique for supine>sit. Impaired sitting balance demonstrated requiring BUE  support during static sitting.     Unable to tolerate gait speed assessment per protocol. Performed below standardized assessment.     Functional Mobility:  30 sec sit<>stand: 1 w/ mod A  Fall Risk: yes  [Below average score indicates high risk for falls; norms by age > or = to...   60-63 y/o Men: 14, Women: 12   65-68 y/o Men: 12, Women: 11   70-73 y/o Men: 12, Women: 10   75-80 y/o Men: 11, Women: 10   80-85 y/o Men: 10, Women: 9   85-88 y/o Men: 8, Women: 8   90-93 y/o Men 7, Women: 4]      Exercise/Education Provided:  Bed mobility  Body mechanics  Energy conservation  Functional activity tolerated  Gait training  Posture  Strengthening  Transfer training    Patient End of Session: Up in chair;Needs met;Call light within reach;RN aware of session/findings;All patient questions and concerns addressed;Alarm set;Family present      Patient Evaluation Complexity Level:  History High - 3 or more personal factors and/or co-morbidities   Examination of body systems Moderate - addressing a total of 3 or more elements   Clinical Presentation Moderate - Evolving   Clinical Decision Making Moderate - Evolving       PT Session Time: 27 minutes  Gait Trainin minutes  Therapeutic Activity: 20 minutes

## 2024-05-06 NOTE — PLAN OF CARE
Pt received at 945 from IR lab. VSS, A paced on monitor, normotensive. Neuros q2day/q4noc; weakness noted in all extremeties. BR for 2 hrs post op. Incision assessment per order. Kaitlyn ANDREA rounded; explained POC to pt/family. PT/OT/SLP saw pt for baseline evals then began lumbar drain trial, draining 5 mL q1. Wife and daughter at bedside; updated on poc and questions answered/encouraged. Pt able to get up to chair and back to bed with 2x assist. After therapy saw pt, began lumbar drain trial, drained 5 mL per her per order. Incision remains dry and intact, small amount of drainage noted on dressing.     Problem: PAIN - ADULT  Goal: Verbalizes/displays adequate comfort level or patient's stated pain goal  Description: INTERVENTIONS:  - Encourage pt to monitor pain and request assistance  - Assess pain using appropriate pain scale  - Administer analgesics based on type and severity of pain and evaluate response  - Implement non-pharmacological measures as appropriate and evaluate response  - Consider cultural and social influences on pain and pain management  - Manage/alleviate anxiety  - Utilize distraction and/or relaxation techniques  - Monitor for opioid side effects  - Notify MD/LIP if interventions unsuccessful or patient reports new pain  - Anticipate increased pain with activity and pre-medicate as appropriate  Outcome: Progressing     Problem: Impaired Functional Mobility  Goal: Achieve highest/safest level of mobility/gait  Description: Interventions:  - Assess patient's functional ability and stability  - Promote increasing activity/tolerance for mobility and gait  - Educate and engage patient/family in tolerated activity level and precautions  - Recommend use of sit-stand lift for transfers  - Recommend use of chair position in bed 3 times per day  Outcome: Progressing     Problem: MUSCULOSKELETAL - ADULT  Goal: Return mobility to safest level of function  Description: INTERVENTIONS:  - Assess patient  stability and activity tolerance for standing, transferring and ambulating w/ or w/o assistive devices  - Assist with transfers and ambulation using safe patient handling equipment as needed  - Ensure adequate protection for wounds/incisions during mobilization  - Obtain PT/OT consults as needed  - Advance activity as appropriate  - Communicate ordered activity level and limitations with patient/family  Outcome: Progressing  Goal: Maintain proper alignment of affected body part  Description: INTERVENTIONS:  - Support and protect limb and body alignment per provider's orders  - Instruct and reinforce with patient and family use of appropriate assistive device and precautions (e.g. spinal or hip dislocation precautions)  Outcome: Progressing     Problem: SAFETY ADULT - FALL  Goal: Free from fall injury  Description: INTERVENTIONS:  - Assess pt frequently for physical needs  - Identify cognitive and physical deficits and behaviors that affect risk of falls.  - [place] fall precautions as indicated by assessment.  - Educate pt/family on patient safety including physical limitations  - Instruct pt to call for assistance with activity based on assessment  - Modify environment to reduce risk of injury  - Provide assistive devices as appropriate  - Consider OT/PT consult to assist with strengthening/mobility  - Encourage toileting schedule  Outcome: Progressing

## 2024-05-06 NOTE — OCCUPATIONAL THERAPY NOTE
OCCUPATIONAL THERAPY EVALUATION - INPATIENT     Room Number: 6602/6602-A  Evaluation Date: 5/6/2024  Type of Evaluation: Re-evaluation  Presenting Problem: weakness, fall, 5/6 lumbar drain placement    Physician Order: IP Consult to Occupational Therapy  Reason for Therapy: ADL/IADL Dysfunction and Discharge Planning    OCCUPATIONAL THERAPY ASSESSMENT   Patient is currently functioning below baseline with  all ADL . Prior to admission, patient's baseline is Modified independent.  Patient is requiring minimal assist and moderate assist as a result of the following impairments: decreased functional strength, decreased endurance, and impaired sitting and standing balance. Occupational Therapy will continue to follow for duration of hospitalization.    **Pt was seen for re-evaluation 5/6 after lumbar drain placement for trial.     SHORT BLESSED TEST of Attention and Memory (SBT) was administered.  Patient scored 0 on this screening test.   In the original validation sample for the SBT (Daniel et al., 1983), 90% of normal scores 6 points or less. Scores of 7 or higher would indicate a need for further evaluation to rule out a dementing disorder, such as Alzheimer’s disease.      Patient will benefit from continued skilled OT Services to facilitate return to prior level of function as patient demonstrates high motivation with excellent tolerance to an intensive therapy program       History Related to Current Admission: : Patient is a 79 year old male admitted on 5/1/2024 with Presenting Problem: Weakness, fall. Co-Morbidities : NPH, Afib, HTN, TIA, COPD, anxiety.    **5/6 s/p lumbar drain placement for trial.     WEIGHT BEARING RESTRICTION  Weight Bearing Restriction: None             Recommendations for nursing staff:   Transfers: mod A  and cga x 1 for safety  Toileting location: bedside commode    EVALUATION SESSION:  Patient Start of Session: supine  FUNCTIONAL TRANSFER ASSESSMENT  Sit to Stand: Edge of Bed;  Chair  Edge of Bed: Moderate Assist  Chair: Moderate Assist    BED MOBILITY  Rolling: Minimal Assist  Supine to Sit : Minimal Assist  Sit to Supine (OT): Not Tested  Scooting: sba      COGNITION  Following Commands:  follows multistep commands consistently  Initiation: hand over hand to initiate tasks    Upper Extremity   ROM: within functional limits   Strength: within functional limits except for the following;  Right Shoulder flexion  4-/5  Coordination  Gross motor:   Fine motor: intact      EDUCATION PROVIDED  Patient: Role of Occupational Therapy; Plan of Care; Fall Prevention; Functional Transfer Techniques; Energy Conservation; Proper Body Mechanics  Patient's Response to Education: Requires Further Education; Returned Demonstration  Family/Caregiver: Role of Occupational Therapy; Plan of Care  Family/Caregiver's Response to Education: Verbalized Understanding    Equipment used: rw       Therapist comments: pt's wife and daughter present.  Pt just had lumbar drain placed this morning for trial.   Pt completed supine to sit min A with cueing for hand placement and cueing for log rolling. SBA static balance, as pt was raising his arms for MMT, pt started to lean to his L side.  Min A dynamic sitting balance.  Mod A to stand. Pt commented, \"Wait, see, my right leg will give out.\" Pt was able to correct it and extend his knee. Able to take side steps with RW and mod A with cueing to keep his knee locked.  This transfer was completed in preparation for bedside commode transfer. Up to chair with mod A. Completed Short Blessed Test. See above. Alarm on.      Patient End of Session: Up in chair;Needs met;Call light within reach;RN aware of session/findings;All patient questions and concerns addressed;Alarm set;Family present    OCCUPATIONAL PROFILE    HOME SITUATION  Type of Home: Independent living facility ([facility])  Home Layout: One level  Lives With: Spouse    Toilet and Equipment: Comfort height  toilet;Grab bar  Shower/Tub and Equipment: Walk-in shower;Grab bar;Shower chair;Hand-held showerhead  Other Equipment: Other (Comment) (Rollator, walker)    Occupation/Status: retired  Hand Dominance: Right  Drives: No  Patient Regularly Uses: Glasses    Prior Level of Function: per OT initial eval 5/2/24, \"Mod I with BADLs and functional mobility with use of Rollator most recently d/t c/o increased BLE weakness and most recently, right LE>LLE weakness. Patient states he typically does not need any help but his wife has been pushing him in the Rollator to the dining for the past few days d/t legs feeling weak. Multiple falls reported.\"     SUBJECTIVE   See above    PAIN ASSESSMENT  Rating: Unable to rate  Location: per pt and family,R shoulder, chronic back pain  Management Techniques: Repositioning;Body mechanics    OBJECTIVE  Precautions: Bed/chair alarm;Drain(s);Other (Comment) (Lumbar drain, -150)  Fall Risk: High fall risk      ASSESSMENTS    AM-PAC ‘6-Clicks’ Inpatient Daily Activity Short Form  -   Putting on and taking off regular lower body clothing?: A Lot  -   Bathing (including washing, rinsing, drying)?: A Lot  -   Toileting, which includes using toilet, bedpan or urinal? : A Lot  -   Putting on and taking off regular upper body clothing?: A Little  -   Taking care of personal grooming such as brushing teeth?: A Little  -   Eating meals?: None    AM-PAC Score:  Score: 16  Approx Degree of Impairment: 53.32%  Standardized Score (AM-PAC Scale): 35.96    ADDITIONAL TESTS     NEUROLOGICAL FINDINGS  Coordination - Rapid Alternating Movement: Symmetrical  Coordination - Finger Opposition: Symmetrical     COGNITION ASSESSMENTS  Short Blessed Test: yes      PLAN  OT Treatment Plan: Balance activities;Energy conservation/work simplification techniques;ADL training;Functional transfer training;Patient/Family education;Patient/Family training;Equipment eval/education;Compensatory technique  education;Cognitive reorientation  Rehab Potential : Good  Frequency: 3-5x/week  Number of Visits to Meet Established Goals: 5    ADL Goals   Patient will perform upper body dressing:  with setup  Patient will perform lower body dressing:  with min assist  Patient will perform toileting: with min assist    Functional Transfer Goals  Patient will transfer from supine to sit:  with supervision  Patient will transfer to toilet:  with supervision    UE Exercise Program Goal  Patient will be independent with bilateral AROM HEP (home exercise program).    Additional Goals  Pt will recall spine precautions without cues.    Therapist Goals  SBT on the day of discharge    Patient Evaluation Complexity Level:   Occupational Profile/Medical History HIGH - Extensive review of history including review of medical or therapy records    Specific performance deficits impacting engagement in ADL/IADL MODERATE  3 - 5 performance deficits   Client Assessment/Performance Deficits MODERATE - Comorbidities and min to mod modifications of tasks    Clinical Decision Making MODERATE - Analysis of occupational profile, detailed assessments, several treatment options    Overall Complexity MODERATE     OT Session Time: 23 minutes  Self-Care Home Management:  minutes  Therapeutic Activity: 8 minutes  Neuromuscular Re-education:  minutes  Therapeutic Exercise:  minutes  Cognitive Skills: 4 minutes

## 2024-05-06 NOTE — PROGRESS NOTES
Attempted to see patient  Off the floor for lumbar drain  Will attempt to see later again today  Will start lumbar drain trial once seen by PT and OT and speech

## 2024-05-06 NOTE — PLAN OF CARE
Assumed care @1930.  A/Ox4, RA, A-paced, VSS on tele, denies pain.  External catheter in place.  NPO after midnight, scheduled for drain placement procedure this morning.  Pt updated on POC.      Problem: PAIN - ADULT  Goal: Verbalizes/displays adequate comfort level or patient's stated pain goal  Description: INTERVENTIONS:  - Encourage pt to monitor pain and request assistance  - Assess pain using appropriate pain scale  - Administer analgesics based on type and severity of pain and evaluate response  - Implement non-pharmacological measures as appropriate and evaluate response  - Consider cultural and social influences on pain and pain management  - Manage/alleviate anxiety  - Utilize distraction and/or relaxation techniques  - Monitor for opioid side effects  - Notify MD/LIP if interventions unsuccessful or patient reports new pain  - Anticipate increased pain with activity and pre-medicate as appropriate  Outcome: Progressing     Problem: Impaired Functional Mobility  Goal: Achieve highest/safest level of mobility/gait  Description: Interventions:  - Assess patient's functional ability and stability  - Promote increasing activity/tolerance for mobility and gait  Outcome: Progressing     Problem: MUSCULOSKELETAL - ADULT  Goal: Return mobility to safest level of function  Description: INTERVENTIONS:  - Assess patient stability and activity tolerance for standing, transferring and ambulating w/ or w/o assistive devices  - Assist with transfers and ambulation using safe patient handling equipment as needed  - Ensure adequate protection for wounds/incisions during mobilization  - Obtain PT/OT consults as needed  - Advance activity as appropriate  - Communicate ordered activity level and limitations with patient/family  Outcome: Progressing  Goal: Maintain proper alignment of affected body part  Description: INTERVENTIONS:  - Support and protect limb and body alignment per provider's orders  - Instruct and reinforce  with patient and family use of appropriate assistive device and precautions (e.g. spinal or hip dislocation precautions)  Outcome: Progressing     Problem: SAFETY ADULT - FALL  Goal: Free from fall injury  Description: INTERVENTIONS:  - Assess pt frequently for physical needs  - Identify cognitive and physical deficits and behaviors that affect risk of falls.  - [place] fall precautions as indicated by assessment.  - Educate pt/family on patient safety including physical limitations  - Instruct pt to call for assistance with activity based on assessment  - Modify environment to reduce risk of injury  - Provide assistive devices as appropriate  - Consider OT/PT consult to assist with strengthening/mobility  - Encourage toileting schedule  Outcome: Progressing

## 2024-05-06 NOTE — SLP NOTE
SPEECH/LANGUAGE/COGNITIVE EVALUATION - INPATIENT    Admission Date: 5/1/2024  Evaluation Date: 05/06/24    Reason for Referral:  (Lumbar Drain Trial)    ASSESSMENT & PLAN   ASSESSMENT & IMPRESSION  Patient is an 80 y/o male with PMHx significant for hydrocephalous. SLP order received to evaluate for lumbar drain trial. Patient received alert and oriented in bed with spouse and daughter present at bedside. Patient noted mild difficulty with memory recently. He is retired, but previously worked as a salesman and has a bachelor's degree. He resides with his wife at a senior living community.  MoCA completed with a score of 28/30 which falls within normal limits. SLP will continue to follow and repeat evaluation following lumbar drain trial. Education provided re: results.    Assessment(s) Administered: MoCA Score (8.1 English)  MoCA Score: 28             Aphasic Depression Rating Scale Administered: Not applicable  Deficits Identified:  (none)    Patient Experiencing Pain: No                           GOALS  Goal #1 Repeat MoCA for lumbar drain trial.  In Progress     Prior Living Situation: Home with spouse  Prior Level of Function: Independent      Imaging Results:   CTH 5/2/24  CONCLUSION:       No acute intracranial process identified.            LOCATION:  [place] [address]         Dictated by (CST): Nneka Pope MD on 5/02/2024 at 5:24 PM       Finalized by (CST): Nneka Pope MD on 5/02/2024 at 5:27 PM     Patient/Family Goals: none stated    Interdisciplinary Communication: Discussed with RN    Patient, family and/or caregiver has been informed and has taken part in this evaluation and plan of treatment and have been advised and agree on the findings and goals.      FOLLOW UP  Treatment Plan/Recommendations: SLP to reassess     Follow Up Needed (Documentation Required): Yes  SLP Follow-up Date: 05/08/24    Thank you for your referral.  If you have any questions please contact ROB Mei

## 2024-05-06 NOTE — PLAN OF CARE
Assumed care at 0700. A&O x 4. On RA tolerating well. PRN tylenol given for pain. Morning blood pressure medication given with sips of water. Left for cath lab around 0820.

## 2024-05-06 NOTE — PROGRESS NOTES
[facility]  Neurosurgery Progress Note    Clay Orellana Patient Status:  Inpatient    1945 MRN [id number]   Location [facility] 6NE-A Attending David Bustillo MD   Hosp Day # 5 PCP Sachin Meza MD     Chief Complaint:  Chief Complaint   Patient presents with    Fall       Subjective:  Pt examined, lumbar drain placed this am by CASEY.  Pt denies complaints currently.  He is awaiting baseline evals for drain trial    Objective/Physical Exam:    Vital Signs:  Blood pressure 130/57, pulse 61, temperature 97.3 °F (36.3 °C), temperature source Temporal, resp. rate 16, weight 119 lb 3.2 oz (54.1 kg), SpO2 98%.    Respiratory:  Respirations nonlabored    CV:  NSR on tele    General: NAD, Speech Fluent, Mood Appropriate    Neurologic: This patient is alert and orientated x 3.  Able to follow commands.  Face is symmetric.   CN 2-12 GI,  Sensation to light touch is intact bilaterally.  BALDWIN x 4.  Gait deferred    Drains:  Lumbar drain clamped    Skin: Dressings intact and dry      Labs:  Lab Results   Component Value Date    INR 1.07 2024    PTP 13.9 2024       Imaging:  None    Assessment:  S/p lumbar drain insertion PPD #0  Neurologic gait dysfunction    Plan:  Discussed with Dr. Pringle  Baseline PT/OT/ST evals  Once evals complete, open drain q hr to drain 5 ml  Medical management per hospitalist  DVT prophylaxis SCDs TEDs no Heparin  Abx while drain in place        EMRE Ricketts  [facility]  2024, 10:49 AM      A total of 15 minutes of visit time (exclusible of billable procedures) was administered.  > 50 % of time spent counseling/coordinating care     Is this a shared or split note between Advanced Practice Provider and Physician? Yes    Patient seen and examined with np  Case discussed  St had seen  Awaiting pt/ot  Ld in place  Plan for drainage once eval'd  Mri pending

## 2024-05-06 NOTE — PROGRESS NOTES
[facility]   part of [facility]     Hospitalist Progress Note     Clay Orellana Patient Status:  Inpatient    1945 MRN [id number]   Location [facility] 7NE-A Attending Angel Cristobal MD   Hosp Day # 5 PCP Sachin Meza MD     Chief Complaint: fall    Subjective:     Some neck pain currently    Objective:    Review of Systems:   A comprehensive review of systems was completed; pertinent positive and negatives stated in subjective.    Vital signs:  Temp:  [97.4 °F (36.3 °C)-98.2 °F (36.8 °C)] 97.4 °F (36.3 °C)  Pulse:  [60-63] 62  Resp:  [15-24] 17  BP: ()/(59-83) 178/77  SpO2:  [94 %-97 %] 94 %    Physical Exam:    General: No acute distress  Respiratory: No wheezes, no rhonchi  Cardiovascular: S1, S2, regular rate and rhythm  Abdomen: Soft, Non-tender, non-distended  Neuro: No new focal deficits.   Extremities: No edema    Diagnostic Data:    Labs:  Recent Labs   Lab 24  0541   WBC 10.4  --    HGB 14.6  --    MCV 95.9  --    .0  --    INR  --  1.07       Recent Labs   Lab 24   GLU 88   BUN 22   CREATSERUM 1.23   CA 9.0   ALB 3.5      K 4.1      CO2 28.0   ALKPHO 88   AST 28   ALT 42   BILT 0.4   TP 7.3       Estimated Creatinine Clearance: 50.2 mL/min (based on SCr of 1.23 mg/dL).    No results for input(s): \"TROP\", \"TROPHS\", \"CK\" in the last 168 hours.    Recent Labs   Lab 24  0541   PTP 13.9   INR 1.07                  Microbiology    No results found for this visit on 24.      Imaging: Reviewed in Epic.    Medications:    HYDROcodone-acetaminophen  1 tablet Oral Once    lisinopril  5 mg Oral Daily    [Held by provider] aspirin  81 mg Oral Daily    atorvastatin  40 mg Oral Nightly    donepezil  10 mg Oral Nightly    finasteride  5 mg Oral Daily    flecainide  100 mg Oral BID    fluticasone-umeclidin-vilant  1 puff Inhalation Daily    ipratropium  2 spray Nasal 2 times per day    metoprolol tartrate  25 mg Oral 2x Daily(Beta  Blocker)    sertraline  25 mg Oral Daily    tamsulosin  0.4 mg Oral After dinner    [Held by provider] rivaroxaban  20 mg Oral Nightly       Assessment & Plan:      #Fall/weakness suspect related to NPH  -plans for drain trial today    #RLE weakness >LLE  #LBP  -d/w NS  -MRI lumbar spine, planned for Monday. Per IR, need to hold asa 5 d, eliquis 3 days. Will hold now.  Hold lovenox for now  -PT concerned w/ R sided lean. Dr. Cristobal D/w neuro, CVA unlikely. CTH neg     #Cerebrovascular disease  -ASA/statin      #PAF s/p DCCV  -DOAC/BB; doac held     #BPH  -Flomax/finasteride     #Anxiety  -SSRI    #HTN  -added lisinopril    Dispo: AR recommended by PT. Plan drain today. stay here until procedure then to AR once drain removed; will likely go to CNICU after drain    David Bustillo MD  [facility]  Internal Medicine Hospitalist        Supplementary Documentation:     Quality:  DVT Mechanical Prophylaxis:   SCDs, Early ambuation  DVT Pharmacologic Prophylaxis   Medication    [Held by provider] rivaroxaban (Xarelto) tab 20 mg         DVT Pharmacologic prophylaxis: Aspirin 81 mg      Code Status: Not on file  Dotson: External urinary catheter in place  Dotson Duration (in days):   Central line:    JAQUELINE:     Discharge is dependent on: course  At this point Mr. Orellana is expected to be discharge to: home    The 21st Century Cures Act makes medical notes like these available to patients in the interest of transparency. Please be advised this is a medical document. Medical documents are intended to carry relevant information, facts as evident, and the clinical opinion of the practitioner. The medical note is intended as peer to peer communication and may appear blunt or direct. It is written in medical language and may contain abbreviations or verbiage that are unfamiliar.

## 2024-05-07 ENCOUNTER — APPOINTMENT (OUTPATIENT)
Dept: MRI IMAGING | Facility: HOSPITAL | Age: 79
DRG: 556 | End: 2024-05-07
Attending: HOSPITALIST

## 2024-05-07 LAB
ANION GAP SERPL CALC-SCNC: 4 MMOL/L (ref 0–18)
BUN BLD-MCNC: 23 MG/DL (ref 9–23)
CALCIUM BLD-MCNC: 8.7 MG/DL (ref 8.5–10.1)
CHLORIDE SERPL-SCNC: 110 MMOL/L (ref 98–112)
CO2 SERPL-SCNC: 26 MMOL/L (ref 21–32)
CREAT BLD-MCNC: 0.98 MG/DL
EGFRCR SERPLBLD CKD-EPI 2021: 77 ML/MIN/1.73M2 (ref 60–?)
ERYTHROCYTE [DISTWIDTH] IN BLOOD BY AUTOMATED COUNT: 12.8 %
GLUCOSE BLD-MCNC: 97 MG/DL (ref 70–99)
HCT VFR BLD AUTO: 37.5 %
HGB BLD-MCNC: 12.9 G/DL
MCH RBC QN AUTO: 32.7 PG (ref 26–34)
MCHC RBC AUTO-ENTMCNC: 34.4 G/DL (ref 31–37)
MCV RBC AUTO: 94.9 FL
OSMOLALITY SERPL CALC.SUM OF ELEC: 294 MOSM/KG (ref 275–295)
PLATELET # BLD AUTO: 229 10(3)UL (ref 150–450)
POTASSIUM SERPL-SCNC: 3.9 MMOL/L (ref 3.5–5.1)
RBC # BLD AUTO: 3.95 X10(6)UL
SODIUM SERPL-SCNC: 140 MMOL/L (ref 136–145)
WBC # BLD AUTO: 9.7 X10(3) UL (ref 4–11)

## 2024-05-07 PROCEDURE — 99231 SBSQ HOSP IP/OBS SF/LOW 25: CPT | Performed by: NEUROLOGICAL SURGERY

## 2024-05-07 PROCEDURE — 99291 CRITICAL CARE FIRST HOUR: CPT | Performed by: INTERNAL MEDICINE

## 2024-05-07 PROCEDURE — 72158 MRI LUMBAR SPINE W/O & W/DYE: CPT | Performed by: HOSPITALIST

## 2024-05-07 RX ORDER — GADOTERATE MEGLUMINE 376.9 MG/ML
20 INJECTION INTRAVENOUS
Status: COMPLETED | OUTPATIENT
Start: 2024-05-07 | End: 2024-05-07

## 2024-05-07 NOTE — PLAN OF CARE
[facility] cares about 1930, A paced at baseline and seen on telemetry. BP elevated occasionally overnight, PRN hydralazine given. Patient asleep and resting comfortably in bed overnight. Neuro assessments q4h overnight intact, generalized weakness in all extremities, no drift or droop, pupils equal and reactive. Lumbar drained 5mL q1h, and max level 18.    Problem: PAIN - ADULT  Goal: Verbalizes/displays adequate comfort level or patient's stated pain goal  Description: INTERVENTIONS:  - Encourage pt to monitor pain and request assistance  - Assess pain using appropriate pain scale  - Administer analgesics based on type and severity of pain and evaluate response  - Implement non-pharmacological measures as appropriate and evaluate response  - Consider cultural and social influences on pain and pain management  - Manage/alleviate anxiety  - Utilize distraction and/or relaxation techniques  - Monitor for opioid side effects  - Notify MD/LIP if interventions unsuccessful or patient reports new pain  - Anticipate increased pain with activity and pre-medicate as appropriate  Outcome: Progressing     Problem: Impaired Functional Mobility  Goal: Achieve highest/safest level of mobility/gait  Description: Interventions:  - Assess patient's functional ability and stability  - Promote increasing activity/tolerance for mobility and gait  - Educate and engage patient/family in tolerated activity level and precautions  - Recommend use of  RW for transfers and ambulation  - Recommend patient transfer to bedside chair toward strongest side  - When transferring patient, block weaker knee for safety  Outcome: Progressing     Problem: MUSCULOSKELETAL - ADULT  Goal: Return mobility to safest level of function  Description: INTERVENTIONS:  - Assess patient stability and activity tolerance for standing, transferring and ambulating w/ or w/o assistive devices  - Assist with transfers and ambulation using safe patient handling equipment as  needed  - Ensure adequate protection for wounds/incisions during mobilization  - Obtain PT/OT consults as needed  - Advance activity as appropriate  - Communicate ordered activity level and limitations with patient/family  Outcome: Progressing  Goal: Maintain proper alignment of affected body part  Description: INTERVENTIONS:  - Support and protect limb and body alignment per provider's orders  - Instruct and reinforce with patient and family use of appropriate assistive device and precautions (e.g. spinal or hip dislocation precautions)  Outcome: Progressing     Problem: SAFETY ADULT - FALL  Goal: Free from fall injury  Description: INTERVENTIONS:  - Assess pt frequently for physical needs  - Identify cognitive and physical deficits and behaviors that affect risk of falls.  - [place] fall precautions as indicated by assessment.  - Educate pt/family on patient safety including physical limitations  - Instruct pt to call for assistance with activity based on assessment  - Modify environment to reduce risk of injury  - Provide assistive devices as appropriate  - Consider OT/PT consult to assist with strengthening/mobility  - Encourage toileting schedule  Outcome: Progressing     Problem: RISK FOR INFECTION - ADULT  Goal: Absence of fever/infection during anticipated neutropenic period  Description: INTERVENTIONS  - Monitor WBC  - Administer growth factors as ordered  - Implement neutropenic guidelines  Outcome: Progressing     Problem: DISCHARGE PLANNING  Goal: Discharge to home or other facility with appropriate resources  Description: INTERVENTIONS:  - Identify barriers to discharge w/pt and caregiver  - Include patient/family/discharge partner in discharge planning  - Arrange for needed discharge resources and transportation as appropriate  - Identify discharge learning needs (meds, wound care, etc)  - Arrange for interpreters to assist at discharge as needed  - Consider post-discharge preferences of  patient/family/discharge partner  - Complete POLST form as appropriate  - Assess patient's ability to be responsible for managing their own health  - Refer to Case Management Department for coordinating discharge planning if the patient needs post-hospital services based on physician/LIP order or complex needs related to functional status, cognitive ability or social support system  Outcome: Progressing     Problem: Patient/Family Goals  Goal: Patient/Family Long Term Goal  Description: Patient's Long Term Goal: Return home with optimal physical mobility    Interventions:  - PT/OT, PRN medications, encourage independence  - See additional Care Plan goals for specific interventions  Outcome: Progressing  Goal: Patient/Family Short Term Goal  Description: Patient's Short Term Goal: Maintain BP within parameters 100-150    Interventions:   - PRN medications, adequate rest, lumbar drain per orders  - See additional Care Plan goals for specific interventions  Outcome: Progressing     Problem: NEUROLOGICAL - ADULT  Goal: Achieves maximal functionality and self care  Description: INTERVENTIONS  - Monitor swallowing and airway patency with patient fatigue and changes in neurological status  - Encourage and assist patient to increase activity and self care with guidance from PT/OT  - Encourage visually impaired, hearing impaired and aphasic patients to use assistive/communication devices  Outcome: Progressing

## 2024-05-07 NOTE — PLAN OF CARE
Patient received aox4, sitting in chair, lumbar drain in place. A paced on monitor, BP hypotensive at start of shift so gave 500 cc 0.9 bolus and held po bp meds, exhibiting some symptoms of orthostatic hypotension when going from sitting to standing position. Still emptying 5 mL/hr from lumbar drain per NS. Pt able to stand, ambulate to bathroom but showing difficulty with walking longer distances requiring a lot of encouragement. Tylenol w/codeine for pain control. Spoke with daughter, Thuy, over phone, gave update. Encouraging more activity. Pt transported down to MRI this AM, tolerated well, [employer] Rep waiting down in MRI area, set ppm to MRI setting and then reset to previous settings when pt left MRI. Wife and granddaughter at bedside when pt returned from MRI; given poc update. PT/OT attempted to get patient up to walk in hallway x2 but pt reported feeling weak and pt bp orthostatic but asymptomatic.     Problem: PAIN - ADULT  Goal: Verbalizes/displays adequate comfort level or patient's stated pain goal  Description: INTERVENTIONS:  - Encourage pt to monitor pain and request assistance  - Assess pain using appropriate pain scale  - Administer analgesics based on type and severity of pain and evaluate response  - Implement non-pharmacological measures as appropriate and evaluate response  - Consider cultural and social influences on pain and pain management  - Manage/alleviate anxiety  - Utilize distraction and/or relaxation techniques  - Monitor for opioid side effects  - Notify MD/LIP if interventions unsuccessful or patient reports new pain  - Anticipate increased pain with activity and pre-medicate as appropriate  Outcome: Progressing     Problem: Impaired Functional Mobility  Goal: Achieve highest/safest level of mobility/gait  Description: Interventions:  - Assess patient's functional ability and stability  - Promote increasing activity/tolerance for mobility and gait  - Educate and engage  patient/family in tolerated activity level and precautions  - Recommend use of  RW for transfers and ambulation  - Recommend patient transfer to bedside chair toward strongest side  - Recommend use of chair position in bed 3 times per day  Outcome: Progressing     Problem: MUSCULOSKELETAL - ADULT  Goal: Return mobility to safest level of function  Description: INTERVENTIONS:  - Assess patient stability and activity tolerance for standing, transferring and ambulating w/ or w/o assistive devices  - Assist with transfers and ambulation using safe patient handling equipment as needed  - Ensure adequate protection for wounds/incisions during mobilization  - Obtain PT/OT consults as needed  - Advance activity as appropriate  - Communicate ordered activity level and limitations with patient/family  Outcome: Progressing  Goal: Maintain proper alignment of affected body part  Description: INTERVENTIONS:  - Support and protect limb and body alignment per provider's orders  - Instruct and reinforce with patient and family use of appropriate assistive device and precautions (e.g. spinal or hip dislocation precautions)  Outcome: Progressing     Problem: SAFETY ADULT - FALL  Goal: Free from fall injury  Description: INTERVENTIONS:  - Assess pt frequently for physical needs  - Identify cognitive and physical deficits and behaviors that affect risk of falls.  - [place] fall precautions as indicated by assessment.  - Educate pt/family on patient safety including physical limitations  - Instruct pt to call for assistance with activity based on assessment  - Modify environment to reduce risk of injury  - Provide assistive devices as appropriate  - Consider OT/PT consult to assist with strengthening/mobility  - Encourage toileting schedule  Outcome: Progressing     Problem: RISK FOR INFECTION - ADULT  Goal: Absence of fever/infection during anticipated neutropenic period  Description: INTERVENTIONS  - Monitor WBC  - Administer growth  factors as ordered  - Implement neutropenic guidelines  Outcome: Progressing     Problem: DISCHARGE PLANNING  Goal: Discharge to home or other facility with appropriate resources  Description: INTERVENTIONS:  - Identify barriers to discharge w/pt and caregiver  - Include patient/family/discharge partner in discharge planning  - Arrange for needed discharge resources and transportation as appropriate  - Identify discharge learning needs (meds, wound care, etc)  - Arrange for interpreters to assist at discharge as needed  - Consider post-discharge preferences of patient/family/discharge partner  - Complete POLST form as appropriate  - Assess patient's ability to be responsible for managing their own health  - Refer to Case Management Department for coordinating discharge planning if the patient needs post-hospital services based on physician/LIP order or complex needs related to functional status, cognitive ability or social support system  Outcome: Progressing     Problem: Patient/Family Goals  Goal: Patient/Family Long Term Goal  Description: Patient's Long Term Goal: Return home with optimal physical mobility    Interventions:  - PT/OT, PRN medications, encourage independence  - See additional Care Plan goals for specific interventions  Outcome: Progressing  Goal: Patient/Family Short Term Goal  Description: Patient's Short Term Goal: Maintain BP within parameters 100-150    Interventions:   - PRN medications, adequate rest, lumbar drain per orders  - See additional Care Plan goals for specific interventions  Outcome: Progressing     Problem: NEUROLOGICAL - ADULT  Goal: Achieves maximal functionality and self care  Description: INTERVENTIONS  - Monitor swallowing and airway patency with patient fatigue and changes in neurological status  - Encourage and assist patient to increase activity and self care with guidance from PT/OT  - Encourage visually impaired, hearing impaired and aphasic patients to use  assistive/communication devices  Outcome: Progressing

## 2024-05-07 NOTE — PROGRESS NOTES
SUBJECTIVE: A, A and confused.     CC: NPH  Interval History: On 5/6/24 lumbar drain placed per CASEY   Scheduled Meds:   ceFAZolin  2 g Intravenous Q8H    lisinopril  5 mg Oral Daily    atorvastatin  40 mg Oral Nightly    donepezil  10 mg Oral Nightly    finasteride  5 mg Oral Daily    flecainide  100 mg Oral BID    fluticasone-umeclidin-vilant  1 puff Inhalation Daily    ipratropium  2 spray Nasal 2 times per day    metoprolol tartrate  25 mg Oral 2x Daily(Beta Blocker)    sertraline  25 mg Oral Daily    tamsulosin  0.4 mg Oral After dinner     Continuous Infusions:   sodium chloride 83 mL/hr at 05/06/24 0010     PRN Meds:  acetaminophen-codeine    hydrALAzine    HYDROcodone-acetaminophen **OR** HYDROcodone-acetaminophen    LORazepam    melatonin    acetaminophen    polyethylene glycol (PEG 3350)    sennosides    bisacodyl    fleet enema    ondansetron    metoclopramide    OBJECTIVE:    Vital signs in last 24 hours:  Patient Vitals for the past 24 hrs:   BP Temp Temp src Pulse Resp SpO2 Weight   05/07/24 1445 107/56 -- -- -- -- -- --   05/07/24 1443 (!) 84/49 -- -- -- -- -- --   05/07/24 1439 (!) 83/41 -- -- -- -- -- --   05/07/24 1437 101/45 -- -- -- -- -- --   05/07/24 1434 113/56 -- -- -- -- -- --   05/07/24 1300 125/58 -- -- 66 18 94 % --   05/07/24 1200 125/59 98.4 °F (36.9 °C) Temporal 63 16 96 % --   05/07/24 1000 93/51 -- -- 64 14 92 % --   05/07/24 0915 (!) 52/45 -- -- -- -- -- --   05/07/24 0912 (!) 109/92 -- -- -- -- -- --   05/07/24 0908 97/47 -- -- -- -- -- --   05/07/24 0900 90/42 -- -- 66 12 94 % --   05/07/24 0800 105/56 97.7 °F (36.5 °C) Temporal 68 23 92 % --   05/07/24 0700 (!) 80/47 -- -- 64 20 93 % --   05/07/24 0600 102/51 -- -- 63 14 90 % 193 lb (87.5 kg)   05/07/24 0500 102/48 -- -- 62 16 91 % --   05/07/24 0400 119/53 98.1 °F (36.7 °C) Temporal 62 22 94 % --   05/07/24 0300 151/66 -- -- 61 16 94 % --   05/07/24 0200 140/67 -- -- 68 16 92 % --   05/07/24 0100 (!) 169/71 -- -- 64 17 93 % --    05/07/24 0000 135/64 98.8 °F (37.1 °C) Temporal 60 12 97 % --   05/06/24 2300 151/66 -- -- 60 17 95 % --   05/06/24 2200 159/62 -- -- 63 15 95 % --   05/06/24 2100 (!) 176/76 -- -- 60 15 93 % --   05/06/24 2000 133/67 98.2 °F (36.8 °C) Temporal 61 13 94 % --   05/06/24 1800 159/71 -- -- 63 18 96 % --   05/06/24 1700 123/57 -- -- 60 16 94 % --   05/06/24 1600 110/56 97.6 °F (36.4 °C) Temporal 64 22 96 % --       Intake/Output:    Intake/Output Summary (Last 24 hours) at 5/7/2024 1513  Last data filed at 5/7/2024 1400  Gross per 24 hour   Intake 40 ml   Output 1170 ml   Net -1130 ml     Physical Therapy:     Bed Mobility: performed x2 (first during AM session then again during PM session)   Rolling: mod I       Supine<>Sit: Min A - cuing provided for log roll technique during both trials, when appropriately performing and utilizing bed rail for leverage pt able to perform transfer with min A and increased time                Sit<>Supine: NT                   Transfer Mobility:  Sit<>Stand: Min A performed in AM and PM session, able to maintain static standing sufficient time to assess BP during both sessions   Stand<>Sit: Min A - poor eccentric lowering control demonstrated in AM session, able to improve during PM session with cuing/successful application of UE placement reaching back for bedside chair   Gait: Able to transfer to bedside chair with min A x2 with assist provided with RW navigation, did well applying cuing to keep R knee in TKE for stability during lateral stepping, unable to further progress gait training 2/2 hypotension, pt educated on plan for next session   .      Lab Results   Component Value Date    WBC 9.7 05/07/2024    HGB 12.9 05/07/2024    HCT 37.5 05/07/2024    .0 05/07/2024    CREATSERUM 0.98 05/07/2024    BUN 23 05/07/2024     05/07/2024    K 3.9 05/07/2024     05/07/2024    CO2 26.0 05/07/2024    GLU 97 05/07/2024    CA 8.7 05/07/2024          ASSESSMENT/PLAN:  Principal Problem:    Weakness generalized  Active Problems:    Benign prostatic hyperplasia    PAF (paroxysmal atrial fibrillation) (HCC)    NPH (normal pressure hydrocephalus) (HCC)    Fall, initial encounter    Chronic right-sided low back pain without sciatica    Acute nausea with nonbilious vomiting    Radiculopathy    Acute pain of right shoulder    Daily PT/OT.  Pt today with orthostasis and was unable to progress much in therapies. Will need to show ability to tolerate intense therapies to qualify for Acute rehab.  If able to consistently participate in therapies with stable BP's  and once neuro stable and drain is dc'ed, pt will benefit from multi-disciplinary therapies with close MD supervision at Acute rehab with ELOS 10-14 days.      Jaquelin Cerna MD  [facility].

## 2024-05-07 NOTE — PROGRESS NOTES
[facility]  Neurocritical Care Progress Note     Clay Orellana Patient Status:  Inpatient    1945 MRN [id number]   Location [facility] 6NE-A Attending David Bustillo MD   Hosp Day # 6 PCP Sachin Meza MD     Subjective:   Patient is a 79 year old male h/o htn, hl, pAF on doac, bph, anxiety do and possible ventriculomegaly now s/p lumbar drain insertion for normal pressure hydrocephalus eval . Pt admitted  with falls c/f due to NPH, thus lumbar drain placed per CASEY to eval for possible  shunt     No acute events overnight. Episode of hypotension after AM BP meds, resolved with IVF bolus    Vitals:   Temp:  [97.3 °F (36.3 °C)-98.8 °F (37.1 °C)] 98.1 °F (36.7 °C)  Pulse:  [60-68] 68  Resp:  [11-24] 23  BP: ()/(43-76) 105/56  SpO2:  [90 %-100 %] 92 %  Body mass index is 26.92 kg/m².    Intake/Output:    Intake/Output Summary (Last 24 hours) at 2024 0921  Last data filed at 2024 0700  Gross per 24 hour   Intake 40 ml   Output 1440 ml   Net -1400 ml     Current Meds:  Current Facility-Administered Medications   Medication Dose Route Frequency    ceFAZolin (Ancef) 2 g in 20mL IV syringe premix  2 g Intravenous Q8H    acetaminophen-codeine (Tylenol #3) 300-30 MG per tab 1 tablet  1 tablet Oral Q4H PRN    hydrALAzine (Apresoline) 20 mg/mL injection 10 mg  10 mg Intravenous Q2H PRN    sodium chloride 0.9% infusion   Intravenous Continuous    lisinopril (Prinivil; Zestril) tab 5 mg  5 mg Oral Daily    HYDROcodone-acetaminophen (Norco) 5-325 MG per tab 1 tablet  1 tablet Oral Q4H PRN    Or    HYDROcodone-acetaminophen (Norco) 5-325 MG per tab 2 tablet  2 tablet Oral Q4H PRN    LORazepam (Ativan) tab 1 mg  1 mg Oral Q6H PRN    atorvastatin (Lipitor) tab 40 mg  40 mg Oral Nightly    donepezil (Aricept) tab 10 mg  10 mg Oral Nightly    melatonin tab 3 mg  3 mg Oral Nightly PRN    acetaminophen (Tylenol Extra Strength) tab 500 mg  500 mg Oral Q4H PRN    polyethylene glycol (PEG  3350) (Miralax) 17 g oral packet 17 g  17 g Oral Daily PRN    sennosides (Senokot) tab 17.2 mg  17.2 mg Oral Nightly PRN    bisacodyl (Dulcolax) 10 MG rectal suppository 10 mg  10 mg Rectal Daily PRN    fleet enema (Fleet) 7-19 GM/118ML rectal enema 133 mL  1 enema Rectal Once PRN    ondansetron (Zofran) 4 MG/2ML injection 4 mg  4 mg Intravenous Q6H PRN    metoclopramide (Reglan) 5 mg/mL injection 10 mg  10 mg Intravenous Q8H PRN    finasteride (Proscar) tab 5 mg  5 mg Oral Daily    flecainide (Tambocor) tab 100 mg  100 mg Oral BID    fluticasone-umeclidin-vilant (Trelegy Ellipta) 100-62.5-25 MCG/ACT inhaler 1 puff  1 puff Inhalation Daily    ipratropium (Atrovent) 0.03 % nasal solution 2 spray  2 spray Nasal 2 times per day    metoprolol tartrate (Lopressor) tab 25 mg  25 mg Oral 2x Daily(Beta Blocker)    sertraline (Zoloft) tab 25 mg  25 mg Oral Daily    tamsulosin (Flomax) cap 0.4 mg  0.4 mg Oral After dinner     Physical Examination:   General- No acute distress  CV- RRR  Resp- CTA Bilat  Neuro-  Mental status- awake and alert, regards and follows commands, oriented x3, speech fluent  Cranial nerves- pupils equally round and reactive to light, extraocular muscles intact, face symmetric, visual fields full  Motor- reed x4  Sens-  Intact to light touch    Diagnostics:   IR LUMBAR DRAINAGE    Result Date: 5/6/2024  CONCLUSION:   Successful lumbar drain placement, as detailed above.  LOCATION:  [place]       Dictated by (CST): Martín Booker MD on 5/06/2024 at 10:00 AM     Finalized by (CST): Martín Booker MD on 5/06/2024 at 10:07 AM      Lab Review     Recent Labs   Lab 05/01/24 2013 05/07/24  0441    140   K 4.1 3.9    110   CO2 28.0 26.0   GLU 88 97   BUN 22 23   CREATSERUM 1.23 0.98     Recent Labs   Lab 05/01/24 2012 05/07/24  0441   WBC 10.4 9.7   HGB 14.6 12.9*   .0 229.0     Assesment/Plan:     Neuro:  Possible ventriculomegaly- in setting of gait, urinary and memory difficulties,  c/f NPH.   LD trial per Neurosurg.   Cont neurochecks/pt/ot/st.  Cardiac:  Htn/hl/pAF- sbp goal 100-150, cont statin, metop, and zestril (hold if SBP<120). Hold doac until cleared per NS.  Hypotension- possibly orthostatic, hold anti-htn meds if SBP<120  Pulmonary:  Stable on RA  Renal:  IVFs  GI:  PO as tolerated  Heme/ID:  Afebrile, no leukocytosis  Endocrine/Rheum:  Monitor glucose, sliding scale insulin prn  DVT Prophylaxis:  SCD’s    Goals of the Day: neurochecks   A total of 40 minutes of critical care time (exclusive of billable procedures) was administered to manage and/or prevent neurologic instability. This involved direct patient intervention, complex decision making, and/or extensive discussions with the patient, family, and clinical staff.    Thank you for allowing me to participate in the care of this patient.     Janeth Galvin MD, [facility]  Medical Director of System Neurosciences  Chief, Section of Neurocritical Care  [facility]

## 2024-05-07 NOTE — PROGRESS NOTES
Stopped by multiple times to see patient; was working with PT on one occasion and gone for MRI on other occasion.  Will check back with RN again soon.

## 2024-05-07 NOTE — CM/SW NOTE
CM reached out to Opal at  this morning. PM&R will see later today. PT stating pt would benefit from intensive therapy. Await PM&R review.    PM&R in agreement wit AR if pt is able to participate in PT. AR referral sent via [employer]. Will need to f/u with list.     Rogelio Meza, MSN RN,   X [phone]

## 2024-05-07 NOTE — PHYSICAL THERAPY NOTE
PHYSICAL THERAPY TREATMENT NOTE - INPATIENT    Room Number: 6602/6602-A     Session: 1 post re-eval    Number of Visits to Meet Established Goals: 7  History related to current admission: Patient is a 79 year old male admitted on 5/1/2024 from [facility] for fall, weakness. 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 MRI L spine reviewed, discussed with neuro APODALYS gutierrez for cont therapy.         HOME SITUATION  Type of Home: Independent living facility ([facility])   Home Layout: One level     Lives With: Spouse  Drives: No  Patient Owned Equipment: Cane;Rolling walker;Rollator  Patient Regularly Uses: Glasses     Prior Level of [place]: \"Pt typically ambulates with rollator walker at [facility], ambulates to dining [place] but lately wife has been pushing him in the rollator. Was supposed to have drain trial this upcoming Monday for NPH. \"  Presenting Problem: Fall  Co-Morbidities : NPH, Afib, HTN, TIA, COPD, anxiety    ASSESSMENT   Patient demonstrates limited progress this session, goals  remain in progress.    Patient continues to function below baseline with bed mobility, transfers, and gait.  Contributing factors to remaining limitations include decreased functional strength, decreased endurance/aerobic capacity, pain, impaired dynamic balance, impaired coordination, impaired motor planning, and orthostatic hypotension .  Continues to demonstrate RUE/LE weakness. Next session anticipate patient to progress bed mobility and transfers.  Physical Therapy will continue to follow patient for duration of hospitalization.    Patient continues to benefit from continued skilled PT services: to facilitate return to prior level of function as patient demonstrates high motivation with excellent tolerance to an intensive therapy program .    PLAN  PT Treatment Plan: Bed mobility;Body mechanics;Endurance;Energy conservation;Patient education;Family education;Gait training;Balance  training;Transfer training;Strengthening;Neuromuscular re-educate  Rehab Potential : Fair  Frequency (Obs): 5x/week    CURRENT GOALS     Goal #1 Patient is able to demonstrate supine - sit EOB @ level: supervision      Goal #2 Patient is able to demonstrate transfers EOB to/from Chair/Wheelchair at assistance level: minimum assistance      Goal #3 Patient is able to ambulate 15 feet with assist device: walker - rolling at assistance level: moderate assistance      Goal #4     Goal #5     Goal #6     Goal Comments: Goals established on 5/2/2024 5/7/2024 all goals ongoing     SUBJECTIVE  \"No\" - pt educated on activity recommendations per neuro    OBJECTIVE  Precautions: Bed/chair alarm;Drain(s);Other (Comment) (Lumbar drain, -150)    WEIGHT BEARING RESTRICTION  Weight Bearing Restriction: None                PAIN ASSESSMENT   Rating: Unable to rate  Location: R shoulder  Management Techniques: Body mechanics;Activity promotion;Repositioning    BALANCE                                                                                                                       Static Sitting: Fair -  Dynamic Sitting: Fair -           Static Standing: Poor +  Dynamic Standing: Poor +    ACTIVITY TOLERANCE           BP: 107/56  BP Location: Left arm  BP Method: Automatic  Patient Position: Sitting (reclined)    O2 WALK         AM-PAC '6-Clicks' INPATIENT SHORT FORM - BASIC MOBILITY  How much difficulty does the patient currently have...  Patient Difficulty: Turning over in bed (including adjusting bedclothes, sheets and blankets)?: A Little   Patient Difficulty: Sitting down on and standing up from a chair with arms (e.g., wheelchair, bedside commode, etc.): A Little   Patient Difficulty: Moving from lying on back to sitting on the side of the bed?: A Little   How much help from another person does the patient currently need...   Help from Another: Moving to and from a bed to a chair (including a wheelchair)?: A Little    Help from Another: Need to walk in hospital room?: A Lot   Help from Another: Climbing 3-5 steps with a railing?: Total       AM-PAC Score:  Raw Score: 15   Approx Degree of Impairment: 57.7%   Standardized Score (AM-PAC Scale): 39.45   CMS Modifier (G-Code): CK    FUNCTIONAL ABILITY STATUS  Gait Assessment   Functional Mobility/Gait Assessment  Gait Assistance: Minimum assistance  Distance (ft): 2  Assistive Device: Rolling walker  Pattern: Shuffle    Skilled Therapy Provided: pt seen in AM and PM     Bed Mobility: performed x2 (first during AM session then again during PM session)   Rolling: mod I    Supine<>Sit: Min A - cuing provided for log roll technique during both trials, when appropriately performing and utilizing bed rail for leverage pt able to perform transfer with min A and increased time   Sit<>Supine: NT     Transfer Mobility:  Sit<>Stand: Min A performed in AM and PM session, able to maintain static standing sufficient time to assess BP during both sessions   Stand<>Sit: Min A - poor eccentric lowering control demonstrated in AM session, able to improve during PM session with cuing/successful application of UE placement reaching back for bedside chair   Gait: Able to transfer to bedside chair with min A x2 with assist provided with RW navigation, did well applying cuing to keep R knee in TKE for stability during lateral stepping, unable to further progress gait training 2/2 hypotension, pt educated on plan for next session     Therapist's Comments: family present during PM session reporting seeing overall improvement in mobility since admit    *unable to participate in 30 sec STS standard assessment this session 2/2 hypotension      Patient End of Session: Up in chair;Needs met;Call light within reach;RN aware of session/findings;All patient questions and concerns addressed;Alarm set;Family present    PT Session Time: 42 minutes total  Gait Trainin minutes  Therapeutic Activity: 42  minutes  Therapeutic Exercise: 0 minutes   Neuromuscular Re-education:  minutes

## 2024-05-07 NOTE — PROGRESS NOTES
[facility]  Neurosurgery Progress Note    Clay Orellana Patient Status:  Inpatient    1945 MRN [id number]   Location [facility] 6NE-A Attending David Bustillo MD   Hosp Day # 6 PCP Sachin Meza MD     Chief Complaint:  Chief Complaint   Patient presents with    Fall         Subjective:  Pt examined, states he was unable to tolerate working with PT this am as he was fatigued.      Objective/Physical Exam:    Vital Signs:  Blood pressure (!) 80/47, pulse 64, temperature 98.1 °F (36.7 °C), temperature source Temporal, resp. rate 20, weight 193 lb (87.5 kg), SpO2 93%.    Respiratory:  Respirations nonlabored    CV:  NSR on tele    General: NAD, Speech Fluent, Mood Appropriate    Neurologic: This patient is alert and orientated x 3.  Able to follow commands.  Face is symmetric. CN 2-12 GI,  Sensation to light touch is intact bilaterally.  BALDWIN x 4.  Gait deferred    Drains:  Lumbar drain with clear CSF    Skin: Dressings intact and dry      Labs:  Lab Results   Component Value Date    WBC 9.7 2024    HGB 12.9 2024    HCT 37.5 2024    .0 2024    CREATSERUM 0.98 2024    BUN 23 2024     2024    K 3.9 2024     2024    CO2 26.0 2024    GLU 97 2024    CA 8.7 2024       Imaging:  MRI SPINE LUMBAR (W+WO) (CPT=72158)    Result Date: 2024  CONCLUSION:   1. There is abnormal low T1 signal and high T2 signal with enhancement associated with the deformed left transverse processes of L2, L3, and L4 that is concerning for potential minimally displaced fractures.  There is mild associated soft tissue edema extending into the left lateral paraspinal musculature.  Dedicated CT of the lumbar spine is suggested for further evaluation.  2. Multilevel degenerative changes lumbar spine are most pronounced at the L4-5 level.  3. Mild-to-moderate spinal canal stenosis with bilateral subarticular zone stenosis at L3-4 and L4-5.  Mild  spinal canal stenosis at L2-3.  4. Moderate to severe right neural foraminal stenosis at L4-5.  Mild left neural foraminal stenosis at L3-4.  Mild to moderate left neural foraminal stenosis at L5-S1.  5. Facet arthropathy in the lumbar spine as above.   Please see above for further details.   LOCATION:  [id number]      Dictated by (CST): Nabeel Flores MD on 5/07/2024 at 1:11 PM     Finalized by (CST): Nabeel Flores MD on 5/07/2024 at 1:20 PM            Assessment:  S/p Lumbar drain insertion PPD #2  Neurologic gait dysfunction    Plan:  Pt seen and examined with Dr. Pringle  Ideally would like PT eval today  Increase lumbar drain to 10 ml/hr  Medical management per hospitalist      EMRE Ricketts  [facility]  5/7/2024, 8:39 AM      A total of 15 minutes of visit time (exclusible of billable procedures) was administered.  > 50 % of time spent counseling/coordinating care     Is this a shared or split note between Advanced Practice Provider and Physician? Yes  Patient seen examined with nurse practitioner on Case discussed  He notes no difference after drainage  Will increase his drainage  He needs to work with physical therapy  All questions were answered  Following

## 2024-05-07 NOTE — DIETARY NOTE
[facility]   part of [facility]   CLINICAL NUTRITION    Clay Orellana     Admitting diagnosis:  Weakness generalized [R53.1]  NPH (normal pressure hydrocephalus) (HCC) [G91.2]  Fall, initial encounter [W19.XXXA]  Chronic right-sided low back pain without sciatica [M54.50, G89.29]    Ht:  5'11\"  Wt: 87.5 kg (193 lb).   Body mass index is 26.92 kg/m².  IBW: 78.2 kg    Wt Readings from Last 6 Encounters:   05/07/24 87.5 kg (193 lb)   05/01/24 89.8 kg (198 lb)   03/12/24 89.8 kg (198 lb)   03/11/24 90.7 kg (200 lb)   12/07/23 93 kg (205 lb)   11/29/23 90.8 kg (200 lb 3.2 oz)        Labs/Meds reviewed    Diet:       Procedures    Regular/General diet Is Patient on Accuchecks? No     Percent Meals Eaten (last 3 days)       Date/Time Percent Meals Eaten (%)    05/04/24 0933 100 %    05/04/24 1930 100 %    05/05/24 0900 100 %    05/05/24 1700 100 %            Pt chart reviewed d/t LOS.  Patient reports good appetite at this time.  Nursing notes reports Percent Meals Eaten (%): 100 % intake for last meal.  Tolerating po diet without diarrhea, emesis, or constipation.   No significant weight changes noted.     PMH includes HTN. Pt p/w generalized weakness. S/p lumbar drain insertion for normal pressure hydrocephalus.  Pt out of room at time of visit.  Per chart review - has been eating well over the past week. No n/v/d reported. Last bM 5/4. No significant wt change per EMR review (7# / 3% x 2 mo)      Patient is at low nutrition risk at this time.    Please consult if patient status changes or nutrition issues arise.    Hafsa Winn RD, LDN, [facility]  Clinical Dietitian  Phone [phone]

## 2024-05-07 NOTE — OCCUPATIONAL THERAPY NOTE
OCCUPATIONAL THERAPY TREATMENT NOTE - INPATIENT     Room Number: 6602/6602-A  Session: 1   Number of Visits to Meet Established Goals: 5    Presenting Problem: weakness, fall, 5/6 lumbar drain placement      ASSESSMENT   Patient demonstrates fair progress this session, goals remain in progress.    ** Pt with orthostatic hypotension this morning. Supine 97/47, standing 109/92, standing 52/45.  No dizziness. Back to supine 92/48.  Informed RN,     ** Addendum: seen in the afternoon, still orthostatic.     Patient continues to function below baseline with  all ADL .   Contributing factors to remaining limitations include decreased functional strength, decreased endurance, and impaired sitting and standing balance. Occupational Therapy will continue to follow for duration of hospitalization.  Next session anticipate patient to progress toileting, upper body dressing, and lower body dressing.  Occupational Therapy will continue to follow patient for duration of hospitalization.    Patient continues to benefit from continued skilled OT services: to facilitate return to prior level of function as patient demonstrates high motivation with excellent tolerance to an intensive therapy program .          OT Device Recommendations: TBD    History:  Patient is a 79 year old male admitted on 5/1/2024 with Presenting Problem: Weakness, fall. Co-Morbidities : NPH, Afib, HTN, TIA, COPD, anxiety.     **5/6 s/p lumbar drain placement for trial.  ** Spine MRI 5/7. Refer to chart. Per neurosurgery, ok to continue with therapy.    WEIGHT BEARING RESTRICTION  Weight Bearing Restriction: None                Recommendations for nursing staff:   Transfers: min A  Toileting location: bedside commode    TREATMENT SESSION:  Patient Start of Session: supine  FUNCTIONAL TRANSFER ASSESSMENT  Sit to Stand: Edge of Bed; Chair  Edge of Bed: Minimal Assist  Chair: Not Tested    BED MOBILITY  Rolling: Minimal Assist  Supine to Sit : Minimal Assist  Sit  to Supine (OT): Minimal Assist  Scooting: sba    EDUCATION PROVIDED  Patient: Role of Occupational Therapy; Plan of Care; Functional Transfer Techniques; Posture/Positioning  Patient's Response to Education: Requires Further Education  Family/Caregiver: Role of Occupational Therapy; Plan of Care  Family/Caregiver's Response to Education: Verbalized Understanding      Equipment used: rw     Exercises:    Exercises Repetitions Comments   Scapular elevation     Scapular retraction     Shoulder rolls     Shoulder flexion 10    Shoulder abduction     Shoulder internal/external rotation     Forward punch     Elbow flexion 10    Elbow extension 10    Forearm pronation/supination     Wrist flexion/extension 10    Gross grasp/fist pumps 10    Ankle pumps 10    Knee extension     Marching       Therapist comments: see Assessment section for orthostatic BP.  Min A log rolling, min A to stand with RW. Increased cueing provided for spine precautions, hand placement for RW, and sequencing.   After pt was back in supine, completed pt education on B UE HEP. See above.     ** ADDENDUM: seen in the afternoon. In bed with HOB already up 60 degrees 113/56, sitting 101/45, standing 83/41, no dizziness. Min A to stand and to take steps to the chair. BP 84/49, BP again in the recliner chair 107/56. Informed RN.     Patient End of Session: In bed;Needs met;Call light within reach;RN aware of session/findings;All patient questions and concerns addressed;Alarm set      PAIN ASSESSMENT  Rating: Unable to rate  Location: R shoulder  Management Techniques: Repositioning     OBJECTIVE  Precautions: Bed/chair alarm;Drain(s);Other (Comment) (Lumbar drain, -150)    AM-PAC ‘6-Clicks’ Inpatient Daily Activity Short Form  -   Putting on and taking off regular lower body clothing?: A Lot  -   Bathing (including washing, rinsing, drying)?: A Lot  -   Toileting, which includes using toilet, bedpan or urinal? : A Lot  -   Putting on and taking off  regular upper body clothing?: A Little  -   Taking care of personal grooming such as brushing teeth?: A Little  -   Eating meals?: A Little    AM-PAC Score:  Score: 15  Approx Degree of Impairment: 56.46%  Standardized Score (AM-PAC Scale): 34.69    PLAN  OT Treatment Plan: Balance activities;Energy conservation/work simplification techniques;ADL training;Functional transfer training;Patient/Family education;Patient/Family training;Equipment eval/education;Compensatory technique education;Cognitive reorientation  Rehab Potential : Good  Frequency: 3-5x/week    OT Goals:   Ongoing 5/7  ADL Goals   Patient will perform upper body dressing:  with setup  Patient will perform lower body dressing:  with min assist  Patient will perform toileting: with min assist     Functional Transfer Goals  Patient will transfer from supine to sit:  with supervision  Patient will transfer to toilet:  with supervision     UE Exercise Program Goal  Patient will be independent with bilateral AROM HEP (home exercise program).     Additional Goals  Pt will recall spine precautions without cues.     Therapist Goals  SBT on the day of discharge    OT Session Time: 40 minutes  Self-Care Home Management:  minutes  Therapeutic Activity: 23 minutes  Neuromuscular Re-education:  minutes  Therapeutic Exercise: 8 minutes  Cognitive Skills: 5 minutes

## 2024-05-08 LAB — MRSA DNA SPEC QL NAA+PROBE: NEGATIVE

## 2024-05-08 PROCEDURE — 99232 SBSQ HOSP IP/OBS MODERATE 35: CPT | Performed by: HOSPITALIST

## 2024-05-08 PROCEDURE — 99231 SBSQ HOSP IP/OBS SF/LOW 25: CPT | Performed by: NEUROLOGICAL SURGERY

## 2024-05-08 PROCEDURE — 99291 CRITICAL CARE FIRST HOUR: CPT | Performed by: INTERNAL MEDICINE

## 2024-05-08 RX ORDER — LABETALOL HYDROCHLORIDE 5 MG/ML
10 INJECTION, SOLUTION INTRAVENOUS EVERY 4 HOURS PRN
Status: DISCONTINUED | OUTPATIENT
Start: 2024-05-08 | End: 2024-05-13

## 2024-05-08 NOTE — OCCUPATIONAL THERAPY NOTE
OCCUPATIONAL THERAPY TREATMENT NOTE - INPATIENT     Room Number: 6602/6602-A  Session: 2   Number of Visits to Meet Established Goals: 5    Presenting Problem: weakness, fall, 5/6 lumbar drain placement      ASSESSMENT   Patient demonstrates fair progress this session, goals remain in progress.    Patient continues to function below baseline with  all ADL .   Contributing factors to remaining limitations include decreased functional strength, decreased endurance, and impaired sitting and standing balance. Occupational Therapy will continue to follow for duration of hospitalization.  Next session anticipate patient to progress toileting, upper body dressing, and lower body dressing.  Occupational Therapy will continue to follow patient for duration of hospitalization.     Patient continues to benefit from continued skilled OT services: to facilitate return to prior level of function as patient demonstrates high motivation with excellent tolerance to an intensive therapy program .         OT Device Recommendations: TBD     History:  Patient is a 79 year old male admitted on 5/1/2024 with Presenting Problem: Weakness, fall. Co-Morbidities : NPH, Afib, HTN, TIA, COPD, anxiety.     **5/6 s/p lumbar drain placement for trial.  ** Spine MRI 5/7. Refer to chart. Per neurosurgery, ok to continue with therapy.      WEIGHT BEARING RESTRICTION  Weight Bearing Restriction: None                Recommendations for nursing staff:   Transfers: mod A   Toileting location: toilet    TREATMENT SESSION:  Patient Start of Session: seated  FUNCTIONAL TRANSFER ASSESSMENT  Sit to Stand: Edge of Bed; Chair  Edge of Bed: Not Tested  Chair: Minimal Assist    BED MOBILITY  Rolling: Minimal Assist  Supine to Sit : Not tested  Sit to Supine (OT): Not Tested  Scooting: not tested    BALANCE ASSESSMENT  Static Sitting: Minimal Assist  Sitting Unilateral: Stand-by Assist  Static Standing: Minimal Assist  Standing Unilateral: Minimal  Assist    FUNCTIONAL ADL ASSESSMENT  Eating: Minimal Assist (tremors w/ hand to mouth right hand)  Grooming Seated: Not Tested  LB Dressing Seated: Not Tested  Toileting Seated: Not Tested       EDUCATION PROVIDED  Patient: Role of Occupational Therapy; Plan of Care; Functional Transfer Techniques; Fall Prevention; Posture/Positioning; Surgical Precautions  Patient's Response to Education: Requires Further Education  Family/Caregiver: Role of Occupational Therapy; Plan of Care  Family/Caregiver's Response to Education: Verbalized Understanding      Equipment used: rw    Therapist comments: Pt was seated in the chair. SBP in mid 90s. No dizziness.  Pt was brought out in the hallway with pt being in the chair.  Refer to PT note about gait.  Min A cueing and hand-over-hand guidance provided for sit to stand transfer. Cueing provided to pacing.  When pt was trying to perform 2-tasks simultaneously (turning head to R and take steps), pt appeared unsteady.  R UE improved strength, 4/5 shoulder and elbow.  Pt completed sit to stand transfer again, min A from the chair. This transfer was completed in preparation for toilet transfer.        Patient End of Session: Up in chair;Needs met;Call light within reach;RN aware of session/findings;All patient questions and concerns addressed    SUBJECTIVE  \"I guess, this still hurts\" R shoulder    PAIN ASSESSMENT  Rating: Unable to rate  Location: posterior neck  Management Techniques: Repositioning     OBJECTIVE  Precautions: Bed/chair alarm;Drain(s);Other (Comment) (Lumbar drain, -150)    AM-PAC ‘6-Clicks’ Inpatient Daily Activity Short Form  -   Putting on and taking off regular lower body clothing?: A Lot  -   Bathing (including washing, rinsing, drying)?: A Lot  -   Toileting, which includes using toilet, bedpan or urinal? : A Lot  -   Putting on and taking off regular upper body clothing?: A Little  -   Taking care of personal grooming such as brushing teeth?: A Little  -    Eating meals?: A Little    AM-PAC Score:  Score: 15  Approx Degree of Impairment: 56.46%  Standardized Score (AM-PAC Scale): 34.69    PLAN  OT Treatment Plan: Balance activities;Energy conservation/work simplification techniques;ADL training;Functional transfer training;Patient/Family education;Patient/Family training;Equipment eval/education;Compensatory technique education;Cognitive reorientation  Rehab Potential : Good  Frequency: 3-5x/week    OT Goals:   Progressing 5/8  ADL Goals   Patient will perform upper body dressing:  with setup  Patient will perform lower body dressing:  with min assist  Patient will perform toileting: with min assist     Functional Transfer Goals  Patient will transfer from supine to sit:  with supervision  Patient will transfer to toilet:  with supervision     UE Exercise Program Goal  Patient will be independent with bilateral AROM HEP (home exercise program).     Additional Goals  Pt will recall spine precautions without cues.     Therapist Goals  SBT on the day of discharge      OT Session Time: 28 minutes  Self-Care Home Management: 8 minutes  Therapeutic Activity: 12 minutes  Neuromuscular Re-education:  minutes  Therapeutic Exercise: 5 minutes

## 2024-05-08 NOTE — PLAN OF CARE
Assumed cares about 1930, VS stable on RA while awake and 2L NC overnight. BP within parameters overnight, no PRNs needed. Lumbar drain in place max level 18mL draining 10mL per hr, dressing reinforced, C/D/I. Neuro assessments intact, pupils equal/reactive, BALDWIN, A/Ox4, generalized weakness noted, no drift or facial droop.    Problem: PAIN - ADULT  Goal: Verbalizes/displays adequate comfort level or patient's stated pain goal  Description: INTERVENTIONS:  - Encourage pt to monitor pain and request assistance  - Assess pain using appropriate pain scale  - Administer analgesics based on type and severity of pain and evaluate response  - Implement non-pharmacological measures as appropriate and evaluate response  - Consider cultural and social influences on pain and pain management  - Manage/alleviate anxiety  - Utilize distraction and/or relaxation techniques  - Monitor for opioid side effects  - Notify MD/LIP if interventions unsuccessful or patient reports new pain  - Anticipate increased pain with activity and pre-medicate as appropriate  Outcome: Progressing     Problem: Impaired Functional Mobility  Goal: Achieve highest/safest level of mobility/gait  Description: Interventions:  - Assess patient's functional ability and stability  - Promote increasing activity/tolerance for mobility and gait  - Educate and engage patient/family in tolerated activity level and precautions  - Recommend use of  RW for transfers and ambulation  - Recommend patient transfer to bedside chair toward strongest side  - When transferring patient, block weaker knee for safety  Outcome: Progressing     Problem: MUSCULOSKELETAL - ADULT  Goal: Return mobility to safest level of function  Description: INTERVENTIONS:  - Assess patient stability and activity tolerance for standing, transferring and ambulating w/ or w/o assistive devices  - Assist with transfers and ambulation using safe patient handling equipment as needed  - Ensure adequate  protection for wounds/incisions during mobilization  - Obtain PT/OT consults as needed  - Advance activity as appropriate  - Communicate ordered activity level and limitations with patient/family  Outcome: Progressing  Goal: Maintain proper alignment of affected body part  Description: INTERVENTIONS:  - Support and protect limb and body alignment per provider's orders  - Instruct and reinforce with patient and family use of appropriate assistive device and precautions (e.g. spinal or hip dislocation precautions)  Outcome: Progressing     Problem: SAFETY ADULT - FALL  Goal: Free from fall injury  Description: INTERVENTIONS:  - Assess pt frequently for physical needs  - Identify cognitive and physical deficits and behaviors that affect risk of falls.  - [place] fall precautions as indicated by assessment.  - Educate pt/family on patient safety including physical limitations  - Instruct pt to call for assistance with activity based on assessment  - Modify environment to reduce risk of injury  - Provide assistive devices as appropriate  - Consider OT/PT consult to assist with strengthening/mobility  - Encourage toileting schedule  Outcome: Progressing     Problem: RISK FOR INFECTION - ADULT  Goal: Absence of fever/infection during anticipated neutropenic period  Description: INTERVENTIONS  - Monitor WBC  - Administer growth factors as ordered  - Implement neutropenic guidelines  Outcome: Progressing     Problem: DISCHARGE PLANNING  Goal: Discharge to home or other facility with appropriate resources  Description: INTERVENTIONS:  - Identify barriers to discharge w/pt and caregiver  - Include patient/family/discharge partner in discharge planning  - Arrange for needed discharge resources and transportation as appropriate  - Identify discharge learning needs (meds, wound care, etc)  - Arrange for interpreters to assist at discharge as needed  - Consider post-discharge preferences of patient/family/discharge partner  -  Complete POLST form as appropriate  - Assess patient's ability to be responsible for managing their own health  - Refer to Case Management Department for coordinating discharge planning if the patient needs post-hospital services based on physician/LIP order or complex needs related to functional status, cognitive ability or social support system  Outcome: Progressing     Problem: Patient/Family Goals  Goal: Patient/Family Long Term Goal  Description: Patient's Long Term Goal: Return home with optimal physical mobility    Interventions:  - PT/OT, PRN medications, encourage independence  - See additional Care Plan goals for specific interventions  Outcome: Progressing  Goal: Patient/Family Short Term Goal  Description: Patient's Short Term Goal: Maintain BP within parameters 100-150    Interventions:   - PRN medications, adequate rest, lumbar drain per orders  - See additional Care Plan goals for specific interventions  Outcome: Progressing     Problem: NEUROLOGICAL - ADULT  Goal: Achieves maximal functionality and self care  Description: INTERVENTIONS  - Monitor swallowing and airway patency with patient fatigue and changes in neurological status  - Encourage and assist patient to increase activity and self care with guidance from PT/OT  - Encourage visually impaired, hearing impaired and aphasic patients to use assistive/communication devices  Outcome: Progressing

## 2024-05-08 NOTE — PHYSICAL THERAPY NOTE
PHYSICAL THERAPY TREATMENT NOTE - INPATIENT    Room Number: 6602/6602-A     Session: 2 post re-eval    Number of Visits to Meet Established Goals: 7  History related to current admission: Patient is a 79 year old male admitted on 5/1/2024 from [facility] for fall, weakness. 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 MRI L spine reviewed, discussed with neuro APODALYS gutierrez for cont therapy.         HOME SITUATION  Type of Home: Independent living facility ([facility])   Home Layout: One level     Lives With: Spouse  Drives: No  Patient Owned Equipment: Cane;Rolling walker;Rollator  Patient Regularly Uses: Glasses     Prior Level of [place]: \"Pt typically ambulates with rollator walker at [facility], ambulates to dining [place] but lately wife has been pushing him in the rollator. Was supposed to have drain trial this upcoming Monday for NPH. \"  Presenting Problem: Fall  Co-Morbidities : NPH, Afib, HTN, TIA, COPD, anxiety    ASSESSMENT   Patient demonstrates good  progress this session, goals  remain in progress. Pt able to participate in gait training for the first time in a week. Additionally improvement noted in 30 sec STS score.     Patient continues to function below baseline with bed mobility, transfers, and gait.  Contributing factors to remaining limitations include decreased functional strength, decreased endurance/aerobic capacity, pain, impaired dynamic balance, impaired coordination, impaired motor planning, and orthostatic hypotension .  Continues to demonstrate RUE/LE weakness but is functionally improving. Next session anticipate patient to progress bed mobility and transfers.  Physical Therapy will continue to follow patient for duration of hospitalization.      Patient continues to benefit from continued skilled PT services: to facilitate return to prior level of function as patient demonstrates high motivation with excellent tolerance to an intensive therapy  program .    PLAN  PT Treatment Plan: Bed mobility;Body mechanics;Endurance;Energy conservation;Patient education;Family education;Gait training;Balance training;Transfer training;Strengthening;Neuromuscular re-educate  Rehab Potential : Fair  Frequency (Obs): 5x/week    CURRENT GOALS     Goal #1 Patient is able to demonstrate supine - sit EOB @ level: supervision      Goal #2 Patient is able to demonstrate transfers EOB to/from Chair/Wheelchair at assistance level: minimum assistance      Goal #3 Patient is able to ambulate 15 feet with assist device: walker - rolling at assistance level: moderate assistance      Goal #4     Goal #5     Goal #6     Goal Comments: Goals established on 5/2/2024 5/8/2024 all goals ongoing     SUBJECTIVE  \"I feel fine\" - pt denied dizziness throughout session despite hypotension     OBJECTIVE  Precautions: Bed/chair alarm;Drain(s);Other (Comment) (Lumbar drain, -150)    WEIGHT BEARING RESTRICTION  Weight Bearing Restriction: None                PAIN ASSESSMENT   Rating: Unable to rate  Location: head and neck  Management Techniques: Activity promotion;Body mechanics;Repositioning    BALANCE                                                                                                                       Static Sitting: Fair -  Dynamic Sitting: Fair -           Static Standing: Poor +  Dynamic Standing: Poor +    ACTIVITY TOLERANCE                         O2 WALK         AM-PAC '6-Clicks' INPATIENT SHORT FORM - BASIC MOBILITY  How much difficulty does the patient currently have...  Patient Difficulty: Turning over in bed (including adjusting bedclothes, sheets and blankets)?: A Little   Patient Difficulty: Sitting down on and standing up from a chair with arms (e.g., wheelchair, bedside commode, etc.): A Little   Patient Difficulty: Moving from lying on back to sitting on the side of the bed?: A Little   How much help from another person does the patient currently need...    Help from Another: Moving to and from a bed to a chair (including a wheelchair)?: A Little   Help from Another: Need to walk in hospital room?: A Lot   Help from Another: Climbing 3-5 steps with a railing?: Total       AM-PAC Score:  Raw Score: 15   Approx Degree of Impairment: 57.7%   Standardized Score (AM-PAC Scale): 39.45   CMS Modifier (G-Code): CK    FUNCTIONAL ABILITY STATUS  Gait Assessment   Functional Mobility/Gait Assessment  Gait Assistance: Moderate assistance  Distance (ft): 7,8  Assistive Device: Rolling walker  Pattern: Shuffle    Skilled Therapy Provided:     Bed Mobility:   Rolling: NT   Supine<>Sit: NT   Sit<>Supine: NT     Transfer Mobility:  Sit<>Stand: Min A intermittently able to perform with CGA throughout multiple trials during session, required continued cuing for UE placement   Stand<>Sit: CGA improved eccentric lowering demonstrated when pt able to utilize UE for support  Gait: mod A with RW anc close chair follow, vc for TKE of BLE to promote stability, fatigues very quickly      Therapist's Comments:denied dizziness throughout session. BP 81/41 in standing, after session /58,  RN aware     30 sec sit<>stand: 2   Fall Risk: yes  [Below average score indicates high risk for falls; norms by age > or = to...   60-63 y/o Men: 14, Women: 12   65-68 y/o Men: 12, Women: 11   70-73 y/o Men: 12, Women: 10   75-80 y/o Men: 11, Women: 10   80-83 y/o Men: 10, Women: 9   85-88 y/o Men: 8, Women: 8   90-93 y/o Men 7, Women: 4]      Patient End of Session: Up in chair;Needs met;Call light within reach;RN aware of session/findings;All patient questions and concerns addressed;Alarm set    PT Session Time: 28 minutes total  Gait Trainin minutes  Therapeutic Activity:  minutes  Therapeutic Exercise: 0 minutes   Neuromuscular Re-education:  minutes

## 2024-05-08 NOTE — PROGRESS NOTES
[facility]  Neurosurgery Progress Note    Clay Orellana Patient Status:  Inpatient    1945 MRN [id number]   Location [facility] 6NE-A Attending David Busitllo MD   Hosp Day # 7 PCP Sachin Meza MD     Chief Complaint:  Chief Complaint   Patient presents with    Fall         Subjective:  Pt examined, states he feels about the same today.  Pt reports decreased hearing today.  No other complaints    Objective/Physical Exam:    Vital Signs:  Blood pressure 139/62, pulse 63, temperature 98.6 °F (37 °C), temperature source Temporal, resp. rate 15, weight 195 lb 8 oz (88.7 kg), SpO2 97%.    Respiratory:  Respirations nonlabored    CV:  NSR on tele    General: NAD, Speech Fluent, Mood Appropriate    Neurologic: This patient is alert and orientated x 3.  Able to follow commands.  Face is symmetric.  CN 2-12 GI,  Sensation to light touch is intact bilaterally.  BALDWIN x 4. Gait deferred    Drains:  Lumbar drain with clear CSF    Skin: Dressings intact and dry      Labs:  No labs to review        Imaging:  No new imaging to review    Assessment:  S/p lumbar drain insertion PPD #2  Neurologic gait dysfunction    Plan:  Pt seen and examined with Dr. Pringle  Discussed increasing drain to 15 ml/hr however pt would like to stay at current amount  Final PT/OT/ST kay then will plan to dc drain tomorrow  Medical management per hospitalist  Dr. Pringle discussed with pt that MRI findings are chronic and not likely causing his weakness      EMRE Ricketts  [facility]  2024, 7:25 AM      Patient seen and examined with np  Case discussed  Draining at 10ml/hr  Discussed with patient  Will cont at 10  Likely remove tomorrow  Pt/ot  st

## 2024-05-08 NOTE — PLAN OF CARE
Received pt at 0730. Pt alert and oriented x4, neurologically intact besides some forgetfulness, urinary incontinence and balance issues. Pt on RA, lungs clear/diminished. Pt A paced, SBP goal 100-150 maintained, losartan held per parameter. LD in place, site with dressing in place, remains C/D/I, draining 10ml/hr per NS. Per NS, MRI seen, nothing acute to address. Pt head/neck/upper back pain managed with repositioning, heat packs and tylenol. Pt walks in halls with pt, ambulates with gait belt/walker and 1 mod assist. Pt updated on plan of care.

## 2024-05-08 NOTE — PROGRESS NOTES
[facility]   part of [facility]     Hospitalist Progress Note     Clay Orellana Patient Status:  Inpatient    1945 MRN [id number]   Location [facility] 7NE-A Attending Angel Cristobal MD   Hosp Day # 7 PCP Sachin Meza MD     Chief Complaint: fall    Subjective:     Feels well currently    Objective:    Review of Systems:   A comprehensive review of systems was completed; pertinent positive and negatives stated in subjective.    Vital signs:  Temp:  [97.7 °F (36.5 °C)-98.6 °F (37 °C)] 98.6 °F (37 °C)  Pulse:  [60-71] 63  Resp:  [11-23] 17  BP: ()/(41-92) 157/73  SpO2:  [92 %-98 %] 95 %    Physical Exam:    General: No acute distress  Respiratory: No wheezes, no rhonchi  Cardiovascular: S1, S2, regular rate and rhythm  Abdomen: Soft, Non-tender, non-distended  Neuro: No new focal deficits.   Extremities: No edema    Diagnostic Data:    Labs:  Recent Labs   Lab 24  0541 24  0441   WBC 10.4  --  9.7   HGB 14.6  --  12.9*   MCV 95.9  --  94.9   .0  --  229.0   INR  --  1.07  --        Recent Labs   Lab 24  0441   GLU 88 97   BUN 22 23   CREATSERUM 1.23 0.98   CA 9.0 8.7   ALB 3.5  --     140   K 4.1 3.9    110   CO2 28.0 26.0   ALKPHO 88  --    AST 28  --    ALT 42  --    BILT 0.4  --    TP 7.3  --        Estimated Creatinine Clearance: 63 mL/min (based on SCr of 0.98 mg/dL).    No results for input(s): \"TROP\", \"TROPHS\", \"CK\" in the last 168 hours.    Recent Labs   Lab 24  0541   PTP 13.9   INR 1.07                  Microbiology    No results found for this visit on 24.      Imaging: Reviewed in Epic.    Medications:    ceFAZolin  2 g Intravenous Q8H    lisinopril  5 mg Oral Daily    atorvastatin  40 mg Oral Nightly    donepezil  10 mg Oral Nightly    finasteride  5 mg Oral Daily    flecainide  100 mg Oral BID    fluticasone-umeclidin-vilant  1 puff Inhalation Daily    ipratropium  2 spray Nasal 2 times per day     metoprolol tartrate  25 mg Oral 2x Daily(Beta Blocker)    sertraline  25 mg Oral Daily    tamsulosin  0.4 mg Oral After dinner       Assessment & Plan:      #Fall/weakness suspect related to NPH  POD2 s/p lumbar drain placement for trial; defer to neurosurgery and CC  Holding eliquis/aspirin/lovenox given drain  MRI lumbar spine reviewed; await neurosurg recs    #RLE weakness >LLE  #LBP  - Dr. Cristobal D/w neuro, CVA unlikely. CTH neg     #Cerebrovascular disease  -statin; aspirin on hold     #PAF s/p DCCV  -BB; doac held     #BPH  -Flomax/finasteride     #Anxiety  -SSRI    #HTN  -added lisinopril        David Bustillo MD  [facility]  Internal Medicine Hospitalist        Supplementary Documentation:     Quality:  DVT Mechanical Prophylaxis:   SCDs, Early ambuation  DVT Pharmacologic Prophylaxis   Medication   None                Code Status: Not on file  Dotson: External urinary catheter in place  Dotson Duration (in days):   Central line:    JAQUELINE:     Discharge is dependent on: course  At this point Mr. Orellana is expected to be discharge to: home    The 21st Century Cures Act makes medical notes like these available to patients in the interest of transparency. Please be advised this is a medical document. Medical documents are intended to carry relevant information, facts as evident, and the clinical opinion of the practitioner. The medical note is intended as peer to peer communication and may appear blunt or direct. It is written in medical language and may contain abbreviations or verbiage that are unfamiliar.

## 2024-05-08 NOTE — PROGRESS NOTES
[facility]  Neurocritical Care Progress Note     Clay Orellana Patient Status:  Inpatient    1945 MRN [id number]   Location [facility] 6NE-A Attending David Bustillo MD   Hosp Day # 7 PCP Sachin eMza MD     Subjective:   Patient is a 79 year old male h/o htn, hl, pAF on doac, bph, anxiety do and possible ventriculomegaly now s/p lumbar drain insertion for normal pressure hydrocephalus eval . Pt admitted  with falls c/f due to NPH, thus lumbar drain placed per CASEY to eval for possible  shunt     No acute events overnight.     Vitals:   Temp:  [97.7 °F (36.5 °C)-98.6 °F (37 °C)] 98.2 °F (36.8 °C)  Pulse:  [60-71] 63  Resp:  [11-21] 14  BP: ()/(41-92) 106/59  SpO2:  [92 %-98 %] 93 %  Body mass index is 27.27 kg/m².    Intake/Output:    Intake/Output Summary (Last 24 hours) at 2024 0853  Last data filed at 2024 0800  Gross per 24 hour   Intake 140 ml   Output 965 ml   Net -825 ml     Current Meds:  Current Facility-Administered Medications   Medication Dose Route Frequency    ceFAZolin (Ancef) 2 g in 20mL IV syringe premix  2 g Intravenous Q8H    acetaminophen-codeine (Tylenol #3) 300-30 MG per tab 1 tablet  1 tablet Oral Q4H PRN    hydrALAzine (Apresoline) 20 mg/mL injection 10 mg  10 mg Intravenous Q2H PRN    sodium chloride 0.9% infusion   Intravenous Continuous    lisinopril (Prinivil; Zestril) tab 5 mg  5 mg Oral Daily    HYDROcodone-acetaminophen (Norco) 5-325 MG per tab 1 tablet  1 tablet Oral Q4H PRN    Or    HYDROcodone-acetaminophen (Norco) 5-325 MG per tab 2 tablet  2 tablet Oral Q4H PRN    LORazepam (Ativan) tab 1 mg  1 mg Oral Q6H PRN    atorvastatin (Lipitor) tab 40 mg  40 mg Oral Nightly    donepezil (Aricept) tab 10 mg  10 mg Oral Nightly    melatonin tab 3 mg  3 mg Oral Nightly PRN    acetaminophen (Tylenol Extra Strength) tab 500 mg  500 mg Oral Q4H PRN    polyethylene glycol (PEG 3350) (Miralax) 17 g oral packet 17 g  17 g Oral Daily PRN     sennosides (Senokot) tab 17.2 mg  17.2 mg Oral Nightly PRN    bisacodyl (Dulcolax) 10 MG rectal suppository 10 mg  10 mg Rectal Daily PRN    fleet enema (Fleet) 7-19 GM/118ML rectal enema 133 mL  1 enema Rectal Once PRN    ondansetron (Zofran) 4 MG/2ML injection 4 mg  4 mg Intravenous Q6H PRN    metoclopramide (Reglan) 5 mg/mL injection 10 mg  10 mg Intravenous Q8H PRN    finasteride (Proscar) tab 5 mg  5 mg Oral Daily    flecainide (Tambocor) tab 100 mg  100 mg Oral BID    fluticasone-umeclidin-vilant (Trelegy Ellipta) 100-62.5-25 MCG/ACT inhaler 1 puff  1 puff Inhalation Daily    ipratropium (Atrovent) 0.03 % nasal solution 2 spray  2 spray Nasal 2 times per day    metoprolol tartrate (Lopressor) tab 25 mg  25 mg Oral 2x Daily(Beta Blocker)    sertraline (Zoloft) tab 25 mg  25 mg Oral Daily    tamsulosin (Flomax) cap 0.4 mg  0.4 mg Oral After dinner     Physical Examination:   General- No acute distress  CV- RRR  Resp- CTA Bilat  Neuro-  Mental status- awake and alert, regards and follows commands, oriented x3, speech fluent  Cranial nerves- pupils equally round and reactive to light, extraocular muscles intact, face symmetric, visual fields full  Motor- reed x4  Sens-  Intact to light touch    Diagnostics:   MRI SPINE LUMBAR (W+WO) (CPT=72158)    Result Date: 5/7/2024  CONCLUSION:   1. There is abnormal low T1 signal and high T2 signal with enhancement associated with the deformed left transverse processes of L2, L3, and L4 that is concerning for potential minimally displaced fractures.  There is mild associated soft tissue edema extending into the left lateral paraspinal musculature.  Dedicated CT of the lumbar spine is suggested for further evaluation.  2. Multilevel degenerative changes lumbar spine are most pronounced at the L4-5 level.  3. Mild-to-moderate spinal canal stenosis with bilateral subarticular zone stenosis at L3-4 and L4-5.  Mild spinal canal stenosis at L2-3.  4. Moderate to severe right neural  foraminal stenosis at L4-5.  Mild left neural foraminal stenosis at L3-4.  Mild to moderate left neural foraminal stenosis at L5-S1.  5. Facet arthropathy in the lumbar spine as above.   Please see above for further details.   LOCATION:  [id number]      Dictated by (CST): Nabeel Flores MD on 5/07/2024 at 1:11 PM     Finalized by (CST): Nabeel Flores MD on 5/07/2024 at 1:20 PM      Lab Review     Recent Labs   Lab 05/01/24 2013 05/07/24  0441    140   K 4.1 3.9    110   CO2 28.0 26.0   GLU 88 97   BUN 22 23   CREATSERUM 1.23 0.98     Recent Labs   Lab 05/01/24 2012 05/07/24  0441   WBC 10.4 9.7   HGB 14.6 12.9*   .0 229.0     Assesment/Plan:     Neuro:  Possible ventriculomegaly- in setting of gait, urinary and memory difficulties, c/f NPH.   LD trial per Neurosurg.   Cont neurochecks/pt/ot/st.  Cardiac:  Htn/hl/pAF- sbp goal 100-150, cont statin, metop, and zestril (hold if SBP<120). Hold doac until cleared per NS.  Pulmonary:  Stable on RA  Renal:  IVFs  GI:  PO as tolerated  Heme/ID:  Afebrile, no leukocytosis  Endocrine/Rheum:  Monitor glucose, sliding scale insulin prn  DVT Prophylaxis:  SCD’s    Goals of the Day: neurochecks   A total of 40 minutes of critical care time (exclusive of billable procedures) was administered to manage and/or prevent neurologic instability. This involved direct patient intervention, complex decision making, and/or extensive discussions with the patient, family, and clinical staff.    Thank you for allowing me to participate in the care of this patient.     Janeth Galvin MD, [facility]  Medical Director of System Neurosciences  Chief, Section of Neurocritical Care  [facility]

## 2024-05-09 LAB
ANION GAP SERPL CALC-SCNC: 6 MMOL/L (ref 0–18)
BUN BLD-MCNC: 31 MG/DL (ref 9–23)
CALCIUM BLD-MCNC: 9.1 MG/DL (ref 8.5–10.1)
CHLORIDE SERPL-SCNC: 109 MMOL/L (ref 98–112)
CO2 SERPL-SCNC: 25 MMOL/L (ref 21–32)
CREAT BLD-MCNC: 1.07 MG/DL
EGFRCR SERPLBLD CKD-EPI 2021: 70 ML/MIN/1.73M2 (ref 60–?)
ERYTHROCYTE [DISTWIDTH] IN BLOOD BY AUTOMATED COUNT: 12.6 %
GLUCOSE BLD-MCNC: 95 MG/DL (ref 70–99)
HCT VFR BLD AUTO: 35.1 %
HGB BLD-MCNC: 12.2 G/DL
MCH RBC QN AUTO: 32.7 PG (ref 26–34)
MCHC RBC AUTO-ENTMCNC: 34.8 G/DL (ref 31–37)
MCV RBC AUTO: 94.1 FL
OSMOLALITY SERPL CALC.SUM OF ELEC: 296 MOSM/KG (ref 275–295)
PLATELET # BLD AUTO: 208 10(3)UL (ref 150–450)
POTASSIUM SERPL-SCNC: 4.1 MMOL/L (ref 3.5–5.1)
RBC # BLD AUTO: 3.73 X10(6)UL
SODIUM SERPL-SCNC: 140 MMOL/L (ref 136–145)
WBC # BLD AUTO: 9.1 X10(3) UL (ref 4–11)

## 2024-05-09 PROCEDURE — 99231 SBSQ HOSP IP/OBS SF/LOW 25: CPT | Performed by: NEUROLOGICAL SURGERY

## 2024-05-09 PROCEDURE — 99232 SBSQ HOSP IP/OBS MODERATE 35: CPT | Performed by: HOSPITALIST

## 2024-05-09 PROCEDURE — 99291 CRITICAL CARE FIRST HOUR: CPT | Performed by: INTERNAL MEDICINE

## 2024-05-09 NOTE — PLAN OF CARE
Received pt at 0730. Pt alert and oriented x4, neurologically intact besides some forgetfulness, urinary incontinence and balance issues and baseline numbness in ERIC feet. Pt on RA, lungs clear/diminished. Pt A paced, SBP goal 100-150, losartan given per parameter but pt BP in the 90s after dose, MD aware, BP meds adjusted per Dr Galvin. LD in place, site with dressing in place, remains C/D/I, draining 10ml/hr per NS. LD removed at bedside after pt works with PT/OT/SLP, flat for 2 hours post removal, site with gauze/tegaderm remains C/D/I. Pt head/neck/upper back pain managed with repositioning, heat packs and tylenol. Pt walks in halls with pt, ambulates with gait belt/walker and 1 mod assist. Per Dr Bustillo, pt not ready for discharge, ok to transfer. Pt updated on plan of care.

## 2024-05-09 NOTE — PLAN OF CARE
Problem: PAIN - ADULT  Goal: Verbalizes/displays adequate comfort level or patient's stated pain goal  Description: INTERVENTIONS:  - Encourage pt to monitor pain and request assistance  - Assess pain using appropriate pain scale  - Administer analgesics based on type and severity of pain and evaluate response  - Implement non-pharmacological measures as appropriate and evaluate response  - Consider cultural and social influences on pain and pain management  - Manage/alleviate anxiety  - Utilize distraction and/or relaxation techniques  - Monitor for opioid side effects  - Notify MD/LIP if interventions unsuccessful or patient reports new pain  - Anticipate increased pain with activity and pre-medicate as appropriate  Outcome: Progressing     Problem: Impaired Functional Mobility  Goal: Achieve highest/safest level of mobility/gait  Description: Interventions:  - Assess patient's functional ability and stability  - Promote increasing activity/tolerance for mobility and gait  - Educate and engage patient/family in tolerated activity level and precautions  - Recommend use of  RW for transfers and ambulation  - Recommend patient transfer to bedside chair toward strongest side  - When transferring patient, block weaker knee for safety  Outcome: Progressing     Problem: MUSCULOSKELETAL - ADULT  Goal: Return mobility to safest level of function  Description: INTERVENTIONS:  - Assess patient stability and activity tolerance for standing, transferring and ambulating w/ or w/o assistive devices  - Assist with transfers and ambulation using safe patient handling equipment as needed  - Ensure adequate protection for wounds/incisions during mobilization  - Obtain PT/OT consults as needed  - Advance activity as appropriate  - Communicate ordered activity level and limitations with patient/family  Outcome: Progressing  Goal: Maintain proper alignment of affected body part  Description: INTERVENTIONS:  - Support and protect limb  and body alignment per provider's orders  - Instruct and reinforce with patient and family use of appropriate assistive device and precautions (e.g. spinal or hip dislocation precautions)  Outcome: Progressing     Problem: SAFETY ADULT - FALL  Goal: Free from fall injury  Description: INTERVENTIONS:  - Assess pt frequently for physical needs  - Identify cognitive and physical deficits and behaviors that affect risk of falls.  - [place] fall precautions as indicated by assessment.  - Educate pt/family on patient safety including physical limitations  - Instruct pt to call for assistance with activity based on assessment  - Modify environment to reduce risk of injury  - Provide assistive devices as appropriate  - Consider OT/PT consult to assist with strengthening/mobility  - Encourage toileting schedule  Outcome: Progressing     Problem: RISK FOR INFECTION - ADULT  Goal: Absence of fever/infection during anticipated neutropenic period  Description: INTERVENTIONS  - Monitor WBC  - Administer growth factors as ordered  - Implement neutropenic guidelines  Outcome: Progressing

## 2024-05-09 NOTE — PHYSICAL THERAPY NOTE
PHYSICAL THERAPY TREATMENT NOTE - INPATIENT    Room Number: 6602/6602-A       Session: 3     Number of Visits to Meet Established Goals: 7  History related to current admission: Patient is a 79 year old male admitted on 5/1/2024 from [facility] for fall, weakness. 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 MRI L spine reviewed, discussed with neuro APODALYS gutierrez for cont therapy.         HOME SITUATION  Type of Home: Independent living facility ([facility])   Home Layout: One level     Lives With: Spouse  Drives: No  Patient Owned Equipment: Cane;Rolling walker;Rollator  Patient Regularly Uses: Glasses     Prior Level of [place]: \"Pt typically ambulates with rollator walker at [facility], ambulates to dining sullivan but lately wife has been pushing him in the rollator. Was supposed to have drain trial this upcoming Monday for NPH. \"  Presenting Problem: Fall  Co-Morbidities : NPH, Afib, HTN, TIA, COPD, anxiety    ASSESSMENT   Patient demonstrates good  progress this session, goals  remain in progress.     Pt with improved stride length, ambulation distance, and upright posture.  Pt did not require physical assist for sit<>stand or stand<>stand transfer.     Patient continues to function below baseline with bed mobility, transfers, and gait.  Contributing factors to remaining limitations include decreased functional strength, decreased endurance/aerobic capacity, pain, impaired dynamic balance, impaired coordination, impaired motor planning, and orthostatic hypotension .  Continues to demonstrate RUE/LE weakness but is functionally improving. Next session anticipate patient to progress bed mobility and transfers.  Physical Therapy will continue to follow patient for duration of hospitalization.      Patient continues to benefit from continued skilled PT services: to facilitate return to prior level of function as patient demonstrates high motivation with excellent tolerance  to an intensive therapy program .    PLAN  PT Treatment Plan: Bed mobility;Body mechanics;Endurance;Energy conservation;Patient education;Family education;Gait training;Balance training;Transfer training;Strengthening;Neuromuscular re-educate  Rehab Potential : Fair  Frequency (Obs): 5x/week    CURRENT GOALS     Goal #1 Patient is able to demonstrate supine - sit EOB @ level: supervision      Goal #2 Patient is able to demonstrate transfers EOB to/from Chair/Wheelchair at assistance level: minimum assistance - met 5/9/2024     Upgrade to SBA level       Goal #3 Patient is able to ambulate 15 feet with assist device: walker - rolling at assistance level: moderate assistance - met 5/9/2024    Upgrade to 150ft at SBA level       Goal #4     Goal #5     Goal #6     Goal Comments: Goals established on 5/2/2024 5/9/2024 all goals ongoing     SUBJECTIVE  \"Yeah I feel a difference - something happened at 4am\"    OBJECTIVE  Precautions: Bed/chair alarm;Drain(s);Other (Comment) (Lumbar drain, -150)    WEIGHT BEARING RESTRICTION  Weight Bearing Restriction: None                PAIN ASSESSMENT   Rating: Unable to rate  Location: head and neck  Management Techniques: Activity promotion;Body mechanics;Repositioning    BALANCE                                                                                                                       Static Sitting: Fair -  Dynamic Sitting: Fair -           Static Standing: Poor +  Dynamic Standing: Poor +    ACTIVITY TOLERANCE                         O2 WALK         AM-PAC '6-Clicks' INPATIENT SHORT FORM - BASIC MOBILITY  How much difficulty does the patient currently have...  Patient Difficulty: Turning over in bed (including adjusting bedclothes, sheets and blankets)?: A Little   Patient Difficulty: Sitting down on and standing up from a chair with arms (e.g., wheelchair, bedside commode, etc.): A Little   Patient Difficulty: Moving from lying on back to sitting on the side  of the bed?: A Little   How much help from another person does the patient currently need...   Help from Another: Moving to and from a bed to a chair (including a wheelchair)?: A Little   Help from Another: Need to walk in hospital room?: A Little   Help from Another: Climbing 3-5 steps with a railing?: A Lot       AM-PAC Score:  Raw Score: 17   Approx Degree of Impairment: 50.57%   Standardized Score (AM-PAC Scale): 42.13   CMS Modifier (G-Code): CK    FUNCTIONAL ABILITY STATUS  Gait Assessment   Functional Mobility/Gait Assessment  Gait Assistance: Moderate assistance (min A - first bout)  Distance (ft): 70 x 2  Assistive Device: Rolling walker  Pattern: Shuffle    Skilled Therapy Provided:     Bed Mobility:   Rolling: NT   Supine<>Sit: NT   Sit<>Supine: NT     Transfer Mobility:  Sit<>Stand: CGA  Stand<>Sit: CGA    Gait:   1st bout = Min A with RW and chair follow  2nd bout - Mod A with RW - increased posterior lean, and required more frequent cues for \"big and loud\" motor patterns.       Therapist's Comments: vitals WFL - Pt with improved alertness and improved gait mechanics when compared to previous session      Patient End of Session: Up in chair;Needs met;Call light within reach;RN aware of session/findings;All patient questions and concerns addressed;Alarm set (RN managed LD)    PT Session Time: 15 minutes   Gait Training: 15 minutes  Therapeutic Activity:  minutes  Therapeutic Exercise: 0 minutes   Neuromuscular Re-education:  minutes

## 2024-05-09 NOTE — PLAN OF CARE
Assumed care of patient around 1930. Patient alert and oriented x4, forgetful at times. Following all commands. Pupils round and reactive to light. EOM intact. Wears glasses. Hard of hearing at baseline. Speech clear. BUE 5/5. BLE 5/5. Baseline numbness in BLE and some balance issues with ambulating. Atrial paced on monitor. Requiring 2L NC overnight while asleep. PRN hydralazine and labetolol administered to maintain SBP <150. Lumbar drain intact - draining 10cc/hr. Male external catheter intact. PRN tylenol administered for headache with relief.

## 2024-05-09 NOTE — PROGRESS NOTES
[facility]   part of [facility]     Hospitalist Progress Note     Clay Orellana Patient Status:  Inpatient    1945 MRN [id number]   Location [facility] 7NE-A Attending Angel Cristobal MD   Hosp Day # 8 PCP Sachin Meza MD     Chief Complaint: fall    Subjective:     Feels well currently    Objective:    Review of Systems:   A comprehensive review of systems was completed; pertinent positive and negatives stated in subjective.    Vital signs:  Temp:  [97.5 °F (36.4 °C)-98.2 °F (36.8 °C)] 97.8 °F (36.6 °C)  Pulse:  [60-77] 61  Resp:  [10-23] 11  BP: ()/(46-93) 147/68  SpO2:  [91 %-100 %] 95 %    Physical Exam:    General: No acute distress  Respiratory: No wheezes, no rhonchi  Cardiovascular: S1, S2, regular rate and rhythm  Abdomen: Soft, Non-tender, non-distended  Neuro: No new focal deficits.   Extremities: No edema    Diagnostic Data:    Labs:  Recent Labs   Lab 24  0541 24  0441 24  0403   WBC  --  9.7 9.1   HGB  --  12.9* 12.2*   MCV  --  94.9 94.1   PLT  --  229.0 208.0   INR 1.07  --   --        Recent Labs   Lab 24  0441 24  0403   GLU 97 95   BUN 23 31*   CREATSERUM 0.98 1.07   CA 8.7 9.1    140   K 3.9 4.1    109   CO2 26.0 25.0       Estimated Creatinine Clearance: 57.7 mL/min (based on SCr of 1.07 mg/dL).    No results for input(s): \"TROP\", \"TROPHS\", \"CK\" in the last 168 hours.    Recent Labs   Lab 24  0541   PTP 13.9   INR 1.07                  Microbiology    No results found for this visit on 24.      Imaging: Reviewed in Epic.    Medications:    ceFAZolin  2 g Intravenous Q8H    lisinopril  5 mg Oral Daily    atorvastatin  40 mg Oral Nightly    donepezil  10 mg Oral Nightly    finasteride  5 mg Oral Daily    flecainide  100 mg Oral BID    fluticasone-umeclidin-vilant  1 puff Inhalation Daily    ipratropium  2 spray Nasal 2 times per day    metoprolol tartrate  25 mg Oral 2x Daily(Beta Blocker)    sertraline  25 mg Oral  Daily    tamsulosin  0.4 mg Oral After dinner       Assessment & Plan:      #Fall/weakness suspect related to NPH  POD2 s/p lumbar drain placement for trial; defer to neurosurgery and CC  Holding eliquis/aspirin/lovenox given drain for now  MRI lumbar spine reviewed; nonacute changes per neurosurgery    #RLE weakness >LLE  #LBP  - Dr. Cristobal D/w neuro, CVA unlikely. CTH neg    #orthostasis; BB tirated per neuroCC; stopped acei; monitor orthos qshift     #Cerebrovascular disease  -statin; aspirin on hold     #PAF s/p DCCV  -BB; doac held     #BPH  -Flomax/finasteride     #Anxiety  -SSRI    Addendum: holding doac/aspirin until 5 days s/p drain removal      David Bustillo MD  [facility]  Internal Medicine Hospitalist        Supplementary Documentation:     Quality:  DVT Mechanical Prophylaxis:   SCDs, Early ambuation  DVT Pharmacologic Prophylaxis   Medication   None                Code Status: Full Code  Dotson: External urinary catheter in place  Dotson Duration (in days):   Central line:    JAQUELINE:     Discharge is dependent on: course  At this point Mr. Orellana is expected to be discharge to: home    The 21st Century Cures Act makes medical notes like these available to patients in the interest of transparency. Please be advised this is a medical document. Medical documents are intended to carry relevant information, facts as evident, and the clinical opinion of the practitioner. The medical note is intended as peer to peer communication and may appear blunt or direct. It is written in medical language and may contain abbreviations or verbiage that are unfamiliar.

## 2024-05-09 NOTE — PROGRESS NOTES
Lumbar drain removed at bedside.  Catheter tip intact on drain removal.  Site covered with gauze and tegaderm.  Site is C/D/I without drainage, erythema or edema at this time. Patient tolerated removal well and was appreciative.         DREW Lazar  [facility]  5/9/2024 11:45 AM

## 2024-05-09 NOTE — PROGRESS NOTES
[facility]  Neurosurgery Progress Note    Clay Orellana Patient Status:  Inpatient    1945 MRN [id number]   Location [facility] 6NE-A Attending David Bustillo MD   Hosp Day # 8 PCP Sachin Meza MD     Chief Complaint:  Chief Complaint   Patient presents with    Fall       Subjective:  Pt examined, reports hearing is improved today.  Pt states today is the first day he felt any improvement since starting the drain trial.      Objective/Physical Exam:    Vital Signs:  Blood pressure 147/68, pulse 61, temperature 97.8 °F (36.6 °C), temperature source Temporal, resp. rate 11, weight 195 lb 8 oz (88.7 kg), SpO2 95%.    Respiratory:  Respirations nonlabored    CV:  NSR on tele    General: NAD, Speech Fluent, Mood Appropriate    Neurologic: This patient is alert and orientated x 3.  Able to follow commands.  Face is symmetric.  CN 2-12 GI,  Sensation to light touch is intact bilaterally.  BALDWIN x 4.  Gait deferred    Drains:  Lumbar drain with clear CSF    Skin: Dressings intact and dry      Labs:  Lab Results   Component Value Date    WBC 9.1 2024    HGB 12.2 2024    HCT 35.1 2024    .0 2024    CREATSERUM 1.07 2024    BUN 31 2024     2024    K 4.1 2024     2024    CO2 25.0 2024    GLU 95 2024    CA 9.1 2024       Imaging:  No new imaging to review    Assessment:  S/p lumbar drain insertion PPD #3  Neurologic gait dysfunction    Plan:  Pt seen and examined with Dr. Pringle  Final PT/OT/ST evals today then will dc drain  Ok to discharge from neurosurgical standpoint after 2 hours of flat bedrest  F/u as scheduled      EMRE Ricketts  [facility]  2024, 8:53 AM      A total of 15 minutes of visit time (exclusible of billable procedures) was administered.  > 50 % of time spent counseling/coordinating care     Is this a shared or split note between Advanced Practice Provider and Physician?  Yes  Patient seen examined nurse practitioner  Case discussed  He has not noticed much change  Plan for possible rehab  PT/OT and speech prior to pulling drain  Will pull drain later today  Spoke with neurocritical care  Following

## 2024-05-09 NOTE — PROGRESS NOTES
[facility]  Neurocritical Care Progress Note     Clay Orellana Patient Status:  Inpatient    1945 MRN [id number]   Location [facility] 6NE-A Attending David Bustillo MD   Hosp Day # 8 PCP Sachin Meza MD     Subjective:   Patient is a 79 year old male h/o htn, hl, pAF on doac, bph, anxiety do and possible ventriculomegaly now s/p lumbar drain insertion for normal pressure hydrocephalus eval . Pt admitted  with falls c/f due to NPH, thus lumbar drain placed per CASEY to eval for possible  shunt     No acute events overnight.     Vitals:   Temp:  [97.5 °F (36.4 °C)-98.2 °F (36.8 °C)] 97.8 °F (36.6 °C)  Pulse:  [60-77] 61  Resp:  [10-23] 11  BP: ()/(46-93) 147/68  SpO2:  [91 %-100 %] 95 %  Body mass index is 27.27 kg/m².    Intake/Output:    Intake/Output Summary (Last 24 hours) at 2024 0914  Last data filed at 2024 0800  Gross per 24 hour   Intake --   Output 1230 ml   Net -1230 ml     Current Meds:  Current Facility-Administered Medications   Medication Dose Route Frequency    labetalol (Trandate) 5 mg/mL injection 10 mg  10 mg Intravenous Q4H PRN    ceFAZolin (Ancef) 2 g in 20mL IV syringe premix  2 g Intravenous Q8H    acetaminophen-codeine (Tylenol #3) 300-30 MG per tab 1 tablet  1 tablet Oral Q4H PRN    hydrALAzine (Apresoline) 20 mg/mL injection 10 mg  10 mg Intravenous Q2H PRN    sodium chloride 0.9% infusion   Intravenous Continuous    lisinopril (Prinivil; Zestril) tab 5 mg  5 mg Oral Daily    HYDROcodone-acetaminophen (Norco) 5-325 MG per tab 1 tablet  1 tablet Oral Q4H PRN    Or    HYDROcodone-acetaminophen (Norco) 5-325 MG per tab 2 tablet  2 tablet Oral Q4H PRN    LORazepam (Ativan) tab 1 mg  1 mg Oral Q6H PRN    atorvastatin (Lipitor) tab 40 mg  40 mg Oral Nightly    donepezil (Aricept) tab 10 mg  10 mg Oral Nightly    melatonin tab 3 mg  3 mg Oral Nightly PRN    acetaminophen (Tylenol Extra Strength) tab 500 mg  500 mg Oral Q4H PRN    polyethylene  glycol (PEG 3350) (Miralax) 17 g oral packet 17 g  17 g Oral Daily PRN    sennosides (Senokot) tab 17.2 mg  17.2 mg Oral Nightly PRN    bisacodyl (Dulcolax) 10 MG rectal suppository 10 mg  10 mg Rectal Daily PRN    fleet enema (Fleet) 7-19 GM/118ML rectal enema 133 mL  1 enema Rectal Once PRN    ondansetron (Zofran) 4 MG/2ML injection 4 mg  4 mg Intravenous Q6H PRN    metoclopramide (Reglan) 5 mg/mL injection 10 mg  10 mg Intravenous Q8H PRN    finasteride (Proscar) tab 5 mg  5 mg Oral Daily    flecainide (Tambocor) tab 100 mg  100 mg Oral BID    fluticasone-umeclidin-vilant (Trelegy Ellipta) 100-62.5-25 MCG/ACT inhaler 1 puff  1 puff Inhalation Daily    ipratropium (Atrovent) 0.03 % nasal solution 2 spray  2 spray Nasal 2 times per day    metoprolol tartrate (Lopressor) tab 25 mg  25 mg Oral 2x Daily(Beta Blocker)    sertraline (Zoloft) tab 25 mg  25 mg Oral Daily    tamsulosin (Flomax) cap 0.4 mg  0.4 mg Oral After dinner     Physical Examination:   General- No acute distress  CV- RRR  Resp- CTA Bilat  Neuro-  Mental status- awake and alert, regards and follows commands, oriented x3, speech fluent  Cranial nerves- pupils equally round and reactive to light, extraocular muscles intact, face symmetric, visual fields full  Motor- reed x4  Sens-  Intact to light touch    Diagnostics:   No results found.  Lab Review     Recent Labs   Lab 05/07/24  0441 05/09/24  0403    140   K 3.9 4.1    109   CO2 26.0 25.0   GLU 97 95   BUN 23 31*   CREATSERUM 0.98 1.07     Recent Labs   Lab 05/07/24  0441 05/09/24  0403   WBC 9.7 9.1   HGB 12.9* 12.2*   .0 208.0     Assesment/Plan:     Neuro:  Possible ventriculomegaly- in setting of gait, urinary and memory difficulties, c/f NPH.   LD trial per Neurosurg, plan to dc LD today.   Cont neurochecks/pt/ot/st.  No further neurocritical care needs at this time, further management and outpt follow-up per Neurosurg, will follow peripherally please call with any questions.    Cardiac:  Htn/hl/pAF- sbp goal 100-150, cont statin and metop, (hold if SBP<120). Hold doac until cleared per NS.  Pulmonary:  Stable on RA  Renal:  IVFs  GI:  PO as tolerated  Heme/ID:  Afebrile, no leukocytosis  Endocrine/Rheum:  Monitor glucose, sliding scale insulin prn  DVT Prophylaxis:  SCD’s    Goals of the Day: neurochecks   A total of 40 minutes of critical care time (exclusive of billable procedures) was administered to manage and/or prevent neurologic instability. This involved direct patient intervention, complex decision making, and/or extensive discussions with the patient, family, and clinical staff.    Thank you for allowing me to participate in the care of this patient.     Janeth Galvin MD, [facility]  Medical Director of System Neurosciences  Chief, Section of Neurocritical Care  [facility] [place]

## 2024-05-09 NOTE — SLP NOTE
SPEECH/LANGUAGE/COGNITIVE EVALUATION - INPATIENT    Admission Date: 5/1/2024  Evaluation Date: 05/09/24    Reason for Referral:  (Lumbar Drain Trial)    ASSESSMENT & PLAN   ASSESSMENT & IMPRESSION  Patient seen today for post lumbar drain trial cognitive-linguistic assessment. Pre-trial assessment completed 5/6 with a score of 28/30 on MoCA indicating intact cognition. Today, patient received alert in bedside chair and was agreeable to assessment. MoCA Version 8.2 completed with a score of 26/30. This score is slightly below his pre-trial score but remains WNL. No significant cognitive-linguistic deficits identified and no significant change appreciated between pre- and post- lumbar drain trial scores. Education provided re: results. No further inpatient SLP services warranted at this time.    Assessment(s) Administered: MoCA Score (Version 8.2 English)  MoCA Score: 26             Aphasic Depression Rating Scale Administered: Not applicable  Deficits Identified:  (none)    Patient Experiencing Pain: No                           GOALS  Goal #1 Repeat MoCA for lumbar drain trial.  Met        Prior Living Situation: Home with spouse  Prior Level of Function: Independent      Imaging Results:   CTH 5/2/24  CONCLUSION:       No acute intracranial process identified.            LOCATION:  [place] [address]         Dictated by (CST): Nneka Pope MD on 5/02/2024 at 5:24 PM       Finalized by (CST): Nneka Pope MD on 5/02/2024 at 5:27 PM       Patient/Family Goals: none stated    Interdisciplinary Communication: Discussed with RN    Patient, family and/or caregiver has been informed and has taken part in this evaluation and plan of treatment and have been advised and agree on the findings and goals.      FOLLOW UP  Treatment Plan/Recommendations: No further inpatient SLP service warranted     Follow Up Needed (Documentation Required): No  SLP Follow-up Date: 05/09/24    Thank you for your referral.  If you have any questions please  contact Flash Rodriguez, SLP

## 2024-05-09 NOTE — OCCUPATIONAL THERAPY NOTE
OCCUPATIONAL THERAPY TREATMENT NOTE - INPATIENT     Room Number: 6602/6602-A  Session: 3   Number of Visits to Meet Established Goals: 5    Presenting Problem: weakness, fall, 5/6 lumbar drain placement      ASSESSMENT   Patient demonstrates excellent progress this session, goals remain in progress.    Patient continues to function below baseline with toileting, lower body dressing, transfers, and dynamic standing balance.   Contributing factors to remaining limitations include decreased endurance and impaired standing balance.  Next session anticipate patient to progress toileting and lower body dressing.  Occupational Therapy will continue to follow patient for duration of hospitalization.    Patient continues to benefit from continued skilled OT services: to facilitate return to prior level of function as patient demonstrates high motivation with excellent tolerance to an intensive therapy program .          OT Device Recommendations: TBD    History:  Patient is a 79 year old male admitted on 5/1/2024 with Presenting Problem: Weakness, fall. Co-Morbidities : NPH, Afib, HTN, TIA, COPD, anxiety.     **5/6 s/p lumbar drain placement for trial.  ** Spine MRI 5/7. Refer to chart. Per neurosurgery, ok to continue with therapy.       WEIGHT BEARING RESTRICTION  Weight Bearing Restriction: None                Recommendations for nursing staff:   Transfers: cga  Toileting location: toilet    TREATMENT SESSION:  Patient Start of Session: seated  FUNCTIONAL TRANSFER ASSESSMENT  Sit to Stand: Edge of Bed; Chair  Edge of Bed: Not Tested  Chair: Contact Guard Assist    BALANCE ASSESSMENT  Static Sitting: Minimal Assist  Sitting Unilateral: Supervision  Static Standing: Minimal Assist  Standing Unilateral: Not Tested      O2 SATURATIONS  Oxygen Therapy  SPO2% on Room Air at Rest: 96    EDUCATION PROVIDED  Patient: Role of Occupational Therapy; Functional Transfer Techniques; Surgical Precautions; Fall Prevention;  Posture/Positioning  Patient's Response to Education: Verbalized Understanding  Family/Caregiver: Role of Occupational Therapy; Plan of Care  Family/Caregiver's Response to Education: Verbalized Understanding      Equipment used: rw    Therapist comments: Improved alertness and ability to process multi-step instructions.   Improved ability to initiate tasks.  CGA to stand and to ambulate in hallway with RW. Chair follow. Refer to PT note about gait.   Per PT, min/mod A with RW.   Pt was able to place his hand/rw properly without cueing.  This transfer was completed in preparation for toilet transfer. Maintaining dynamic standing balance with min A while closing gown behind her back. Full ROM R UE, 4+/5 strength. Fine motor intact.     Patient End of Session: Up in chair;Needs met;In bed;Call light within reach;RN aware of session/findings;All patient questions and concerns addressed    SUBJECTIVE  \"I can tell that I am better.\"    PAIN ASSESSMENT  Rating: Unable to rate  Location: \"better\" R shoulder  Management Techniques: Repositioning     OBJECTIVE  Precautions: Bed/chair alarm;Drain(s);Other (Comment) (Lumbar drain, -150)    AM-PAC ‘6-Clicks’ Inpatient Daily Activity Short Form  -   Putting on and taking off regular lower body clothing?: A Lot  -   Bathing (including washing, rinsing, drying)?: A Lot  -   Toileting, which includes using toilet, bedpan or urinal? : A Lot  -   Putting on and taking off regular upper body clothing?: A Little  -   Taking care of personal grooming such as brushing teeth?: A Little  -   Eating meals?: A Little    AM-PAC Score:  Score: 15  Approx Degree of Impairment: 56.46%  Standardized Score (AM-PAC Scale): 34.69    PLAN  OT Treatment Plan: Balance activities;Energy conservation/work simplification techniques;ADL training;Functional transfer training;Patient/Family education;Patient/Family training;Equipment eval/education;Compensatory technique education;Cognitive  reorientation  Rehab Potential : Good  Frequency: 3-5x/week    OT Goals:   Progressing 5/9  ADL Goals   Patient will perform upper body dressing:  with setup  Patient will perform lower body dressing:  with min assist  Patient will perform toileting: with min assist     Functional Transfer Goals  Patient will transfer from supine to sit:  with supervision  Patient will transfer to toilet:  with supervision     UE Exercise Program Goal  Patient will be independent with bilateral AROM HEP (home exercise program).     Additional Goals  Pt will recall spine precautions without cues.     Therapist Goals  SBT on the day of discharge    OT Session Time: 15 minutes  Self-Care Home Management: 2 minutes  Therapeutic Activity: 13 minutes

## 2024-05-09 NOTE — CM/SW NOTE
Met with patient and spouse, later accompanied by son-in-law to discuss discharge planning.  Explained two types of rehab--acute and CHRISTOS--patient seen by physiatrist several days ago and therapies today--recommendation today is acute--stabilizing blood pressures and monitoring for orthostatic pressures.  Explained race between 'acute' or 'rabbitt' with 3-hours of therapy and 'CHRISTOS' or 'turtle'.  Furthermore, when the 'race begins between the rabbit and the turtle--path or track the same--speed @ which each gets to the finish line that differentiates the two.  Patient seen as 'rabbitt'--[facility]  Discussed transportation--both patient/spouse interested in use of medivan (aware of cost as well as payment not due @ time of service) will be billed.  CM/SW to continue to follow for discharge planning

## 2024-05-10 PROBLEM — I95.1 ORTHOSTATIC HYPOTENSION: Status: ACTIVE | Noted: 2024-05-10

## 2024-05-10 LAB
ANION GAP SERPL CALC-SCNC: 5 MMOL/L (ref 0–18)
BUN BLD-MCNC: 35 MG/DL (ref 9–23)
CALCIUM BLD-MCNC: 8.9 MG/DL (ref 8.5–10.1)
CHLORIDE SERPL-SCNC: 107 MMOL/L (ref 98–112)
CO2 SERPL-SCNC: 25 MMOL/L (ref 21–32)
CREAT BLD-MCNC: 1.23 MG/DL
EGFRCR SERPLBLD CKD-EPI 2021: 59 ML/MIN/1.73M2 (ref 60–?)
ERYTHROCYTE [DISTWIDTH] IN BLOOD BY AUTOMATED COUNT: 12.7 %
GLUCOSE BLD-MCNC: 90 MG/DL (ref 70–99)
HCT VFR BLD AUTO: 34.5 %
HGB BLD-MCNC: 12 G/DL
MCH RBC QN AUTO: 33.2 PG (ref 26–34)
MCHC RBC AUTO-ENTMCNC: 34.8 G/DL (ref 31–37)
MCV RBC AUTO: 95.6 FL
OSMOLALITY SERPL CALC.SUM OF ELEC: 292 MOSM/KG (ref 275–295)
PLATELET # BLD AUTO: 206 10(3)UL (ref 150–450)
POTASSIUM SERPL-SCNC: 4.1 MMOL/L (ref 3.5–5.1)
RBC # BLD AUTO: 3.61 X10(6)UL
SODIUM SERPL-SCNC: 137 MMOL/L (ref 136–145)
WBC # BLD AUTO: 9 X10(3) UL (ref 4–11)

## 2024-05-10 PROCEDURE — 99232 SBSQ HOSP IP/OBS MODERATE 35: CPT | Performed by: HOSPITALIST

## 2024-05-10 RX ORDER — MIDODRINE HYDROCHLORIDE 2.5 MG/1
2.5 TABLET ORAL
Status: DISCONTINUED | OUTPATIENT
Start: 2024-05-10 | End: 2024-05-11

## 2024-05-10 RX ORDER — ASPIRIN 81 MG/1
81 TABLET ORAL DAILY
Status: SHIPPED | COMMUNITY
Start: 2024-05-17

## 2024-05-10 RX ORDER — MIDODRINE HYDROCHLORIDE 2.5 MG/1
2.5 TABLET ORAL
Qty: 60 TABLET | Refills: 0 | Status: SHIPPED | OUTPATIENT
Start: 2024-05-10 | End: 2024-05-13

## 2024-05-10 NOTE — PLAN OF CARE
Attempted to reach pt, no answer. Left message for pt to return call.    Result letter sent to pt viamychart.    RN please place EGD order.   Received care of pt circa 1930. Pt resting in bed, a/o x4 with baseline numbness in BLE and RUE, urinary incontinence, and balance issues. External catheter in place. LD dressing C/D/I. Pt atrial paced. VSS. Pt c/o neck pain. Pain controlled with heat packs and PRN tylenol. Pt updated on plan of care. Per pt, no further questions. Plan of care continues.    Circa 0220, pt SPO2 high 80s. NC on 2 L started.

## 2024-05-10 NOTE — PHYSICAL THERAPY NOTE
PHYSICAL THERAPY TREATMENT NOTE - INPATIENT    Room Number: 6602/6602-A       Session: 4     Number of Visits to Meet Established Goals: 7  History related to current admission: Patient is a 79 year old male admitted on 5/1/2024 from [facility] for fall, weakness. 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 MRI L spine reviewed, discussed with neuro APN Kaitlyn gutierrez for cont therapy. Lumbar drain removed 5/9.         HOME SITUATION  Type of Home: Independent living facility ([facility])   Home Layout: One level     Lives With: Spouse  Drives: No  Patient Owned Equipment: Cane;Rolling walker;Rollator  Patient Regularly Uses: Glasses     Prior Level of [place]: \"Pt typically ambulates with rollator walker at [facility], ambulates to dining sullivan but lately wife has been pushing him in the rollator. Was supposed to have drain trial this upcoming Monday for NPH. \"  Presenting Problem: Fall  Co-Morbidities : NPH, Afib, HTN, TIA, COPD, anxiety    ASSESSMENT   Patient demonstrates good  progress this session, goals  remain in progress.     Pt with hypotension this AM limiting therapeutic progression, followed up this PM with pt able to tolerate gait training and continued progress with therapies 80ft x2 with min A int mod A.     Patient continues to function below baseline with bed mobility, transfers, and gait.  Contributing factors to remaining limitations include decreased functional strength, decreased endurance/aerobic capacity, pain, impaired dynamic balance, impaired coordination, impaired motor planning, and orthostatic hypotension .  Continues to demonstrate RUE/LE weakness but is functionally improving. Next session anticipate patient to progress bed mobility, transfers, and gait.  Physical Therapy will continue to follow patient for duration of hospitalization.      Patient continues to benefit from continued skilled PT services: to facilitate return to prior level of  function as patient demonstrates high motivation with excellent tolerance to an intensive therapy program .    PLAN  PT Treatment Plan: Bed mobility;Body mechanics;Endurance;Energy conservation;Patient education;Family education;Gait training;Balance training;Transfer training;Strengthening;Neuromuscular re-educate  Rehab Potential : Fair  Frequency (Obs): 5x/week    CURRENT GOALS     Goal #1 Patient is able to demonstrate supine - sit EOB @ level: supervision      Goal #2 Patient is able to demonstrate transfers EOB to/from Chair/Wheelchair at assistance level: minimum assistance - met 5/9/2024     Upgrade to SBA level       Goal #3 Patient is able to ambulate 15 feet with assist device: walker - rolling at assistance level: moderate assistance - met 5/9/2024    Upgrade to 150ft at SBA level       Goal #4     Goal #5     Goal #6     Goal Comments: Goals established on 5/2/2024     5/10/2024 all goals ongoing     SUBJECTIVE  \"I'm not dizzy but I don't feel good\"     OBJECTIVE  Precautions: Bed/chair alarm (-150)    WEIGHT BEARING RESTRICTION  Weight Bearing Restriction: None                PAIN ASSESSMENT   Rating: Unable to rate  Location:  (\" a little better\" in his head neck and shoulders)  Management Techniques: Activity promotion;Body mechanics;Repositioning    BALANCE                                                                                                                       Static Sitting: Fair -  Dynamic Sitting: Fair -           Static Standing: Poor +  Dynamic Standing: Poor +    ACTIVITY TOLERANCE           BP: (!) 87/65  BP Location: Left arm  BP Method: Automatic  Patient Position: Lying    O2 WALK         AM-PAC '6-Clicks' INPATIENT SHORT FORM - BASIC MOBILITY  How much difficulty does the patient currently have...  Patient Difficulty: Turning over in bed (including adjusting bedclothes, sheets and blankets)?: A Little   Patient Difficulty: Sitting down on and standing up from a chair  with arms (e.g., wheelchair, bedside commode, etc.): A Little   Patient Difficulty: Moving from lying on back to sitting on the side of the bed?: A Little   How much help from another person does the patient currently need...   Help from Another: Moving to and from a bed to a chair (including a wheelchair)?: A Little   Help from Another: Need to walk in hospital room?: A Little   Help from Another: Climbing 3-5 steps with a railing?: A Lot       AM-PAC Score:  Raw Score: 17   Approx Degree of Impairment: 50.57%   Standardized Score (AM-PAC Scale): 42.13   CMS Modifier (G-Code): CK    FUNCTIONAL ABILITY STATUS  Gait Assessment   Functional Mobility/Gait Assessment  Gait Assistance: Minimum assistance  Distance (ft): 80,80  Assistive Device: Rolling walker  Pattern: Shuffle;R Steppage    Skilled Therapy Provided:     Bed Mobility:   Rolling: min A    Supine<>Sit: NT   Sit<>Supine: min A *assist with LE provided     Transfer Mobility:  Sit<>Stand: min A participated in x3 trials, able to maintain statically for extended period of time while BP assessed   Stand<>Sit: CGA    Gait:   AM Min A 2ft with RW from chair to bed    2nd bout on PM Min A with RW intermittent mod A for impaired dynamic balance as fatigued, performed with chair follow 80ft x2, performing intermittent steppage pattern with RLE for foot clearance, cued for TKE during stance phase and heel strike with minimal improvement noted during second trial     Functional Mobility:  30 sec sit<>stand: 3 *improved from prior assessment  Fall Risk: yes  [Below average score indicates high risk for falls; norms by age > or = to...   60-63 y/o Men: 14, Women: 12   65-70 y/o Men: 12, Women: 11   70-75 y/o Men: 12, Women: 10   75-78 y/o Men: 11, Women: 10   80-85 y/o Men: 10, Women: 9   85-88 y/o Men: 8, Women: 8   90-93 y/o Men 7, Women: 4]        Therapist's Comments: hypotensive this AM BP 85/57 in sitting, 74/44 in standing, RN notified - not appropriate to  progress gait training, pt denied dizziness/lightheadedness but reported not feeling well. Reassessed in PM /76 sitting, 118/63 standing and 132/72 sitting post ambulation - reported feeling better this PM.     Patient End of Session: Up in chair;Needs met;Call light within reach;RN aware of session/findings;All patient questions and concerns addressed;Alarm set;Family present    PT Session Time: 15 minutes in AM, 30 mins in PM  Gait Trainin minutes  Therapeutic Activity: 15 minutes  Therapeutic Exercise: 0 minutes   Neuromuscular Re-education:  minutes

## 2024-05-10 NOTE — DISCHARGE SUMMARY
[facility]     Clay Orellana Patient Status:  Inpatient    1945 MRN [id number]   Location [facility] 6NE-A Attending David Bustillo MD   Hosp Day # 12 PCP Sachin Meza MD     Date of Admission: 2024  Date of Discharge: 2024  Discharge Disposition: [facility]  Chief complaint:   Chief Complaint   Patient presents with    Fall         Discharge Diagnoses and Brief Synopsis:  #Fall/weakness ; initial concern for NPH; s/p lumbar drain placement for trial; not likely NPH,as did not show improvement with drain; drain removed and plan to hold aspirin/doac for 5-7 day before resuming.  MRI lumbar spine also ordered and  reviewed; nonacute changes per neurosurgery; nothing surgical at this time     #RLE weakness >LLE  #LBP  #orthostasis; BB tirated per neuroCC; added midodrine with improvement in bp     #Cerebrovascular disease  #PAF s/p DCCV  #BPH  #Anxiety        Lab/Test results pending at Discharge:   none        Admission History of Present Illness (author of HPI not necessarily myself; see H&P author): Clay Orellana is a 78 year old male with a PMH of hydrocephalous presented to ED after suffering a fall. Patient reports he was attempting to get out of bed and go to bathroom when right leg gave out resulting in fall. Patient developed pain to right hip after fall and has been unable to ambulate due to pain and weakness. Denies hitting head. Denies CP/SOB/N/V.       Lace+ Score: 79  59-90 High Risk  29-58 Medium Risk  0-28   Low Risk       TCM Follow-Up Recommendation:  LACE > 58: High Risk of readmission after discharge from the hospital.      Discharge Medication List:     Discharge Medications        START taking these medications        Instructions Prescription details   midodrine 5 MG Tabs  Commonly known as: ProAmatine      Take 1 tablet (5 mg total) by mouth in the morning and 1 tablet (5 mg total) at noon and 1 tablet (5 mg total) in the evening.   Quantity: 90  tablet  Refills: 0            CHANGE how you take these medications        Instructions Prescription details   aspirin 81 MG Tbec  Start taking on: May 17, 2024  What changed: These instructions start on May 17, 2024. If you are unsure what to do until then, ask your doctor or other care provider.      Take 1 tablet (81 mg total) by mouth daily.   Refills: 0     metoprolol tartrate 25 MG Tabs  Commonly known as: Lopressor  What changed: how much to take      Take 0.5 tablets (12.5 mg total) by mouth 2x Daily(Beta Blocker).   Quantity: 30 tablet  Refills: 0     rivaroxaban 20 MG Tabs  Commonly known as: Xarelto  Start taking on: May 17, 2024  What changed: These instructions start on May 17, 2024. If you are unsure what to do until then, ask your doctor or other care provider.      Take 1 tablet (20 mg total) by mouth nightly.   Refills: 0            CONTINUE taking these medications        Instructions Prescription details   acetaminophen 500 MG Tabs  Commonly known as: Tylenol Extra Strength      Take 1 tablet (500 mg total) by mouth every 6 (six) hours as needed.   Refills: 0     acidophilus-pectin Caps  Commonly known as: Probiotic      Take 1 capsule by mouth daily.   Refills: 0     Cholecalciferol 125 MCG (5000 UT) Tabs  Commonly known as: VITAMIN D-3      Take 1 tablet (5,000 Units total) by mouth. Three times a week   Refills: 0     cyanocobalamin 500 MCG Tabs  Commonly known as: Vitamin B12      Take 1 tablet (500 mcg total) by mouth daily.   Refills: 0     donepezil 10 MG Tabs  Commonly known as: Aricept      Take 1 tablet (10 mg total) by mouth nightly.   Quantity: 90 tablet  Refills: 1     finasteride 5 MG Tabs  Commonly known as: Proscar      TAKE ONE TABLET BY MOUTH ONE TIME DAILY   Quantity: 90 tablet  Refills: 2     flecainide 100 MG Tabs  Commonly known as: Tambocor      Take 1 tablet (100 mg total) by mouth 2 (two) times daily.   Refills: 0     ipratropium 0.03 % Soln  Commonly known as: Atrovent       2 sprays by Nasal route Q12H.   Refills: 0     Multi-Vitamin/Minerals Tabs      Take 1 tablet by mouth daily.   Refills: 0     sertraline 25 MG Tabs  Commonly known as: Zoloft      Take 1 tablet (25 mg total) by mouth daily.   Quantity: 30 tablet  Refills: 1     tamsulosin 0.4 MG Caps  Commonly known as: Flomax      Take 1 capsule (0.4 mg total) by mouth After dinner. 30 MIN AFTER   Quantity: 90 capsule  Refills: 0     Trelegy Ellipta 100-62.5-25 MCG/ACT Aepb  Generic drug: fluticasone-umeclidin-vilant      Inhale 1 puff into the lungs daily.   Refills: 0            ASK your doctor about these medications        Instructions Prescription details   atorvastatin 40 MG Tabs  Commonly known as: Lipitor  Ask about: Which instructions should I use?      Take 1 tablet (40 mg total) by mouth nightly.   Quantity: 90 tablet  Refills: 3               Where to Get Your Medications        These medications were sent to [id number] - [place], [place] - [address] [phone], [phone]  [address] [facility] [address]      Phone: [phone]   atorvastatin 40 MG Tabs       Please  your prescriptions at the location directed by your doctor or nurse    Bring a paper prescription for each of these medications  metoprolol tartrate 25 MG Tabs  midodrine 5 MG Tabs         [place]    Follow-up appointment:   Kaitlyn Martinez APN  [address] [place], [place] [address] [address]  [phone]    Follow up on 5/23/2024  at 2:30 pm    Sachin Meza MD  [address] [place] [address]  [phone]    Follow up in 1 week(s)      Appointments for Next 30 Days 5/13/2024 - 6/12/2024        Date Arrival Time Visit Type Length Department Provider     5/23/2024  2:30 PM  POST OP VISIT [9386] 30 min [facility] Kaitlyn Martinez APN    Patient Instructions:         Location Instructions:     Masks are optional for all patients and visitors, unless  otherwise indicated.               5/29/2024  2:00 PM  FOLLOW UP VISIT [1316] 20 min [facility] Vinny Preciado MD    Patient Instructions:         Location Instructions:     Masks are optional for all patients and visitors, unless otherwise indicated.                      Vital signs:  Temp:  [97.4 °F (36.3 °C)-98.8 °F (37.1 °C)] 97.6 °F (36.4 °C)  Pulse:  [60-68] 62  Resp:  [13-21] 13  BP: (101-160)/(47-91) 109/60  SpO2:  [90 %-94 %] 92 %    Physical Exam:    General: No acute distress  Respiratory: No wheezes, no rhonchi  Cardiovascular: S1, S2, regular rate and rhythm  Abdomen: Soft, Non-tender, non-distended  Neuro: No new focal deficits.   Extremities: No edema  -----------------------------------------------------------------------------------------------  PATIENT DISCHARGE INSTRUCTIONS: See electronic chart    David Navarro MD    Time spent:   31 minutes

## 2024-05-10 NOTE — PROGRESS NOTES
[facility]   part of [facility]     Hospitalist Progress Note     Clay Orellana Patient Status:  Inpatient    1945 MRN [id number]   Location [facility] 7NE-A Attending Angel Cristobal MD   Hosp Day # 9 PCP Sachin Meza MD     Chief Complaint: fall    Subjective:     Feels well currently however was a little uneasy feeling with standing and SBP in 80s.    Objective:    Review of Systems:   A comprehensive review of systems was completed; pertinent positive and negatives stated in subjective.    Vital signs:  Temp:  [97 °F (36.1 °C)-98.6 °F (37 °C)] 97.5 °F (36.4 °C)  Pulse:  [60-80] 61  Resp:  [13-22] 16  BP: ()/(43-82) (P) 87/65  SpO2:  [88 %-95 %] 92 %    Physical Exam:    General: No acute distress  Respiratory: No wheezes, no rhonchi  Cardiovascular: S1, S2, regular rate and rhythm  Abdomen: Soft, Non-tender, non-distended  Neuro: No new focal deficits.   Extremities: No edema    Diagnostic Data:    Labs:  Recent Labs   Lab 24  0541 241 243 05/10/24  0434   WBC  --  9.7 9.1 9.0   HGB  --  12.9* 12.2* 12.0*   MCV  --  94.9 94.1 95.6   PLT  --  229.0 208.0 206.0   INR 1.07  --   --   --        Recent Labs   Lab 243 05/10/24  0433   GLU 97 95 90   BUN 23 31* 35*   CREATSERUM 0.98 1.07 1.23   CA 8.7 9.1 8.9    140 137   K 3.9 4.1 4.1    109 107   CO2 26.0 25.0 25.0       Estimated Creatinine Clearance: 50.2 mL/min (based on SCr of 1.23 mg/dL).    No results for input(s): \"TROP\", \"TROPHS\", \"CK\" in the last 168 hours.    Recent Labs   Lab 24  0541   PTP 13.9   INR 1.07                  Microbiology    No results found for this visit on 24.      Imaging: Reviewed in Epic.    Medications:    midodrine  2.5 mg Oral BID AC    metoprolol tartrate  12.5 mg Oral 2x Daily(Beta Blocker)    atorvastatin  40 mg Oral Nightly    donepezil  10 mg Oral Nightly    finasteride  5 mg Oral Daily    flecainide  100 mg Oral BID     fluticasone-umeclidin-vilant  1 puff Inhalation Daily    ipratropium  2 spray Nasal 2 times per day    sertraline  25 mg Oral Daily    tamsulosin  0.4 mg Oral After dinner       Assessment & Plan:      #Fall/weakness   POD3 s/p lumbar drain placement for trial; not likely NPH; drain removed   holding doac/aspirin until 5 days s/p drain removal  MRI lumbar spine reviewed; nonacute changes per neurosurgery    #RLE weakness >LLE  #LBP  - Dr. Cristobal D/w neuro, CVA unlikely. CTH neg    #orthostasis; BB tirated per neuroCC; stopped acei; monitor orthos qshift; add low dose midodrine     #Cerebrovascular disease  -statin; aspirin on hold     #PAF s/p DCCV  -BB; doac held     #BPH  -Flomax/finasteride     #Anxiety  -SSRI    Not ready for discharge until orthostasis corrrected      David Bustillo MD  [facility]  Internal Medicine Hospitalist        Supplementary Documentation:     Quality:  DVT Mechanical Prophylaxis:   SCDs, Early ambuation  DVT Pharmacologic Prophylaxis   Medication   None                Code Status: Full Code  Dotson: External urinary catheter in place  Dotson Duration (in days):   Central line:    JAQUELINE:     Discharge is dependent on: course  At this point Mr. Orellana is expected to be discharge to: home    The 21st Century Cures Act makes medical notes like these available to patients in the interest of transparency. Please be advised this is a medical document. Medical documents are intended to carry relevant information, facts as evident, and the clinical opinion of the practitioner. The medical note is intended as peer to peer communication and may appear blunt or direct. It is written in medical language and may contain abbreviations or verbiage that are unfamiliar.

## 2024-05-10 NOTE — PROGRESS NOTES
Pt awake and alert. Dressing to lumbar c,d,I.  Walked in sullivan with PT, tolerating diet. Family at bedside. Updated on plan of care.

## 2024-05-10 NOTE — CM/SW NOTE
Updated liaison Opal with MJ of discharge order--PM&R physician recommending 48-hours of systolic blood pressures @ 100/--they will re-assess tomorrow    Updated patient and family @ bedside

## 2024-05-11 PROCEDURE — 99232 SBSQ HOSP IP/OBS MODERATE 35: CPT | Performed by: HOSPITALIST

## 2024-05-11 PROCEDURE — G0179 MD RECERTIFICATION HHA PT: HCPCS | Performed by: FAMILY MEDICINE

## 2024-05-11 RX ORDER — MIDODRINE HYDROCHLORIDE 5 MG/1
5 TABLET ORAL 3 TIMES DAILY
Status: DISCONTINUED | OUTPATIENT
Start: 2024-05-11 | End: 2024-05-13

## 2024-05-11 NOTE — PROGRESS NOTES
[facility]   part of [facility]     Hospitalist Progress Note     Clay Orellana Patient Status:  Inpatient    1945 MRN [id number]   Location [facility] 7NE-A Attending Angel Cristobal MD   Hosp Day # 10 PCP Sachin Meza MD     Chief Complaint: fall    Subjective:     Feels fine at rest    Objective:    Review of Systems:   A comprehensive review of systems was completed; pertinent positive and negatives stated in subjective.    Vital signs:  Temp:  [97.4 °F (36.3 °C)-98.1 °F (36.7 °C)] 97.5 °F (36.4 °C)  Pulse:  [60-95] 64  Resp:  [11-21] 15  BP: (104-165)/(56-86) 119/66  SpO2:  [91 %-96 %] 93 %    Physical Exam:    General: No acute distress  Respiratory: No wheezes, no rhonchi  Cardiovascular: S1, S2, regular rate and rhythm  Abdomen: Soft, Non-tender, non-distended  Neuro: No new focal deficits.   Extremities: No edema    Diagnostic Data:    Labs:  Recent Labs   Lab 24  0541 24  0403 05/10/24  0434   WBC  --  9.7 9.1 9.0   HGB  --  12.9* 12.2* 12.0*   MCV  --  94.9 94.1 95.6   PLT  --  229.0 208.0 206.0   INR 1.07  --   --   --        Recent Labs   Lab 241 243 05/10/24  0433   GLU 97 95 90   BUN 23 31* 35*   CREATSERUM 0.98 1.07 1.23   CA 8.7 9.1 8.9    140 137   K 3.9 4.1 4.1    109 107   CO2 26.0 25.0 25.0       Estimated Creatinine Clearance: 50.2 mL/min (based on SCr of 1.23 mg/dL).    No results for input(s): \"TROP\", \"TROPHS\", \"CK\" in the last 168 hours.    Recent Labs   Lab 24  0541   PTP 13.9   INR 1.07                  Microbiology    No results found for this visit on 24.      Imaging: Reviewed in Epic.    Medications:    midodrine  2.5 mg Oral BID AC    metoprolol tartrate  12.5 mg Oral 2x Daily(Beta Blocker)    atorvastatin  40 mg Oral Nightly    donepezil  10 mg Oral Nightly    finasteride  5 mg Oral Daily    flecainide  100 mg Oral BID    fluticasone-umeclidin-vilant  1 puff Inhalation Daily    ipratropium  2  spray Nasal 2 times per day    sertraline  25 mg Oral Daily    tamsulosin  0.4 mg Oral After dinner       Assessment & Plan:      #Fall/weakness   POD4 s/p lumbar drain placement for trial; not likely NPH; drain removed   holding doac/aspirin until 5 days s/p drain removal  MRI lumbar spine reviewed; nonacute changes per neurosurgery    #RLE weakness >LLE  #LBP  - Dr. Cristobal D/w neuro, CVA unlikely. CTH neg    #orthostasis; BB tirated per neuroCC; stopped acei; monitor orthos qshift; added low dose midodrine     #Cerebrovascular disease  -statin; aspirin on hold     #PAF s/p DCCV  -BB; doac held     #BPH  -Flomax/finasteride     #Anxiety  -SSRI    Medically cleared if orthostatics negative      David Bustillo MD  [facility]  Internal Medicine Hospitalist        Supplementary Documentation:     Quality:  DVT Mechanical Prophylaxis:   SCDs, Early ambuation  DVT Pharmacologic Prophylaxis   Medication   None         DVT Pharmacologic prophylaxis: Aspirin 81 mg      Code Status: Full Code  Dotson: External urinary catheter in place  Dotson Duration (in days):   Central line:    JAQUELINE: 5/10/2024    Discharge is dependent on: course  At this point Mr. Orellana is expected to be discharge to: home    The 21st Century Cures Act makes medical notes like these available to patients in the interest of transparency. Please be advised this is a medical document. Medical documents are intended to carry relevant information, facts as evident, and the clinical opinion of the practitioner. The medical note is intended as peer to peer communication and may appear blunt or direct. It is written in medical language and may contain abbreviations or verbiage that are unfamiliar.

## 2024-05-11 NOTE — CM/SW NOTE
LM with Susan in admissions at  to inform her that pt could be ready tomorrow. CM/SW will follow up in the morning.    EVENS Robertson RN, CM  [phone]

## 2024-05-11 NOTE — PHYSICAL THERAPY NOTE
PHYSICAL THERAPY TREATMENT NOTE - INPATIENT    Room Number: 7625/7625-A      Session: 5     Number of Visits to Meet Established Goals: 7  History related to current admission: Patient is a 79 year old male admitted on 5/1/2024 from [facility] for fall, weakness. 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 MRI L spine reviewed, discussed with neuro APN Kaitlyn gutierrez for cont therapy. Lumbar drain removed 5/9.         HOME SITUATION  Type of Home: Independent living facility ([facility])   Home Layout: One level     Lives With: Spouse  Drives: No  Patient Owned Equipment: Cane;Rolling walker;Rollator  Patient Regularly Uses: Glasses     Prior Level of [place]: \"Pt typically ambulates with rollator walker at [facility], ambulates to dining sullivan but lately wife has been pushing him in the rollator. Was supposed to have drain trial this upcoming Monday for NPH. \"  Presenting Problem: Fall  Co-Morbidities : NPH, Afib, HTN, TIA, COPD, anxiety    ASSESSMENT   Patient demonstrates good  progress this session, goals  remain in progress.     Pt remains positive orthostatics - but asymptomatic.  Pt denied pain, nausea or dizziness with activity.    Patient continues to function below baseline with bed mobility, transfers, and gait.  Contributing factors to remaining limitations include decreased functional strength, decreased endurance/aerobic capacity, pain, impaired dynamic balance, impaired coordination, impaired motor planning, and orthostatic hypotension .  Continues to demonstrate RUE/LE weakness but is functionally improving. Next session anticipate patient to progress bed mobility, transfers, and gait.  Physical Therapy will continue to follow patient for duration of hospitalization.      Patient continues to benefit from continued skilled PT services: to facilitate return to prior level of function as patient demonstrates high motivation with excellent tolerance to an intensive  therapy program .    PLAN  PT Treatment Plan: Bed mobility;Body mechanics;Endurance;Energy conservation;Patient education;Family education;Gait training;Balance training;Transfer training;Strengthening;Neuromuscular re-educate  Rehab Potential : Fair  Frequency (Obs): 5x/week    CURRENT GOALS     Goal #1 Patient is able to demonstrate supine - sit EOB @ level: supervision      Goal #2 Patient is able to demonstrate transfers EOB to/from Chair/Wheelchair at assistance level: minimum assistance - met 5/9/2024     Upgrade to SBA level       Goal #3 Patient is able to ambulate 15 feet with assist device: walker - rolling at assistance level: moderate assistance - met 5/9/2024    Upgrade to 150ft at SBA level       Goal #4     Goal #5     Goal #6     Goal Comments: Goals established on 5/2/2024 5/11/2024 all goals ongoing     SUBJECTIVE  \"I am doing better thanks\"    OBJECTIVE  Precautions: Bed/chair alarm (-150)    WEIGHT BEARING RESTRICTION  Weight Bearing Restriction: None                PAIN ASSESSMENT   Rating: Unable to rate  Location:  (\" a little better\" in his head neck and shoulders)  Management Techniques: Activity promotion;Body mechanics;Repositioning    BALANCE                                                                                                                       Static Sitting: Fair -  Dynamic Sitting: Fair -           Static Standing: Poor +  Dynamic Standing: Poor +    ACTIVITY TOLERANCE                         O2 WALK         AM-PAC '6-Clicks' INPATIENT SHORT FORM - BASIC MOBILITY  How much difficulty does the patient currently have...  Patient Difficulty: Turning over in bed (including adjusting bedclothes, sheets and blankets)?: A Little   Patient Difficulty: Sitting down on and standing up from a chair with arms (e.g., wheelchair, bedside commode, etc.): A Little   Patient Difficulty: Moving from lying on back to sitting on the side of the bed?: A Little   How much help from  another person does the patient currently need...   Help from Another: Moving to and from a bed to a chair (including a wheelchair)?: A Little   Help from Another: Need to walk in hospital room?: A Little   Help from Another: Climbing 3-5 steps with a railing?: A Little       AM-PAC Score:  Raw Score: 18   Approx Degree of Impairment: 46.58%   Standardized Score (AM-PAC Scale): 43.63   CMS Modifier (G-Code): CK    FUNCTIONAL ABILITY STATUS  Gait Assessment   Functional Mobility/Gait Assessment  Gait Assistance: Minimum assistance  Distance (ft): 60  Assistive Device: Rolling walker  Pattern: Shuffle;R Steppage    Skilled Therapy Provided:     Bed Mobility:   Rolling: NT    Supine<>Sit: NT   Sit<>Supine: NT     Transfer Mobility:  Sit<>Stand: Min A  Stand<>Sit: CGA    Gait: see above - orthostatics done with PCT Amaury - see nursing flowsheet for BP values    Patient End of Session: Up in chair;Needs met;Call light within reach;RN aware of session/findings;All patient questions and concerns addressed;Alarm set    PT Session Time: 25  Gait Training: 15 minutes  Therapeutic Activity: 8 minutes  Therapeutic Exercise: 0 minutes   Neuromuscular Re-education:  minutes

## 2024-05-11 NOTE — PLAN OF CARE
Assumed care 1930.  A&O x4, however, anxious and irritable at times.  Pt reports being \"left alone\" and \"forgotten\" several times, despite RN and PCT rounding and meeting needs at least every hour.  Hydralazine given x 1 for SBP >150. No orthostatic hypotension overnight.      Transferred to room 7625 at 0655, report called to Shana, notified wife, Aniya of transfer.  Glasses and dentures with case transported with pt.

## 2024-05-12 PROCEDURE — 99232 SBSQ HOSP IP/OBS MODERATE 35: CPT | Performed by: HOSPITALIST

## 2024-05-12 RX ORDER — TAMSULOSIN HYDROCHLORIDE 0.4 MG/1
0.4 CAPSULE ORAL NIGHTLY
Status: DISCONTINUED | OUTPATIENT
Start: 2024-05-12 | End: 2024-05-13

## 2024-05-12 NOTE — PROGRESS NOTES
[facility]   part of [facility]     Hospitalist Progress Note     Clay Orellana Patient Status:  Inpatient    1945 MRN [id number]   Location [facility] 7NE-A Attending Angel Cristobal MD   Hosp Day # 11 PCP Sachin Meza MD     Chief Complaint: fall    Subjective:     Feels fine at rest    Objective:    Review of Systems:   A comprehensive review of systems was completed; pertinent positive and negatives stated in subjective.    Vital signs:  Temp:  [97.7 °F (36.5 °C)-98.5 °F (36.9 °C)] 98.4 °F (36.9 °C)  Pulse:  [60-71] 61  Resp:  [13-25] 16  BP: ()/() 85/47  SpO2:  [90 %-98 %] 90 %    Physical Exam:    General: No acute distress  Respiratory: No wheezes, no rhonchi  Cardiovascular: S1, S2, regular rate and rhythm  Abdomen: Soft, Non-tender, non-distended  Neuro: No new focal deficits.   Extremities: No edema    Diagnostic Data:    Labs:  Recent Labs   Lab 24  0541 24  0403 05/10/24  0434   WBC  --  9.7 9.1 9.0   HGB  --  12.9* 12.2* 12.0*   MCV  --  94.9 94.1 95.6   PLT  --  229.0 208.0 206.0   INR 1.07  --   --   --        Recent Labs   Lab 243 05/10/24  0433   GLU 97 95 90   BUN 23 31* 35*   CREATSERUM 0.98 1.07 1.23   CA 8.7 9.1 8.9    140 137   K 3.9 4.1 4.1    109 107   CO2 26.0 25.0 25.0       Estimated Creatinine Clearance: 50.2 mL/min (based on SCr of 1.23 mg/dL).    No results for input(s): \"TROP\", \"TROPHS\", \"CK\" in the last 168 hours.    Recent Labs   Lab 24  0541   PTP 13.9   INR 1.07                  Microbiology    No results found for this visit on 24.      Imaging: Reviewed in Epic.    Medications:    tamsulosin  0.4 mg Oral Nightly    midodrine  5 mg Oral TID    metoprolol tartrate  12.5 mg Oral 2x Daily(Beta Blocker)    atorvastatin  40 mg Oral Nightly    donepezil  10 mg Oral Nightly    finasteride  5 mg Oral Daily    flecainide  100 mg Oral BID    fluticasone-umeclidin-vilant  1 puff  Inhalation Daily    ipratropium  2 spray Nasal 2 times per day    sertraline  25 mg Oral Daily       Assessment & Plan:      #Fall/weakness   POD5 s/p lumbar drain placement for trial; not likely NPH; drain removed   holding doac/aspirin until 5 days s/p drain removal; drain removed 5/9  MRI lumbar spine reviewed; nonacute changes per neurosurgery    #RLE weakness >LLE  #LBP  - Dr. Cristobal D/w neuro, CVA unlikely. CTH neg    #orthostasis; BB tirated per neuroCC; stopped acei; monitor orthos qshift; increased midodrine 5/11;  consider stopping flomax if BP stays low with standing     #Cerebrovascular disease  -statin; aspirin on hold     #PAF s/p DCCV  -BB; doac held     #BPH  -Flomax/finasteride     #Anxiety  -SSRI    Monitor overnight      David Bustillo MD  [facility]  Internal Medicine Hospitalist        Supplementary Documentation:     Quality:  DVT Mechanical Prophylaxis:   SCDs, Early ambuation  DVT Pharmacologic Prophylaxis   Medication   None         DVT Pharmacologic prophylaxis: Aspirin 81 mg      Code Status: Full Code  Dotson: External urinary catheter in place  Dotson Duration (in days):   Central line:    JAQUELINE: 5/10/2024    Discharge is dependent on: course  At this point Mr. Orellana is expected to be discharge to: home    The 21st Century Cures Act makes medical notes like these available to patients in the interest of transparency. Please be advised this is a medical document. Medical documents are intended to carry relevant information, facts as evident, and the clinical opinion of the practitioner. The medical note is intended as peer to peer communication and may appear blunt or direct. It is written in medical language and may contain abbreviations or verbiage that are unfamiliar.

## 2024-05-12 NOTE — PLAN OF CARE
Assumed care 0730   AOx4  Urinary incontinence using external cath   2 person assist with walker and gait belt   External catheter in place  Pt atrial paced.   Positive for Orthostatic during shift  Pt c/o neck pain. Pain controlled with heat packs and PRN tylenol.  Pt updated on plan of care. Per pt, no further questions.   Patient ok with POC at this time.   Patient resting , Call light within reach

## 2024-05-12 NOTE — PLAN OF CARE
Assumed care 0730   AOx4  Urinary incontinence using external cath   2 person assist with walker and gait belt    External catheter in place  Pt atrial paced.   Positive for Orthostatic during shift  Pt c/o neck pain. Pain controlled with heat packs and PRN tylenol. Pt updated on plan of care. Per pt, no further questions  Patient ok with POC at this time.   Patient resting , Call light within reach

## 2024-05-12 NOTE — PROGRESS NOTES
05/12/24 0700 05/12/24 0703 05/12/24 0705   Vital Signs   Pulse 60 71 61   Heart Rate Source  --  Monitor Monitor   Resp  --  25 16   Respiratory Quality Normal Normal Normal   /75 112/58 (!) 85/47   MAP (mmHg) 92 74 (!) 60   BP Location Right arm Right arm Right arm   BP Method Automatic Automatic Automatic   Patient Position Lying Sitting Standing   Oxygen Therapy   SpO2 92 % 96 % 90 %   O2 Device None (Room air) None (Room air) None (Room air)

## 2024-05-12 NOTE — PLAN OF CARE
Pt on RA this evening, pt stated he wears 2L N/C at night when he needs it  No complaints of pain  Pts b/p was elevated but it did decrease in the morning  Purewick changed, see flowsheet for urine output   Call light within reach, all safety measures maintained                   Problem: PAIN - ADULT  Goal: Verbalizes/displays adequate comfort level or patient's stated pain goal  Description: INTERVENTIONS:  - Encourage pt to monitor pain and request assistance  - Assess pain using appropriate pain scale  - Administer analgesics based on type and severity of pain and evaluate response  - Implement non-pharmacological measures as appropriate and evaluate response  - Consider cultural and social influences on pain and pain management  - Manage/alleviate anxiety  - Utilize distraction and/or relaxation techniques  - Monitor for opioid side effects  - Notify MD/LIP if interventions unsuccessful or patient reports new pain  - Anticipate increased pain with activity and pre-medicate as appropriate  Outcome: Progressing     Problem: SAFETY ADULT - FALL  Goal: Free from fall injury  Description: INTERVENTIONS:  - Assess pt frequently for physical needs  - Identify cognitive and physical deficits and behaviors that affect risk of falls.  - [place] fall precautions as indicated by assessment.  - Educate pt/family on patient safety including physical limitations  - Instruct pt to call for assistance with activity based on assessment  - Modify environment to reduce risk of injury  - Provide assistive devices as appropriate  - Consider OT/PT consult to assist with strengthening/mobility  - Encourage toileting schedule  Outcome: Progressing     Problem: RISK FOR INFECTION - ADULT  Goal: Absence of fever/infection during anticipated neutropenic period  Description: INTERVENTIONS  - Monitor WBC  - Administer growth factors as ordered  - Implement neutropenic guidelines  Outcome: Progressing     Problem: DISCHARGE PLANNING  Goal:  Discharge to home or other facility with appropriate resources  Description: INTERVENTIONS:  - Identify barriers to discharge w/pt and caregiver  - Include patient/family/discharge partner in discharge planning  - Arrange for needed discharge resources and transportation as appropriate  - Identify discharge learning needs (meds, wound care, etc)  - Arrange for interpreters to assist at discharge as needed  - Consider post-discharge preferences of patient/family/discharge partner  - Complete POLST form as appropriate  - Assess patient's ability to be responsible for managing their own health  - Refer to Case Management Department for coordinating discharge planning if the patient needs post-hospital services based on physician/LIP order or complex needs related to functional status, cognitive ability or social support system  Outcome: Progressing     Problem: NEUROLOGICAL - ADULT  Goal: Achieves maximal functionality and self care  Description: INTERVENTIONS  - Monitor swallowing and airway patency with patient fatigue and changes in neurological status  - Encourage and assist patient to increase activity and self care with guidance from PT/OT  - Encourage visually impaired, hearing impaired and aphasic patients to use assistive/communication devices  Outcome: Progressing

## 2024-05-12 NOTE — OCCUPATIONAL THERAPY NOTE
OCCUPATIONAL THERAPY TREATMENT NOTE - INPATIENT     Room Number: 7625/7625-A  Session: 4   Number of Visits to Meet Established Goals: 5    Presenting Problem: weakness, fall, 5/6 lumbar drain placement  Prior Level of Function: Mod I with BADLs and functional mobility with use of Rollator most recently d/t c/o increased BLE weakness and most recently, right LE>LLE weakness.  Patient states he typically does not need any help but his wife has been pushing him in the Rollator to the dining for the past few days d/t legs feeling weak.  Multiple falls reported.    ASSESSMENT   Patient demonstrates good  progress this session, goals remain in progress.    Patient continues to function below baseline with toileting, lower body dressing, transfers, and functional standing tolerance.   Contributing factors to remaining limitations include decreased functional strength, decreased endurance, and decreased muscular endurance.  Next session anticipate patient to progress toileting, lower body dressing, transfers, and functional standing tolerance.  Occupational Therapy will continue to follow patient for duration of hospitalization.    Patient continues to benefit from continued skilled OT services: to facilitate return to prior level of function as patient demonstrates high motivation with excellent tolerance to an intensive therapy program .          OT Device Recommendations: TBD    History: Patient is a 79 year old male admitted on 5/1/2024 with Presenting Problem: Weakness, fall. Co-Morbidities : NPH, Afib, HTN, TIA, COPD, anxiety.     **5/6 s/p lumbar drain placement for trial.  ** Spine MRI 5/7. Refer to chart. Per neurosurgery, ok to continue with therapy.       WEIGHT BEARING RESTRICTION  Weight Bearing Restriction: None                Recommendations for nursing staff:   Transfers: 1 person  Toileting location: toilet    TREATMENT SESSION:  Patient Start of Session: seated in chair; family present  FUNCTIONAL  TRANSFER ASSESSMENT  Sit to Stand: Chair  Edge of Bed: Not Tested  Chair: Contact Guard Assist    BED MOBILITY  Rolling: Not Tested  Supine to Sit : Not tested  Sit to Supine (OT): Not Tested  Scooting: not tested    BALANCE ASSESSMENT  Static Sitting: Stand-by Assist  Sitting Unilateral: Supervision  Static Standing: Contact Guard Assist  Standing Unilateral: Contact Guard Assist  Standing Bilateral: Contact Guard Assist    FUNCTIONAL ADL ASSESSMENT  Eating: Not Tested  Grooming Seated: Not Tested  LB Dressing Seated: Not Tested  Toileting Seated: Not Tested      ACTIVITY TOLERANCE: WFL; BP sitting 115/58, BP standing 88/51, BP sitting again 100/62, BP standing 95/45; denies symptoms           BP: 95/45  BP Location: Right arm  BP Method: Automatic  Patient Position: Standing    O2 SATURATIONS       EDUCATION PROVIDED  Patient: Role of Occupational Therapy; Plan of Care; Discharge Recommendations; Functional Transfer Techniques; Fall Prevention; Posture/Positioning; Energy Conservation; Proper Body Mechanics  Patient's Response to Education: Verbalized Understanding; Returned Demonstration  Family/Caregiver: Role of Occupational Therapy; Plan of Care  Family/Caregiver's Response to Education: Verbalized Understanding      Equipment used: RW  Demonstrates functional use, Would benefit from additional trial      Therapist comments: Pt pleasant and cooperative for OT session. See above for BP orthos. Pt denies symptoms. Pt agreeable for functional mobility training in preparation for ambulatory toilet transfer. Sit to stand transfer performed at [facility] and engages in functional mobility with RW requiring CGA and close chair follow. 2 bouts performed with one seated rest break due to B LE fatigue/weakness. Pt returns to room with all needs. Pt educated on pursed lip breathing for improved activity tolerance,   Patient End of Session: Up in chair;Needs met;Call light within reach;RN aware of session/findings;All patient  questions and concerns addressed;Alarm set;Family present    SUBJECTIVE  Pt pleasant and cooperative for OT.     PAIN ASSESSMENT  [place]  Location: denies  Management Techniques: Repositioning     OBJECTIVE  Precautions: Bed/chair alarm (-150)    AM-PAC ‘6-Clicks’ Inpatient Daily Activity Short Form  -   Putting on and taking off regular lower body clothing?: A Lot  -   Bathing (including washing, rinsing, drying)?: A Lot  -   Toileting, which includes using toilet, bedpan or urinal? : A Lot  -   Putting on and taking off regular upper body clothing?: A Little  -   Taking care of personal grooming such as brushing teeth?: A Little  -   Eating meals?: A Little    AM-PAC Score:  Score: 15  Approx Degree of Impairment: 56.46%  Standardized Score (AM-PAC Scale): 34.69    PLAN  OT Treatment Plan: Balance activities;Energy conservation/work simplification techniques;ADL training;Functional transfer training;Patient/Family education;Patient/Family training;Equipment eval/education;Compensatory technique education;Cognitive reorientation  Rehab Potential : Good  Frequency: 3-5x/week    OT Goals:     All goals ongoing     ADL Goals   Patient will perform upper body dressing:  with setup  Patient will perform lower body dressing:  with min assist  Patient will perform toileting: with min assist     Functional Transfer Goals  Patient will transfer from supine to sit:  with supervision  Patient will transfer to toilet:  with supervision     UE Exercise Program Goal  Patient will be independent with bilateral AROM HEP (home exercise program).     Additional Goals  Pt will recall spine precautions without cues.     Therapist Goals  SBT on the day of discharge    OT Session Time: 25 minutes  Therapeutic Activity: 25 minutes

## 2024-05-12 NOTE — CM/SW NOTE
TIFFANY spoke to Geno with [facility]. Informed pt likely ready for DC. Per Geno, beds are very tight but they will update team with their ability to accept pt.    CM/SW will remain available for DC planning and/or support.     COLBY BriggsN, CMSRN    [id number]

## 2024-05-13 VITALS
WEIGHT: 195.13 LBS | BODY MASS INDEX: 27 KG/M2 | OXYGEN SATURATION: 92 % | DIASTOLIC BLOOD PRESSURE: 60 MMHG | TEMPERATURE: 98 F | RESPIRATION RATE: 13 BRPM | HEART RATE: 62 BPM | SYSTOLIC BLOOD PRESSURE: 109 MMHG

## 2024-05-13 LAB
ANION GAP SERPL CALC-SCNC: 6 MMOL/L (ref 0–18)
BASOPHILS # BLD AUTO: 0.03 X10(3) UL (ref 0–0.2)
BASOPHILS NFR BLD AUTO: 0.3 %
BUN BLD-MCNC: 27 MG/DL (ref 9–23)
CALCIUM BLD-MCNC: 9 MG/DL (ref 8.5–10.1)
CHLORIDE SERPL-SCNC: 106 MMOL/L (ref 98–112)
CO2 SERPL-SCNC: 26 MMOL/L (ref 21–32)
CREAT BLD-MCNC: 1.19 MG/DL
EGFRCR SERPLBLD CKD-EPI 2021: 61 ML/MIN/1.73M2 (ref 60–?)
EOSINOPHIL # BLD AUTO: 0.21 X10(3) UL (ref 0–0.7)
EOSINOPHIL NFR BLD AUTO: 1.9 %
ERYTHROCYTE [DISTWIDTH] IN BLOOD BY AUTOMATED COUNT: 12.5 %
GLUCOSE BLD-MCNC: 96 MG/DL (ref 70–99)
HCT VFR BLD AUTO: 37.1 %
HGB BLD-MCNC: 12.9 G/DL
IMM GRANULOCYTES # BLD AUTO: 0.09 X10(3) UL (ref 0–1)
IMM GRANULOCYTES NFR BLD: 0.8 %
LYMPHOCYTES # BLD AUTO: 1.19 X10(3) UL (ref 1–4)
LYMPHOCYTES NFR BLD AUTO: 11 %
MCH RBC QN AUTO: 33.3 PG (ref 26–34)
MCHC RBC AUTO-ENTMCNC: 34.8 G/DL (ref 31–37)
MCV RBC AUTO: 95.9 FL
MONOCYTES # BLD AUTO: 0.98 X10(3) UL (ref 0.1–1)
MONOCYTES NFR BLD AUTO: 9.1 %
NEUTROPHILS # BLD AUTO: 8.29 X10 (3) UL (ref 1.5–7.7)
NEUTROPHILS # BLD AUTO: 8.29 X10(3) UL (ref 1.5–7.7)
NEUTROPHILS NFR BLD AUTO: 76.9 %
OSMOLALITY SERPL CALC.SUM OF ELEC: 291 MOSM/KG (ref 275–295)
PLATELET # BLD AUTO: 196 10(3)UL (ref 150–450)
POTASSIUM SERPL-SCNC: 4.2 MMOL/L (ref 3.5–5.1)
RBC # BLD AUTO: 3.87 X10(6)UL
SODIUM SERPL-SCNC: 138 MMOL/L (ref 136–145)
WBC # BLD AUTO: 10.8 X10(3) UL (ref 4–11)

## 2024-05-13 PROCEDURE — 99239 HOSP IP/OBS DSCHRG MGMT >30: CPT | Performed by: HOSPITALIST

## 2024-05-13 RX ORDER — MIDODRINE HYDROCHLORIDE 5 MG/1
5 TABLET ORAL 3 TIMES DAILY
Qty: 90 TABLET | Refills: 0 | Status: SHIPPED | OUTPATIENT
Start: 2024-05-13

## 2024-05-13 RX ORDER — ATORVASTATIN CALCIUM 40 MG/1
40 TABLET, FILM COATED ORAL NIGHTLY
Qty: 90 TABLET | Refills: 3 | Status: SHIPPED | OUTPATIENT
Start: 2024-05-13

## 2024-05-13 NOTE — PLAN OF CARE
NURSING DISCHARGE NOTE    Discharged Rehab facility via Wheelchair.  Accompanied by Family member and Support staff  Belongings Taken by patient/family.    Discharge AVS complete and reviewed with patient and family.  New medications, side effects, and follow up appointments discussed.  Patient verbalized understanding.  All questions answered.  Patient discharged to  via [facility].  Report given to LEIGH Sherwood at .

## 2024-05-13 NOTE — PROGRESS NOTES
[facility]   part of [facility]     Hospitalist Progress Note     Clay Orellana Patient Status:  Inpatient    1945 MRN [id number]   [facility] 7NE-A Attending Angel Crisotbal MD   Hosp Day # 12 PCP Sachin Meza MD     Chief Complaint: fall    Subjective:     Feels fine at rest    Objective:    Review of Systems:   A comprehensive review of systems was completed; pertinent positive and negatives stated in subjective.    Vital signs:  Temp:  [97.9 °F (36.6 °C)-98.8 °F (37.1 °C)] 98 °F (36.7 °C)  Pulse:  [60-68] 68  Resp:  [12-20] 15  BP: ()/(45-80) 101/47  SpO2:  [91 %-95 %] 93 %    Physical Exam:    General: No acute distress  Respiratory: No wheezes, no rhonchi  Cardiovascular: S1, S2, regular rate and rhythm  Abdomen: Soft, Non-tender, non-distended  Neuro: No new focal deficits.   Extremities: No edema    Diagnostic Data:    Labs:  Recent Labs   Lab 24  0441 24  0403 05/10/24  0434   WBC 9.7 9.1 9.0   HGB 12.9* 12.2* 12.0*   MCV 94.9 94.1 95.6   .0 208.0 206.0       Recent Labs   Lab 24  0441 24  0403 05/10/24  0433   GLU 97 95 90   BUN 23 31* 35*   CREATSERUM 0.98 1.07 1.23   CA 8.7 9.1 8.9    140 137   K 3.9 4.1 4.1    109 107   CO2 26.0 25.0 25.0       Estimated Creatinine Clearance: 50.2 mL/min (based on SCr of 1.23 mg/dL).    No results for input(s): \"TROP\", \"TROPHS\", \"CK\" in the last 168 hours.    No results for input(s): \"PTP\", \"INR\" in the last 168 hours.                 Microbiology    No results found for this visit on 24.      Imaging: Reviewed in Epic.    Medications:    tamsulosin  0.4 mg Oral Nightly    midodrine  5 mg Oral TID    metoprolol tartrate  12.5 mg Oral 2x Daily(Beta Blocker)    atorvastatin  40 mg Oral Nightly    donepezil  10 mg Oral Nightly    finasteride  5 mg Oral Daily    flecainide  100 mg Oral BID    fluticasone-umeclidin-vilant  1 puff Inhalation Daily    ipratropium  2 spray Nasal 2 times per day     sertraline  25 mg Oral Daily       Assessment & Plan:      #Fall/weakness   POD6 s/p lumbar drain placement for trial; not likely NPH; drain removed   holding doac/aspirin until 5 days s/p drain removal; drain removed 5/9  MRI lumbar spine reviewed; nonacute changes per neurosurgery    #RLE weakness >LLE  #LBP  - Dr. Cristobal D/w neuro, CVA unlikely. CTH neg    #orthostasis; BB tirated per neuroCC; stopped acei; monitor orthos qshift; increased midodrine 5/11;  consider stopping flomax if BP stays low with standing     #Cerebrovascular disease  -statin; aspirin on hold     #PAF s/p DCCV  -BB; doac held     #BPH  -Flomax/finasteride     #Anxiety  -SSRI    Medically cleared for discharge if not dizzy with standing today.  Resume doac/aspirin 5/17.  Could consider adding back 5/14 but would prefer him get a few days of stability at rehab on his feet without dizziness from orthostasis to ensure safe to resume doac/aspirin      David Bustillo MD  [facility]  Internal Medicine Hospitalist        Supplementary Documentation:     Quality:  DVT Mechanical Prophylaxis:   SCDs, Early ambuation  DVT Pharmacologic Prophylaxis   Medication   None         DVT Pharmacologic prophylaxis: Aspirin 81 mg      Code Status: Full Code  Dotson: External urinary catheter in place  Dotson Duration (in days):   Central line:    JAQUELINE: 5/13/2024    Discharge is dependent on: course  At this point Mr. Orellana is expected to be discharge to: home    The 21st Century Cures Act makes medical notes like these available to patients in the interest of transparency. Please be advised this is a medical document. Medical documents are intended to carry relevant information, facts as evident, and the clinical opinion of the practitioner. The medical note is intended as peer to peer communication and may appear blunt or direct. It is written in medical language and may contain abbreviations or verbiage that are unfamiliar.

## 2024-05-13 NOTE — CM/SW NOTE
DC planning-- requested CBC/BMP per protocol for DC to [facility]. Notified liaison, PMR reviewing for acceptance to  today.     Addendum 2:00- bed available at [facility]. SW  met with pt/spouse at bedside. Wife inquiring if able transport with patient, aware it is dependent on  but should be no problem. Wife says she can have ride as well if needed.     Pt to dc to rm 2286, RN to call [phone]  for report. [facility] transport scheduled for 4:15, PCS completed.     KAMALA Mckay

## 2024-05-13 NOTE — PROGRESS NOTES
05/13/24 0530 05/13/24 0535 05/13/24 0539   Vital Signs   Respiratory Quality Normal Normal Normal   /80 122/59 101/47   MAP (mmHg) 97 76 (!) 63   BP Location Right arm Right arm Right arm   BP Method Automatic Automatic Automatic   Patient Position Lying Sitting Standing

## 2024-05-13 NOTE — TELEPHONE ENCOUNTER
Please review. Protocol Failed; No Protocol    Requested Prescriptions   Pending Prescriptions Disp Refills    ATORVASTATIN 40 MG Oral Tab [Pharmacy Med Name: Atorvastatin Calcium 40 Mg Tab Nort] 90 tablet 0     Sig: Take 1 tablet (40 mg total) by mouth nightly.       Cholesterol Medication Protocol Failed - 5/11/2024 11:22 AM        Failed - Lipid panel within past 12 months     Lab Results   Component Value Date    CHOLEST 148 03/20/2023    TRIG 49 03/20/2023    HDL 47 03/20/2023    LDL 90 03/20/2023    VLDL 8 03/20/2023    NONHDLC 101 03/20/2023             Passed - ALT < 80     Lab Results   Component Value Date    ALT 42 05/01/2024             Passed - ALT resulted within past year        Passed - In person appointment or virtual visit in the past 12 mos or appointment in next 3 mos     Recent Outpatient Visits              2 months ago Neurologic gait dysfunction    [facility] Kaitlyn Martinez APN    Office Visit    2 months ago Gait abnormality    [facility], [facility] Yanet Land PA    Office Visit    5 months ago Mood disorder (HCC)    [facility], Antwan Barriga Katelyn, APRN    Office Visit    7 months ago [facility] exam for internal medicine    [facility] [place] Kately[facility]    Office Visit    7 months ago Complaints of memory disturbance    [facility], [facility] Vinny Preciado MD    Office Visit          Future Appointments         Provider Department Appt Notes    In 1 week Kaitlyn Martinez APN [facility] post op, Lumbar drain trial 5.6.24    In 2 weeks Vinny Preciado MD [facility] f/up,weakness                           Future Appointments         Provider Department Appt Notes    In 1 week  Kaitlyn Martinez APN [facility], [facility] post op, Lumbar drain trial 5.6.24    In 2 weeks Vinny Preciado MD [facility], [facility] f/up,weakness          Recent Outpatient Visits              2 months ago Neurologic gait dysfunction    [facility], [facility] Kaitlyn Martinez APN    Office Visit    2 months ago Gait abnormality    [facility], [facility] Yanet Land PA    Office Visit    5 months ago Mood disorder (HCC)    [facility], Antwan Barriga Katelyn, APRN    Office Visit    7 months ago Preop exam for internal medicine    [facility], Antwan Barriga Katelyn, APRN    Office Visit    7 months ago Complaints of memory disturbance    [facility], [facility] Vinny Preciado MD    Office Visit

## 2024-05-13 NOTE — PLAN OF CARE
Pt stable overnight. Denies any cp or yecenia.  VSS.(+) OBP. Meds given as scheduled.   Neuro assessment unremarkable.Generalized weakness.   T/R for comfort. Voiding via external catheter in good amounts.   Brenda care given. All safety precautions in place.   Plan to dc to [facility] today. Will cont to monitor.    Problem: PAIN - ADULT  Goal: Verbalizes/displays adequate comfort level or patient's stated pain goal  Description: INTERVENTIONS:  - Encourage pt to monitor pain and request assistance  - Assess pain using appropriate pain scale  - Administer analgesics based on type and severity of pain and evaluate response  - Implement non-pharmacological measures as appropriate and evaluate response  - Consider cultural and social influences on pain and pain management  - Manage/alleviate anxiety  - Utilize distraction and/or relaxation techniques  - Monitor for opioid side effects  - Notify MD/LIP if interventions unsuccessful or patient reports new pain  - Anticipate increased pain with activity and pre-medicate as appropriate  Outcome: Progressing     Problem: Impaired Functional Mobility  Goal: Achieve highest/safest level of mobility/gait  Description: Interventions:  - Assess patient's functional ability and stability  - Promote increasing activity/tolerance for mobility and gait  - Educate and engage patient/family in tolerated activity level and precautions  - Recommend use of  RW for transfers and ambulation  - Recommend patient transfer to bedside chair toward strongest side  - When transferring patient, block weaker knee for safety  Outcome: Progressing     Problem: MUSCULOSKELETAL - ADULT  Goal: Return mobility to safest level of function  Description: INTERVENTIONS:  - Assess patient stability and activity tolerance for standing, transferring and ambulating w/ or w/o assistive devices  - Assist with transfers and ambulation using safe patient handling equipment as needed  - Ensure adequate protection for  wounds/incisions during mobilization  - Obtain PT/OT consults as needed  - Advance activity as appropriate  - Communicate ordered activity level and limitations with patient/family  Outcome: Progressing     Problem: SAFETY ADULT - FALL  Goal: Free from fall injury  Description: INTERVENTIONS:  - Assess pt frequently for physical needs  - Identify cognitive and physical deficits and behaviors that affect risk of falls.  - [place] fall precautions as indicated by assessment.  - Educate pt/family on patient safety including physical limitations  - Instruct pt to call for assistance with activity based on assessment  - Modify environment to reduce risk of injury  - Provide assistive devices as appropriate  - Consider OT/PT consult to assist with strengthening/mobility  - Encourage toileting schedule  Outcome: Progressing     Problem: RISK FOR INFECTION - ADULT  Goal: Absence of fever/infection during anticipated neutropenic period  Description: INTERVENTIONS  - Monitor WBC  - Administer growth factors as ordered  - Implement neutropenic guidelines  Outcome: Progressing     Problem: Patient/Family Goals  Goal: Patient/Family Long Term Goal  Description: Patient's Long Term Goal: Return home with optimal physical mobility    Interventions:  - PT/OT, PRN medications, encourage independence  - See additional Care Plan goals for specific interventions  Outcome: Progressing  Goal: Patient/Family Short Term Goal  Description: Patient's Short Term Goal: Maintain BP within parameters 100-150    Interventions:   - PRN medications, adequate rest, lumbar drain per orders  - See additional Care Plan goals for specific interventions  Outcome: Progressing     Problem: NEUROLOGICAL - ADULT  Goal: Achieves maximal functionality and self care  Description: INTERVENTIONS  - Monitor swallowing and airway patency with patient fatigue and changes in neurological status  - Encourage and assist patient to increase activity and self care with  guidance from PT/OT  - Encourage visually impaired, hearing impaired and aphasic patients to use assistive/communication devices  Outcome: Progressing

## 2024-05-14 ENCOUNTER — PATIENT OUTREACH (OUTPATIENT)
Dept: CASE MANAGEMENT | Age: 79
End: 2024-05-14

## 2024-05-16 RX ORDER — TAMSULOSIN HYDROCHLORIDE 0.4 MG/1
0.4 CAPSULE ORAL
Qty: 90 CAPSULE | Refills: 3 | Status: SHIPPED | OUTPATIENT
Start: 2024-05-16

## 2024-05-16 NOTE — TELEPHONE ENCOUNTER
REFILL PASSED PER [facility] PROTOCOLS    Requested Prescriptions   Pending Prescriptions Disp Refills    TAMSULOSIN 0.4 MG Oral Cap [Pharmacy Med Name: Tamsulosin Hydrochloride 0.4 Mg Cap Zydu] 90 capsule 0     Sig: Take 1 capsule (0.4 mg total) by mouth 30 minutes after dinner       Genitourinary Medications Passed - 5/14/2024  2:29 PM        Passed - Patient does not have pulmonary hypertension on problem list        Passed - In person appointment or virtual visit in the past 12 mos or appointment in next 3 mos     Recent Outpatient Visits              2 months ago Neurologic gait dysfunction    [facility], [facility] Kaitlyn Martinez APN    Office Visit    2 months ago Gait abnormality    [facility], [facility] Yanet Land PA    Office Visit    5 months ago Mood disorder (HCC)    [facility], [facility], Kalyn Garcia APRN    Office Visit    7 months ago Preop exam for internal medicine    [facility], [facility], Kalyn Garcia APRN    Office Visit    7 months ago Complaints of memory disturbance    [facility], [facility] Vinny Preciado MD    Office Visit          Future Appointments         Provider Department Appt Notes    In 1 week Kaitlyn Martinez APN [facility] post op, Lumbar drain trial 5.6.24    In 1 week Vinny Preciado MD [facility], [facility] f/up,weakness                         Future Appointments         Provider Department Appt Notes    In 1 week Kaitlyn Martinez APN [facility], [facility] post op, Lumbar drain trial 5.6.24    In 1 week Vinny Preciado MD [facility], [facility] f/up,weakness          Recent Outpatient Visits              2 months ago  Neurologic gait dysfunction    [facility], [facility] Kaitlyn Martinez APN    Office Visit    2 months ago Gait abnormality    [facility], [facility] Yanet Land PA    Office Visit    5 months ago Mood disorder (HCC)    [facility], [facility], Kalyn Garcia APRN    Office Visit    7 months ago Preop exam for internal medicine    [facility], [facility], Kalyn Garcia APRN    Office Visit    7 months ago Complaints of memory disturbance    [facility], [facility] Vinny Preciado MD    Office Visit

## 2024-05-20 ENCOUNTER — TELEPHONE (OUTPATIENT)
Dept: SURGERY | Facility: CLINIC | Age: 79
End: 2024-05-20

## 2024-05-20 NOTE — TELEPHONE ENCOUNTER
Noted that patient underwent surgery with Dr. Booker-lumbar drain insertion for LDT on 5.6.24.      [facility] would like to know if patient can delay his post-op GUNNER appointment to a date after discharge from [facility].      [facility] Future Appointments    Encounter Information   Provider Department Center   5/23/2024 2:30 PM Kaitlyn Martinez APN       Per Kristie, PSR:  patient will be discharged from [facility] on 5.26.24.     Routed to EMRE Meyers.

## 2024-05-20 NOTE — TELEPHONE ENCOUNTER
Dr. Levine from [facility]  is requesting to speak with clinical staff in regards to questions on patient apt on 05/23/2024.     Dr. Levine believes patient will be discharged on 05/26/2024, so he would like to know if patient can wait to have post op apt on 05/23/2024 with Kaitlyn Martinez or apt be kept. Please advise

## 2024-05-21 NOTE — TELEPHONE ENCOUNTER
Psr spoke to pt's daughter, pt's daughter did not wish to cx appt on 5/23/2024. Daughter to review with Dr laith Echeverria and will c/b to confirm appt or r/s

## 2024-05-24 ENCOUNTER — TELEPHONE (OUTPATIENT)
Dept: SURGERY | Facility: CLINIC | Age: 79
End: 2024-05-24

## 2024-05-24 NOTE — TELEPHONE ENCOUNTER
Patient wife calling patient needs to f.u with ZOË Chew for hospital follow up and there is no openings.Also need to speak to a nurse.Please advise

## 2024-05-27 ENCOUNTER — PATIENT MESSAGE (OUTPATIENT)
Dept: SURGERY | Facility: CLINIC | Age: 79
End: 2024-05-27

## 2024-05-28 NOTE — TELEPHONE ENCOUNTER
From: Clay Orellana  To: Kaitlyn Juan  Sent: 5/27/2024 4:45 PM CDT  Subject: Results of Lumbar Drain Trial    The procedure was done between 5/6 -5/9/24, followed by rehab treatment at [facility]. Can you please share the detailed written results of the lumbar drain trial? We were not given anything while in the hospital and can't find any results posted on [employer].  Thank you.  
Message below noted and routed to provider.   
This is something we discuss when he comes back to the office   
Render In Strict Bullet Format?: No
Detail Level: Zone
Continue Regimen: Clobetasol twice daily for two weeks when flared\\nEucrisa apply to affected areas daily for maintenance\\nSoak and smear before applying topicals\\nDupixent injections every two weeks.

## 2024-05-29 ENCOUNTER — MED REC SCAN ONLY (OUTPATIENT)
Dept: FAMILY MEDICINE CLINIC | Facility: CLINIC | Age: 79
End: 2024-05-29

## 2024-05-29 ENCOUNTER — OFFICE VISIT (OUTPATIENT)
Dept: NEUROLOGY | Facility: CLINIC | Age: 79
End: 2024-05-29

## 2024-05-29 VITALS
WEIGHT: 195 LBS | HEART RATE: 62 BPM | BODY MASS INDEX: 27.3 KG/M2 | SYSTOLIC BLOOD PRESSURE: 100 MMHG | DIASTOLIC BLOOD PRESSURE: 64 MMHG | HEIGHT: 71 IN | RESPIRATION RATE: 16 BRPM

## 2024-05-29 DIAGNOSIS — R26.9 GAIT ABNORMALITY: ICD-10-CM
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
## 2024-05-29 DIAGNOSIS — R26.89 BALANCE PROBLEM: ICD-10-CM

## 2024-05-29 DIAGNOSIS — R29.898 WEAKNESS OF RIGHT LEG: ICD-10-CM

## 2024-05-29 DIAGNOSIS — G91.2 NPH (NORMAL PRESSURE HYDROCEPHALUS) (HCC): Primary | ICD-10-CM

## 2024-05-29 PROCEDURE — 99214 OFFICE O/P EST MOD 30 MIN: CPT | Performed by: OTHER

## 2024-05-29 NOTE — PATIENT INSTRUCTIONS
Refill policies:    Allow 2-3 business days for refills; controlled substances may take longer.  Contact your pharmacy at least 5 days prior to running out of medication and have them send an electronic request or submit request through the “request refill” option in your [employer] account.  Refills are not addressed on weekends; covering physicians do not authorize routine medications on weekends.  No narcotics or controlled substances are refilled after noon on Fridays or by on call physicians.  By law, narcotics must be electronically prescribed.  A 30 day supply with no refills is the maximum allowed.  If your prescription is due for a refill, you may be due for a follow up appointment.  To best provide you care, patients receiving routine medications need to be seen at least once a year.  Patients receiving narcotic/controlled substance medications need to be seen at least once every 3 months.  In the event that your preferred pharmacy does not have the requested medication in stock (e.g. Backordered), it is your responsibility to find another pharmacy that has the requested medication available.  We will gladly send a new prescription to that pharmacy at your request.    Scheduling Tests:    If your physician has ordered radiology tests such as MRI or CT scans, please contact Central Scheduling at [phone] right away to schedule the test.  Once scheduled, the [facility] Centralized Referral Team will work with your insurance carrier to obtain pre-certification or prior authorization.  Depending on your insurance carrier, approval may take 3-10 days.  It is highly recommended patients assure they have received an authorization before having a test performed.  If test is done without insurance authorization, patient may be responsible for the entire amount billed.      Precertification and Prior Authorizations:  If your physician has recommended that you have a procedure or additional testing performed the [facility]  Centralized Referral Team will contact your insurance carrier to obtain pre-certification or prior authorization.    You are strongly encouraged to contact your insurance carrier to verify that your procedure/test has been approved and is a COVERED benefit.  Although the [facility] Centralized Referral Team does its due diligence, the insurance carrier gives the disclaimer that \"Although the procedure is authorized, this does not guarantee payment.\"    Ultimately the patient is responsible for payment.   Thank you for your understanding in this matter.  Paperwork Completion:  If you require FMLA or disability paperwork for your recovery, please make sure to either drop it off or have it faxed to our office at [phone]. Be sure the form has your name and date of birth on it.  The form will be faxed to our Forms Department and they will complete it for you.  There is a 25$ fee for all forms that need to be filled out.  Please be aware there is a 10-14 day turnaround time.  You will need to sign a release of information (ZAKIYA) form if your paperwork does not come with one.  You may call the Forms Department with any questions at [phone].  Their fax number is [phone].

## 2024-05-29 NOTE — PROGRESS NOTES
NEUROLOGY  [facility]       Clay Orellana  5/2/1945  Primary Care Provider:  Sachin Meza MD    5/29/2024  79 year old yo,  was last seen on:: March when I did EMG legs    Seen for/plans last visit:  Gait imbalance      Previous visit and existing record notes reviewed in preparation for the face to face visit.  Relevant labs and studies reviewed and will be noted in relevant areas of this record.  Accompanied visit: daughter and wife    () No.      Present condition:  Last seen in March for an EMG and we did not find any substantial neuropathy to account for balance problem.  EMG/NCV was focused on finding how much neuropathy there was.  At that time there was no complaint of right leg weakness.  In May he fell after he lost his balance and hit his back.  He develops low back pain.  MRI was done through the ER and was told that there was no surgical condition.    He then underwent lumbar drain which did not show any convincing improvement.  He had been to rehab.  He complains of right leg feeling like it will buckle anytime and there is a sense of slight foot drop on the right leg when walking.  DTRs in the legs are of course absent to be reliable indicator of lumbar radiculopathy.    Past History update/new problem(s): As noted above    Review of Systems:  Review of Systems:  Denies systemic symptoms.     No CP or SOB.  No GI or  symptoms. Relevant Neuro as noted above.      Medications:      Current Outpatient Medications:     atorvastatin 40 MG Oral Tab, Take 1 tablet (40 mg total) by mouth nightly., Disp: 90 tablet, Rfl: 3    midodrine 5 MG Oral Tab, Take 1 tablet (5 mg total) by mouth in the morning and 1 tablet (5 mg total) at noon and 1 tablet (5 mg total) in the evening. (Patient taking differently: Take 1 tablet (5 mg total) by mouth in the morning, at noon, and at bedtime. Hold if BP >130), Disp: 90 tablet, Rfl: 0    metoprolol tartrate 25 MG Oral Tab, Take 0.5 tablets (12.5 mg  total) by mouth 2x Daily(Beta Blocker)., Disp: 30 tablet, Rfl: 0    rivaroxaban 20 MG Oral Tab, Take 1 tablet (20 mg total) by mouth nightly., Disp: , Rfl:     sertraline (ZOLOFT) 25 MG Oral Tab, Take 1 tablet (25 mg total) by mouth daily., Disp: 30 tablet, Rfl: 1    donepezil 10 MG Oral Tab, Take 1 tablet (10 mg total) by mouth nightly., Disp: 90 tablet, Rfl: 1    fluticasone-umeclidin-vilant (TRELEGY ELLIPTA) 100-62.5-25 MCG/ACT Inhalation Aerosol Powder, Breath Activated, Inhale 1 puff into the lungs daily., Disp: , Rfl:     ipratropium 0.03 % Nasal Solution, 2 sprays by Nasal route Q12H., Disp: , Rfl:     flecainide 100 MG Oral Tab, Take 1 tablet (100 mg total) by mouth 2 (two) times daily., Disp: , Rfl:     finasteride 5 MG Oral Tab, TAKE ONE TABLET BY MOUTH ONE TIME DAILY, Disp: 90 tablet, Rfl: 2    Cholecalciferol 125 MCG (5000 UT) Oral Tab, Take 1 tablet (5,000 Units total) by mouth. Three times a week, Disp: , Rfl:     cyanocobalamin 500 MCG Oral Tab, Take 1 tablet (500 mcg total) by mouth daily., Disp: , Rfl:     Multiple Vitamins-Minerals (MULTI-VITAMIN/MINERALS) Oral Tab, Take 1 tablet by mouth daily., Disp: , Rfl:     Acidophilus/Pectin Oral Cap, Take 1 capsule by mouth daily., Disp: , Rfl:     acetaminophen 500 MG Oral Tab, Take 1 tablet (500 mg total) by mouth every 6 (six) hours as needed., Disp: , Rfl:     tamsulosin 0.4 MG Oral Cap, Take 1 capsule (0.4 mg total) by mouth After dinner. (Patient not taking: Reported on 5/29/2024), Disp: 90 capsule, Rfl: 3    aspirin 81 MG Oral Tab EC, Take 1 tablet (81 mg total) by mouth daily. (Patient not taking: Reported on 5/29/2024), Disp: , Rfl:   PRN:     Allergies:  Allergies   Allergen Reactions    Kiwi Extract ITCHING     Inner ears itch          EXAM:  /64 (BP Location: Left arm, Patient Position: Sitting, Cuff Size: adult)   Pulse 62   Resp 16   Ht 71\"   Wt 195 lb (88.5 kg)   BMI 27.20 kg/m²   Looks stated age  General Exam:  HENT:  pink  conjunctiva anicteric sclerae  Neck no adenopathy, thyroid normal  Heart and Lungs:  normal  Extremities: no cyanosis, skin changes    NEURO  Most remarkable for the gait instability.  Needs assistance to stand up and take a few steps and there is an obvious leg lift on the right when taking a step.      INTERPRETATION of RELEVANT LABS and other DATA:        There is enough reason right L34 and L45 to cause the \"WEAK RIGHT LEG wanting to buckle\"        Problem/s Identified this visit:   1. NPH (normal pressure hydrocephalus) (HCC)    2. Gait abnormality    3. Balance problem    4. Fall, sequela    5. Weakness of right leg          Discussion plus Diagnostics & Treatment Orders:  Alternative means to prove NPH would be a cisternogram but at same time there is an ongoing issue about right leg weakness which could possibly be from the foraminal stenosis noted on the MRI as shown in the pictures.  Both at L3-4 and L4-5 there is also some degree of central stenosis.    I will order the cisternogram but I will go ahead and schedule an EMG of the right leg and lumbar paraspinal focusing on detecting whether there is any active radicular process.    Keep the scheduled visit with neurosurgery to address the same issues above.    Another possible approach would be to try Parkinson medication to see whether gait improvement will occur but this may take months for us to be able to conclude whether it helps or not.    (x) Discussed potential side effects of any treatment relevant to above.  Includes explanation of tests as necessary.    Return for scheduled EMG-NCV test.      Patient understands that if needed, based on condition and or test results, follow up will be readjusted      Vinny Preciado MD  Vascular & General Neurology  Director, Multiple Sclerosis Program  [facility]  5/29/2024, Time completed 2:42 PM    Decision making:  ( x ) labs reviewed/ordered - 1  (  ) new diagnosis: - 1  ( x) Images &  studies independently reviewed -non F2F  (  ) Case/studies discussed with other caregivers - -non F2F  (  ) Telephone time with patiern or authorized Fam member--non F2F  ( x ) other records reviewed --non F2F including consultations  (  ) [facility] meetings - patient not present --non F2F  (  ) Independent Historian obtained    Non Face to Face CPT code 80432/99051 applies as documented above    PROCEDURE DONE     (   ) see notes        After visit, patient was escorted out and handed-off discharge process and instructions to the check out desk.  No additional issues relevant to visit were raised to staff at this time interval.        This document is to be interpreted as my current opinion regarding the case as of the stated date of service based on the information available to me at this time and may supersedes any prior opinion expressed either orally or in writing.  Services rendered are only within the scope of direct medical care  Sometimes, reports may have been prepared partially using a speech recognition software technology.  If a word or phrase is confusing or out of context, please do not hesitate to call for clarification.

## 2024-05-29 NOTE — TELEPHONE ENCOUNTER
Called pt's wife to discuss below.   Follow up appt made with LESVIA Chew.   This encounter is completed.

## 2024-05-30 ENCOUNTER — OFFICE VISIT (OUTPATIENT)
Dept: SURGERY | Facility: CLINIC | Age: 79
End: 2024-05-30

## 2024-05-30 ENCOUNTER — TELEPHONE (OUTPATIENT)
Dept: SURGERY | Facility: CLINIC | Age: 79
End: 2024-05-30

## 2024-05-30 VITALS — OXYGEN SATURATION: 95 % | SYSTOLIC BLOOD PRESSURE: 118 MMHG | HEART RATE: 61 BPM | DIASTOLIC BLOOD PRESSURE: 66 MMHG

## 2024-05-30 DIAGNOSIS — R29.898 WEAKNESS OF RIGHT LEG: Primary | ICD-10-CM

## 2024-05-30 DIAGNOSIS — G91.2 NPH (NORMAL PRESSURE HYDROCEPHALUS) (HCC): Primary | ICD-10-CM

## 2024-05-30 DIAGNOSIS — G93.89 CEREBRAL VENTRICULOMEGALY: ICD-10-CM

## 2024-05-30 PROCEDURE — 99214 OFFICE O/P EST MOD 30 MIN: CPT | Performed by: NURSE PRACTITIONER

## 2024-05-30 NOTE — PROGRESS NOTES
[facility]   Outpatient Neurological Surgery Follow Up    Clay Orellana  : 1945  PCP: Sachin Meza MD  Referring Provider: No ref. provider found    REASON FOR VISIT:  Chief Complaint   Patient presents with    Post-Op     2024--Lumbar Drain Placement         HISTORY OF PRESENT ILLNESS:  Clay Orellana is a 79 year old male who presents today for follow up.  Pt presents today with his spouse and daughter.  Pt was planned for a lumbar drain trial earlier this month however a few days prior to his trial pt had sustained a fall with resultant RLE weakness.  Pt had a lumbar MRI while in the hospital which showed chronic degenerative changes.  Pt was recommended not to proceed with the drain trial until his weakness subsided however he had some improvement and therefor he had a lumbar drain inserted.  Pt had difficulty initially ambulating with PT however with increasing the drain, pt had mild improvement.  Pt's daughter states she noticed a clear improvement with his shuffled gait.  Pt was discharged to rehab.  Pt has had improvement in his RLE weakness although he remains generally weak in the leg.  Pt also reports mild LLE weakness.  Pt denies upper extremity complaints.        PAST MEDICAL HISTORY:  Past Medical History:    Acute nausea with nonbilious vomiting    Anxiety    Arrhythmia    Arthritis    Bilateral inguinal hernia without obstruction or gangrene    BPH (benign prostatic hyperplasia)    Calculus of kidney    Essential hypertension    High cholesterol       PAST SURGICAL HISTORY:  Past Surgical History:   Procedure Laterality Date    Anesth,pacemaker insertion  2023    Appendectomy      Appendectomy      Colonoscopy      Colonoscopy N/A 2022    Procedure: COLONOSCOPY;  Surgeon: Sudarshan Gilbert MD;  Location:  ENDOSCOPY    Colonoscopy      Cyst removal N/A     Cyst removed behind back of neck    Cyst removal Right 2020    Removed from right  upper back - just below shoulder    Hernia surgery  July 2022    Skin surgery  2020    Vasectomy  1980       SOCIAL HISTORY:   reports that he has quit smoking. His smoking use included cigarettes. He has never been exposed to tobacco smoke. He has never used smokeless tobacco. He reports that he does not drink alcohol and does not use drugs.      ALLERGIES:  Allergies   Allergen Reactions    Kiwi Extract ITCHING     Inner ears itch       MEDICATIONS:  Current Outpatient Medications   Medication Sig Dispense Refill    atorvastatin 40 MG Oral Tab Take 1 tablet (40 mg total) by mouth nightly. 90 tablet 3    midodrine 5 MG Oral Tab Take 1 tablet (5 mg total) by mouth in the morning and 1 tablet (5 mg total) at noon and 1 tablet (5 mg total) in the evening. (Patient taking differently: Take 1 tablet (5 mg total) by mouth in the morning, at noon, and at bedtime. Hold if BP >130) 90 tablet 0    metoprolol tartrate 25 MG Oral Tab Take 0.5 tablets (12.5 mg total) by mouth 2x Daily(Beta Blocker). 30 tablet 0    rivaroxaban 20 MG Oral Tab Take 1 tablet (20 mg total) by mouth nightly.      sertraline (ZOLOFT) 25 MG Oral Tab Take 1 tablet (25 mg total) by mouth daily. 30 tablet 1    donepezil 10 MG Oral Tab Take 1 tablet (10 mg total) by mouth nightly. 90 tablet 1    fluticasone-umeclidin-vilant (TRELEGY ELLIPTA) 100-62.5-25 MCG/ACT Inhalation Aerosol Powder, Breath Activated Inhale 1 puff into the lungs daily.      ipratropium 0.03 % Nasal Solution 2 sprays by Nasal route Q12H.      flecainide 100 MG Oral Tab Take 1 tablet (100 mg total) by mouth 2 (two) times daily.      finasteride 5 MG Oral Tab TAKE ONE TABLET BY MOUTH ONE TIME DAILY 90 tablet 2    Cholecalciferol 125 MCG (5000 UT) Oral Tab Take 1 tablet (5,000 Units total) by mouth. Three times a week      cyanocobalamin 500 MCG Oral Tab Take 1 tablet (500 mcg total) by mouth daily.      Multiple Vitamins-Minerals (MULTI-VITAMIN/MINERALS) Oral Tab Take 1 tablet by mouth  daily.      Acidophilus/Pectin Oral Cap Take 1 capsule by mouth daily.      acetaminophen 500 MG Oral Tab Take 1 tablet (500 mg total) by mouth every 6 (six) hours as needed.      tamsulosin 0.4 MG Oral Cap Take 1 capsule (0.4 mg total) by mouth After dinner. (Patient not taking: Reported on 5/29/2024) 90 capsule 3    aspirin 81 MG Oral Tab EC Take 1 tablet (81 mg total) by mouth daily. (Patient not taking: Reported on 5/29/2024)         REVIEW OF SYSTEMS:  Pertinent positives and negatives are noted in HPI.      PHYSICAL EXAMINATION:  VITAL SIGNS: /66   Pulse 61   SpO2 95%   GENERAL:  Patient is a 79 year old male in no apparent distress.  HEENT:  Normocephalic, atraumatic.  NEUROLOGICAL:  This patient is alert and orientated x 3.  Speech fluent. Able to follow simple commands.  CN II - XII intact.  Face symmetric.  Lower extremity strength:      Iliospoas  Hamstrings  Quads  D-flexion  P-flexion EHL     Right 4+ 5 5 5- 5 5-     Left 5 5 5 5 5 5         IMAGING:  MRI lumbar  FINDINGS:  There is abnormal low T1 signal and high T2 signal with enhancement associated with the deformed left transverse processes of L2, L3, and L4 that is concerning for potential minimally displaced fractures.  There is mild associated soft tissue edema  extending into the left lateral paraspinal musculature.  Dedicated CT of the lumbar spine is suggested for further evaluation.     Lumbar drain seen entering the lumbar spinal canal at the approximate L2-3 level and extending cephalad.     Minimal leftward curvature.  There is normal lumbar lordosis with anatomic alignment.  Vertebral body heights are well-maintained.  Mild degenerative disc space loss, disc desiccation, endplate spurring, and degenerative endplate marrow signal changes  throughout the lumbar spine, most pronounced at L3-4 and L4-5.  L2 hemangiomas.  Additional scattered areas of focal fat and/or hemangiomas elsewhere in the visualized marrow space.     There is  developmental narrowing of the lumbar spinal canal noted.  The distal spinal cord and conus medullaris have a normal signal and morphology.  The conus medullaris terminates at the approximate L1-2 level.  The roots of the cauda equina are  unremarkable.  No focal mass or fluid collection is seen in the lumbar spinal canal.  Probable renal cysts noted on the  imaging which remain incompletely characterized on the basis of this exam.     Degenerative changes in the cervical and thoracic spine on  imaging can be further assessed with dedicated MRI of the cervical and/or thoracic spine as clinically directed.     T12-L1:  Minimal diffuse disc bulge. There is no significant spinal canal or neural foraminal stenosis.     L1-2:  Minimal diffuse disc bulge with mild facet arthropathy. There is no significant spinal canal or neural foraminal stenosis.     L2-3:  Diffuse disc bulge with a small superimposed broad-based central disc protrusion.  Mild facet arthropathy.  Mild spinal canal stenosis.  No significant neural foraminal stenosis.     L3-4:  Diffuse disc bulge with endplate osteophytes and a superimposed broad-based right paracentral/foraminal disc protrusion.  Mild facet arthropathy with thickening of ligamentum flavum.  Mild to moderate spinal canal stenosis with right greater than  left subarticular zone stenosis.  Mild left neural foraminal stenosis.     L4-5:  Diffuse disc bulge with endplate osteophytes and a superimposed broad-based central disc protrusion.  There is also a superimposed right foraminal disc extrusion measuring 21 x 9 mm in the axial plane extending cephalad by 14 mm into the right  neural foramen.  Mild to moderate facet arthropathy with thickening of ligamentum flavum.  Mild to moderate spinal canal stenosis with bilateral subarticular zone stenosis.  There is moderate to severe right neural foraminal stenosis.     L5-S1:  Diffuse disc bulge with endplate osteophytes and a small  superimposed broad-based central disc protrusion.  There is also a superimposed medium-sized left foraminal/far lateral disc osteophyte complex.  Mild facet arthropathy.  No significant  spinal canal stenosis.  Mild to moderate left neural foraminal stenosis.                      Impression   CONCLUSION:       1. There is abnormal low T1 signal and high T2 signal with enhancement associated with the deformed left transverse processes of L2, L3, and L4 that is concerning for potential minimally displaced fractures.  There is mild associated soft tissue edema  extending into the left lateral paraspinal musculature.  Dedicated CT of the lumbar spine is suggested for further evaluation.     2. Multilevel degenerative changes lumbar spine are most pronounced at the L4-5 level.     3. Mild-to-moderate spinal canal stenosis with bilateral subarticular zone stenosis at L3-4 and L4-5.  Mild spinal canal stenosis at L2-3.     4. Moderate to severe right neural foraminal stenosis at L4-5.  Mild left neural foraminal stenosis at L3-4.  Mild to moderate left neural foraminal stenosis at L5-S1.     5. Facet arthropathy in the lumbar spine as above.       Please see above for further details.       ASSESSMENT:    ICD-10-CM    1. Weakness of right leg  R29.898       2. Cerebral ventriculomegaly  G93.89           PLAN:  Reviewed lumbar Mri with patient and family.  Pt has degenerative changes but no clear cause to his RLE weakness  2.   Dr. Pringle discussed lumbar drain trial with patient and spouse.  Pt is unsure if he had clear improvement with the lumbar drain trial however pt had not returned to baseline and therefor his trial was not as effective as it could be  3.   Discussed continuing with PT and having EMG as recommended by neurology vs  shunt insertion  4.   Dr. Pringle discussed  shunt insertion in detail.  Risks and benefits discussed in detail.  Risks including bleeding, infection, temporary or permanent  neurologic deficit, stroke, injury to the bowel, injury to the trachea or esophagus, pneumothorax, or need for shunt revision.  Pt has had hernia and appendectomy so if he would like to proceed with surgery, would have general surgery assist with distal catheter insertion  5.   Pt would like to proceed with surgery  6.   Pt will need PCP and cardiac clearance for surgery      Dr. Pringle evaluated the patient and is in agreement with the plan.        Total time spent:  30 minutes  Greater than 50% of the time was spent on counseling/coordination of care.  Nature of education / counseling: Pathology, treatment options, and expected outcomes    EMRE Ricketts  5/30/2024, 2:06 PM

## 2024-05-30 NOTE — PATIENT INSTRUCTIONS
Refill policies:    Allow 2-3 business days for refills; controlled substances may take longer.  Contact your pharmacy at least 5 days prior to running out of medication and have them send an electronic request or submit request through the “request refill” option in your [employer] account.  Refills are not addressed on weekends; covering physicians do not authorize routine medications on weekends.  No narcotics or controlled substances are refilled after noon on Fridays or by on call physicians.  By law, narcotics must be electronically prescribed.  A 30 day supply with no refills is the maximum allowed.  If your prescription is due for a refill, you may be due for a follow up appointment.  To best provide you care, patients receiving routine medications need to be seen at least once a year.  Patients receiving narcotic/controlled substance medications need to be seen at least once every 3 months.  In the event that your preferred pharmacy does not have the requested medication in stock (e.g. Backordered), it is your responsibility to find another pharmacy that has the requested medication available.  We will gladly send a new prescription to that pharmacy at your request.    Scheduling Tests:    If your physician has ordered radiology tests such as MRI or CT scans, please contact Central Scheduling at [phone] right away to schedule the test.  Once scheduled, the [facility] Centralized Referral Team will work with your insurance carrier to obtain pre-certification or prior authorization.  Depending on your insurance carrier, approval may take 3-10 days.  It is highly recommended patients assure they have received an authorization before having a test performed.  If test is done without insurance authorization, patient may be responsible for the entire amount billed.      Precertification and Prior Authorizations:  If your physician has recommended that you have a procedure or additional testing performed the [facility]  Centralized Referral Team will contact your insurance carrier to obtain pre-certification or prior authorization.    You are strongly encouraged to contact your insurance carrier to verify that your procedure/test has been approved and is a COVERED benefit.  Although the [facility] Centralized Referral Team does its due diligence, the insurance carrier gives the disclaimer that \"Although the procedure is authorized, this does not guarantee payment.\"    Ultimately the patient is responsible for payment.   Thank you for your understanding in this matter.  Paperwork Completion:  If you require FMLA or disability paperwork for your recovery, please make sure to either drop it off or have it faxed to our office at [phone]. Be sure the form has your name and date of birth on it.  The form will be faxed to our Forms Department and they will complete it for you.  There is a 25$ fee for all forms that need to be filled out.  Please be aware there is a 10-14 day turnaround time.  You will need to sign a release of information (ZAKIYA) form if your paperwork does not come with one.  You may call the Forms Department with any questions at [phone].  Their fax number is [phone].     You are scheduled for a  shunt placement on tbd with Dr. Pringle at [facility].     You will need to contact the Pre-admission department at [phone] to schedule your pre-op testing. Blood work needs to be done within 30 days of surgery.    No blood thinning medications, over the counter non-steroidal anti inflammatories (Advil, Aleve, Ibuprofen), herbal supplements (garlic,tumeric etc.), vitamin E, fish oil or krill oil for at least 7-14 days prior to surgery.     You may only take Tylenol, Extra Strength Tylenol, Arthritis Tylenol, or prescription Norco or Tramadol for pain if something is needed.    You should have nothing to eat or drink after 11:00pm the night prior to surgery unless instructed differently by Pre-admissions  nurse.    Do drink 12 ounces of regular Gatorade (NOT RED) 12 hours and 4 hours prior to your scheduled surgery time, Do not drink any other liquids (including water) before your surgery. Do not chew gum or eat candies before surgery.  Take 1000 mg of Tylenol (Acetaminophen) 4 hours before your scheduled surgery time, take this with your scheduled Gatorade.    You will be in the ICU overnight and then transferred to regular nursing unit    Average inpatient stay is about 2-3 days.  This is an estimate and varies from person to person.  Ultimately, the surgeon will determine when you are ready to be discharged.    PCP clearance is needed, our office will fax a letter to your PCP, Dr. Meza please contact their office for an appointment.     Cardiac clearance is needed, our office will fax a letter to your CARDIOLOGIST, Dr. Champagne please contact their office for an appointment.     Our office will get authorization for surgery.The hospital will contact you 1-2 days before surgery with your arrival time.     If you were on blood thinners (such as Coumadin, Pradaxa, Xarelto, etc) prior to surgery that we had you stop for surgery, please make sure you get instructions about when to resume the medication before you are discharged from the hospital    Post operative appointments to be made 2 and 6 weeks after surgery.    Your 2 week post op appointment is scheduled for tbd at tbd with ZOË Glasgow    Your 6 week post op appointment is scheduled for tbd at d with Dr Pringle    Please make sure to arrive at least 15 minutes prior to your scheduled appointment in order to get checked in and roomed in a timely manner.  Depending on provider availability, late patients may be required to reschedule.  REFILLS:  After surgery, please remember that we do have a 48 hour refill policy that does not include weekends, please make sure to request your medications in a timely manner so that you do not go days without  medication.  *Refills should be requested through your pharmacy or through the refill request in [employer] (log in, go to medications, then select refill request).  [employer] MESSAGING:  Please remember that our office is closed during the weekend and no one is available for [employer] messages. If you have an urgent or emergent matter please go to a walk-in center or the emergency room. Also please remember your [employer] messages are part of your legal medical record and should not be utilized as a personal email with our providers as it is visible to all [facility] employees. Also, Since [employer] messages are not for emergent matters it may be several days before there is a response to your message.  FMLA/PAPERWORK COMPLETED BY OUR MEDICAL FORMS DEPARTMENT:  If you require FMLA paperwork for your surgery, please make sure to either Drop it off or have it faxed to our office at [phone]. Make sure it has your NAME, , and has your signature. You will need to have a Release of Information on file. To facilitate this process we ask that you requested it at the  on your way out and sign it. Without a signed ZAKIYA or signature on the form we will not be able to fax it and this will cause a delay with your forms. Fees charged for forms are $25 for initial submission and $15 for recertifications. If you have questions on the status of your forms please call the forms department at [phone].  **We do have a 3 week policy for all forms and paperwork, please make sure to allow plenty of time for completion. Same day paperwork will not be completed. **    Please visit the following website for the detailed and up-to-date visitor screening and restriction policy at [facility] [email or web address].     For Office Use Only:    Medical Clearance needed: PCP, CARDIOLOGY  PA: MEDICARE  CPT Codes:

## 2024-05-31 NOTE — TELEPHONE ENCOUNTER
You are scheduled for a  shunt placement on 6.17.24 with Dr. Pringle at [facility].     You will need to contact the Pre-admission department at [phone] to schedule your pre-op testing. Blood work needs to be done within 30 days of surgery.    No blood thinning medications, over the counter non-steroidal anti inflammatories (Advil, Aleve, Ibuprofen), herbal supplements (garlic,tumeric etc.), vitamin E, fish oil or krill oil for at least 7-14 days prior to surgery.     You may only take Tylenol, Extra Strength Tylenol, Arthritis Tylenol, or prescription Norco or Tramadol for pain if something is needed.    You should have nothing to eat or drink after 11:00pm the night prior to surgery unless instructed differently by Pre-admissions nurse.    Do drink 12 ounces of regular Gatorade (NOT RED) 12 hours and 4 hours prior to your scheduled surgery time, Do not drink any other liquids (including water) before your surgery. Do not chew gum or eat candies before surgery.  Take 1000 mg of Tylenol (Acetaminophen) 4 hours before your scheduled surgery time, take this with your scheduled Gatorade.    You will be in the ICU overnight and then transferred to regular nursing unit    Average inpatient stay is about 2-3 days.  This is an estimate and varies from person to person.  Ultimately, the surgeon will determine when you are ready to be discharged.    PCP clearance is needed, our office will fax a letter to your PCP, Dr. Meza please contact their office for an appointment.     Cardiac clearance is needed, our office will fax a letter to your CARDIOLOGIST, Dr. Champagne please contact their office for an appointment.     Our office will get authorization for surgery.The hospital will contact you 1-2 days before surgery with your arrival time.     If you were on blood thinners (such as Coumadin, Pradaxa, Xarelto, etc) prior to surgery that we had you stop for surgery, please make sure you get instructions about when  to resume the medication before you are discharged from the hospital    Post operative appointments to be made 2 and 6 weeks after surgery.    Your 2 week post op appointment is scheduled for tbd at [facility] with ZOË Glasgow    Your 6 week post op appointment is scheduled for tbd at [facility] with Dr Pringle    Please make sure to arrive at least 15 minutes prior to your scheduled appointment in order to get checked in and roomed in a timely manner.  Depending on provider availability, late patients may be required to reschedule.  REFILLS:  After surgery, please remember that we do have a 48 hour refill policy that does not include weekends, please make sure to request your medications in a timely manner so that you do not go days without medication.  *Refills should be requested through your pharmacy or through the refill request in [employer] (log in, go to medications, then select refill request).  [employer] MESSAGING:  Please remember that our office is closed during the weekend and no one is available for [employer] messages. If you have an urgent or emergent matter please go to a walk-in center or the emergency room. Also please remember your [employer] messages are part of your legal medical record and should not be utilized as a personal email with our providers as it is visible to all [facility] employees. Also, Since [email or web address] messages are not for emergent matters it may be several days before there is a response to your message.  FMLA/PAPERWORK COMPLETED BY OUR MEDICAL FORMS DEPARTMENT:  If you require FMLA paperwork for your surgery, please make sure to either Drop it off or have it faxed to our office at [phone]. Make sure it has your NAME, , and has your signature. You will need to have a Release of Information on file. To facilitate this process we ask that you requested it at the  on your way out and sign it. Without a signed ZAKIYA or signature on the form we will not be able to fax it and this  will cause a delay with your forms. Fees charged for forms are $25 for initial submission and $15 for recertifications. If you have questions on the status of your forms please call the forms department at [phone].  **We do have a 3 week policy for all forms and paperwork, please make sure to allow plenty of time for completion. Same day paperwork will not be completed. **    Please visit the following website for the detailed and up-to-date visitor screening and restriction policy at [facility] [email or web address].     For Office Use Only:    Medical Clearance needed: PCP, CARDIOLOGY  PA: MEDICARE  CPT Codes:

## 2024-06-04 ENCOUNTER — EXTERNAL FACILITY (OUTPATIENT)
Dept: FAMILY MEDICINE CLINIC | Facility: CLINIC | Age: 79
End: 2024-06-04

## 2024-06-04 VITALS
DIASTOLIC BLOOD PRESSURE: 70 MMHG | RESPIRATION RATE: 16 BRPM | SYSTOLIC BLOOD PRESSURE: 132 MMHG | OXYGEN SATURATION: 97 % | HEART RATE: 71 BPM | TEMPERATURE: 97 F

## 2024-06-04 DIAGNOSIS — I48.20 CHRONIC ATRIAL FIBRILLATION (HCC): Primary | ICD-10-CM

## 2024-06-04 DIAGNOSIS — R29.898 WEAKNESS OF BOTH LEGS: ICD-10-CM

## 2024-06-04 DIAGNOSIS — G91.2 NORMAL PRESSURE HYDROCEPHALUS (HCC): ICD-10-CM

## 2024-06-04 DIAGNOSIS — F39 MOOD DISORDER (HCC): ICD-10-CM

## 2024-06-04 PROBLEM — F41.1 GENERALIZED ANXIETY DISORDER: Status: ACTIVE | Noted: 2024-05-13

## 2024-06-04 PROBLEM — M48.061 DEGENERATIVE LUMBAR SPINAL STENOSIS: Status: ACTIVE | Noted: 2024-05-13

## 2024-06-04 PROBLEM — R93.89 ABNORMAL CHEST XRAY: Status: ACTIVE | Noted: 2023-07-21

## 2024-06-04 PROCEDURE — 99349 HOME/RES VST EST MOD MDM 40: CPT | Performed by: FAMILY MEDICINE

## 2024-06-04 RX ORDER — SERTRALINE HYDROCHLORIDE 25 MG/1
25 TABLET, FILM COATED ORAL DAILY
Qty: 30 TABLET | Refills: 1 | Status: SHIPPED | OUTPATIENT
Start: 2024-06-04

## 2024-06-04 RX ORDER — TAMSULOSIN HYDROCHLORIDE 0.4 MG/1
0.4 CAPSULE ORAL
COMMUNITY
Start: 2024-06-04

## 2024-06-04 NOTE — PROGRESS NOTES
Clay Orellana Author: Sachin Meza MD     1945 MRN [id number]   Last Hospital  Admission 24      Last Hospital Discharge 24 PCP Sachin Meza MD   Hospital of Discharge  [facility]             CC--  Chief Complaint   Patient presents with    Hospital F/U   [facility]-- [facility] for shunt by neurology    H.P.I lCay Orellana is a 79 year old male who is seen today with his wife  Patient has history of normal pressure hydrocephalus, he was admitted to the hospital on 2024, due to a fall secondary to weakness on bilateral lower extremity right more than the left  Patient was transferred to [facility] where he received rehab and was discharged back to [facility] where he lives with his wife  During his hospitalization he was seen by neurology and was cleared for  shunt to treat as hydrocephalus  Patient states that his overall strength has improved but he still feels very weak and wobbly on the lower extremities  Patient has been also orthostatic  He is on midodrine 3 times a day for blood pressure less than 130    His wife brought in a log of blood pressures that showed few elevated numbers after she had administered midodrine for blood pressure less than 1:30 at night, in the morning his blood pressure was found to be in 170s  Patient denies any symptoms of worsening weakness or dizziness for blood pressures less than 130    Patient does have history of paroxysmal atrial fibrillation as well as TIAs and is currently anticoagulated on Xarelto  He is scheduled for  shunt placement on July 3 with Dr. Rose      Wt Readings from Last 3 Encounters:   24 195 lb (88.5 kg)   24 195 lb 1.6 oz (88.5 kg)   24 198 lb (89.8 kg)     BP Readings from Last 3 Encounters:   24 132/70   24 118/66   24 100/64      Past Medical History:    Acute nausea with nonbilious vomiting    Anxiety    Arrhythmia    Arthritis    Bilateral inguinal hernia  without obstruction or gangrene    BPH (benign prostatic hyperplasia)    Calculus of kidney    Essential hypertension    High cholesterol     Past Surgical History:   Procedure Laterality Date    Anesth,pacemaker insertion  08/2023    Appendectomy      Appendectomy      Colonoscopy      Colonoscopy N/A 05/03/2022    Procedure: COLONOSCOPY;  Surgeon: Sudarshan Gilbert MD;  Location:  ENDOSCOPY    Colonoscopy      Cyst removal N/A 1990    Cyst removed behind back of neck    Cyst removal Right 11/05/2020    Removed from right upper back - just below shoulder    Hernia surgery  July 2022    Skin surgery  2020    Vasectomy  1980     Family History   Problem Relation Age of Onset    Heart Attack Father     Diabetes Mother     Other (DM) Sister     Other (ALS) Brother     No Known Problems Maternal Grandmother     No Known Problems Maternal Grandfather     No Known Problems Paternal Grandmother     No Known Problems Paternal Grandfather     Other (smoker) Brother     Other (MS) Sister     No Known Problems Son     No Known Problems Daughter      Social History     Socioeconomic History    Marital status:    Tobacco Use    Smoking status: Former     Types: Cigarettes     Passive exposure: Never    Smokeless tobacco: Never    Tobacco comments:     Has not smoked for about 20 years   Vaping Use    Vaping status: Never Used   Substance and Sexual Activity    Alcohol use: Never     Alcohol/week: 4.0 standard drinks of alcohol     Types: 4 Cans of beer per week    Drug use: Never   Other Topics Concern    Caffeine Concern No    Exercise No    Seat Belt Yes    Special Diet No    Stress Concern Yes    Weight Concern No     Social Determinants of Health     Food Insecurity: Low Risk  (5/24/2024)    Received from [facility]    Food Insecurity     Have there been times that your food ran out, and you didn't have money to get more?: No     Are there times that you worry that this might happen?: No    Transportation Needs: Low Risk  (5/26/2024)    Received from [facility]    Transportation Needs     Has lack of transportation kept you from medical appointments, meetings, work, or from getting things needed for daily living: No   Housing Stability: Low Risk  (5/24/2024)    Received from [facility]    Housing Stability     Are you worried that your electric, gas, oil, or water might be shut off?: No     Are you concerned about having a safe and reliable place to live?: No       ALLERGIES:  Allergies   Allergen Reactions    Kiwi Extract ITCHING     Inner ears itch       CURRENT MEDICATIONS   Current Outpatient Medications   Medication Sig Dispense Refill    tamsulosin 0.4 MG Oral Cap Take 1 capsule (0.4 mg total) by mouth After dinner.      sertraline (ZOLOFT) 25 MG Oral Tab Take 1 tablet (25 mg total) by mouth daily. 30 tablet 1    atorvastatin 40 MG Oral Tab Take 1 tablet (40 mg total) by mouth nightly. 90 tablet 3    midodrine 5 MG Oral Tab Take 1 tablet (5 mg total) by mouth in the morning and 1 tablet (5 mg total) at noon and 1 tablet (5 mg total) in the evening. (Patient taking differently: Take 1 tablet (5 mg total) by mouth in the morning, at noon, and at bedtime. Hold if BP >130) 90 tablet 0    metoprolol tartrate 25 MG Oral Tab Take 0.5 tablets (12.5 mg total) by mouth 2x Daily(Beta Blocker). 30 tablet 0    aspirin 81 MG Oral Tab EC Take 1 tablet (81 mg total) by mouth daily.      rivaroxaban 20 MG Oral Tab Take 1 tablet (20 mg total) by mouth nightly.      donepezil 10 MG Oral Tab Take 1 tablet (10 mg total) by mouth nightly. 90 tablet 1    fluticasone-umeclidin-vilant (TRELEGY ELLIPTA) 100-62.5-25 MCG/ACT Inhalation Aerosol Powder, Breath Activated Inhale 1 puff into the lungs daily.      ipratropium 0.03 % Nasal Solution 2 sprays by Nasal route Q12H.      flecainide 100 MG Oral Tab Take 1 tablet (100 mg total) by mouth 2 (two) times daily.      finasteride 5  MG Oral Tab TAKE ONE TABLET BY MOUTH ONE TIME DAILY 90 tablet 2    Cholecalciferol 125 MCG (5000 UT) Oral Tab Take 1 tablet (5,000 Units total) by mouth. Three times a week      cyanocobalamin 500 MCG Oral Tab Take 1 tablet (500 mcg total) by mouth daily.      Multiple Vitamins-Minerals (MULTI-VITAMIN/MINERALS) Oral Tab Take 1 tablet by mouth daily.      Acidophilus/Pectin Oral Cap Take 1 capsule by mouth daily.      acetaminophen 500 MG Oral Tab Take 1 tablet (500 mg total) by mouth every 6 (six) hours as needed.         Review of Systems:   Constitutional: No fevers, chills, fatigue or night sweats.  ENT: No mouth pain, neck pain, running nose, headaches or swollen glands.  Skin: No rashes, pruritus or skin changes,  Respiratory: Denies cough, wheezing or shortness of breath.  CV: Denies chest pain, palpitations, orthopnea, PND or dizziness.  Musculoskeletal: No joint pain, stiffness or swelling.  GI: No nausea, vomiting or diarrhea. No blood in stools.  Neurologic: No seizures, tremors, weakness or numbness.    VITALS:  /70   Pulse 71   Temp 97 °F (36.1 °C) (Temporal)   Resp 16   SpO2 97%       GENERAL: well developed, well nourished, in no apparent distress sitting comfortably on the wheelchair  SKIN: no rashes, no suspicious lesions  HEENT: atraumatic, normocephalic, ears and throat are clear  EYES: PERRLA, EOMI, conjunctiva are clear  NECK: supple, no adenopathy, no bruits  CHEST: no chest tenderness  LUNGS: clear to auscultation  CARDIO: RRR without murmur  GI: good BS's, no masses, HSM or tenderness  MUSCULOSKELETAL: back is not tender, FROM of the back  EXTREMITIES: no cyanosis, clubbing or edema  NEURO: Oriented times three, cranial nerves are intact, motor and sensory are grossly intact  Weakness on the lower extremities    ASSESSMENT AND PLAN:  Diagnoses and all orders for this visit:    Chronic atrial fibrillation (HCC)  Continue Xarelto  Patient may need to follow-up with Dr. Champagne for  preop clearance for her  shunt  Mood disorder (HCC)  Refills given-     sertraline (ZOLOFT) 25 MG Oral Tab; Take 1 tablet (25 mg total) by mouth daily.    Normal pressure hydrocephalus (HCC)  Scheduled for  shunt placement  Cardiac clearance needed  Weakness of both legs  Continue PT/OT by home health    During the visit, the following was completed:  Obtained and reviewed discharge summary, continuity of care documents, and Hospitalist notes  Reviewed Labs (CBC, CMP), Labs (Cardiac markers), Labs (Pathology), Labs (Microbiology), and CT radiology results  specialists already aware of assumption/resumption of care  Education given to patient and family on self-management, independent living, and activities of daily living.      Time spent at appointment today is 35 minutes including preparing to see patient, reviewing test results, performing medically appropriate examination and evaluation and coordinating care, counseling and educating patient/family, ordering medications and testing, and documenting clinical information in EMR.     Sachin Meza MD   [facility]  [address] [address]    Electronically signed        This dictation was performed with a verbal recognition program (DRAGON) and it was checked for errors. It is possible that there are still dictated errors within this office note. If so, please bring any errors to my attention for an addendum. All efforts were made to ensure that this office note is accurate

## 2024-06-05 NOTE — TELEPHONE ENCOUNTER
Patient is scheduled for 6.17.24 with Dr. Pringle at [facility].     NA pre-op apt scheduled (if sx is more than 30 days from last apt)  NApre-op apt scheduled with RN spine navigator  Y Surgical instructions reviewed by nursing staff with patient  Y  form completed  Y Surgery order signed   Y Placed sx on surgery sheet  Y Placed on outlook calendar  Y [employer] message sent to patient with sx instructions  Y Faxed pre-op clearance request to PCP MAKENZIE LANG   Y Faxed letter to prescribing provider requesting anticoagulants be held for surgery  NA E-mail sent to [facility]  Y Post-op appointments made  NA PA NOT NEEDED. Routed to PA team to initiate.  Y Post-Op outreach pt reminder placed.   Y Entire Neurosurgery Checklist Completed    Clearances: PCP, CARDS  PA:MEDICARE A&B   CPT Codes: 74068

## 2024-06-07 ENCOUNTER — TELEPHONE (OUTPATIENT)
Dept: INTERNAL MEDICINE CLINIC | Facility: CLINIC | Age: 79
End: 2024-06-07

## 2024-06-07 NOTE — TELEPHONE ENCOUNTER
Faxed chart notes  for 6/4/24 to [facility] per request/Dr Sachin Meza.

## 2024-06-09 PROCEDURE — G0180 MD CERTIFICATION HHA PATIENT: HCPCS | Performed by: FAMILY MEDICINE

## 2024-06-11 ENCOUNTER — TELEPHONE (OUTPATIENT)
Dept: INTERNAL MEDICINE CLINIC | Facility: CLINIC | Age: 79
End: 2024-06-11

## 2024-06-11 ENCOUNTER — TELEPHONE (OUTPATIENT)
Dept: FAMILY MEDICINE CLINIC | Facility: CLINIC | Age: 79
End: 2024-06-11

## 2024-06-11 ENCOUNTER — OFFICE VISIT (OUTPATIENT)
Dept: SURGERY | Facility: CLINIC | Age: 79
End: 2024-06-11

## 2024-06-11 DIAGNOSIS — R32 URINARY INCONTINENCE, UNSPECIFIED TYPE: ICD-10-CM

## 2024-06-11 DIAGNOSIS — R31.0 GROSS HEMATURIA: Primary | ICD-10-CM

## 2024-06-11 DIAGNOSIS — R82.90 URINE FINDING: ICD-10-CM

## 2024-06-11 DIAGNOSIS — N20.0 NEPHROLITHIASIS: ICD-10-CM

## 2024-06-11 LAB
APPEARANCE: CLEAR
BILIRUBIN: NEGATIVE
GLUCOSE (URINE DIPSTICK): NEGATIVE MG/DL
KETONES (URINE DIPSTICK): NEGATIVE MG/DL
LEUKOCYTES: NEGATIVE
MULTISTIX LOT#: ABNORMAL NUMERIC
NITRITE, URINE: NEGATIVE
PH, URINE: 6 (ref 4.5–8)
PROTEIN (URINE DIPSTICK): NEGATIVE MG/DL
SPECIFIC GRAVITY: 1.02 (ref 1–1.03)
UROBILINOGEN,SEMI-QN: 0.2 MG/DL (ref 0–1.9)

## 2024-06-11 PROCEDURE — 81003 URINALYSIS AUTO W/O SCOPE: CPT | Performed by: PHYSICIAN ASSISTANT

## 2024-06-11 PROCEDURE — 99214 OFFICE O/P EST MOD 30 MIN: CPT | Performed by: PHYSICIAN ASSISTANT

## 2024-06-11 NOTE — TELEPHONE ENCOUNTER
Patient is scheduled for RIGHT VENTRICULOPERITONEAL SHUNT INSERTION on Friday 6/17/24.  Patient was seen on 6/4/24.  They are asking if Dr Meza couldn't make an addendum to the note, indicating the correct date of surgery (currently says 7/3/24) and indicating patient is cleared for surgery from his perspective.  Patient is scheduled for cardiac appointment for clearance on 6/13/24.    Can fax clearance to [phone]  Any questions, call Yuli at Dr Pringle's office at [phone].

## 2024-06-11 NOTE — PROGRESS NOTES
Subjective:   Clay Orellana is a 79 year old male with hydrocephalus, HTN, HL, radiculopathy, anxiety, who presents for follow up with family.    Patient was admitted 5/1/24-5/13/24 for generalized weakness and fall. Had lumbar drain placement, but ultimately removed prior to discharge. Had some improvement, but not the improvement that neurosurgery was hoping for. [facility] was [facility] after and there he had regular bladder scans which per patient and family were normal. No urinary retention.    His urinary symptoms are stable from last visit.   He did have an episode of gross hematuria following him resuming DOAC. He has not had any recurrence and denies any flank pain.     Plan for right ventricular shunt placement 6/17/24. Hoping this may also help some of his urinary incontinence. Tends to be worse in the evening, voids very frequently and does not like to use the urinal or the commode next to his bed. Remains on tamsulosin and finasteride.      UA today trace blood    History/Other:    Chief Complaint Reviewed and Verified  Nursing Notes Reviewed and   Verified  Allergies Reviewed  Medications Reviewed         Current Outpatient Medications   Medication Sig Dispense Refill    tamsulosin 0.4 MG Oral Cap Take 1 capsule (0.4 mg total) by mouth After dinner.      sertraline (ZOLOFT) 25 MG Oral Tab Take 1 tablet (25 mg total) by mouth daily. 30 tablet 1    atorvastatin 40 MG Oral Tab Take 1 tablet (40 mg total) by mouth nightly. 90 tablet 3    metoprolol tartrate 25 MG Oral Tab Take 0.5 tablets (12.5 mg total) by mouth 2x Daily(Beta Blocker). 30 tablet 0    rivaroxaban 20 MG Oral Tab Take 1 tablet (20 mg total) by mouth nightly.      donepezil 10 MG Oral Tab Take 1 tablet (10 mg total) by mouth nightly. 90 tablet 1    fluticasone-umeclidin-vilant (TRELEGY ELLIPTA) 100-62.5-25 MCG/ACT Inhalation Aerosol Powder, Breath Activated Inhale 1 puff into the lungs daily.      ipratropium 0.03 % Nasal Solution  2 sprays by Nasal route Q12H.      flecainide 100 MG Oral Tab Take 1 tablet (100 mg total) by mouth 2 (two) times daily.      finasteride 5 MG Oral Tab TAKE ONE TABLET BY MOUTH ONE TIME DAILY 90 tablet 2    Cholecalciferol 125 MCG (5000 UT) Oral Tab Take 1 tablet (5,000 Units total) by mouth. Three times a week      cyanocobalamin 500 MCG Oral Tab Take 1 tablet (500 mcg total) by mouth daily.      Multiple Vitamins-Minerals (MULTI-VITAMIN/MINERALS) Oral Tab Take 1 tablet by mouth daily.      Acidophilus/Pectin Oral Cap Take 1 capsule by mouth daily.      acetaminophen 500 MG Oral Tab Take 1 tablet (500 mg total) by mouth every 6 (six) hours as needed.      midodrine 5 MG Oral Tab Take 1 tablet (5 mg total) by mouth in the morning and 1 tablet (5 mg total) at noon and 1 tablet (5 mg total) in the evening. (Patient not taking: Reported on 6/11/2024) 90 tablet 0    aspirin 81 MG Oral Tab EC Take 1 tablet (81 mg total) by mouth daily. (Patient not taking: Reported on 6/11/2024)         Review of Systems:  Pertinent items are noted in HPI.      Objective:   There were no vitals taken for this visit. Estimated body mass index is 27.2 kg/m² as calculated from the following:    Height as of 6/7/24: 5' 11\" (1.803 m).    Weight as of 6/7/24: 195 lb (88.5 kg).    No distress  Non labored respriations    Laboratory Data:  Lab Results   Component Value Date    WBC 10.8 05/13/2024    HGB 12.9 (L) 05/13/2024    .0 05/13/2024     Lab Results   Component Value Date     05/13/2024    K 4.2 05/13/2024     05/13/2024    CO2 26.0 05/13/2024    BUN 27 (H) 05/13/2024    GLU 96 05/13/2024    GFRAA 87 12/20/2023    AST 28 05/01/2024    ALT 42 05/01/2024    TP 7.3 05/01/2024    ALB 3.5 05/01/2024    PHOS 3.0 03/21/2023    CA 9.0 05/13/2024    MG 2.1 03/21/2023       Urinalysis Results (last three years):  Recent Labs     03/29/22  1242 03/29/22  1244 04/28/22  0820 06/09/22  1313 08/04/22  1338 12/29/22  1607  04/14/23  1346 04/14/23  1447 05/02/24  0604 06/11/24  1343   COLORUR  --  Yellow  --   --   --   --   --   --  Light-Yellow  --    CLARITY  --  Clear  --   --   --   --   --   --  Clear  --    SPECGRAVITY 1.025 1.018 >=1.030 1.025 1.025  --  1.030  --  1.020 1.025   PHURINE 5.5 5.0 5.0 6.0 5.5  --  5.5  --  5.5 6.0   PROUR  --  Negative  --   --   --   --   --   --  Negative  --    GLUUR  --  Negative  --   --   --   --   --   --  Normal  --    KETUR  --  Negative  --   --   --   --   --   --  Negative  --    BILUR  --  Negative  --   --   --   --   --   --  Negative  --    BLOODURINE  --  Large*  --   --   --   --   --   --  Negative  --    NITRITE Negative Negative Negative Negative Negative  --  Negative  --  Negative Negative   UROBILINOGEN  --  <2.0  --   --   --   --   --   --  Normal  --    LEUUR  --  Negative  --   --   --   --   --   --  Negative  --    UASA  --  Negative  --   --   --   --   --   --   --   --    WBCUR  --  1-5  --   --   --  6-10*  --  1-5  --   --    RBCUR  --  >10*  --   --   --  >10*  --  >10*  --   --    BACUR  --  None Seen  --   --   --  Rare*  --  None Seen  --   --        Urine Culture Results (last three years):  No results found for: \"URINECUL\"    Imaging  No results found.    Assessment & Plan:   BPH with LUTS  Urinary urgency with incontinence  Possible hydrocephalus  Urinary symptoms stable  Nighttime voiding most bothersome  Consideration for OAB medication but would recommend defer any additional medications until after his shunt placement to assess if any improvement with LUTS or not     Gross hematuria  Nephrolithiasis   Reviewed potential etiologies of gross hematuria including but not limited to malignancy. Prior CT scan in 2022 showed 1.3 cm left renal stone.   UA today trace blood, will send for micro and cultures  Reviewed recommendations of upper tract imaging and cystoscopy.  Will order CT now and cystoscopy once recovered from shunt placement.       Naina VALENCIA  LESVIA Sosa

## 2024-06-11 NOTE — TELEPHONE ENCOUNTER
Received call from [facility].  They are stating Pharmacy did not receive order for patient's Tamsulosin.  Reviewed chart, Tamsulosin ordered electronically on 6/4/24 by Dr. Meza.  Did not go through to Pharmacy.  Verified patient's pharmacy is [facility].  Called [facility] and gave verbal order for Tamsulosin as written by Dr. Meza on 6/4/24.    tamsulosin 0.4 MG Oral Cap -- -- 6/4/2024 --   Sig:   Take 1 capsule (0.4 mg total) by mouth After dinner.     Route:   Oral     Advised to dispense #90.

## 2024-06-12 NOTE — TELEPHONE ENCOUNTER
Pre Op Clearance office visit note from 6/4/24 Dr Sachin Meza received stating the following: \" Patient is medically cleared for surgery if cleared by cardiology\"    Copy of clearance faxed to Aaron HERNANDEZ at #[phone] with confirmation received     Note:  patient has appointment with Cardiology on 6/13/2024 Dr Estephania Champagne

## 2024-06-13 ENCOUNTER — HOSPITAL ENCOUNTER (OUTPATIENT)
Dept: LAB | Facility: HOSPITAL | Age: 79
Discharge: HOME | End: 2024-06-13
Attending: NEUROLOGICAL SURGERY

## 2024-06-13 ENCOUNTER — MED REC SCAN ONLY (OUTPATIENT)
Dept: FAMILY MEDICINE CLINIC | Facility: CLINIC | Age: 79
End: 2024-06-13

## 2024-06-13 DIAGNOSIS — G93.89 CEREBRAL VENTRICULOMEGALY: ICD-10-CM

## 2024-06-13 DIAGNOSIS — I10 PRIMARY HYPERTENSION: ICD-10-CM

## 2024-06-13 DIAGNOSIS — G91.8 HYDROCEPHALUS EX VACUO (HCC): ICD-10-CM

## 2024-06-13 DIAGNOSIS — G91.2 NPH (NORMAL PRESSURE HYDROCEPHALUS) (HCC): ICD-10-CM

## 2024-06-13 DIAGNOSIS — R26.9 NEUROLOGIC GAIT DYSFUNCTION: ICD-10-CM

## 2024-06-13 LAB
ALBUMIN SERPL-MCNC: 3.2 G/DL (ref 3.4–5)
ALBUMIN/GLOB SERPL: 1 {RATIO} (ref 1–2)
ALP LIVER SERPL-CCNC: 66 U/L
ALT SERPL-CCNC: 36 U/L
ANION GAP SERPL CALC-SCNC: 2 MMOL/L (ref 0–18)
ANTIBODY SCREEN: NEGATIVE
APTT PPP: 39.1 SECONDS (ref 23–36)
AST SERPL-CCNC: 20 U/L (ref 15–37)
BASOPHILS # BLD AUTO: 0.03 X10(3) UL (ref 0–0.2)
BASOPHILS NFR BLD AUTO: 0.3 %
BILIRUB DIRECT SERPL-MCNC: 0.1 MG/DL (ref 0–0.2)
BILIRUB SERPL-MCNC: 0.3 MG/DL (ref 0.1–2)
BUN BLD-MCNC: 25 MG/DL (ref 9–23)
CALCIUM BLD-MCNC: 8.9 MG/DL (ref 8.5–10.1)
CHLORIDE SERPL-SCNC: 111 MMOL/L (ref 98–112)
CO2 SERPL-SCNC: 27 MMOL/L (ref 21–32)
CREAT BLD-MCNC: 1.14 MG/DL
EGFRCR SERPLBLD CKD-EPI 2021: 65 ML/MIN/1.73M2 (ref 60–?)
EOSINOPHIL # BLD AUTO: 0.11 X10(3) UL (ref 0–0.7)
EOSINOPHIL NFR BLD AUTO: 1.2 %
ERYTHROCYTE [DISTWIDTH] IN BLOOD BY AUTOMATED COUNT: 12.1 %
FASTING STATUS PATIENT QL REPORTED: NO
GLOBULIN PLAS-MCNC: 3.2 G/DL (ref 2.8–4.4)
GLUCOSE BLD-MCNC: 82 MG/DL (ref 70–99)
HCT VFR BLD AUTO: 38.6 %
HGB BLD-MCNC: 13 G/DL
IMM GRANULOCYTES # BLD AUTO: 0.05 X10(3) UL (ref 0–1)
IMM GRANULOCYTES NFR BLD: 0.5 %
INR BLD: 1.81 (ref 0.8–1.2)
LYMPHOCYTES # BLD AUTO: 1.13 X10(3) UL (ref 1–4)
LYMPHOCYTES NFR BLD AUTO: 12 %
MCH RBC QN AUTO: 32.7 PG (ref 26–34)
MCHC RBC AUTO-ENTMCNC: 33.7 G/DL (ref 31–37)
MCV RBC AUTO: 97 FL
MONOCYTES # BLD AUTO: 0.76 X10(3) UL (ref 0.1–1)
MONOCYTES NFR BLD AUTO: 8.1 %
NEUTROPHILS # BLD AUTO: 7.33 X10 (3) UL (ref 1.5–7.7)
NEUTROPHILS # BLD AUTO: 7.33 X10(3) UL (ref 1.5–7.7)
NEUTROPHILS NFR BLD AUTO: 77.9 %
OSMOLALITY SERPL CALC.SUM OF ELEC: 293 MOSM/KG (ref 275–295)
PLATELET # BLD AUTO: 210 10(3)UL (ref 150–450)
POTASSIUM SERPL-SCNC: 4.4 MMOL/L (ref 3.5–5.1)
PROT SERPL-MCNC: 6.4 G/DL (ref 6.4–8.2)
PROTHROMBIN TIME: 21.1 SECONDS (ref 11.6–14.8)
RBC # BLD AUTO: 3.98 X10(6)UL
RH BLOOD TYPE: POSITIVE
SODIUM SERPL-SCNC: 140 MMOL/L (ref 136–145)
WBC # BLD AUTO: 9.4 X10(3) UL (ref 4–11)

## 2024-06-13 PROCEDURE — 86901 BLOOD TYPING SEROLOGIC RH(D): CPT | Performed by: NEUROLOGICAL SURGERY

## 2024-06-13 PROCEDURE — 36415 COLL VENOUS BLD VENIPUNCTURE: CPT | Performed by: NEUROLOGICAL SURGERY

## 2024-06-13 PROCEDURE — 82248 BILIRUBIN DIRECT: CPT | Performed by: NEUROLOGICAL SURGERY

## 2024-06-13 PROCEDURE — 85025 COMPLETE CBC W/AUTO DIFF WBC: CPT | Performed by: NEUROLOGICAL SURGERY

## 2024-06-13 PROCEDURE — 86850 RBC ANTIBODY SCREEN: CPT | Performed by: NEUROLOGICAL SURGERY

## 2024-06-13 PROCEDURE — 86900 BLOOD TYPING SEROLOGIC ABO: CPT | Performed by: NEUROLOGICAL SURGERY

## 2024-06-13 PROCEDURE — 85730 THROMBOPLASTIN TIME PARTIAL: CPT | Performed by: NEUROLOGICAL SURGERY

## 2024-06-13 PROCEDURE — 85610 PROTHROMBIN TIME: CPT | Performed by: NEUROLOGICAL SURGERY

## 2024-06-13 PROCEDURE — 80053 COMPREHEN METABOLIC PANEL: CPT | Performed by: NEUROLOGICAL SURGERY

## 2024-06-13 NOTE — TELEPHONE ENCOUNTER
CARDS presurgical clearance received per OV note dated 6.13.24 obtained via Care Everywhere, \"He may proceed with this procedure as scheduled. He will need to hold his Xarelto 2 days prior to procedure. It should be noted that any interruption in anticoagulation represents a small risk of CVA. He understands this and aggress to proceed with his procedure.   Benefits exceed Risks.   Last dose of Xarelto on Thursday 6/14/24 Evening. Then resume on Tuesday 6/18/24 evening if ok with Neurosurgery, Dr. Rose.\"

## 2024-06-13 NOTE — TELEPHONE ENCOUNTER
Per lab results, Dr. Pringle ordered pt to stop taking xarelto starting now.     Call placed to pt to inform.     NA-TCB.     Routing to front in case of pt call back, ok to route back to nursing.

## 2024-06-14 ENCOUNTER — LABORATORY ENCOUNTER (OUTPATIENT)
Dept: LAB | Age: 79
End: 2024-06-14
Attending: NEUROLOGICAL SURGERY

## 2024-06-14 ENCOUNTER — LABORATORY ENCOUNTER (OUTPATIENT)
Dept: LAB | Age: 79
DRG: 032 | End: 2024-06-14
Attending: NEUROLOGICAL SURGERY

## 2024-06-14 DIAGNOSIS — G91.2 NPH (NORMAL PRESSURE HYDROCEPHALUS) (HCC): ICD-10-CM

## 2024-06-14 DIAGNOSIS — I10 PRIMARY HYPERTENSION: ICD-10-CM

## 2024-06-14 PROCEDURE — 87081 CULTURE SCREEN ONLY: CPT

## 2024-06-14 PROCEDURE — 87635 SARS-COV-2 COVID-19 AMP PRB: CPT

## 2024-06-15 LAB — SARS-COV-2 RNA RESP QL NAA+PROBE: NOT DETECTED

## 2024-06-17 ENCOUNTER — HOSPITAL ENCOUNTER (INPATIENT)
Facility: HOSPITAL | Age: 79
LOS: 1 days | Discharge: HOME | DRG: 032 | End: 2024-06-18
Attending: NEUROLOGICAL SURGERY | Admitting: NEUROLOGICAL SURGERY

## 2024-06-17 ENCOUNTER — ANESTHESIA EVENT (OUTPATIENT)
Dept: SURGERY | Facility: HOSPITAL | Age: 79
DRG: 032 | End: 2024-06-17
Payer: MEDICARE

## 2024-06-17 ENCOUNTER — ANESTHESIA (OUTPATIENT)
Dept: SURGERY | Facility: HOSPITAL | Age: 79
DRG: 032 | End: 2024-06-17
Payer: MEDICARE

## 2024-06-17 DIAGNOSIS — G91.8 HYDROCEPHALUS EX VACUO (HCC): ICD-10-CM

## 2024-06-17 DIAGNOSIS — I10 PRIMARY HYPERTENSION: ICD-10-CM

## 2024-06-17 DIAGNOSIS — G91.2 NPH (NORMAL PRESSURE HYDROCEPHALUS) (HCC): Primary | ICD-10-CM

## 2024-06-17 PROBLEM — F03.90 DEMENTIA (HCC): Status: ACTIVE | Noted: 2024-06-17

## 2024-06-17 PROBLEM — R41.3 MEMORY IMPAIRMENT: Status: ACTIVE | Noted: 2024-06-17

## 2024-06-17 PROBLEM — I25.10 CORONARY ARTERY DISEASE INVOLVING NATIVE CORONARY ARTERY OF NATIVE HEART WITHOUT ANGINA PECTORIS: Status: ACTIVE | Noted: 2024-06-17

## 2024-06-17 PROBLEM — I67.9 CEREBROVASCULAR DISEASE: Status: ACTIVE | Noted: 2024-06-17

## 2024-06-17 PROBLEM — I48.0 PAROXYSMAL ATRIAL FIBRILLATION (HCC): Status: ACTIVE | Noted: 2023-04-07

## 2024-06-17 PROBLEM — E78.5 HYPERLIPIDEMIA: Status: ACTIVE | Noted: 2023-04-07

## 2024-06-17 LAB
APTT PPP: 32 SECONDS (ref 23–36)
INR BLD: 1.08 (ref 0.8–1.2)
MRSA DNA SPEC QL NAA+PROBE: NEGATIVE
PROTHROMBIN TIME: 14 SECONDS (ref 11.6–14.8)
RH BLOOD TYPE: POSITIVE

## 2024-06-17 PROCEDURE — 99291 CRITICAL CARE FIRST HOUR: CPT | Performed by: OTHER

## 2024-06-17 PROCEDURE — 99223 1ST HOSP IP/OBS HIGH 75: CPT | Performed by: STUDENT IN AN ORGANIZED HEALTH CARE EDUCATION/TRAINING PROGRAM

## 2024-06-17 PROCEDURE — 00160J6 BYPASS CEREBRAL VENTRICLE TO PERITONEAL CAVITY WITH SYNTHETIC SUBSTITUTE, OPEN APPROACH: ICD-10-PCS | Performed by: NEUROLOGICAL SURGERY

## 2024-06-17 PROCEDURE — 62223 ESTABLISH BRAIN CAVITY SHUNT: CPT | Performed by: STUDENT IN AN ORGANIZED HEALTH CARE EDUCATION/TRAINING PROGRAM

## 2024-06-17 DEVICE — CATH 23047 PERIT STD OE W/SLITS 120: Type: IMPLANTABLE DEVICE | Site: ABDOMEN | Status: FUNCTIONAL

## 2024-06-17 DEVICE — VALVE 42866 FP-STRATA 2 REGULAR
Type: IMPLANTABLE DEVICE | Site: HEAD | Status: FUNCTIONAL
Brand: STRATA®

## 2024-06-17 DEVICE — CATHETER 91101 VENTRICULAR ANTIMICROBIAL
Type: IMPLANTABLE DEVICE | Site: HEAD | Status: FUNCTIONAL
Brand: ARES™

## 2024-06-17 RX ORDER — HYDROMORPHONE HYDROCHLORIDE 1 MG/ML
0.4 INJECTION, SOLUTION INTRAMUSCULAR; INTRAVENOUS; SUBCUTANEOUS EVERY 2 HOUR PRN
Status: DISCONTINUED | OUTPATIENT
Start: 2024-06-17 | End: 2024-06-18

## 2024-06-17 RX ORDER — LABETALOL HYDROCHLORIDE 5 MG/ML
INJECTION, SOLUTION INTRAVENOUS
Status: DISCONTINUED
Start: 2024-06-17 | End: 2024-06-17 | Stop reason: WASHOUT

## 2024-06-17 RX ORDER — HYDROMORPHONE HYDROCHLORIDE 1 MG/ML
0.4 INJECTION, SOLUTION INTRAMUSCULAR; INTRAVENOUS; SUBCUTANEOUS EVERY 5 MIN PRN
Status: DISCONTINUED | OUTPATIENT
Start: 2024-06-17 | End: 2024-06-17 | Stop reason: HOSPADM

## 2024-06-17 RX ORDER — ROCURONIUM BROMIDE 10 MG/ML
INJECTION, SOLUTION INTRAVENOUS AS NEEDED
Status: DISCONTINUED | OUTPATIENT
Start: 2024-06-17 | End: 2024-06-17 | Stop reason: SURG

## 2024-06-17 RX ORDER — HYDROMORPHONE HYDROCHLORIDE 1 MG/ML
0.2 INJECTION, SOLUTION INTRAMUSCULAR; INTRAVENOUS; SUBCUTANEOUS EVERY 5 MIN PRN
Status: DISCONTINUED | OUTPATIENT
Start: 2024-06-17 | End: 2024-06-17 | Stop reason: HOSPADM

## 2024-06-17 RX ORDER — ACETAMINOPHEN 500 MG
TABLET ORAL EVERY 6 HOURS PRN
Status: DISCONTINUED | OUTPATIENT
Start: 2024-06-17 | End: 2024-06-18

## 2024-06-17 RX ORDER — DONEPEZIL HYDROCHLORIDE 10 MG/1
10 TABLET, FILM COATED ORAL NIGHTLY
Status: DISCONTINUED | OUTPATIENT
Start: 2024-06-17 | End: 2024-06-18

## 2024-06-17 RX ORDER — FINASTERIDE 5 MG/1
5 TABLET, FILM COATED ORAL DAILY
Status: DISCONTINUED | OUTPATIENT
Start: 2024-06-18 | End: 2024-06-18

## 2024-06-17 RX ORDER — BUPIVACAINE HYDROCHLORIDE 2.5 MG/ML
INJECTION, SOLUTION EPIDURAL; INFILTRATION; INTRACAUDAL AS NEEDED
Status: DISCONTINUED | OUTPATIENT
Start: 2024-06-17 | End: 2024-06-17 | Stop reason: HOSPADM

## 2024-06-17 RX ORDER — HYDROMORPHONE HYDROCHLORIDE 1 MG/ML
0.2 INJECTION, SOLUTION INTRAMUSCULAR; INTRAVENOUS; SUBCUTANEOUS EVERY 2 HOUR PRN
Status: DISCONTINUED | OUTPATIENT
Start: 2024-06-17 | End: 2024-06-18

## 2024-06-17 RX ORDER — FLECAINIDE ACETATE 100 MG/1
100 TABLET ORAL 2 TIMES DAILY
Status: DISCONTINUED | OUTPATIENT
Start: 2024-06-17 | End: 2024-06-18

## 2024-06-17 RX ORDER — SODIUM CHLORIDE, SODIUM LACTATE, POTASSIUM CHLORIDE, CALCIUM CHLORIDE 600; 310; 30; 20 MG/100ML; MG/100ML; MG/100ML; MG/100ML
INJECTION, SOLUTION INTRAVENOUS CONTINUOUS
Status: DISCONTINUED | OUTPATIENT
Start: 2024-06-17 | End: 2024-06-17

## 2024-06-17 RX ORDER — ACETAMINOPHEN 500 MG
1000 TABLET ORAL ONCE AS NEEDED
Status: DISCONTINUED | OUTPATIENT
Start: 2024-06-17 | End: 2024-06-17 | Stop reason: HOSPADM

## 2024-06-17 RX ORDER — ONDANSETRON 2 MG/ML
4 INJECTION INTRAMUSCULAR; INTRAVENOUS EVERY 6 HOURS PRN
Status: DISCONTINUED | OUTPATIENT
Start: 2024-06-17 | End: 2024-06-17 | Stop reason: HOSPADM

## 2024-06-17 RX ORDER — FAMOTIDINE 10 MG/ML
20 INJECTION, SOLUTION INTRAVENOUS 2 TIMES DAILY
Status: DISCONTINUED | OUTPATIENT
Start: 2024-06-17 | End: 2024-06-18

## 2024-06-17 RX ORDER — SODIUM CHLORIDE 9 MG/ML
INJECTION, SOLUTION INTRAVENOUS CONTINUOUS
Status: DISCONTINUED | OUTPATIENT
Start: 2024-06-17 | End: 2024-06-18

## 2024-06-17 RX ORDER — LABETALOL HYDROCHLORIDE 5 MG/ML
INJECTION, SOLUTION INTRAVENOUS
Status: COMPLETED
Start: 2024-06-17 | End: 2024-06-17

## 2024-06-17 RX ORDER — HYDRALAZINE HYDROCHLORIDE 20 MG/ML
INJECTION INTRAMUSCULAR; INTRAVENOUS
Status: COMPLETED
Start: 2024-06-17 | End: 2024-06-17

## 2024-06-17 RX ORDER — ATORVASTATIN CALCIUM 40 MG/1
40 TABLET, FILM COATED ORAL NIGHTLY
Status: DISCONTINUED | OUTPATIENT
Start: 2024-06-17 | End: 2024-06-18

## 2024-06-17 RX ORDER — METOCLOPRAMIDE HYDROCHLORIDE 5 MG/ML
10 INJECTION INTRAMUSCULAR; INTRAVENOUS EVERY 8 HOURS PRN
Status: DISCONTINUED | OUTPATIENT
Start: 2024-06-17 | End: 2024-06-17 | Stop reason: HOSPADM

## 2024-06-17 RX ORDER — ACETAMINOPHEN 500 MG
1000 TABLET ORAL ONCE
Status: DISCONTINUED | OUTPATIENT
Start: 2024-06-17 | End: 2024-06-17 | Stop reason: HOSPADM

## 2024-06-17 RX ORDER — ONDANSETRON 2 MG/ML
INJECTION INTRAMUSCULAR; INTRAVENOUS AS NEEDED
Status: DISCONTINUED | OUTPATIENT
Start: 2024-06-17 | End: 2024-06-17 | Stop reason: SURG

## 2024-06-17 RX ORDER — DEXAMETHASONE SODIUM PHOSPHATE 4 MG/ML
VIAL (ML) INJECTION AS NEEDED
Status: DISCONTINUED | OUTPATIENT
Start: 2024-06-17 | End: 2024-06-17 | Stop reason: SURG

## 2024-06-17 RX ORDER — HYDRALAZINE HYDROCHLORIDE 20 MG/ML
5 INJECTION INTRAMUSCULAR; INTRAVENOUS AS NEEDED
Status: DISCONTINUED | OUTPATIENT
Start: 2024-06-17 | End: 2024-06-17 | Stop reason: HOSPADM

## 2024-06-17 RX ORDER — NALOXONE HYDROCHLORIDE 0.4 MG/ML
0.08 INJECTION, SOLUTION INTRAMUSCULAR; INTRAVENOUS; SUBCUTANEOUS AS NEEDED
Status: DISCONTINUED | OUTPATIENT
Start: 2024-06-17 | End: 2024-06-17 | Stop reason: HOSPADM

## 2024-06-17 RX ORDER — SODIUM CHLORIDE, SODIUM LACTATE, POTASSIUM CHLORIDE, CALCIUM CHLORIDE 600; 310; 30; 20 MG/100ML; MG/100ML; MG/100ML; MG/100ML
INJECTION, SOLUTION INTRAVENOUS CONTINUOUS
Status: DISCONTINUED | OUTPATIENT
Start: 2024-06-17 | End: 2024-06-17 | Stop reason: HOSPADM

## 2024-06-17 RX ORDER — TAMSULOSIN HYDROCHLORIDE 0.4 MG/1
0.4 CAPSULE ORAL NIGHTLY
Status: DISCONTINUED | OUTPATIENT
Start: 2024-06-17 | End: 2024-06-18

## 2024-06-17 RX ORDER — ONDANSETRON 2 MG/ML
4 INJECTION INTRAMUSCULAR; INTRAVENOUS EVERY 6 HOURS PRN
Status: DISCONTINUED | OUTPATIENT
Start: 2024-06-17 | End: 2024-06-18

## 2024-06-17 RX ORDER — BUPIVACAINE HYDROCHLORIDE AND EPINEPHRINE 2.5; 5 MG/ML; UG/ML
INJECTION, SOLUTION EPIDURAL; INFILTRATION; INTRACAUDAL; PERINEURAL AS NEEDED
Status: DISCONTINUED | OUTPATIENT
Start: 2024-06-17 | End: 2024-06-17 | Stop reason: HOSPADM

## 2024-06-17 RX ORDER — HYDROMORPHONE HYDROCHLORIDE 1 MG/ML
INJECTION, SOLUTION INTRAMUSCULAR; INTRAVENOUS; SUBCUTANEOUS
Status: COMPLETED
Start: 2024-06-17 | End: 2024-06-17

## 2024-06-17 RX ORDER — LABETALOL HYDROCHLORIDE 5 MG/ML
10 INJECTION, SOLUTION INTRAVENOUS EVERY 2 HOUR PRN
Status: DISCONTINUED | OUTPATIENT
Start: 2024-06-17 | End: 2024-06-18

## 2024-06-17 RX ORDER — OXYCODONE HYDROCHLORIDE 5 MG/1
2.5 TABLET ORAL EVERY 4 HOURS PRN
Status: DISCONTINUED | OUTPATIENT
Start: 2024-06-17 | End: 2024-06-18

## 2024-06-17 RX ORDER — PROCHLORPERAZINE EDISYLATE 5 MG/ML
10 INJECTION INTRAMUSCULAR; INTRAVENOUS EVERY 6 HOURS PRN
Status: DISCONTINUED | OUTPATIENT
Start: 2024-06-17 | End: 2024-06-18

## 2024-06-17 RX ORDER — HYDROCODONE BITARTRATE AND ACETAMINOPHEN 5; 325 MG/1; MG/1
1 TABLET ORAL ONCE AS NEEDED
Status: DISCONTINUED | OUTPATIENT
Start: 2024-06-17 | End: 2024-06-17 | Stop reason: HOSPADM

## 2024-06-17 RX ORDER — FAMOTIDINE 20 MG/1
20 TABLET, FILM COATED ORAL 2 TIMES DAILY
Status: DISCONTINUED | OUTPATIENT
Start: 2024-06-17 | End: 2024-06-18

## 2024-06-17 RX ORDER — METOCLOPRAMIDE HYDROCHLORIDE 5 MG/ML
10 INJECTION INTRAMUSCULAR; INTRAVENOUS EVERY 6 HOURS PRN
Status: DISCONTINUED | OUTPATIENT
Start: 2024-06-17 | End: 2024-06-18

## 2024-06-17 RX ORDER — HYDRALAZINE HYDROCHLORIDE 20 MG/ML
10 INJECTION INTRAMUSCULAR; INTRAVENOUS
Status: DISCONTINUED | OUTPATIENT
Start: 2024-06-17 | End: 2024-06-18

## 2024-06-17 RX ORDER — LIDOCAINE HYDROCHLORIDE 10 MG/ML
INJECTION, SOLUTION EPIDURAL; INFILTRATION; INTRACAUDAL; PERINEURAL AS NEEDED
Status: DISCONTINUED | OUTPATIENT
Start: 2024-06-17 | End: 2024-06-17 | Stop reason: SURG

## 2024-06-17 RX ORDER — LABETALOL HYDROCHLORIDE 5 MG/ML
5 INJECTION, SOLUTION INTRAVENOUS AS NEEDED
Status: DISCONTINUED | OUTPATIENT
Start: 2024-06-17 | End: 2024-06-17 | Stop reason: HOSPADM

## 2024-06-17 RX ORDER — HYDROMORPHONE HYDROCHLORIDE 1 MG/ML
0.6 INJECTION, SOLUTION INTRAMUSCULAR; INTRAVENOUS; SUBCUTANEOUS EVERY 5 MIN PRN
Status: DISCONTINUED | OUTPATIENT
Start: 2024-06-17 | End: 2024-06-17 | Stop reason: HOSPADM

## 2024-06-17 RX ORDER — SERTRALINE HYDROCHLORIDE 25 MG/1
25 TABLET, FILM COATED ORAL DAILY
Status: DISCONTINUED | OUTPATIENT
Start: 2024-06-17 | End: 2024-06-18

## 2024-06-17 RX ORDER — HYDROCODONE BITARTRATE AND ACETAMINOPHEN 5; 325 MG/1; MG/1
2 TABLET ORAL ONCE AS NEEDED
Status: DISCONTINUED | OUTPATIENT
Start: 2024-06-17 | End: 2024-06-17 | Stop reason: HOSPADM

## 2024-06-17 RX ORDER — OXYCODONE HYDROCHLORIDE 5 MG/1
5 TABLET ORAL EVERY 4 HOURS PRN
Status: DISCONTINUED | OUTPATIENT
Start: 2024-06-17 | End: 2024-06-18

## 2024-06-17 RX ADMIN — DEXAMETHASONE SODIUM PHOSPHATE 8 MG: 4 MG/ML VIAL (ML) INJECTION at 08:04:00

## 2024-06-17 RX ADMIN — ROCURONIUM BROMIDE 20 MG: 10 INJECTION, SOLUTION INTRAVENOUS at 08:21:00

## 2024-06-17 RX ADMIN — ONDANSETRON 4 MG: 2 INJECTION INTRAMUSCULAR; INTRAVENOUS at 08:49:00

## 2024-06-17 RX ADMIN — LIDOCAINE HYDROCHLORIDE 50 MG: 10 INJECTION, SOLUTION EPIDURAL; INFILTRATION; INTRACAUDAL; PERINEURAL at 07:53:00

## 2024-06-17 RX ADMIN — ROCURONIUM BROMIDE 50 MG: 10 INJECTION, SOLUTION INTRAVENOUS at 07:53:00

## 2024-06-17 NOTE — CONSULTS
[facility]  CONSULT     Clay Orellana Patient Status:  Inpatient    1945 MRN [id number]   Location [facility] 6NE-A Attending Brian Pringle MD   Hosp Day # 0 PCP Sachin Meza MD     Reason for consult: Medical management    Requested by: Neurosurgery    Subjective:   History of Present Illness:     Clay Orellana is a 79 year old male with past medical history of hypertension, hyperlipidemia, cerebrovascular disease, paroxysmal A-fib s/p DCCV, BPH, anxiety who presents [facility] for management of normal pressure hydrocephalus.  Patient underwent  shunt placement by Dr. Pringle today.  He feels well after procedure.  He has mild headache.    History/Other:    Past Medical History:  Past Medical History:    Acute nausea with nonbilious vomiting    Anxiety    Arrhythmia    Arthritis    Bilateral inguinal hernia without obstruction or gangrene    BPH (benign prostatic hyperplasia)    Calculus of kidney    Essential hypertension    High cholesterol    Muscle weakness    Stroke (HCC)    TIA    Visual impairment     Past Surgical History:   Past Surgical History:   Procedure Laterality Date    Anesth,pacemaker insertion  2023    Appendectomy      Appendectomy      Colonoscopy      Colonoscopy N/A 2022    Procedure: COLONOSCOPY;  Surgeon: Sudarshan Gilbert MD;  Location:  ENDOSCOPY    Colonoscopy      Cyst removal N/A     Cyst removed behind back of neck    Cyst removal Right 2020    Removed from right upper back - just below shoulder    Hernia surgery  2022    Skin surgery      Vasectomy        Family History:   Family History   Problem Relation Age of Onset    Heart Attack Father     Diabetes Mother     Other (DM) Sister     Other (ALS) Brother     No Known Problems Maternal Grandmother     No Known Problems Maternal Grandfather     No Known Problems Paternal Grandmother     No Known Problems Paternal Grandfather     Other (smoker) Brother     Other (MS)  Sister     No Known Problems Son     No Known Problems Daughter      Social History:    reports that he has quit smoking. His smoking use included cigarettes. He has never been exposed to tobacco smoke. He has never used smokeless tobacco. He reports that he does not drink alcohol and does not use drugs.     Allergies:   Allergies   Allergen Reactions    Kiwi Extract ITCHING     Inner ears itch       Medications:    No current facility-administered medications on file prior to encounter.     Current Outpatient Medications on File Prior to Encounter   Medication Sig Dispense Refill    metoprolol tartrate 25 MG Oral Tab Take 1 tablet (25 mg total) by mouth 2 (two) times daily.      tamsulosin 0.4 MG Oral Cap Take 1 capsule (0.4 mg total) by mouth After dinner.      sertraline (ZOLOFT) 25 MG Oral Tab Take 1 tablet (25 mg total) by mouth daily. 30 tablet 1    atorvastatin 40 MG Oral Tab Take 1 tablet (40 mg total) by mouth nightly. 90 tablet 3    midodrine 5 MG Oral Tab Take 1 tablet (5 mg total) by mouth in the morning and 1 tablet (5 mg total) at noon and 1 tablet (5 mg total) in the evening. 90 tablet 0    rivaroxaban 20 MG Oral Tab Take 1 tablet (20 mg total) by mouth nightly.      donepezil 10 MG Oral Tab Take 1 tablet (10 mg total) by mouth nightly. 90 tablet 1    fluticasone-umeclidin-vilant (TRELEGY ELLIPTA) 100-62.5-25 MCG/ACT Inhalation Aerosol Powder, Breath Activated Inhale 1 puff into the lungs daily.      ipratropium 0.03 % Nasal Solution 2 sprays by Nasal route Q12H.      flecainide 100 MG Oral Tab Take 1 tablet (100 mg total) by mouth 2 (two) times daily.      finasteride 5 MG Oral Tab TAKE ONE TABLET BY MOUTH ONE TIME DAILY 90 tablet 2    Cholecalciferol 125 MCG (5000 UT) Oral Tab Take 1 tablet (5,000 Units total) by mouth 3 (three) times a week. Three times a week. MWF      cyanocobalamin 500 MCG Oral Tab Take 1 tablet (500 mcg total) by mouth daily.      Multiple Vitamins-Minerals  (MULTI-VITAMIN/MINERALS) Oral Tab Take 1 tablet by mouth daily.      Acidophilus/Pectin Oral Cap Take 1 capsule by mouth daily.      acetaminophen 500 MG Oral Tab Take 1 tablet (500 mg total) by mouth every 6 (six) hours as needed.      aspirin 81 MG Oral Tab EC Take 1 tablet (81 mg total) by mouth daily.         Review of Systems:   A comprehensive review of systems was completed.    Pertinent positives and negatives noted in the HPI.    Objective:   Physical Exam:    /71 (BP Location: Right arm)   Pulse 64   Temp 98.2 °F (36.8 °C) (Temporal)   Resp 16   Ht 5' 11\" (1.803 m)   Wt 206 lb 6.4 oz (93.6 kg)   SpO2 92%   BMI 28.79 kg/m²   General: No acute distress, Alert  HEENT: Surgical incision on head C/C/I  Respiratory: No rhonchi, no wheezes  Cardiovascular: S1, S2. Regular rate and rhythm  Abdomen: Soft, NT/ND, +BS  Neuro: No new focal deficits  Extremities: No edema    Results:    Labs:      Labs Last 24 Hours:  Recent Labs   Lab 06/13/24  1052 06/17/24  0644   WBC 9.4  --    HGB 13.0  --    MCV 97.0  --    .0  --    INR 1.81* 1.08       Recent Labs   Lab 06/13/24  1052   GLU 82   BUN 25*   CREATSERUM 1.14   CA 8.9   ALB 3.2*      K 4.4      CO2 27.0   ALKPHO 66   AST 20   ALT 36   BILT 0.3   TP 6.4       Recent Labs   Lab 06/13/24  1052 06/17/24  0644   PTP 21.1* 14.0   INR 1.81* 1.08       No results for input(s): \"TROP\", \"CK\" in the last 168 hours.      Imaging: Imaging data reviewed in Epic.    Assessment & Plan:      #NPH  -S/p  shunt placement on 6/6/2017  -Management per neurosurgery  -Surgery following    #Cerebrovascular disease  -Aspirin on hold until cleared by neurosurgery  -Statin    #Paroxysmal A-fib s/p DCCV  -Flecainide, metoprolol  -Xarelto on hold until cleared by neurosurgery    #Hyperlipidemia  -Statin    #BPH  -Flomax, finasteride    #Anxiety  -Sertraline    #Dementia  -Donepezil      Plan of care discussed with patient     Eda Williamson,   6/17/2024    The  21st Century Cures Act makes medical notes like these available to patients in the interest of transparency. Please be advised this is a medical document. Medical documents are intended to carry relevant information, facts as evident, and the clinical opinion of the practitioner. The medical note is intended as peer to peer communication and may appear blunt or direct. It is written in medical language and may contain abbreviations or verbiage that are unfamiliar.

## 2024-06-17 NOTE — ANESTHESIA POSTPROCEDURE EVALUATION
[facility]    Clay Orellana Patient Status:  Inpatient   Age/Gender 79 year old male MRN [id number]   Location [facility] SURGERY Attending Brian Pringle MD   Hosp Day # 0 PCP Sachin Meza MD       Anesthesia Post-op Note    RIGHT VENTRICULOPERITONEAL SHUNT INSERTION    Procedure Summary       Date: 06/17/24 Room / Location:  MAIN OR 15 / [facility] MAIN OR    Anesthesia Start: 0743 Anesthesia Stop: 0918    Procedures:       RIGHT VENTRICULOPERITONEAL SHUNT INSERTION (Right: Head)      LAPAROSCOPY ASSISTED RIGHT VENTRICULOPERITONEAL SHUNT INSERTION (Abdomen) Diagnosis:       NPH (normal pressure hydrocephalus) (HCC)      (NPH (normal pressure hydrocephalus) (HCC) [G91.2])    Surgeons: Brian Pringle MD; Richard Arnett MD Anesthesiologist: Tommy Alas MD    Anesthesia Type: general ASA Status: 3            Anesthesia Type: general    Vitals Value Taken Time   /81 06/17/24 0920   Temp 97.1 06/17/24 0920   Pulse 60 06/17/24 0920   Resp 16 06/17/24 0920   SpO2 100 06/17/24 0920       Patient Location: PACU    Anesthesia Type: general    Airway Patency: patent    Postop Pain Control: adequate    Mental Status: preanesthetic baseline    Nausea/Vomiting: none    Cardiopulmonary/Hydration status: stable euvolemic    Complications: no apparent anesthesia related complications    Postop vital signs: stable    Dental Exam: Unchanged from Preop    Patient to be transferred to ICU.

## 2024-06-17 NOTE — BRIEF OP NOTE
Pre-Operative Diagnosis: NPH (normal pressure hydrocephalus) (HCC) [G91.2]     Post-Operative Diagnosis: NPH (normal pressure hydrocephalus) (HCC) [G91.2]      Procedure Performed:   RIGHT VENTRICULOPERITONEAL SHUNT INSERTION    Surgeons and Role:  Panel 1:     * Brian Pringle MD - Primary  Panel 2:     * Richard Arnett MD - Primary    Assistant(s):  PA: Teodora Castle PA-C     Surgical Findings: see dictation     Specimen: none     Estimated Blood Loss: Blood Output: 8 mL (6/17/2024  9:02 AM)        Brian Pringle MD  6/17/2024  9:18 AM

## 2024-06-17 NOTE — H&P
History & Physical Examination    Patient Name: Clay Orellana  MRN: [id number]  [facility]: [id number]  YOB: 1945    Diagnosis: NPH    Present Illness: This is an 79-year-old male who presents for right ventriculoperitoneal shunt insertion with Dr. Pringle.  Patient is accompanied by wife and daughter.  Per patient and family, no changes since last office visit.  He has no further questions in regards to surgical intervention, all questions were answered at his office visit during surgical discussion.  The patient endorses no chest pain, headaches, shortness of breath or abdominal pain.  Last ate yesterday evening.  No recent antiplatelet/anticoagulation use endorsed.  He is ready to proceed with surgical intervention.  His main preoperative symptoms include poor control of urination and walking difficulties.    Medications Prior to Admission   Medication Sig Dispense Refill Last Dose    tamsulosin 0.4 MG Oral Cap Take 1 capsule (0.4 mg total) by mouth After dinner.   6/16/2024    sertraline (ZOLOFT) 25 MG Oral Tab Take 1 tablet (25 mg total) by mouth daily. 30 tablet 1 6/16/2024    atorvastatin 40 MG Oral Tab Take 1 tablet (40 mg total) by mouth nightly. 90 tablet 3 6/16/2024    midodrine 5 MG Oral Tab Take 1 tablet (5 mg total) by mouth in the morning and 1 tablet (5 mg total) at noon and 1 tablet (5 mg total) in the evening. 90 tablet 0 6/3/2024    metoprolol tartrate 25 MG Oral Tab Take 0.5 tablets (12.5 mg total) by mouth 2x Daily(Beta Blocker). 30 tablet 0 6/17/2024 at 0330    rivaroxaban 20 MG Oral Tab Take 1 tablet (20 mg total) by mouth nightly.   6/13/2024    donepezil 10 MG Oral Tab Take 1 tablet (10 mg total) by mouth nightly. 90 tablet 1 6/16/2024    fluticasone-umeclidin-vilant (TRELEGY ELLIPTA) 100-62.5-25 MCG/ACT Inhalation Aerosol Powder, Breath Activated Inhale 1 puff into the lungs daily.   6/17/2024    ipratropium 0.03 % Nasal Solution 2 sprays by Nasal route Q12H.   6/17/2024    flecainide  100 MG Oral Tab Take 1 tablet (100 mg total) by mouth 2 (two) times daily.   6/17/2024    finasteride 5 MG Oral Tab TAKE ONE TABLET BY MOUTH ONE TIME DAILY 90 tablet 2 6/17/2024    Cholecalciferol 125 MCG (5000 UT) Oral Tab Take 1 tablet (5,000 Units total) by mouth. Three times a week   6/16/2024    cyanocobalamin 500 MCG Oral Tab Take 1 tablet (500 mcg total) by mouth daily.   6/16/2024    Multiple Vitamins-Minerals (MULTI-VITAMIN/MINERALS) Oral Tab Take 1 tablet by mouth daily.   6/16/2024    Acidophilus/Pectin Oral Cap Take 1 capsule by mouth daily.   6/16/2024    acetaminophen 500 MG Oral Tab Take 1 tablet (500 mg total) by mouth every 6 (six) hours as needed.   Past Week    aspirin 81 MG Oral Tab EC Take 1 tablet (81 mg total) by mouth daily. (Patient not taking: Reported on 6/11/2024)   More than a month     Current Facility-Administered Medications   Medication Dose Route Frequency    acetaminophen (Tylenol Extra Strength) tab 1,000 mg  1,000 mg Oral Once    lactated ringers infusion   Intravenous Continuous    ceFAZolin (Ancef) 2g in 10mL IV syringe premix  2 g Intravenous Once       Allergies:   Allergies   Allergen Reactions    Kiwi Extract ITCHING     Inner ears itch       Past Medical History:    Acute nausea with nonbilious vomiting    Anxiety    Arrhythmia    Arthritis    Bilateral inguinal hernia without obstruction or gangrene    BPH (benign prostatic hyperplasia)    Calculus of kidney    Essential hypertension    High cholesterol    Muscle weakness    Stroke (HCC)    TIA    Visual impairment     Past Surgical History:   Procedure Laterality Date    Anesth,pacemaker insertion  08/2023    Appendectomy      Appendectomy      Colonoscopy      Colonoscopy N/A 05/03/2022    Procedure: COLONOSCOPY;  Surgeon: Sudarshan Gilbert MD;  Location:  ENDOSCOPY    Colonoscopy      Cyst removal N/A 1990    Cyst removed behind back of neck    Cyst removal Right 11/05/2020    Removed from right upper back - just below  shoulder    Hernia surgery  July 2022    Skin surgery  2020    Vasectomy  1980     Family History   Problem Relation Age of Onset    Heart Attack Father     Diabetes Mother     Other (DM) Sister     Other (ALS) Brother     No Known Problems Maternal Grandmother     No Known Problems Maternal Grandfather     No Known Problems Paternal Grandmother     No Known Problems Paternal Grandfather     Other (smoker) Brother     Other (MS) Sister     No Known Problems Son     No Known Problems Daughter      Social History     Tobacco Use    Smoking status: Former     Types: Cigarettes     Passive exposure: Never    Smokeless tobacco: Never    Tobacco comments:     Has not smoked for about 20 years   Substance Use Topics    Alcohol use: Never     Alcohol/week: 4.0 standard drinks of alcohol     Types: 4 Cans of beer per week       GENERAL: Pleasant 79-year-old male is lying comfortably in bed, in no acute distress  HEENT:  Normocephalic, atraumatic  SKIN: Warm, dry  RESPIRATORY: Easy and even  NEUROLOGICAL:  This patient is alert and oriented x 3.  Speech fluent. Comprehension intact.  Face is symmetrical.  Negative drift.  Able to shrug shoulders.  EOMs intact.  Finger-nose intact.  Answers questions appropriately.  He is a follows commands.    SPINE: Gait deferred.    5/5 strength of bilateral upper and lower extremities, proximally and distally    [ x ] I have reviewed the risks and benefits and alternatives with the patient/family.  They understand and agree to proceed with plan of care.  [ x ] I have reviewed the last office visit note.  Any changes noted above.  Dr. Pringle to follow    Teodora Castle PA-C  6/17/2024  7:14 AM      Dragon speech recognition software was used to prepare this note. If a word or phrase is confusing, it is likely due to a failure of recognition. Please contact me with any questions or clarifications.

## 2024-06-17 NOTE — DISCHARGE INSTRUCTIONS
Discharge instructions:    Activity  Restrictions  No twisting, pulling, pushing or lifting > 5 lbs (a gallon of milk is approximately 8 lbs)  No lifting anything over your head.  No excessive activity  Walking is okay    Sitting  Sit with your knees at about the same level as your hips; use a footstool if needed.  Firm chairs with straight backs give better support’ recliners are OK to use.  Change position every 20-30 minutes when sitting (example: after sitting, stand, then you can sit again for another 20-30 minutes).    Walking is your best exercise.  Listen to your body.  Walk several times a day  Smaller distances are better.  Your goal is to increase the distance you walk each day.  Remember to listen to your body.    Stairs  Climb as needed    Sex  After two weeks and only when comfortable.  Stop if causing pain.  Check with surgeon for more information.    Driving  Check with physician at office visit when you may resume driving.  Do not drive while taking narcotics  Adhere to sitting restrictions.  You may be a passenger.  Wear your seat belt.    Incision Site Care and Dressing  Changes as directed by your surgeon    IF DERMABOND (GLUE)  May leave open to air or apply and change dressing once a day using dry sterile gauze and paper tape. May prevent clothing from rubbing incision.   Always wash hands before and after dressing changes  Watch incision for any redness, drainage, increased warmth or opening of the incision.   Call surgeon if you notice any of these.  No lotions or ointments on or near incision.      Bathing  No tub baths or pools for 6 weeks and until cleared by surgeon.  Check with your surgeon for specific bathing/showering restrictions.  For Dermabond (glue), you do NOT need to cover incision while showering.  After shower, pat incision dry with clean towel.  Do not apply any lotions or ointments to incision.  After showering, you may apply new dry dressing.    Return to work  When cleared  by surgeon. Discuss specific work activities with your surgeon.    Sleeping  Log roll to turn.  Sleep on back or side avoiding face down position    Diet and Constipation Prevention  Soft diet may help if you have discomfort while swallowing.  Cold drinks or food may feel more soothing.  Drink six to eight glasses liquid (water, juice) per day.  Eat a high fiber diet (bran, vegetables, fruit).  Use an over the counter stool softener such as Colace or senakot while taking narcotics to prevent constipation, or add a laxative such as Miralax or Milk of Magnesia as needed.  An enema or suppository may be needed if above measures do not work.    No Smoking  Smoking will inhibit healing  Even one cigarette a day will cause problems.  Chewing tobacco, nicotine gum or patches will also inhibit bone healing.    Pain Management    Relaxation techniques  A way to focus your attention other than on your pain. Your brain makes endorphins that are a natural body chemical that can decrease pain. Some examples of relaxation techniques are deep breathing, listening to music or meditating.    Medication  No NSAIDS until okay with surgeon.  NSAIDS (non-steroidal anti-inflammatory) include: Aspirin, Motrin, ibuprofen, Advil, Aleve, Naprosyn, Indocin, Nuprin, Vioxx, Celebrex, Bextra. (COMPLETE LIST IN GUIDEBOOK)  Tylenol (acetaminophen) is okay. Caution if your pain med contains Tylenol (acetaminophen); maximum 3 gms of Tylenol (acetaminophen) in 24 hours.  Take muscle relaxants and pain medications as prescribed allowing 30-45 minutes to take effect.  Do NOT drink alcohol while on pain medication.  Monitor need for pain medication refills closely.  Call pharmacy or physician office at least five days before out of pain medication. If calling physician office after 3 pm, it will be handled on the next business day.    Post-op Office Visit  Schedule 2 weeks after surgery with surgeon as directed at discharge  Schedule one week follow up  with Primary Care Physician. Review all medications.    [facility] @ discharge  Phone  [phone]  Fax  [phone]    When to contact your surgeon  Temp > 101F; take your temperature twice a day.  Increased pain, swelling, redness, or any drainage to incision.  Separation of incision.  Sudden reappearance of arm pain that won’t go away with pain medication.  Increased numbness or weakness.  Severe headaches.  Increased difficulty swallowing  Difficulty urinating or having a bowel movement    Go directly to the ER or CALL 911 if you:  become short of breath  have chest pain  cough up blood  have unexplained anxiety with breathing    Patient may resume Aspirin post operative day 5 (Saturday, 6/22/24). Patient may resume Xarelto on post operative day 6, (Sunday 6/23/24)

## 2024-06-17 NOTE — CONSULTS
[facility]   NEUROCRITICAL CARE   CONSULT NOTE    Admission date: 6/17/2024  Reason for Consult: VPS  Chief Complaint: NPH  ________________________________________________________________    History     History of Presenting Illness  79 year old male with PMH of anxiety, afib, BPH, HTN, HLD, CAD, prior stroke who presents after VPS placement. Tolerated procedure well, no IntraOp complications.  Having trouble urinating while in bed.  Denies any new neurologic symptoms.  Mild head pain.    Past Medical History:    Acute nausea with nonbilious vomiting    Anxiety    Arrhythmia    Arthritis    Bilateral inguinal hernia without obstruction or gangrene    BPH (benign prostatic hyperplasia)    Calculus of kidney    Essential hypertension    High cholesterol    Muscle weakness    Stroke (HCC)    TIA    Visual impairment     Past Surgical History:   Procedure Laterality Date    Anesth,pacemaker insertion  08/2023    Appendectomy      Appendectomy      Colonoscopy      Colonoscopy N/A 05/03/2022    Procedure: COLONOSCOPY;  Surgeon: Sudarshan Gilbert MD;  Location:  ENDOSCOPY    Colonoscopy      Cyst removal N/A 1990    Cyst removed behind back of neck    Cyst removal Right 11/05/2020    Removed from right upper back - just below shoulder    Hernia surgery  July 2022    Skin surgery  2020    Vasectomy  1980     Social History     Socioeconomic History    Marital status:    Tobacco Use    Smoking status: Former     Types: Cigarettes     Passive exposure: Never    Smokeless tobacco: Never    Tobacco comments:     Has not smoked for about 20 years   Vaping Use    Vaping status: Never Used   Substance and Sexual Activity    Alcohol use: Never     Alcohol/week: 4.0 standard drinks of alcohol     Types: 4 Cans of beer per week    Drug use: Never   Other Topics Concern    Caffeine Concern No    Exercise No    Seat Belt Yes    Special Diet No    Stress Concern Yes    Weight Concern No     Social  Determinants of Health     Food Insecurity: No Food Insecurity (6/17/2024)    Food Insecurity     Food Insecurity: Never true   Transportation Needs: No Transportation Needs (6/17/2024)    Transportation Needs     Lack of Transportation: No   Housing Stability: Low Risk  (6/17/2024)    Housing Stability     Housing Instability: No     Family History   Problem Relation Age of Onset    Heart Attack Father     Diabetes Mother     Other (DM) Sister     Other (ALS) Brother     No Known Problems Maternal Grandmother     No Known Problems Maternal Grandfather     No Known Problems Paternal Grandmother     No Known Problems Paternal Grandfather     Other (smoker) Brother     Other (MS) Sister     No Known Problems Son     No Known Problems Daughter      Allergies   Allergies   Allergen Reactions    Kiwi Extract ITCHING     Inner ears itch       Home Meds  Current Outpatient Medications   Medication Instructions    acetaminophen (TYLENOL EXTRA STRENGTH) 500 mg, Oral, Every 6 hours PRN    Acidophilus/Pectin Oral Cap 1 capsule, Oral, Daily    aspirin 81 mg, Oral, Daily    atorvastatin (LIPITOR) 40 mg, Oral, Nightly    Cholecalciferol 125 MCG (5000 UT) Oral Tab 1 tablet, Oral, Three times weekly, Three times a week. MWF    cyanocobalamin (VITAMIN B12) 500 mcg, Oral, Daily    donepezil (ARICEPT) 10 mg, Oral, Nightly    finasteride (PROSCAR) 5 mg, Oral, Daily    flecainide (TAMBOCOR) 100 mg, Oral, 2 times daily    fluticasone-umeclidin-vilant (TRELEGY ELLIPTA) 100-62.5-25 MCG/ACT Inhalation Aerosol Powder, Breath Activated 1 puff, Inhalation, Daily    ipratropium 0.03 % Nasal Solution 2 sprays, Nasal, Every 12 Hours    metoprolol tartrate (LOPRESSOR) 25 mg, Oral, 2 times daily    midodrine (PROAMATINE) 5 mg, Oral, 3 times daily    Multiple Vitamins-Minerals (MULTI-VITAMIN/MINERALS) Oral Tab 1 tablet, Oral, Daily    rivaroxaban (XARELTO) 20 mg, Oral, Nightly    sertraline (ZOLOFT) 25 mg, Oral, Daily    tamsulosin (FLOMAX) 0.4 mg,  Oral, After dinner     Scheduled Meds:   famotidine  20 mg Oral BID    Or    famotidine  20 mg Intravenous BID    ceFAZolin  2 g Intravenous Q8H     Continuous Infusions:   sodium chloride 80 mL/hr at 06/17/24 1015     PRN Meds:  oxyCODONE **OR** oxyCODONE    HYDROmorphone **OR** HYDROmorphone    ondansetron    acetaminophen    hydrALAzine    labetalol     OBJECTIVE   VITAL SIGNS:   Temp:  [97.1 °F (36.2 °C)-98 °F (36.7 °C)] 98 °F (36.7 °C)  Pulse:  [59-63] 62  Resp:  [15-23] 15  BP: (140-194)/(66-92) 146/66  SpO2:  [95 %-99 %] 96 %    INTAKE/OUTPUT:  Intake/Output Summary (Last 24 hours) at 6/17/2024 1147  Last data filed at 6/17/2024 1100  Gross per 24 hour   Intake 1100 ml   Output 208 ml   Net 892 ml     PHYSICAL EXAM:  GENERAL APPEARANCE: Patient resting comfortably in bed, NAD  HEENT: Normocephalic, atraumatic.  Surgical site clean/dry/intact  LUNGS: Normal respiratory rate and effort   HEART: Regular rate and rhythm   ABDOMEN: Soft. No tenderness, guarding, or rebound.     NEUROLOGIC:    Mental Status:  A&O x 4, Follows simple commands, no obvious aphasia or dysarthria  Cranial nerves: PERRL.  Visual fields full.  EOMI.  Face symmetric with normal movement bilaterally.  Hearing grossly intact. Tongue midline with normal movements.   Motor: Drift:  Absent; nonfocal, full strength throughout,  Sensation: Intact to light touch bilaterally    LABORATORY DATA:  Last 24 hour labs were reviewed in detail.  Recent Labs   Lab 06/13/24  1052      K 4.4      CO2 27.0   GLU 82   BUN 25*   CREATSERUM 1.14     Recent Labs   Lab 06/13/24  1052   WBC 9.4   HGB 13.0   .0     Recent Labs   Lab 06/13/24  1052   ALT 36   AST 20     No results for input(s): \"MG\", \"PHOS\" in the last 168 hours.    Radiology:    No results found.  ASSESSMENT/PLAN   79 year old male with PMH of anxiety, afib, BPH, HTN, HLD, CAD, prior stroke who presents after VPS placement.      Neurological:  NPH   - MRI showing ventriculomegaly,  hx of gait instability, memory issues   - Now s/p VPS placement   - Shunt series and CT timing per nsg    - Pain control and close neuro monitoring overnight     Anxiety - Continue home med    Memory impairment - Continue home donepezil     Cardiovascular:  Accelerated HTN   - SBP goal < 150   - Resume home medications   - IV labetalol/hydralazine pushes prn      Paroxysmal A-fib - Rate wnl, hold AC until cleared by nsg     CAD - Holding asa per prior cards notes in setting of hematuria     HLD - Continue home med    Pulmonary: - Satting well on RA, encourage IS     Renal:   BPH   - Resume home med        - Monitor I&Os          - IVF while NPO    Gastrointestinal:  Nutrition:        - ADAT        - Bowel regimen: ordered prn    Infectious Disease: - Afebrile, continue to monitor      Heme/Onc - Hemodynamically stable, no active bleeding, continue to monitor     Endocrine: - Accuchecks q6 with SSI prn while NPO    Checklist   - Lines: PIVs   - DVT Prophylaxis: SCDs   - Diet: ADAT   - IVF: Dc   - Electrolytes: Replete per protocol   - Code Status: FULL    Dispo: CNICU    Greater than 40 minutes of critical care time (exclusive of billable procedures) was administered to manage and/or prevent neurologic instability. This involved direct patient intervention, complex decision making, and/or extensive discussions with the patient, family, and clinical staff.    Tesfaye Sotelo MD  Neurocritical Care  [facility]    Disclaimer: This record was dictated using Dragon software. There may be errors due to voice recognition problems that were not realized and corrected during the completion of the note.

## 2024-06-17 NOTE — ANESTHESIA PROCEDURE NOTES
Airway  Date/Time: 6/17/2024 7:56 AM  Urgency: elective    Airway not difficult    General Information and Staff    Patient location during procedure: OR  Anesthesiologist: Tommy Alas MD  Performed: anesthesiologist   Performed by: Tommy Alas MD  Authorized by: Tommy Alas MD      Indications and Patient Condition  Indications for airway management: anesthesia  Spontaneous Ventilation: absent  Sedation level: deep  Preoxygenated: yes  Patient position: sniffing  Mask difficulty assessment: 1 - vent by mask    Final Airway Details  Final airway type: endotracheal airway      Successful airway: ETT  Cuffed: yes   Successful intubation technique: direct laryngoscopy  Facilitating devices/methods: intubating stylet  Endotracheal tube insertion site: oral  Blade: Santos  Blade size: #3  ETT size (mm): 7.5    Cormack-Lehane Classification: grade I - full view of glottis  Placement verified by: capnometry   Cuff volume (mL): 7  Measured from: lips  ETT to lips (cm): 23  Number of attempts at approach: 1  Number of other approaches attempted: 0

## 2024-06-17 NOTE — OPERATIVE REPORT
[facility]  Operative Note    Clay Orellana Location: OR   [facility] [id number] MRN [id number]    1945 Age 79 year old   Admission Date 2024 Operation Date 2024   Attending Physician Brian Pringle MD Operating Physician Richard Arnett MD   PCP Sachin Meza MD          Patient Name: Clay Orellana    Preoperative Diagnosis: NPH (normal pressure hydrocephalus) (HCC) [G91.2]    Postoperative Diagnosis: Same as pre-op diagnosis.    Primary Surgeon: Richard Arentt MD    Co- Surgeon: Brian Willis MD       Assistant: none     Anesthesia: General    Anesthesiologist: Anesthesiologist.: Tommy Alas MD    Procedures:  RIGHT VENTRICULOPERITONEAL SHUNT INSERTION     Implants:  shunt     Specimen:   Specimens (From admission, onward)      None             Drains: none    Estimated Blood Loss: Blood Output: 8 mL (2024  9:02 AM)       Complications: none    Condition: stable    Indications for Surgery:   Clay Orellana is a 79 year old male who is diagnosed with NPH here for ventriculoperitoneal shunt placement.     Surgical Findings:   Ventriculoperitoneal shunt functional within the peritoneal cavity    Description of Procedure:   The patient was transported to the operating room and placed on the operating table in supine position.General endotracheal anesthesia was administered. Preoperative antibiotics were given. An orogastric tube was placed into the stomach and placed to suction. The head, chest and abdomen was prepped and draped in sterile fashion. A time-out was performed.    A stab incision was made in the left upper quadrant 2 cm below the costal margin in the mid clavicular line. A Veress needle was passed into the peritoneal cavity and pneumoperitoneum was established to a pressure of 15 mmHg. A 5 mm incision was made in the left mid abdomen away from all surgical scars. A 5 mm  optical port was placed through the incision with a 5 mm laparoscope visualizing the abdominal wall  layers during entry. A diagnostic laparoscopy was performed and this showed no iatrogenic injury from our entry and no obvious abnormalities within the abdomen and pelvis. A 5 mm trocar was placed in the right mid abdomen under direct vision.     The ventriculoperitoneal shunt was tunneled from the head to the right upper quadrant. This was tested ex vivo and was noted to be functional. The shunt was then brought into the abdomen thorough a right upper quadrant incision . The shunt was tested in vivo and had good function. The shunt was left in the right upper quadrant where it is able to drain freely. The abdomen was inspected again and no abnormalities were noted and hemostatic. The trocars were removed under direct vision. The pneumoperitoneum was evacuated.      All skin incisions were cleansed, irrigated, and injected with local anesthetic. The incision in the right upper quadrant was closed in two layers with the deep dermis approximated with 3-0 vicryl suture. The Skin incisions were reapproximated using 4-0 Monocryl.  Skin glue was used to seal all the incisions.  The patient was awakened from anesthesia and brought to the recovery room in good condition. The patient tolerated the procedure well without apparent complication. All sponge, needle, and instrument counts were correct at the end of the case.    Richard Arnett MD  6/17/2024  9:52 AM

## 2024-06-17 NOTE — ANESTHESIA PREPROCEDURE EVALUATION
PRE-OP EVALUATION    Patient Name: Clay Orellana    Admit Diagnosis: NPH (normal pressure hydrocephalus) (HCC) [G91.2]    Pre-op Diagnosis: NPH (normal pressure hydrocephalus) (HCC) [G91.2]    RIGHT VENTRICULOPERITONEAL SHUNT INSERTION    Anesthesia Procedure: RIGHT VENTRICULOPERITONEAL SHUNT INSERTION (Right)  DIAGNOSTIC LAPAROSCOPY-GENERAL    Surgeons and Role:  Panel 1:     * Brian Pringle MD - Primary  Panel 2:     * Richard Arnett MD - Primary    Pre-op vitals reviewed.  Temp: 97.6 °F (36.4 °C)  Pulse: 59  Resp: 16  BP: 140/69  SpO2: 97 %  Body mass index is 28.79 kg/m².    Current medications reviewed.  Hospital Medications:  • acetaminophen (Tylenol Extra Strength) tab 1,000 mg  1,000 mg Oral Once   • lactated ringers infusion   Intravenous Continuous   • ceFAZolin (Ancef) 2g in 10mL IV syringe premix  2 g Intravenous Once       Outpatient Medications:     Medications Prior to Admission   Medication Sig Dispense Refill Last Dose   • tamsulosin 0.4 MG Oral Cap Take 1 capsule (0.4 mg total) by mouth After dinner.   6/16/2024   • sertraline (ZOLOFT) 25 MG Oral Tab Take 1 tablet (25 mg total) by mouth daily. 30 tablet 1 6/16/2024   • atorvastatin 40 MG Oral Tab Take 1 tablet (40 mg total) by mouth nightly. 90 tablet 3 6/16/2024   • midodrine 5 MG Oral Tab Take 1 tablet (5 mg total) by mouth in the morning and 1 tablet (5 mg total) at noon and 1 tablet (5 mg total) in the evening. 90 tablet 0 6/3/2024   • metoprolol tartrate 25 MG Oral Tab Take 0.5 tablets (12.5 mg total) by mouth 2x Daily(Beta Blocker). 30 tablet 0 6/17/2024 at 0330   • rivaroxaban 20 MG Oral Tab Take 1 tablet (20 mg total) by mouth nightly.   6/13/2024   • donepezil 10 MG Oral Tab Take 1 tablet (10 mg total) by mouth nightly. 90 tablet 1 6/16/2024   • fluticasone-umeclidin-vilant (TRELEGY ELLIPTA) 100-62.5-25 MCG/ACT Inhalation Aerosol Powder, Breath Activated Inhale 1 puff into the lungs daily.   6/17/2024   • ipratropium 0.03 % Nasal  Solution 2 sprays by Nasal route Q12H.   6/17/2024   • flecainide 100 MG Oral Tab Take 1 tablet (100 mg total) by mouth 2 (two) times daily.   6/17/2024   • finasteride 5 MG Oral Tab TAKE ONE TABLET BY MOUTH ONE TIME DAILY 90 tablet 2 6/17/2024   • Cholecalciferol 125 MCG (5000 UT) Oral Tab Take 1 tablet (5,000 Units total) by mouth. Three times a week   6/16/2024   • cyanocobalamin 500 MCG Oral Tab Take 1 tablet (500 mcg total) by mouth daily.   6/16/2024   • Multiple Vitamins-Minerals (MULTI-VITAMIN/MINERALS) Oral Tab Take 1 tablet by mouth daily.   6/16/2024   • Acidophilus/Pectin Oral Cap Take 1 capsule by mouth daily.   6/16/2024   • acetaminophen 500 MG Oral Tab Take 1 tablet (500 mg total) by mouth every 6 (six) hours as needed.   Past Week   • aspirin 81 MG Oral Tab EC Take 1 tablet (81 mg total) by mouth daily. (Patient not taking: Reported on 6/11/2024)   More than a month       Allergies: Kiwi extract      Anesthesia Evaluation    Patient summary reviewed.    Anesthetic Complications  (-) history of anesthetic complications         GI/Hepatic/Renal    Negative GI/hepatic/renal ROS.                             Cardiovascular      ECG reviewed.  Exercise tolerance: poor     MET: <=4      (+) hypertension and well controlled  (+) hyperlipidemia        (+) pacemaker/AICD (Pacemaker Magnet per Electrtophysiology)       (+) dysrhythmias and atrial fibrillation                  Endo/Other                           (+) arthritis       Pulmonary        (+) COPD and mild  COPD not requiring home oxygen.                Neuro/Psych        (+) anxiety  (+) CVA and residual deficits  (+) TIA    (+) neuromuscular disease                 Past Surgical History:   Procedure Laterality Date   • Anesth,pacemaker insertion  08/2023   • Appendectomy     • Appendectomy     • Colonoscopy     • Colonoscopy N/A 05/03/2022    Procedure: COLONOSCOPY;  Surgeon: Sudarshan Gilbert MD;  Location:  ENDOSCOPY   • Colonoscopy     • Cyst  removal N/A 1990    Cyst removed behind back of neck   • Cyst removal Right 11/05/2020    Removed from right upper back - just below shoulder   • Hernia surgery  July 2022   • Skin surgery  2020   • Vasectomy  1980     Social History     Socioeconomic History   • Marital status:    Tobacco Use   • Smoking status: Former     Types: Cigarettes     Passive exposure: Never   • Smokeless tobacco: Never   • Tobacco comments:     Has not smoked for about 20 years   Vaping Use   • Vaping status: Never Used   Substance and Sexual Activity   • Alcohol use: Never     Alcohol/week: 4.0 standard drinks of alcohol     Types: 4 Cans of beer per week   • Drug use: Never   Other Topics Concern   • Caffeine Concern No   • Exercise No   • Seat Belt Yes   • Special Diet No   • Stress Concern Yes   • Weight Concern No     History   Drug Use Unknown     Available pre-op labs reviewed.  Lab Results   Component Value Date    WBC 9.4 06/13/2024    RBC 3.98 06/13/2024    HGB 13.0 06/13/2024    HCT 38.6 (L) 06/13/2024    MCV 97.0 06/13/2024    MCH 32.7 06/13/2024    MCHC 33.7 06/13/2024    RDW 12.1 06/13/2024    .0 06/13/2024     Lab Results   Component Value Date     06/13/2024    K 4.4 06/13/2024     06/13/2024    CO2 27.0 06/13/2024    BUN 25 (H) 06/13/2024    CREATSERUM 1.14 06/13/2024    GLU 82 06/13/2024    CA 8.9 06/13/2024     Lab Results   Component Value Date    INR 1.08 06/17/2024         Airway      Mallampati: II  Mouth opening: >3 FB  TM distance: > 6 cm  Neck ROM: full Cardiovascular    Cardiovascular exam normal.         Dental    Dentition appears grossly intact         Pulmonary    Pulmonary exam normal.                 Other findings        ASA: 3   Plan: general  NPO status verified and patient meets guidelines.  Patient has taken beta blockers in last 24 hours.        Plan/risks discussed with: patient, spouse and child/children            Present on Admission:  **None**

## 2024-06-17 NOTE — OPERATIVE REPORT
Operative Note    Patient Name: Clay Orellana    Preoperative Diagnosis: NPH (normal pressure hydrocephalus) ([facility]) [G91.2]    Postoperative Diagnosis: same    Primary Surgeon: MD Richard Jovel  Assistant: Teodora Castle PA-C, present of the case including opening, closing and retraction    Procedures: Placement of ventriculoperitoneal shunt with laparoscopic assistance for the abdomen    Surgical Findings: See dictation    Anesthesia: General    Complications: none    Implants: Medtronic catheter, strata valve set at 1.5    Specimen: None    Drains: None     condition:  stable    Estimated Blood Loss: 8 mL    Indication for Procedure: 79-year-old gentleman with normal pressure hydrocephalus.  Patient had a lumbar drain trial.  After discussion after trial patient wished to proceed with placement of  shunt.  Patient multiple abdominal surgeries in the past so general surgery was asked to open the belly.  Patient plan for surgery.    Procedure: Patient prepped identified and brought back to the OR 15.  Patient placed on the OR table.  Patient placed under general esthesia by anesthesia staff.  Patient position and all pressure points padded.  For abdominal portion see Dr. Arnett's dictation.  Patient was sterilely prepped and draped in usual fashion.  Time was a course of Marcaine with epinephrine was given as local to the head incision.  Incision was made and dissection down to periosteum.  A subgaleal pocket was then dissected free.  We then passed the catheter from the superior incision to a nick incision and neck.  Then we passed that down to the abdominal incision.  The strata valve was then attached to the catheter.  The high-speed drill was used to make a craniotomy defect.  The dura was coagulated.  The dura was incised in a cruciate fashion.  The catheter then was passed to 6 cm with CSF seen from the catheter.  The catheter was cut to length.  Catheter was attached to the valve.   This was attached with silk tie.  CSF was seen from the distal catheter.  The valve was pulled into position.  The catheter was tacked down.  The distal catheter was placed in the abdomen by general surgery.  Hemostasis was achieved.  The wound was then closed in layers.  Patient was awakened and taken recovery.    Dictated but not proofread

## 2024-06-17 NOTE — PLAN OF CARE
Received patient from PACU at 1100 s/p R  shunt placement with Dr. Pringle. Patient awake, alert and oriented x4 with complaints of minimal head incision pain. VSS; SBP goal <150mmHg. Medications administered per MAR. Neurochecks performed per order frequency; see EMR for documentation. Patient tolerating food without issue. Ambulating to chair with assistance of walker. Plan for CT head and shunt x-ray imaging tomorrow morning.     Patient and family aware of plan of care and endorses understanding. All questions answered.

## 2024-06-17 NOTE — H&P
[facility]  Report of  Surgical Consultation with History and Physical Exam    Clay Orellana Patient Status:  Inpatient    1945 MRN [id number]   Location [facility] PERIOPERATIVE SERVICE Attending Brian Pringle MD   Hosp Day # 0 PCP Sachin Meza MD     Referring Provider:   Brian Pringle MD        Reason for Surgical Consultation:  Normal Pressure Hydrocephalus    History of Present Illness:  Clay Orellana is a 79 year old male with history of prior appendectomy and laparoscopic bilateral inguinal hernia repairs in the past presents for ventriculoperitoneal shunt placement with dr. Willis.     History:  Past Medical History:    Acute nausea with nonbilious vomiting    Anxiety    Arrhythmia    Arthritis    Bilateral inguinal hernia without obstruction or gangrene    BPH (benign prostatic hyperplasia)    Calculus of kidney    Essential hypertension    High cholesterol    Muscle weakness    Stroke (HCC)    TIA    Visual impairment     Past Surgical History:   Procedure Laterality Date    Anesth,pacemaker insertion  2023    Appendectomy      Appendectomy      Colonoscopy      Colonoscopy N/A 2022    Procedure: COLONOSCOPY;  Surgeon: Sudarshan Gilbert MD;  Location:  ENDOSCOPY    Colonoscopy      Cyst removal N/A     Cyst removed behind back of neck    Cyst removal Right 2020    Removed from right upper back - just below shoulder    Hernia surgery  2022    Skin surgery      Vasectomy       Family History   Problem Relation Age of Onset    Heart Attack Father     Diabetes Mother     Other (DM) Sister     Other (ALS) Brother     No Known Problems Maternal Grandmother     No Known Problems Maternal Grandfather     No Known Problems Paternal Grandmother     No Known Problems Paternal Grandfather     Other (smoker) Brother     Other (MS) Sister     No Known Problems Son     No Known Problems Daughter       reports that he has quit smoking. His smoking use included  cigarettes. He has never been exposed to tobacco smoke. He has never used smokeless tobacco. He reports that he does not drink alcohol and does not use drugs.    Allergies:  Allergies   Allergen Reactions    Kiwi Extract ITCHING     Inner ears itch       Medications:    Current Facility-Administered Medications:     acetaminophen (Tylenol Extra Strength) tab 1,000 mg, 1,000 mg, Oral, Once    lactated ringers infusion, , Intravenous, Continuous    ceFAZolin (Ancef) 2g in 10mL IV syringe premix, 2 g, Intravenous, Once    Review of Systems:  Pertinent items are noted in HPI.  The review of systems was negative other than the above listed history of present illness and past medical history including the HEENT, Heart, Lungs, GI, , Neuro, Musculoskeletal, Hematologic, Endocrine and Psych.     Physical Exam:  Blood pressure 140/69, pulse 59, temperature 97.6 °F (36.4 °C), temperature source Temporal, resp. rate 16, height 71\", weight 206 lb 6.4 oz (93.6 kg), SpO2 97%.    General: Alert, orientated x3.  Cooperative.  Not in apparent distress.    HEENT: Exam is unremarkable.  Without scleral icterus.  Mucous membranes are moist. EOM are WNL.    Neck: No tenderness to palpitation.  Full range of motion to flexion and extension, lateral rotation and lateral flexion of cervical spine.  No JVD. Supple.     Lungs: Bilateral chest rise. Good excursion of the diaphragms. No secondary use of accessory respiratory musculature.    Cardiac: Regular rate and rhythm. No murmur.    Abdomen:  soft, non tender, non distended,no rebound tenderness or guarding    Extremities:  No lower extremity edema noted.  Without clubbing or cyanosis.      Skin: Normal texture and turgor.      Laboratory Data and Relevant X-rays:  Recent Labs   Lab 06/13/24  1052   RBC 3.98   HGB 13.0   HCT 38.6*   MCV 97.0   MCH 32.7   MCHC 33.7   RDW 12.1   NEPRELIM 7.33   WBC 9.4   .0       Recent Labs   Lab 06/13/24  1052   GLU 82   BUN 25*   CREATSERUM 1.14    CA 8.9   ALB 3.2*      K 4.4      CO2 27.0   ALKPHO 66   AST 20   ALT 36   BILT 0.3   TP 6.4         Recent Labs   Lab 06/13/24  1052 06/17/24  0644   PTP 21.1* 14.0   INR 1.81* 1.08   PTT 39.1* 32.0         Impression/Plan:  Patient Active Problem List   Diagnosis    Hypertension    Primary osteoarthritis involving multiple joints    Anxiety    Bilateral inguinal hernia without obstruction or gangrene    Arteriovenous malformation of colon, ascending colon, May 3, 2022    Benign prostatic hyperplasia    Post-operative state    TIA (transient ischemic attack)    Hydrocephalus ex vacuo (HCC)    Fall    Dysarthria    PAF (paroxysmal atrial fibrillation) ([facility])    Intermittent constipation    Other hyperlipidemia    Chronic obstructive pulmonary disease, unspecified (HCC)    Mood disorder (HCC)    Weakness generalized    NPH (normal pressure hydrocephalus) ([facility])    Fall, initial encounter    Chronic right-sided low back pain without sciatica    Acute nausea with nonbilious vomiting    Radiculopathy    Acute pain of right shoulder    Orthostatic hypotension    Abnormal chest xray    Degenerative lumbar spinal stenosis    Generalized anxiety disorder       79 year old male with NPH here for  shunt placement. I will be performing the abdominal placement of the shunt in lieu of dr. Willis. The risks and benefits of surgery were explained in detail including but not limited to bleeding infection and damage to surrounding organs or structures. All questions were answered.     Richard Arnett MD  6/17/2024  7:19 AM

## 2024-06-17 NOTE — SLP NOTE
SLP order received to evaluate and treat s/p  shunt placement. Patient known to this service for prior cognitive-linguistic assessment during lumbar drain trial. Evaluation at the time revealed cognitive functioning WFL per [facility]. Today, patient received alert in bed with family present at bedside. He reported feeling he does not wish to receive SLP services at this time and is functioning at his cognitive baseline. Education provided re: availability of SLP services should he change his mind. Patient also able to seek OP SLP services should he wish following discharge. SLP will sign off.

## 2024-06-18 ENCOUNTER — APPOINTMENT (OUTPATIENT)
Dept: CT IMAGING | Facility: HOSPITAL | Age: 79
DRG: 032 | End: 2024-06-18
Attending: PHYSICIAN ASSISTANT

## 2024-06-18 ENCOUNTER — APPOINTMENT (OUTPATIENT)
Dept: GENERAL RADIOLOGY | Facility: HOSPITAL | Age: 79
DRG: 032 | End: 2024-06-18
Attending: PHYSICIAN ASSISTANT

## 2024-06-18 VITALS
DIASTOLIC BLOOD PRESSURE: 72 MMHG | RESPIRATION RATE: 12 BRPM | TEMPERATURE: 98 F | HEIGHT: 71 IN | BODY MASS INDEX: 28.73 KG/M2 | HEART RATE: 60 BPM | OXYGEN SATURATION: 96 % | WEIGHT: 205.25 LBS | SYSTOLIC BLOOD PRESSURE: 144 MMHG

## 2024-06-18 LAB
ANION GAP SERPL CALC-SCNC: 7 MMOL/L (ref 0–18)
BUN BLD-MCNC: 22 MG/DL (ref 9–23)
CALCIUM BLD-MCNC: 8.2 MG/DL (ref 8.5–10.1)
CHLORIDE SERPL-SCNC: 109 MMOL/L (ref 98–112)
CO2 SERPL-SCNC: 21 MMOL/L (ref 21–32)
CREAT BLD-MCNC: 1.03 MG/DL
EGFRCR SERPLBLD CKD-EPI 2021: 73 ML/MIN/1.73M2 (ref 60–?)
ERYTHROCYTE [DISTWIDTH] IN BLOOD BY AUTOMATED COUNT: 12.3 %
GLUCOSE BLD-MCNC: 140 MG/DL (ref 70–99)
HCT VFR BLD AUTO: 35.2 %
HGB BLD-MCNC: 11.7 G/DL
MCH RBC QN AUTO: 32.2 PG (ref 26–34)
MCHC RBC AUTO-ENTMCNC: 33.2 G/DL (ref 31–37)
MCV RBC AUTO: 97 FL
OSMOLALITY SERPL CALC.SUM OF ELEC: 290 MOSM/KG (ref 275–295)
PLATELET # BLD AUTO: 200 10(3)UL (ref 150–450)
POTASSIUM SERPL-SCNC: 4.3 MMOL/L (ref 3.5–5.1)
RBC # BLD AUTO: 3.63 X10(6)UL
SODIUM SERPL-SCNC: 137 MMOL/L (ref 136–145)
WBC # BLD AUTO: 16.1 X10(3) UL (ref 4–11)

## 2024-06-18 PROCEDURE — 74018 RADEX ABDOMEN 1 VIEW: CPT | Performed by: PHYSICIAN ASSISTANT

## 2024-06-18 PROCEDURE — 70450 CT HEAD/BRAIN W/O DYE: CPT | Performed by: PHYSICIAN ASSISTANT

## 2024-06-18 PROCEDURE — 99232 SBSQ HOSP IP/OBS MODERATE 35: CPT | Performed by: STUDENT IN AN ORGANIZED HEALTH CARE EDUCATION/TRAINING PROGRAM

## 2024-06-18 PROCEDURE — 70360 X-RAY EXAM OF NECK: CPT | Performed by: PHYSICIAN ASSISTANT

## 2024-06-18 PROCEDURE — 70250 X-RAY EXAM OF SKULL: CPT | Performed by: PHYSICIAN ASSISTANT

## 2024-06-18 PROCEDURE — 71045 X-RAY EXAM CHEST 1 VIEW: CPT | Performed by: PHYSICIAN ASSISTANT

## 2024-06-18 PROCEDURE — 99291 CRITICAL CARE FIRST HOUR: CPT | Performed by: OTHER

## 2024-06-18 NOTE — DISCHARGE SUMMARY
[facility]  Discharge Summary    Clay Orellana Patient Status:  Inpatient    1945 MRN [id number]   Location [facility] 6NE-A Attending No att. providers found   Hosp Day # 1 PCP Sachin Meza MD     Date of Admission: 2024    Date of Discharge: 2024  2:31 PM    Admitting Diagnosis: NPH (normal pressure hydrocephalus) (HCC) [G91.2]  NPH (normal pressure hydrocephalus) (HCC)    Discharge Diagnosis:   Patient Active Problem List   Diagnosis    Hypertension    Primary osteoarthritis involving multiple joints    Anxiety    Bilateral inguinal hernia without obstruction or gangrene    Arteriovenous malformation of colon, ascending colon, May 3, 2022    Benign prostatic hyperplasia    Post-operative state    TIA (transient ischemic attack)    Hydrocephalus ex vacuo (HCC)    Fall    Dysarthria    Paroxysmal atrial fibrillation (HCC)    Intermittent constipation    Hyperlipidemia    Chronic obstructive pulmonary disease, unspecified (HCC)    Mood disorder (HCC)    Weakness generalized    NPH (normal pressure hydrocephalus) (HCC)    Fall, initial encounter    Chronic right-sided low back pain without sciatica    Acute nausea with nonbilious vomiting    Radiculopathy    Acute pain of right shoulder    Orthostatic hypotension    Abnormal chest xray    Degenerative lumbar spinal stenosis    Generalized anxiety disorder    Coronary artery disease involving native coronary artery of native heart without angina pectoris    Memory impairment    Dementia (HCC)    Cerebrovascular disease    NPH (normal pressure hydrocephalus) ([facility]) [G91.2]    Reason for Admission: For the below surgical procedure with Dr. Pringle    Procedures: RIGHT VENTRICULOPERITONEAL SHUNT INSERTION     Hospital Course: Patient arrived for the above surgical procedure.  Patient underwent surgical intervention and anesthesia without apparent complication.  Patient was transferred to the critical care unit.  Patient without new neurologic  complaints overnight.  Please see Dr. Pringle's progress note from today for further details.  Plan for outpatient follow-up in the neurosurgical outpatient setting as scheduled.    Complications: No apparent complications    Discharge Condition: Good    Disposition: Home or Self Care    Discharge Medications:   Discharge Medication List as of 6/18/2024  2:40 PM        CONTINUE these medications which have NOT CHANGED    Details   metoprolol tartrate 25 MG Oral Tab Take 1 tablet (25 mg total) by mouth 2 (two) times daily., Historical      tamsulosin 0.4 MG Oral Cap Take 1 capsule (0.4 mg total) by mouth After dinner., Historical      sertraline (ZOLOFT) 25 MG Oral Tab Take 1 tablet (25 mg total) by mouth daily., Normal, Disp-30 tablet, R-1      atorvastatin 40 MG Oral Tab Take 1 tablet (40 mg total) by mouth nightly., Normal, Disp-90 tablet, R-3      midodrine 5 MG Oral Tab Take 1 tablet (5 mg total) by mouth in the morning and 1 tablet (5 mg total) at noon and 1 tablet (5 mg total) in the evening., Print Script, Disp-90 tablet, R-0      rivaroxaban 20 MG Oral Tab Take 1 tablet (20 mg total) by mouth nightly., Historical      donepezil 10 MG Oral Tab Take 1 tablet (10 mg total) by mouth nightly., Normal, Disp-90 tablet, R-1      fluticasone-umeclidin-vilant (TRELEGY ELLIPTA) 100-62.5-25 MCG/ACT Inhalation Aerosol Powder, Breath Activated Inhale 1 puff into the lungs daily., Historical      ipratropium 0.03 % Nasal Solution 2 sprays by Nasal route Q12H., Historical      flecainide 100 MG Oral Tab Take 1 tablet (100 mg total) by mouth 2 (two) times daily., Historical      finasteride 5 MG Oral Tab TAKE ONE TABLET BY MOUTH ONE TIME DAILY, Normal, Disp-90 tablet, R-2      Cholecalciferol 125 MCG (5000 UT) Oral Tab Take 1 tablet (5,000 Units total) by mouth 3 (three) times a week. Three times a week. MWF, Historical      cyanocobalamin 500 MCG Oral Tab Take 1 tablet (500 mcg total) by mouth daily., Historical       Multiple Vitamins-Minerals (MULTI-VITAMIN/MINERALS) Oral Tab Take 1 tablet by mouth daily., Historical      Acidophilus/Pectin Oral Cap Take 1 capsule by mouth daily., Historical      acetaminophen 500 MG Oral Tab Take 1 tablet (500 mg total) by mouth every 6 (six) hours as needed., Historical      aspirin 81 MG Oral Tab EC Take 1 tablet (81 mg total) by mouth daily., Historical             Follow up Visits:   Future Appointments   Date Time Provider Department Center   7/5/2024 12:45 PM Teodora Castle PA-C [facility] [place]   7/18/2024 11:00 AM Vinny Preciado MD [facility] [place]   8/1/2024  2:15 PM Teodora Castle PA-C [facility] [place]       Follow up Labs: None at this time from a neurosurgical standpoint    Follow up Imaging: None at this time from a neurosurgical standpoint    Patient Instructions: Please see patient instruction section for further details    Teodora Castle M.S., LESVIA  [address], [place] [address]  [phone]  6/18/2024 2:43 PM    Dragon speech recognition software was used to prepare this note. If a word or phrase is confusing, it is likely due to a failure of recognition. Please contact me with any questions or clarifications.    Is this a shared or split note between Advanced Practice Provider and Physician? Yes

## 2024-06-18 NOTE — PROGRESS NOTES
[facility]  Neurosurgery Progress Note  2024    Clay Orellana Patient Status:  Inpatient    1945 MRN [id number]   Location [facility] 6NE-A Attending Brian Pringle MD   Hosp Day # 1 PCP Sachin Meza MD     SUBJECTIVE:  Clay Orellana is a(n) 79 year old male status post  shunt  He is doing well  No issues        OBJECTIVE / PHYSICAL EXAM:  Vital Signs:  Blood pressure 147/74, pulse 73, temperature 98.2 °F (36.8 °C), temperature source Temporal, resp. rate 14, height 71\", weight 205 lb 4 oz (93.1 kg), SpO2 94%.  Awake alert, oriented x 3  Follows commands x 4      Lab Results (last 24 hours):  Recent Results (from the past 24 hour(s))   Emergency MRSA Screen by PCR    Collection Time: 24 11:45 AM   Result Value Ref Range    MRSA Screen By PCR Negative Negative   CBC, Platelet; No Differential    Collection Time: 24  4:42 AM   Result Value Ref Range    WBC 16.1 (H) 4.0 - 11.0 x10(3) uL    RBC 3.63 (L) 3.80 - 5.80 x10(6)uL    HGB 11.7 (L) 13.0 - 17.5 g/dL    HCT 35.2 (L) 39.0 - 53.0 %    .0 150.0 - 450.0 10(3)uL    MCV 97.0 80.0 - 100.0 fL    MCH 32.2 26.0 - 34.0 pg    MCHC 33.2 31.0 - 37.0 g/dL    RDW 12.3 %   Basic Metabolic Panel (8)    Collection Time: 24  4:42 AM   Result Value Ref Range    Glucose 140 (H) 70 - 99 mg/dL    Sodium 137 136 - 145 mmol/L    Potassium 4.3 3.5 - 5.1 mmol/L    Chloride 109 98 - 112 mmol/L    CO2 21.0 21.0 - 32.0 mmol/L    Anion Gap 7 0 - 18 mmol/L    BUN 22 9 - 23 mg/dL    Creatinine 1.03 0.70 - 1.30 mg/dL    Calcium, Total 8.2 (L) 8.5 - 10.1 mg/dL    Calculated Osmolality 290 275 - 295 mOsm/kg    eGFR-Cr 73 >=60 mL/min/1.73m2       Assessment/Plan:  79-year-old gentleman status post  shunt  He is doing well  Head CT looks good  Await shunt series  PT/OT  Dispo planning      Brian Pringle MD   [facility]  2024  8:37 AM  Dictated but not proofread

## 2024-06-18 NOTE — CM/SW NOTE
Patient does not want [facility]---prefers [facility]--current with RN, PT, OT and aide--clinicals faxed over    [facility]  Phone  [phone]  Fax  [phone]

## 2024-06-18 NOTE — PROGRESS NOTES
Assumed care of pt at 1930. Pt A/Ox4, slightly forgetful at times, q1hr neuro checks. Pt responds appropriately, follows commands. NSR on monitor, PRN meds to maintain SBP goal  <150. Room air. Pt has periods of incontinence, briefed.     2130 - attempting to give PO meds. Pt reports frequent bouts of nausea, has hiccups. PRN zofran given at 2020. Will attempt again after PRN compazine due at 2153.  PO meds successfully given.    0200 - elevated BP, pt complaining of nausea, pt dry heaving. Given PRN antiemetic meds then hydralazine to treat elevated BP. Repeat BP 0230 127/68.    POC: q1hr neuro checks, head CT for shunt placement 0500, control nausea and pain with PRN medications,

## 2024-06-18 NOTE — OCCUPATIONAL THERAPY NOTE
OCCUPATIONAL THERAPY EVALUATION - INPATIENT     Room Number: 6612/6612-A  Evaluation Date: 6/18/2024  Type of Evaluation: Initial  Presenting Problem: 6/17 RIGHT  SHUNT INSERTION    Physician Order: IP Consult to Occupational Therapy  Reason for Therapy: ADL/IADL Dysfunction and Discharge Planning    OCCUPATIONAL THERAPY ASSESSMENT   Patient is currently functioning near baseline with toileting, lower body dressing, bed mobility, and transfers. Prior to admission, patient's baseline is Modified independent, assist with showering.  Patient is requiring stand-by assist and contact guard assist as a result of the following impairments: impaired standing balance. Occupational Therapy will continue to follow for duration of hospitalization.    Patient will benefit from continued skilled OT Services at discharge to promote functional independence and safety with additional support and return home with home health OT      History Related to Current Admission: Patient is a 79 year old male admitted on 6/17/2024 with Presenting Problem: 6/17 RIGHT  SHUNT INSERTION. Co-Morbidities : HTN, TIA, mood disorder, radiculopathy, dementia, anxiety    Recent admission:   5/13 to 5/26/24 at   for NPH  5/1 to 5/13/24 for  initial concern for NPH; s/p lumbar drain placement for trial-> acute rehab      WEIGHT BEARING RESTRICTION  Weight Bearing Restriction: None                Recommendations for nursing staff:   Transfers: cga  Toileting location: toilet    EVALUATION SESSION:  Patient Start of Session: supine  FUNCTIONAL TRANSFER ASSESSMENT  Sit to Stand: Edge of Bed  Edge of Bed: Contact Guard Assist    BED MOBILITY  Supine to Sit : Contact Guard Assist    O2 SATURATIONS  Oxygen Therapy  SPO2% on Room Air at Rest: 97    COGNITION  Overall Cognitive Status:  WFL - within functional limits    Upper Extremity   ROM: within functional limits   Strength: within functional limits   Coordination  Gross motor:   Fine motor:  intact  Sensation: Light touch:  intact    EDUCATION PROVIDED  Patient: Plan of Care; Role of Occupational Therapy; Functional Transfer Techniques; Posture/Positioning; Compensatory ADL Techniques; Fall Prevention  Patient's Response to Education: Verbalized Understanding    Equipment used: rw     Therapist comments: pleasant, R ring finger trigger finger, per pt. Fine motor intact.  CGA to stand to ambulate with RW and PT.  Educated the pt about hand placement, energy conservation principles. Verbalized understanding. SBP within the parameter.      Patient End of Session: Up in chair;Needs met;Call light within reach;RN aware of session/findings;All patient questions and concerns addressed;Alarm set    OCCUPATIONAL PROFILE    HOME SITUATION  Type of Home: Independent living facility  Home Layout: One level  Lives With: Spouse    Toilet and Equipment: Comfort height toilet  Shower/Tub and Equipment: Walk-in shower;Grab bar;Shower chair  Other Equipment:  (rollator,rw)    Occupation/Status: retired     Drives: No  Patient Regularly Uses: Glasses    Prior Level of Function: lives at [facility].  Modified independent with rw, wife pushes him while he is seated on the rollator seat when going to dining room. Has assist with showering.  Home OT and PT current.  Finished  inpatient stay 24.        PAIN ASSESSMENT  Ratin          OBJECTIVE  Precautions: Bed/chair alarm (SBP <150)  Fall Risk: High fall risk      ASSESSMENTS    AM-PAC ‘6-Clicks’ Inpatient Daily Activity Short Form  -   Putting on and taking off regular lower body clothing?: A Little  -   Bathing (including washing, rinsing, drying)?: A Little  -   Toileting, which includes using toilet, bedpan or urinal? : A Little  -   Putting on and taking off regular upper body clothing?: A Little  -   Taking care of personal grooming such as brushing teeth?: None  -   Eating meals?: None    AM-PAC Score:  Score: 20  Approx Degree of  Impairment: 38.32%  Standardized Score (AM-PAC Scale): 42.03    ADDITIONAL TESTS     NEUROLOGICAL FINDINGS  Coordination - Finger to Nose: Symmetrical  Coordination - Rapid Alternating Movement: Symmetrical  Coordination - Finger Opposition: Symmetrical     COGNITION ASSESSMENTS       PLAN  OT Treatment Plan: Energy conservation/work simplification techniques;Balance activities;ADL training;Functional transfer training;Patient/Family training;Patient/Family education;Equipment eval/education;Compensatory technique education  Rehab Potential : Good  Frequency: 3x/week  Number of Visits to Meet Established Goals: 3    ADL Goals   Patient will perform upper body dressing:  with supervision  Patient will perform lower body dressing:  with supervision  Patient will perform toileting: with supervision    Functional Transfer Goals  Patient will transfer to toilet:  with supervision    UE Exercise Program Goal  Patient will be independent with bilateral AROM HEP (home exercise program).    Additional Goals  Pt will incorporate 2 energy conservation techniques into ADL.    Patient Evaluation Complexity Level:   Occupational Profile/Medical History MODERATE - Expanded review of history including review of medical or therapy record   Specific performance deficits impacting engagement in ADL/IADL LOW  1 - 3 performance deficits    Client Assessment/Performance Deficits MODERATE - Comorbidities and min to mod modifications of tasks    Clinical Decision Making LOW - Analysis of occupational profile, problem-focused assessments, limited treatment options    Overall Complexity LOW     OT Session Time: 23 minutes  Self-Care Home Management: 8 minutes  Therapeutic Activity: 5 minutes

## 2024-06-18 NOTE — PROGRESS NOTES
[facility]   NEUROCRITICAL CARE   PROGRESS NOTE    Admission date: 6/17/2024  Reason for Consult: Post op VPS  Chief Complaint:  NPH  ________________________________________________________________    SUBJECTIVE     24 Hour Significant Events: Blood pressure elevated overnight, CT head demonstrating expected postoperative changes.  Shunt series pending    OBJECTIVE   VITAL SIGNS:   Temp:  [97.1 °F (36.2 °C)-98.4 °F (36.9 °C)] 97.9 °F (36.6 °C)  Pulse:  [60-79] 69  Resp:  [13-23] 17  BP: (100-194)/(51-92) 129/55  SpO2:  [88 %-99 %] 96 %    INTAKE/OUTPUT:  Intake/Output Summary (Last 24 hours) at 6/18/2024 0904  Last data filed at 6/18/2024 0830  Gross per 24 hour   Intake 1340 ml   Output 1450 ml   Net -110 ml     PHYSICAL EXAM:   GENERAL APPEARANCE: Patient resting comfortably in bed, NAD  HEENT: Normocephalic, atraumatic.  Surgical site clean/dry/intact  LUNGS: Normal respiratory rate and effort   HEART: Regular rate and rhythm   ABDOMEN: Soft. No tenderness, guarding, or rebound.     NEUROLOGIC:    Mental Status:  A&O x 4, Follows simple commands, no obvious aphasia or dysarthria  Cranial nerves: PERRL.  Visual fields full.  EOMI.  Face symmetric with normal movement bilaterally.  Hearing grossly intact. Tongue midline with normal movements.   Motor: Drift:  Absent; nonfocal, full strength throughout,  Sensation: Intact to light touch bilaterally     LABORATORY DATA:  Last 24 hour labs were reviewed in detail.  Recent Labs   Lab 06/13/24  1052 06/18/24  0442    137   K 4.4 4.3    109   CO2 27.0 21.0   GLU 82 140*   BUN 25* 22   CREATSERUM 1.14 1.03     Recent Labs   Lab 06/13/24  1052 06/18/24  0442   WBC 9.4 16.1*   HGB 13.0 11.7*   .0 200.0     Recent Labs   Lab 06/13/24  1052   ALT 36   AST 20     No results for input(s): \"MG\", \"PHOS\" in the last 168 hours.    Scheduled Meds:   famotidine  20 mg Oral BID    Or    famotidine  20 mg Intravenous BID    metoprolol tartrate   25 mg Oral BID    donepezil  10 mg Oral Nightly    atorvastatin  40 mg Oral Nightly    finasteride  5 mg Oral Daily    flecainide  100 mg Oral BID    sertraline  25 mg Oral Daily    tamsulosin  0.4 mg Oral Nightly     Continuous Infusions:  PRN Meds:  oxyCODONE **OR** oxyCODONE    HYDROmorphone **OR** HYDROmorphone    ondansetron    acetaminophen    hydrALAzine    labetalol    prochlorperazine    metoclopramide    Radiology:    CT BRAIN OR HEAD (23820)    Result Date: 6/18/2024  CONCLUSION:  1. Status post placement of a right frontal approach ventriculostomy shunt tube.  The tip is in good position in the inferior medial right lateral ventricle.  There is slight decrease in the size of the ventricular system.  There is some pneumocephalus and air within the lateral ventricles anteriorly.  No evidence of a complicating process.     LOCATION:  [place] [id number]   Dictated by (CST): German Hernández MD on 6/18/2024 at 7:38 AM     Finalized by (CST): German Hernández MD on 6/18/2024 at 7:45 AM      ASSESSMENT/PLAN   79 year old male with PMH of anxiety, afib, BPH, HTN, HLD, CAD, prior stroke who presents after VPS placement.       Neurological:  NPH         - MRI showing ventriculomegaly, hx of gait instability, memory issues         - Now s/p VPS placement         - Postop CT head with expected changes   - Shunt series pending         - PT/OT evals         - Call if further questions or concerns,     Anxiety - Continue home meds     Memory impairment - Continue home donepezil      Cardiovascular:  Accelerated HTN         - SBP goal < 150         - Continue home BB, further adjustments per IM         - IV labetalol/hydralazine pushes prn            Paroxysmal A-fib - Rate wnl, hold AC until cleared by nsg      CAD - Holding asa per prior cards notes in setting of hematuria      HLD - Continue home med     Pulmonary: - Satting well on RA, encourage IS      Renal:   BPH        - Continue home meds        - Monitor I&Os        Gastrointestinal:  Nutrition:        - ADAT        - Bowel regimen: ordered prn     Infectious Disease:   Leukocytosis - Likely reactive, afebrile, continue to monitor      Heme/Onc - Hemodynamically stable, no active bleeding, continue to monitor      Endocrine:   - Accuchecks q6 with SSI prn while NPO     Checklist         - Lines: PIVs         - DVT Prophylaxis: SCDs         - Diet: ADAT         - IVF: Dc         - Electrolytes: Replete per protocol         - Code Status: FULL     Dispo: CNICU pending nsg clearance      Greater than 35 minutes of critical care time (exclusive of billable procedures) was administered to manage and/or prevent neurologic instability. This involved direct patient intervention, complex decision making, and/or extensive discussions with the patient, family, and clinical staff.     Tesfaye Sotelo MD  Neurocritical Care  [facility]     Disclaimer: This record was dictated using Dragon software. There may be errors due to voice recognition problems that were not realized and corrected during the completion of the note.

## 2024-06-18 NOTE — CM/SW NOTE
Patient resides @ [facility] (independent living).  On site [facility] agency is [facility]--phone  [phone]  fax  [phone]--patient had used this agency in the past and wishes to use @ discharge.  Spoke with Sherlyn Multani--to fax order, f2f and clinicals

## 2024-06-18 NOTE — PHYSICAL THERAPY NOTE
PHYSICAL THERAPY EVALUATION - INPATIENT     Room Number: 6612/6612-A  Evaluation Date: 6/18/2024  Type of Evaluation: Initial  Physician Order: PT Eval and Treat    Presenting Problem: Post op  shunt insertion 6/17  Co-Morbidities : HTN, TIA, mood disorder, radiculopathy, dementia, anxiety  Reason for Therapy: Mobility Dysfunction and Discharge Planning    PHYSICAL THERAPY ASSESSMENT   Patient is currently functioning near baseline with bed mobility, transfers, and gait.  Prior to admission, patient's baseline is supervision to CGA a for gait, transfers and bed mobility.  Patient is requiring supervision, contact guard assist, and minimal assist as a result of the following impairments: decreased functional strength, impaired dynamic standing balance, and decreased muscular endurance.  Physical Therapy will continue to follow for duration of hospitalization.    Patient will benefit from continued skilled PT Services at discharge to promote functional independence in home.  Anticipate patient will return home with home health PT.    PLAN  PT Treatment Plan: Bed mobility;Patient education;Family education;Gait training;Strengthening;Transfer training;Balance training  Rehab Potential : Good  Frequency (Obs): 3-5x/week  Number of Visits to Meet Established Goals: 5      CURRENT GOALS    Goal #1 Patient is able to demonstrate supine - sit EOB @ level: modified independent     Goal #2 Patient is able to demonstrate transfers EOB to/from BSC at assistance level: supervision     Goal #3 Patient is able to ambulate 150 feet with assist device: walker - rolling at assistance level: supervision     Goal #4    Goal #5    Goal #6    Goal Comments: Goals established on 6/18/2024      PHYSICAL THERAPY MEDICAL/SOCIAL HISTORY  History related to current admission: Patient is a 79 year old male admitted on 6/17/2024 from home for planned  shunt.  Pt diagnosed with NPH.  Recent hospitalizations:  5/1-5/13 Fall, weakness,  [facility]    HOME SITUATION  Type of Home: Independent living facility   Home Layout: One level  Stairs to Enter : 0     Stairs to Bedroom: 0       Lives With: Spouse  Drives: No  Patient Owned Equipment: Rolling walker;Rollator  Patient Regularly Uses: Glasses    Prior Level of [place]: Pt is typically supervision assist w/ adl's, gait w/ rolling walker w/I the apartment and rollator for longer distances. Wife provides supervision and will push pt down to dining room in his rollator if he cannot make it.  Pt reports that's more often lately.  Pt has a bath aide to assist w/ showering.      SUBJECTIVE  \"My walking feels better already.\"    OBJECTIVE  Precautions: Bed/chair alarm  Fall Risk: High fall risk    WEIGHT BEARING RESTRICTION  Weight Bearing Restriction: None                PAIN ASSESSMENT  Rating: Unable to rate  Location: Abdominal surgical site \"tender\" with transition into standing  Management Techniques: Activity promotion;Repositioning    COGNITION  Overall Cognitive Status:  WFL - within functional limits  Pt appears to have good safety awareness.    RANGE OF MOTION AND STRENGTH ASSESSMENT  Upper extremity ROM and strength -see OT evaluation    Lower extremity ROM is within functional limits     Lower extremity strength is within functional limits - 4-/5 throughout      BALANCE  Static Sitting: Fair  Dynamic Sitting: Fair -  Static Standing: Poor +  Dynamic Standing: Poor +    ADDITIONAL TESTS                                    ACTIVITY TOLERANCE  Pulse: 63  Heart Rate Source: Monitor  Resp: 21  BP: 125/56  BP Location: Right arm  BP Method: Automatic  Patient Position: Sitting    O2 WALK  Oxygen Therapy  SPO2% on Room Air at Rest: 97    NEUROLOGICAL FINDINGS  Neurological Findings: Coordination - Rapid Alternating Movement;Coordination - Heel to Shin     Coordination - Heel to Shin: Symmetrical  Coordination - Rapid Alternating Movement: Symmetrical            AM-PAC  '6-Clicks' INPATIENT SHORT FORM - BASIC MOBILITY  How much difficulty does the patient currently have...  Patient Difficulty: Turning over in bed (including adjusting bedclothes, sheets and blankets)?: None   Patient Difficulty: Sitting down on and standing up from a chair with arms (e.g., wheelchair, bedside commode, etc.): A Little   Patient Difficulty: Moving from lying on back to sitting on the side of the bed?: None   How much help from another person does the patient currently need...   Help from Another: Moving to and from a bed to a chair (including a wheelchair)?: A Little   Help from Another: Need to walk in hospital room?: A Little   Help from Another: Climbing 3-5 steps with a railing?: A Little       AM-PAC Score:  Raw Score: 20   Approx Degree of Impairment: 35.83%   Standardized Score (AM-PAC Scale): 47.67   CMS Modifier (G-Code): CJ    FUNCTIONAL ABILITY STATUS  Gait Assessment   Functional Mobility/Gait Assessment  Gait Assistance: Contact guard assist  Distance (ft): 75  Assistive Device: Rolling walker  Pattern: R Flexed knee;Ataxic    Skilled Therapy Provided     Bed Mobility:  Rolling: Right w/ hob slightly elevated - mod I  Supine to sit: Mod I w/ hob slightly elevated  Sit to supine: NT     Transfer Mobility:  Sit to stand: Pt using proper hand placement, completed w/ CGA   Stand to sit: CGA w/ cues for safety technique.  Gait = Pt completed gait 75'x1 w/ rolling walker- CGA for majority of distance, but as pt fatigued transitioned more to a min a of 1.    Therapist's Comments: Patient educated on pacing gait and activities following surgery and performing ankle pumps at regular intervals during the day.  Pt expresses understanding.    Exercise/Education Provided:  Bed mobility  Functional activity tolerated  Gait training  Transfer training    Patient End of Session: Up in chair;Needs met;Call light within reach;RN aware of session/findings;All patient questions and concerns addressed;Alarm  set (Zion Bill aware of eval session)      Patient Evaluation Complexity Level:  History Moderate - 1 or 2 personal factors and/or co-morbidities   Examination of body systems Moderate - addressing a total of 3 or more elements   Clinical Presentation Moderate - Evolving   Clinical Decision Making Moderate - Evolving       PT Session Time: 28 minutes  Gait Trainin minutes  Therapeutic Activity: 7 minutes  Neuromuscular Re-education: 0 minutes  Therapeutic Exercise: 1 minutes

## 2024-06-18 NOTE — PROGRESS NOTES
[facility]   part of [facility]     Hospitalist Progress Note     Clay Orellana Patient Status:  Inpatient    1945 MRN [id number]   Location [facility] 6NE-A Attending Brian Pringle MD   Hosp Day # 1 PCP Sachin Meza MD     Chief Complaint: Medical management    Subjective:     Patient resting in bed and feels well    Objective:    Review of Systems:   A comprehensive review of systems was completed; pertinent positive and negatives stated in subjective.    Vital signs:  Temp:  [97.9 °F (36.6 °C)-98.4 °F (36.9 °C)] 97.9 °F (36.6 °C)  Pulse:  [60-79] 60  Resp:  [12-20] 12  BP: (100-183)/(51-90) 144/72  SpO2:  [88 %-97 %] 96 %    Physical Exam:    General: No acute distress  HEENT: Surgical incision on head C/D/I   Respiratory: No wheezes, no rhonchi  Cardiovascular: S1, S2, regular rate and rhythm  Abdomen: Soft, Non-tender, non-distended, positive bowel sounds  Neuro: No new focal deficits.   Extremities: No edema    Diagnostic Data:    Labs:  Recent Labs   Lab 24  1052 24  0644 24  0442   WBC 9.4  --  16.1*   HGB 13.0  --  11.7*   MCV 97.0  --  97.0   .0  --  200.0   INR 1.81* 1.08  --        Recent Labs   Lab 24  1052 24  0442   GLU 82 140*   BUN 25* 22   CREATSERUM 1.14 1.03   CA 8.9 8.2*   ALB 3.2*  --     137   K 4.4 4.3    109   CO2 27.0 21.0   ALKPHO 66  --    AST 20  --    ALT 36  --    BILT 0.3  --    TP 6.4  --        Estimated Creatinine Clearance: 59.9 mL/min (based on SCr of 1.03 mg/dL).    No results for input(s): \"TROP\", \"TROPHS\", \"CK\" in the last 168 hours.    Recent Labs   Lab 24  1052 24  0644   PTP 21.1* 14.0   INR 1.81* 1.08                  Microbiology    No results found for this visit on 24.      Imaging: Reviewed in Epic.    Medications:    fluticasone-umeclidin-vilant  1 puff Inhalation Daily    famotidine  20 mg Oral BID    Or    famotidine  20 mg Intravenous BID    metoprolol tartrate  25 mg  Oral BID    donepezil  10 mg Oral Nightly    atorvastatin  40 mg Oral Nightly    finasteride  5 mg Oral Daily    flecainide  100 mg Oral BID    sertraline  25 mg Oral Daily    tamsulosin  0.4 mg Oral Nightly       Assessment & Plan:      #NPH  -S/p  shunt placement on 6/6/2017  -Management per neurosurgery  -Surgery following     #Cerebrovascular disease  -Aspirin on hold until cleared by neurosurgery  -Statin     #Paroxysmal A-fib s/p DCCV  -Flecainide, metoprolol  -Xarelto on hold until cleared by neurosurgery     #Hyperlipidemia  -Statin     #BPH  -Flomax, finasteride     #Anxiety  -Sertraline     #Dementia  -Donepezil    DC planning      Eda Williamson,     Supplementary Documentation:     Quality:  DVT Mechanical Prophylaxis: FAB hose, SCDs, Early ambuation  DVT Pharmacologic Prophylaxis   Medication   None                Code Status: Full Code  Dotson: No urinary catheter in place  Dotson Duration (in days):   Central line:    JAQUELINE: 6/19/2024    The 21st Century Cures Act makes medical notes like these available to patients in the interest of transparency. Please be advised this is a medical document. Medical documents are intended to carry relevant information, facts as evident, and the clinical opinion of the practitioner. The medical note is intended as peer to peer communication and may appear blunt or direct. It is written in medical language and may contain abbreviations or verbiage that are unfamiliar.

## 2024-06-19 ENCOUNTER — PATIENT OUTREACH (OUTPATIENT)
Dept: CASE MANAGEMENT | Age: 79
End: 2024-06-19

## 2024-06-19 DIAGNOSIS — Z02.9 ENCOUNTERS FOR ADMINISTRATIVE PURPOSE: ICD-10-CM

## 2024-06-19 DIAGNOSIS — G91.8 HYDROCEPHALUS EX VACUO (HCC): Primary | ICD-10-CM

## 2024-06-19 PROCEDURE — 1111F DSCHRG MED/CURRENT MED MERGE: CPT

## 2024-06-19 NOTE — PROGRESS NOTES
Initial Post Discharge Follow Up   Discharge Date: 6/18/24  Contact Date: 6/19/2024    Consent Verification:  Assessment Completed With: Spouse: Aniya Permission received per patient?  verbal, pt was in the background also answering questions.   HIPAA Verified?  Yes    Discharge Dx:   Right  Shunt Insertion    General:   How have you been since your discharge from the hospital?  I think he's doing fine really. He hasn't complained of anything.    Do you have any pain since discharge?  No    When you were leaving the hospital were your discharge instructions reviewed with you? Yes  How well were your discharge instructions explained to you?   On a scale of 1-5   1- Very Poor and 5- Very well   Very Well  Do you have any questions about your discharge instructions?  No  Before leaving the hospital was your diagnoses explained to you? Yes  Do you have any questions about your diagnoses? No  Are you able to perform normal daily activities of living as you have prior to your hospital stay (dressing, bathing, ambulating to the bathroom, etc)? yes  (NCM) Was patient given a different diet per AVS? no      Medications:   Current Outpatient Medications   Medication Sig Dispense Refill    metoprolol tartrate 25 MG Oral Tab Take 1 tablet (25 mg total) by mouth 2 (two) times daily.      tamsulosin 0.4 MG Oral Cap Take 1 capsule (0.4 mg total) by mouth After dinner.      sertraline (ZOLOFT) 25 MG Oral Tab Take 1 tablet (25 mg total) by mouth daily. 30 tablet 1    atorvastatin 40 MG Oral Tab Take 1 tablet (40 mg total) by mouth nightly. 90 tablet 3    midodrine 5 MG Oral Tab Take 1 tablet (5 mg total) by mouth in the morning and 1 tablet (5 mg total) at noon and 1 tablet (5 mg total) in the evening. 90 tablet 0    aspirin 81 MG Oral Tab EC Take 1 tablet (81 mg total) by mouth daily.      rivaroxaban 20 MG Oral Tab Take 1 tablet (20 mg total) by mouth nightly.      donepezil 10 MG Oral Tab Take 1 tablet (10 mg total) by mouth  nightly. 90 tablet 1    fluticasone-umeclidin-vilant (TRELEGY ELLIPTA) 100-62.5-25 MCG/ACT Inhalation Aerosol Powder, Breath Activated Inhale 1 puff into the lungs daily.      ipratropium 0.03 % Nasal Solution 2 sprays by Nasal route Q12H.      flecainide 100 MG Oral Tab Take 1 tablet (100 mg total) by mouth 2 (two) times daily.      finasteride 5 MG Oral Tab TAKE ONE TABLET BY MOUTH ONE TIME DAILY 90 tablet 2    Cholecalciferol 125 MCG (5000 UT) Oral Tab Take 1 tablet (5,000 Units total) by mouth 3 (three) times a week. Three times a week. MWF      cyanocobalamin 500 MCG Oral Tab Take 1 tablet (500 mcg total) by mouth daily.      Multiple Vitamins-Minerals (MULTI-VITAMIN/MINERALS) Oral Tab Take 1 tablet by mouth daily.      Acidophilus/Pectin Oral Cap Take 1 capsule by mouth daily.      acetaminophen 500 MG Oral Tab Take 1 tablet (500 mg total) by mouth every 6 (six) hours as needed.       Were there any changes to your current medication(s) noted on the AVS? Yes, ASA restarts on 6/22/24 and Xarelto on 6/23/24  If so, were these medication changes discussed with you prior to leaving the hospital? Yes  If a new medication was prescribed:  n/a  Let's go over your medications together to make sure we are not missing anything. Medications Reviewed  Are there any reasons that keep you from taking your medication as prescribed? No  Are you having any concerns with constipation? No      Discharge medications reviewed/discussed/and reconciled against outpatient medications with patient.  Any changes or updates to medications sent to PCP.  Patient Acknowledged     Referrals/orders at D/C:  Referrals/orders placed at D/C? yes  What services:   Home health   (If HH was ordered) Has HH been set up?  No, wife states that she has been in contact with [facility] since discharge. She states that the RN usually comes every Tuesday but will check to see if she will be coming sooner.        DME ordered at D/C? No      Discharge orders,  AVS reviewed and discussed with patient. Any changes or updates to orders sent to PCP.  Patient Acknowledged      SDOH:   Transportation Needs: No Transportation Needs (6/17/2024)    Transportation Needs     Lack of Transportation: No     Car Seat: Not on file     Financial Resource Strain: Low Risk  (6/19/2024)    Financial Resource Strain     Difficulty of Paying Living Expenses: Not hard at all     Med Affordability: No       Follow up appointments:      Your appointments       Date & Time Appointment Department ([place])    Jul 05, 2024 12:45 PM CDT Post Op Visit with Teodora Castle PA-C [facility] ([facility])        Jul 18, 2024 11:00 AM CDT EMG 1 LIMB with Vinny Preciado MD [facility] ([facility] [place])        Aug 01, 2024 2:15 PM CDT Post Op Visit with Teodora Castle PA-C [facility] ([facility])              [facility]  [address]  [facility] [id number] [phone] [facility]  [address]  [facility] [id number] [phone]            TCC  Was TCC ordered: No      PCP (If no TCC appointment)  Does patient already have a PCP appointment scheduled? No, NCM unable to schedule for Dr. Meza through [facility]   If no appointment scheduled: Explain-wife states that PCP comes to [facility] every other Tuesday and will make sure that he is on the list to see him when he comes.     Specialist    Does the patient have any other follow up appointment(s) needing to be scheduled? Yes  If yes: NCM reviewed upcoming specialist appointment with patient: Yes  Does the patient need assistance scheduling appointment(s): No, Neuro appt already scheduled for 7/2    Is there  any reason as to why you cannot make your appointment(s)?  No     Needs post D/C:   Now that you are home, are there any needs or concerns you need addressed before your next visit with your PCP?  (DME, meds, questions, etc.): No    Interventions by NCM:   NCM reviewed discharge instructions and when to seek medical attention with the patient and his wife as NCM was on speaker phone. Wife states that he is doing well and has not complained of any pain. NCM instructed on s/s of infection; she v/u and states that incision looks fine. His bp this morning was 147/79. He denies that he has had any fever, n/v/c/d, sob, lightheadedness, HA or any new or worsening symptoms. Med review completed. They both denied having any questions or concerns at this time.       CCM referral placed:    No    BOOK BY DATE: 7/2/24

## 2024-06-21 ENCOUNTER — APPOINTMENT (OUTPATIENT)
Dept: GENERAL RADIOLOGY | Facility: HOSPITAL | Age: 79
DRG: 194 | End: 2024-06-21
Attending: EMERGENCY MEDICINE

## 2024-06-21 ENCOUNTER — HOSPITAL ENCOUNTER (INPATIENT)
Facility: HOSPITAL | Age: 79
LOS: 4 days | Discharge: NURSING, REHAB OR LONG-TERM CARE | DRG: 194 | End: 2024-06-25
Attending: EMERGENCY MEDICINE | Admitting: HOSPITALIST

## 2024-06-21 ENCOUNTER — TELEPHONE (OUTPATIENT)
Dept: SURGERY | Facility: CLINIC | Age: 79
End: 2024-06-21

## 2024-06-21 ENCOUNTER — APPOINTMENT (OUTPATIENT)
Dept: CT IMAGING | Facility: HOSPITAL | Age: 79
DRG: 194 | End: 2024-06-21
Attending: EMERGENCY MEDICINE

## 2024-06-21 DIAGNOSIS — J18.9 MULTIFOCAL PNEUMONIA: Primary | ICD-10-CM

## 2024-06-21 DIAGNOSIS — R53.1 WEAKNESS GENERALIZED: ICD-10-CM

## 2024-06-21 LAB
ALBUMIN SERPL-MCNC: 2.7 G/DL (ref 3.4–5)
ALBUMIN/GLOB SERPL: 0.8 {RATIO} (ref 1–2)
ALP LIVER SERPL-CCNC: 70 U/L
ALT SERPL-CCNC: 16 U/L
ANION GAP SERPL CALC-SCNC: 6 MMOL/L (ref 0–18)
AST SERPL-CCNC: 17 U/L (ref 15–37)
BASOPHILS # BLD AUTO: 0.02 X10(3) UL (ref 0–0.2)
BASOPHILS NFR BLD AUTO: 0.1 %
BILIRUB SERPL-MCNC: 0.7 MG/DL (ref 0.1–2)
BUN BLD-MCNC: 24 MG/DL (ref 9–23)
CALCIUM BLD-MCNC: 8.4 MG/DL (ref 8.5–10.1)
CHLORIDE SERPL-SCNC: 107 MMOL/L (ref 98–112)
CO2 SERPL-SCNC: 25 MMOL/L (ref 21–32)
CREAT BLD-MCNC: 1.15 MG/DL
EGFRCR SERPLBLD CKD-EPI 2021: 64 ML/MIN/1.73M2 (ref 60–?)
EOSINOPHIL # BLD AUTO: 0.01 X10(3) UL (ref 0–0.7)
EOSINOPHIL NFR BLD AUTO: 0.1 %
ERYTHROCYTE [DISTWIDTH] IN BLOOD BY AUTOMATED COUNT: 12 %
GLOBULIN PLAS-MCNC: 3.4 G/DL (ref 2.8–4.4)
GLUCOSE BLD-MCNC: 119 MG/DL (ref 70–99)
HCT VFR BLD AUTO: 35.8 %
HGB BLD-MCNC: 12.3 G/DL
IMM GRANULOCYTES # BLD AUTO: 0.09 X10(3) UL (ref 0–1)
IMM GRANULOCYTES NFR BLD: 0.6 %
LACTATE SERPL-SCNC: 1.1 MMOL/L (ref 0.4–2)
LYMPHOCYTES # BLD AUTO: 0.52 X10(3) UL (ref 1–4)
LYMPHOCYTES NFR BLD AUTO: 3.3 %
MCH RBC QN AUTO: 32.9 PG (ref 26–34)
MCHC RBC AUTO-ENTMCNC: 34.4 G/DL (ref 31–37)
MCV RBC AUTO: 95.7 FL
MONOCYTES # BLD AUTO: 1.62 X10(3) UL (ref 0.1–1)
MONOCYTES NFR BLD AUTO: 10.3 %
NEUTROPHILS # BLD AUTO: 13.48 X10 (3) UL (ref 1.5–7.7)
NEUTROPHILS # BLD AUTO: 13.48 X10(3) UL (ref 1.5–7.7)
NEUTROPHILS NFR BLD AUTO: 85.6 %
OSMOLALITY SERPL CALC.SUM OF ELEC: 291 MOSM/KG (ref 275–295)
PLATELET # BLD AUTO: 204 10(3)UL (ref 150–450)
POTASSIUM SERPL-SCNC: 4.1 MMOL/L (ref 3.5–5.1)
PROT SERPL-MCNC: 6.1 G/DL (ref 6.4–8.2)
RBC # BLD AUTO: 3.74 X10(6)UL
SODIUM SERPL-SCNC: 138 MMOL/L (ref 136–145)
WBC # BLD AUTO: 15.7 X10(3) UL (ref 4–11)

## 2024-06-21 PROCEDURE — 71045 X-RAY EXAM CHEST 1 VIEW: CPT | Performed by: EMERGENCY MEDICINE

## 2024-06-21 PROCEDURE — 70450 CT HEAD/BRAIN W/O DYE: CPT | Performed by: EMERGENCY MEDICINE

## 2024-06-21 PROCEDURE — 99223 1ST HOSP IP/OBS HIGH 75: CPT | Performed by: STUDENT IN AN ORGANIZED HEALTH CARE EDUCATION/TRAINING PROGRAM

## 2024-06-21 PROCEDURE — 70360 X-RAY EXAM OF NECK: CPT | Performed by: EMERGENCY MEDICINE

## 2024-06-21 PROCEDURE — 70250 X-RAY EXAM OF SKULL: CPT | Performed by: EMERGENCY MEDICINE

## 2024-06-21 PROCEDURE — 74018 RADEX ABDOMEN 1 VIEW: CPT | Performed by: EMERGENCY MEDICINE

## 2024-06-21 RX ORDER — CHOLECALCIFEROL (VITAMIN D3) 125 MCG
5000 CAPSULE ORAL
Status: DISCONTINUED | OUTPATIENT
Start: 2024-06-24 | End: 2024-06-25

## 2024-06-21 RX ORDER — ONDANSETRON 2 MG/ML
4 INJECTION INTRAMUSCULAR; INTRAVENOUS EVERY 4 HOURS PRN
Status: ACTIVE | OUTPATIENT
Start: 2024-06-21 | End: 2024-06-21

## 2024-06-21 RX ORDER — FINASTERIDE 5 MG/1
5 TABLET, FILM COATED ORAL DAILY
Status: DISCONTINUED | OUTPATIENT
Start: 2024-06-22 | End: 2024-06-25

## 2024-06-21 RX ORDER — ACETAMINOPHEN 500 MG
1000 TABLET ORAL ONCE
Status: COMPLETED | OUTPATIENT
Start: 2024-06-21 | End: 2024-06-21

## 2024-06-21 RX ORDER — DONEPEZIL HYDROCHLORIDE 10 MG/1
10 TABLET, FILM COATED ORAL NIGHTLY
Status: DISCONTINUED | OUTPATIENT
Start: 2024-06-21 | End: 2024-06-25

## 2024-06-21 RX ORDER — MULTIPLE VITAMINS W/ MINERALS TAB 9MG-400MCG
1 TAB ORAL DAILY
Status: DISCONTINUED | OUTPATIENT
Start: 2024-06-22 | End: 2024-06-25

## 2024-06-21 RX ORDER — AZITHROMYCIN 250 MG/1
500 TABLET, FILM COATED ORAL
Status: DISCONTINUED | OUTPATIENT
Start: 2024-06-22 | End: 2024-06-24

## 2024-06-21 RX ORDER — FLECAINIDE ACETATE 100 MG/1
100 TABLET ORAL 2 TIMES DAILY
Status: DISCONTINUED | OUTPATIENT
Start: 2024-06-21 | End: 2024-06-25

## 2024-06-21 RX ORDER — ACETAMINOPHEN 500 MG
500 TABLET ORAL EVERY 4 HOURS PRN
Status: DISCONTINUED | OUTPATIENT
Start: 2024-06-21 | End: 2024-06-25

## 2024-06-21 RX ORDER — ATORVASTATIN CALCIUM 40 MG/1
40 TABLET, FILM COATED ORAL NIGHTLY
Status: DISCONTINUED | OUTPATIENT
Start: 2024-06-21 | End: 2024-06-25

## 2024-06-21 RX ORDER — ASPIRIN 81 MG/1
81 TABLET ORAL DAILY
Status: DISCONTINUED | OUTPATIENT
Start: 2024-06-22 | End: 2024-06-25

## 2024-06-21 RX ORDER — TAMSULOSIN HYDROCHLORIDE 0.4 MG/1
0.4 CAPSULE ORAL
Status: DISCONTINUED | OUTPATIENT
Start: 2024-06-21 | End: 2024-06-25

## 2024-06-21 RX ORDER — LACTOBACILLUS ACIDOPHILUS 500MM CELL
1 CAPSULE ORAL DAILY
Status: DISCONTINUED | OUTPATIENT
Start: 2024-06-22 | End: 2024-06-25

## 2024-06-21 RX ORDER — SERTRALINE HYDROCHLORIDE 25 MG/1
25 TABLET, FILM COATED ORAL DAILY
Status: DISCONTINUED | OUTPATIENT
Start: 2024-06-22 | End: 2024-06-25

## 2024-06-21 RX ORDER — MAGNESIUM OXIDE 400 MG (241.3 MG MAGNESIUM) TABLET
500 TABLET DAILY
Status: DISCONTINUED | OUTPATIENT
Start: 2024-06-22 | End: 2024-06-25

## 2024-06-21 RX ORDER — OXYCODONE HYDROCHLORIDE 5 MG/1
5 TABLET ORAL EVERY 6 HOURS PRN
Status: DISCONTINUED | OUTPATIENT
Start: 2024-06-21 | End: 2024-06-25

## 2024-06-21 RX ORDER — GUAIFENESIN 600 MG/1
600 TABLET, EXTENDED RELEASE ORAL 2 TIMES DAILY
Status: DISCONTINUED | OUTPATIENT
Start: 2024-06-21 | End: 2024-06-25

## 2024-06-21 RX ORDER — IPRATROPIUM BROMIDE AND ALBUTEROL SULFATE 2.5; .5 MG/3ML; MG/3ML
3 SOLUTION RESPIRATORY (INHALATION) EVERY 6 HOURS PRN
Status: DISCONTINUED | OUTPATIENT
Start: 2024-06-21 | End: 2024-06-25

## 2024-06-21 NOTE — ED QUICK NOTES
Family at bedside.  Rounding Completed    Plan of Care reviewed. Waiting for inpatient room.  Elimination needs assessed.  RT provided sputum collection cup.    Bed is locked and in lowest position.

## 2024-06-21 NOTE — H&P
[facility]     Clay Orellana Patient Status:  Emergency    1945 MRN [id number]   Location [facility] EMERGENCY DEPARTMENT Attending Grabiel Auguste*   Hosp Day # 0 PCP Sachin Meza MD     Chief Complaint: weakness    Subjective:    History of Present Illness:     Clay Orellana is a 79 year old male with PMHX BPH/ HTN/ HLD/ CVA/ NPH (s/p  shunt)/ COPD/ A-fib/ CAD who presented to the hospital for weakness. He recently had a  shunt placed on  and was sent home on the . For the past 3 days, noticed progressive weakness as well as a cough and a fever of 100.4 F. He has been so weak he is having problems ambulating. He also began to have increased low back pain from normal which was dull and rated 7/10 with no alleviating or exacerbating factors. He had increased nocturia without dysuria or change in urgency, He notes a headache since his surgery which has been unchanged since discharge. He denied any new paresthesia, specific muscle weakness, or visual changes. His mentation has been at his baseline as well. He denied any dyspnea, chest pain, palpitations, abdominal pain, nausea, emesis, diarrhea.    History/Other:    Past Medical History:  Past Medical History:    Acute nausea with nonbilious vomiting    Anxiety    Arrhythmia    Arthritis    Bilateral inguinal hernia without obstruction or gangrene    BPH (benign prostatic hyperplasia)    Calculus of kidney    Essential hypertension    High cholesterol    Muscle weakness    Stroke (HCC)    TIA    Visual impairment     Past Surgical History:   Past Surgical History:   Procedure Laterality Date    Anesth,pacemaker insertion  2023    Appendectomy      Appendectomy      Colonoscopy      Colonoscopy N/A 2022    Procedure: COLONOSCOPY;  Surgeon: Sudarshan Gilbert MD;  Location:  ENDOSCOPY    Colonoscopy      Cyst removal N/A     Cyst removed behind back of neck    Cyst removal Right 2020     Removed from right upper back - just below shoulder    Hernia surgery  July 2022    Skin surgery  2020    Vasectomy  1980      Family History:   Family History   Problem Relation Age of Onset    Heart Attack Father     Diabetes Mother     Other (DM) Sister     Other (ALS) Brother     No Known Problems Maternal Grandmother     No Known Problems Maternal Grandfather     No Known Problems Paternal Grandmother     No Known Problems Paternal Grandfather     Other (smoker) Brother     Other (MS) Sister     No Known Problems Son     No Known Problems Daughter      Social History:    reports that he has quit smoking. His smoking use included cigarettes. He has never been exposed to tobacco smoke. He has never used smokeless tobacco. He reports that he does not drink alcohol and does not use drugs.     Allergies:   Allergies   Allergen Reactions    Kiwi Extract ITCHING     Inner ears itch       Medications:    Current Facility-Administered Medications on File Prior to Encounter   Medication Dose Route Frequency Provider Last Rate Last Admin    [COMPLETED] ceFAZolin (Ancef) 2g in 10mL IV syringe premix  2 g Intravenous Once Brian Pringle MD   2 g at 06/17/24 0815    [COMPLETED] ceFAZolin (Ancef) 2g in 10mL IV syringe premix  2 g Intravenous Q8H Teodora Castle PA-C 120 mL/hr at 06/18/24 0002 2 g at 06/18/24 0002    [COMPLETED] labetalol (Trandate) 5 mg/mL injection             [COMPLETED] sodium chloride 0.9 % IV bolus 500 mL  500 mL Intravenous Once Janeth Galvin  mL/hr at 05/07/24 0750 500 mL at 05/07/24 0750    [COMPLETED] gadoterate meglumine (Dotarem) 10 MMOL/20ML injection 20 mL  20 mL Intravenous ONCE PRN David Bustillo MD   20 mL at 05/07/24 1130    [COMPLETED] fentaNYL (Sublimaze) 50 mcg/mL injection             [COMPLETED] hydrALAzine (Apresoline) 20 mg/mL injection             [COMPLETED] ceFAZolin (Ancef) 2 g/20mL IV syringe premix             [COMPLETED] acetaminophen (Tylenol) tab 650 mg  650  mg Oral Once David Bustillo MD   650 mg at 05/05/24 0857    [COMPLETED] metoprolol tartrate (Lopressor) tab 25 mg  25 mg Oral Once Milan Wilson DO   25 mg at 05/03/24 0104    [COMPLETED] enoxaparin (Lovenox) 100 MG/ML SUBQ injection 90 mg  1 mg/kg Subcutaneous 2 times per day Angel Cristobal MD   90 mg at 05/04/24 2009    [COMPLETED] HYDROcodone-acetaminophen (Norco) 5-325 MG per tab 1 tablet  1 tablet Oral Once Gabriel Lange DO   1 tablet at 05/01/24 1811     Current Outpatient Medications on File Prior to Encounter   Medication Sig Dispense Refill    metoprolol tartrate 25 MG Oral Tab Take 1 tablet (25 mg total) by mouth 2 (two) times daily.      tamsulosin 0.4 MG Oral Cap Take 1 capsule (0.4 mg total) by mouth After dinner.      sertraline (ZOLOFT) 25 MG Oral Tab Take 1 tablet (25 mg total) by mouth daily. 30 tablet 1    atorvastatin 40 MG Oral Tab Take 1 tablet (40 mg total) by mouth nightly. 90 tablet 3    midodrine 5 MG Oral Tab Take 1 tablet (5 mg total) by mouth in the morning and 1 tablet (5 mg total) at noon and 1 tablet (5 mg total) in the evening. 90 tablet 0    aspirin 81 MG Oral Tab EC Take 1 tablet (81 mg total) by mouth daily.      rivaroxaban 20 MG Oral Tab Take 1 tablet (20 mg total) by mouth nightly.      donepezil 10 MG Oral Tab Take 1 tablet (10 mg total) by mouth nightly. 90 tablet 1    fluticasone-umeclidin-vilant (TRELEGY ELLIPTA) 100-62.5-25 MCG/ACT Inhalation Aerosol Powder, Breath Activated Inhale 1 puff into the lungs daily.      ipratropium 0.03 % Nasal Solution 2 sprays by Nasal route Q12H.      flecainide 100 MG Oral Tab Take 1 tablet (100 mg total) by mouth 2 (two) times daily.      finasteride 5 MG Oral Tab TAKE ONE TABLET BY MOUTH ONE TIME DAILY 90 tablet 2    Cholecalciferol 125 MCG (5000 UT) Oral Tab Take 1 tablet (5,000 Units total) by mouth 3 (three) times a week. Three times a week. MWF      cyanocobalamin 500 MCG Oral Tab Take 1 tablet (500 mcg total) by  mouth daily.      Multiple Vitamins-Minerals (MULTI-VITAMIN/MINERALS) Oral Tab Take 1 tablet by mouth daily.      Acidophilus/Pectin Oral Cap Take 1 capsule by mouth daily.      acetaminophen 500 MG Oral Tab Take 1 tablet (500 mg total) by mouth every 6 (six) hours as needed.         Review of Systems:   A comprehensive review of systems was completed.    Pertinent positives and negatives noted in the HPI.    Objective:   Physical Exam:    BP (!) 156/95   Pulse 68   Temp 98.1 °F (36.7 °C) (Oral)   Resp 18   SpO2 93%   General: No acute distress, Alert  Respiratory: No rhonchi, no wheezes, on room air, normal work of breathing  Cardiovascular: S1, S2. Regular rate and rhythm  Abdomen: Soft, Non-tender, non-distended, positive bowel sounds  Neuro: tremulous in arms >legs, muscle strength 3/5 with right hip flexion, sensation equal to light touch bilaterally, EOM intact bl, tongue protrudes midline, uvula midline  Extremities: No edema    Results:    Labs:      Labs Last 24 Hours:    Recent Labs   Lab 06/18/24  0442 06/21/24  1511   RBC 3.63* 3.74*   HGB 11.7* 12.3*   HCT 35.2* 35.8*   MCV 97.0 95.7   MCH 32.2 32.9   MCHC 33.2 34.4   RDW 12.3 12.0   NEPRELIM  --  13.48*   WBC 16.1* 15.7*   .0 204.0       Recent Labs   Lab 06/18/24  0442 06/21/24  1511   * 119*   BUN 22 24*   CREATSERUM 1.03 1.15   EGFRCR 73 64   CA 8.2* 8.4*   ALB  --  2.7*    138   K 4.3 4.1    107   CO2 21.0 25.0   ALKPHO  --  70   AST  --  17   ALT  --  16   BILT  --  0.7   TP  --  6.1*       Lab Results   Component Value Date    INR 1.08 06/17/2024    INR 1.81 (H) 06/13/2024    INR 1.07 05/06/2024       No results for input(s): \"TROP\", \"TROPHS\", \"CK\" in the last 168 hours.    No results for input(s): \"TROP\", \"PBNP\" in the last 168 hours.    No results for input(s): \"PCT\" in the last 168 hours.    Imaging: Imaging data reviewed in Epic.    Assessment & Plan:      #Multifocal PNA with weakness  -chest xray reviewed and  showing bilateral scattered infiltrate  -Shunt xray reviewed and no significant kink present  -met 1 SIRS criteria: WBC 15.7 (16.1 post-op on 6/18)  -weakness 2/2 post-operative PNA, unlikely to be encephalitis/ meningitis as no neurologic deficits or headache, r/o shunt malfunction  -CT brain pending to confirm placement of shunt and ensure no worsening hydrocephalus  -antibiotics: azithromycin, roephin  -Tylenol prn, duo nebs prn, mucinex  -blood cx pending  -obtain sputum cx  -maintain SpO2 >88% given hx COPD  #NPH s/p  shunt    #Normocytic anemia  -hgb 12.3: baseline 11-13  -no signs acute bleeding  -cont to monitor CBC    #Hypoalbuminemia  -albumin 2.7    #HLD  -cont home atorvastatin    #Dementia  -cont home donepezil    #BPH  -cont hme finasteride, tamsulosin    #A-fib  -cont home flecainide, metoprolol, rivaroxaban    #Aniety  -cont home sertraline    #COPD  -cont home trelegy      Plan of care discussed with ED physician    Rosalina Obrien DO    Supplementary Documentation:     The 21st Century Cures Act makes medical notes like these available to patients in the interest of transparency. Please be advised this is a medical document. Medical documents are intended to carry relevant information, facts as evident, and the clinical opinion of the practitioner. The medical note is intended as peer to peer communication and may appear blunt or direct. It is written in medical language and may contain abbreviations or verbiage that are unfamiliar.

## 2024-06-21 NOTE — TELEPHONE ENCOUNTER
Pt's wife called stating pt had a shunt placed Monday and was doing really well. Pt is now very weak, can barely walk, and in a lot of pain. Pt's best call back number is [phone]

## 2024-06-21 NOTE — ED PROVIDER NOTES
Patient Seen in: [facility] Emergency Department      History     Chief Complaint   Patient presents with    Fatigue     Stated Complaint: weakness/ fatigue    Subjective:   HPI    79-year-old male presents today for evaluation of generalized weakness.  On June 17, patient had a ventricular shunt put in because of normal pressure hydrocephalus.  Since discharge home, he states he has been generally weak.  He reports a cough that has been ongoing.  He does know when the cough started.  He is complaining of a generalized headache and some discomfort around his incision sites.  He also reports chronic back pain which he thinks has been contributing to his inability to ambulate at home.  He states his appetite has been decreased.  He denies any fevers, chest pain, vomiting or diarrhea.    Objective:   Past Medical History:    Acute nausea with nonbilious vomiting    Anxiety    Arrhythmia    Arthritis    Bilateral inguinal hernia without obstruction or gangrene    BPH (benign prostatic hyperplasia)    Calculus of kidney    Essential hypertension    High cholesterol    Muscle weakness    Stroke (HCC)    TIA    Visual impairment              Past Surgical History:   Procedure Laterality Date    Anesth,pacemaker insertion  08/2023    Appendectomy      Appendectomy      Colonoscopy      Colonoscopy N/A 05/03/2022    Procedure: COLONOSCOPY;  Surgeon: Sudarshan Gilbert MD;  Location:  ENDOSCOPY    Colonoscopy      Cyst removal N/A 1990    Cyst removed behind back of neck    Cyst removal Right 11/05/2020    Removed from right upper back - just below shoulder    Hernia surgery  July 2022    Skin surgery  2020    Vasectomy  1980                No pertinent social history.            Review of Systems    Positive for stated complaint: weakness/ fatigue  Other systems are as noted in HPI.  Constitutional and vital signs reviewed.      All other systems reviewed and negative except as noted above.    Physical Exam     ED  Triage Vitals [06/21/24 1357]   /58   Pulse 68   Resp 18   Temp 98.1 °F (36.7 °C)   Temp src Oral   SpO2 93 %   O2 Device None (Room air)       Current Vitals:   Vital Signs  BP: (!) 199/85  Pulse: 70  Resp: 17  Temp: 98.1 °F (36.7 °C)  Temp src: Oral  MAP (mmHg): (!) 111    Oxygen Therapy  SpO2: 93 %  O2 Device: None (Room air)            Physical Exam  Vitals and nursing note reviewed.   Constitutional:       Appearance: Normal appearance.   HENT:      Head: Normocephalic and atraumatic.      Nose: Nose normal.      Mouth/Throat:      Mouth: Mucous membranes are moist.   Eyes:      Extraocular Movements: Extraocular movements intact.      Pupils: Pupils are equal, round, and reactive to light.   Cardiovascular:      Rate and Rhythm: Normal rate and regular rhythm.   Pulmonary:      Effort: Pulmonary effort is normal.      Breath sounds: Normal breath sounds.   Abdominal:      Palpations: Abdomen is soft.      Tenderness: There is no abdominal tenderness. There is no guarding or rebound.      Comments: Resolving ecchymosis over the incision sites   Musculoskeletal:         General: Normal range of motion.      Cervical back: Neck supple.   Skin:     General: Skin is warm.   Neurological:      General: No focal deficit present.      Mental Status: He is alert.      Cranial Nerves: No cranial nerve deficit.      Sensory: No sensory deficit.      Motor: No weakness.      Coordination: Coordination normal.   Psychiatric:         Mood and Affect: Mood normal.           ED Course     Labs Reviewed   COMP METABOLIC PANEL (14) - Abnormal; Notable for the following components:       Result Value    Glucose 119 (*)     BUN 24 (*)     Calcium, Total 8.4 (*)     Total Protein 6.1 (*)     Albumin 2.7 (*)     A/G Ratio 0.8 (*)     All other components within normal limits   CBC W/ DIFFERENTIAL - Abnormal; Notable for the following components:    WBC 15.7 (*)     RBC 3.74 (*)     HGB 12.3 (*)     HCT 35.8 (*)     Neutrophil  Absolute Prelim 13.48 (*)     Neutrophil Absolute 13.48 (*)     Lymphocyte Absolute 0.52 (*)     Monocyte Absolute 1.62 (*)     All other components within normal limits   LACTIC ACID, PLASMA - Normal   CBC WITH DIFFERENTIAL WITH PLATELET    Narrative:     The following orders were created for panel order CBC With Differential With Platelet.  Procedure                               Abnormality         Status                     ---------                               -----------         ------                     CBC W/ DIFFERENTIAL[[id number]]          Abnormal            Final result                 Please view results for these tests on the individual orders.   URINALYSIS WITH CULTURE REFLEX   RAINBOW DRAW BLUE   BLOOD CULTURE   BLOOD CULTURE     EKG    Rate, intervals and axes as noted on EKG Report.  Rate: 67  Rhythm: Sinus Rhythm  Reading: rbbb, no stemi                 CT BRAIN OR HEAD ([id number])    Result Date: 6/21/2024  PROCEDURE:  CT BRAIN OR HEAD (34955)  COMPARISON:  [facility] , CT, CT BRAIN OR HEAD (17686), 6/18/2024, 5:19 AM.  INDICATIONS:  weakness/ fatigue  TECHNIQUE:  Noncontrast CT scanning is performed through the brain. Dose reduction techniques were used. Dose information is transmitted to the ACR (American College of Radiology) NRDR (National Radiology Data Registry) which includes the Dose Index Registry.  PATIENT STATED HISTORY: (As transcribed by Technologist)  Weakness/fatigue.  Pt states he had a shunt placed in his brain on Monday    FINDINGS:  VENTRICLES/SULCI:  Stable symmetric enlargement of ventricular system and cortical sulci with right frontal ventriculostomy catheter in place.  Decreased intraventricular air in decreased subdural air.  No extra-axial fluid collection or midline shift. INTRACRANIAL:  No intracranial mass or hemorrhage.  No sign of acute territorial infarction.  Gray-white matter differentiation is within normal limits. SINUSES:           No sign of acute sinusitis.   MASTOIDS:          No sign of acute inflammation. SKULL:             No evidence for fracture or osseous abnormality. OTHER:             None.            CONCLUSION:  No acute intracranial pathology.  Right frontal ventriculostomy catheter remains in place with decreased intracranial air.    LOCATION:  [id number]   Dictated by (CST): Suleman Nieto MD on 6/21/2024 at 5:24 PM     Finalized by (CST): Suleman Nieto MD on 6/21/2024 at 5:28 PM       XR CHEST AP/PA (1 VIEW) (CPT=71045)    Result Date: 6/21/2024  PROCEDURE:  XR CHEST AP/PA (1 VIEW) (CPT=71045)  TECHNIQUE:  AP chest radiograph was obtained.  COMPARISON:  [facility] , XR, XR CHEST PA + LAT CHEST (JPP=34923), 8/02/2023, 5:59 PM.  INDICATIONS:  weakness/ fatigue  PATIENT STATED HISTORY: (As transcribed by Technologist)  Patient stated feeling weak today and shunt was placed on 06/17/2024.    FINDINGS:  There is ground-glass airspace disease within the right lung base concerning for pneumonia.  Follow-up imaging after treatment is recommended to ensure resolution.  There is a partially visualized right-sided  shunt catheter.  Left chest pacemaker noted.  Cardiac silhouette is normal in size.  There is a left suprahilar ground-glass airspace opacity which may also represent pneumonia.  Trace right pleural effusion.  No measurable pneumothorax.            CONCLUSION:  Imaging findings are suspicious for multifocal pneumonia.  Follow-up imaging after treatment is recommended to ensure resolution.   LOCATION:  [id number]      Dictated by (CST): Stromberg, LeRoy, MD on 6/21/2024 at 4:15 PM     Finalized by (CST): Stromberg, LeRoy, MD on 6/21/2024 at 4:17 PM       XR SHUNT POSITION SERIES  (CPT=70250/37281/76246/77798)    Result Date: 6/21/2024  PROCEDURE:  XR SHUNT POSITION SERIES  (CPT=70250/60208/77523/89111)  COMPARISON:  [facility] , XR, XR SHUNT POSITION SERIES  (CPT=70250/07250/80083/52410), 6/18/2024, 10:47 AM.  INDICATIONS:  evaluate shunt integrity  PATIENT STATED  HISTORY: (As transcribed by Technologist)  Patient stated feeling weak today and shunt was placed on 06/17/2024.    FINDINGS:  A right-sided  shunt is noted.  This courses along the right neck, right chest, right abdomen, and terminates within the right paracentral abdomen.  There is a minimal coil at the mid right abdomen without significant kinking.  No definitive  shunt discontinuity is seen.  Mild bibasilar atelectasis/scarring.  Left chest pacemaker noted.             CONCLUSION:  See above.   LOCATION:  [id number]    Dictated by (CST): Stromberg, LeRoy, MD on 6/21/2024 at 4:06 PM     Finalized by (CST): Stromberg, LeRoy, MD on 6/21/2024 at 4:11 PM       XR SHUNT POSITION SERIES  (CPT=70250/50989/25493/89832)    Result Date: 6/18/2024  PROCEDURE:  XR SHUNT POSITION SERIES  (CPT=70250/90353/47151/65263)  COMPARISON:  None.  INDICATIONS:  post op shunt  PATIENT STATED HISTORY: (As transcribed by Technologist)  Patient offered no additional history at this time.     FINDINGS:  SHUNT POSITION:  There is a right frontal approach ventriculostomy shunt tube identified.  There is pneumocephalus and air within the lateral ventricles anteriorly.  The shunt courses within the right neck anterior right chest right upper abdomen crosses  the midline in the upper abdomen extends inferiorly with the tip in the right pelvis. BOWEL GAS PATTERN:  Non-specific bowel gas pattern.  Changes related to previous anterior abdominal wall hernia repair noted. FREE AIR:  None. CALCIFICATIONS:  Moderate vascular calcification.  There is a 1.4 cm calcification midpole left kidney again noted.  Moderate vascular calcification left carotid artery. LUNGS:  There is moderate peribronchial thickening with some reticular changes and vascular redistribution most likely representing early CHF with bronchitis and interstitial pneumonia not excluded.  No consolidations.  Small right effusion.  Pacemaker leads unchanged in position. MEDIASTINUM:  Normal  for age. PLEURA:  Normal. OTHER:  Degenerative change of the spine noted.            CONCLUSION:  Shunt tube in good position.  Chest findings suggest early CHF.   LOCATION:  [id number]    Dictated by (CST): German Hernández MD on 6/18/2024 at 11:35 AM     Finalized by (CST): German Hernández MD on 6/18/2024 at 11:39 AM       CT BRAIN OR HEAD (51085)    Result Date: 6/18/2024  PROCEDURE:  CT BRAIN OR HEAD (06768)  COMPARISON:  [facility], MRI BRAIN/MRA BRAIN  (CPT=70551/64479), 3/21/2023, 11:35 AM.  [facility] , CT, CT BRAIN OR HEAD (16743), 3/20/2023, 2:50 PM.  [facility] , CT, CT BRAIN OR HEAD (49843), 5/02/2024, 4:48 PM.  INDICATIONS:  post op shunt  TECHNIQUE:  Noncontrast CT scanning is performed through the brain. Dose reduction techniques were used. Dose information is transmitted to the ACR (American College of Radiology) NRDR (National Radiology Data Registry) which includes the Dose Index Registry.  PATIENT STATED HISTORY: (As transcribed by Technologist)  Post op shunt    FINDINGS:  VENTRICLES/SULCI:  There has been interval placement of a ventricular shunt tube from a right frontal approach the tip is in the inferomedial aspect of the right lateral ventricle.  There is slight decrease in the size of the ventricular system in the interval.  There is pneumocephalus and air within the anterior horns of the lateral ventricles. INTRACRANIAL:  No evidence of hemorrhage.  No midline shift.  Faint calcifications within the basal ganglia again noted.  No acute territorial infarction.  No mass.  The basal cisterns are patent.  The cerebellar tonsils are not low lying.  Minimal chronic microvascular disease noted in the deep white matter of the cerebrum.  Stable tiny lacunar infarct left basal ganglia. SINUSES:           No sign of acute sinusitis.  MASTOIDS:          No sign of acute inflammation. SKULL:             The patient is status post  shunt tube placement via right frontal approach.  Postsurgical changes are noted the  soft tissues. OTHER:             None.            CONCLUSION:  1. Status post placement of a right frontal approach ventriculostomy shunt tube.  The tip is in good position in the inferior medial right lateral ventricle.  There is slight decrease in the size of the ventricular system.  There is some pneumocephalus and air within the lateral ventricles anteriorly.  No evidence of a complicating process.     LOCATION:  [place] [address]   Dictated by (CST): German Hernández MD on 6/18/2024 at 7:38 AM     Finalized by (CST): German Hernández MD on 6/18/2024 at 7:45 AM               [facility]      Differential Diagnosis  79-year-old male presents today for generalized weakness following a  shunt being inserted.  His neuroexam is unremarkable.  He is hemodynamically stable and in no acute distress.  His only additional symptom aside from a headache and generalized weakness that he has noted has been a cough.  Differential would include intracranial hemorrhage, obstructed  shunt, infected shunt, pneumonia.  Plan for chest x-ray, labs, along with neuro imaging.  Will reassess    5:36 pm  Patient remains hemodynamically stable.  He was noted to have a leukocytosis and a chest x-ray that shows multifocal pneumonia.  IV antibiotics were ordered.  Will admit to the hospital for further care.  I spoke with the hospitalist in regards to admission and notify neurosurgery of consultation.    External Chart Reviewed  I reviewed some of the documentation from his hospitalization on June 17    Discussions of Management  I spoke with the hospitalist in regards to admission and notify neurosurgery of consultation.  Admission disposition: 6/21/2024  5:29 PM                                        Medical Decision Making      Disposition and Plan     Clinical Impression:  1. Multifocal pneumonia    2. Weakness generalized         Disposition:  Admit  6/21/2024  5:29 pm    Follow-up:  No follow-up provider specified.        Medications  Prescribed:  Current Discharge Medication List                            Hospital Problems       Present on Admission  Date Reviewed: 6/12/2024            ICD-10-CM Noted POA    * (Principal) Multifocal pneumonia J18.9 6/21/2024 Unknown

## 2024-06-21 NOTE — TELEPHONE ENCOUNTER
Pt call rcvd.    Pt spouse, per verbal on file, and pt on speaker, states pt is c/o bilateral Weakness in LE.    Pain 1-10 scale =  7/10 - in abdomen and Incision site, headache, when pt moves sharp abdomen pain, bruises.    Pt had new  shunt placement.    Tylenol taken only so far - denies fever or chills.    Pt does have a cough, couple of days, fairly productive.     Bilateral LE weakness, no numbness. Change in sensation in groin area and upper abdomen area. Denies bowel or bladder changes.    Difficulty walking and getting up on own.    States Tuesday and Wednesday after discharge went extremely well. Started Wednesday night, and seems to be struggling more yesterday and today.    Routing to PA to advise nursing.

## 2024-06-21 NOTE — ED INITIAL ASSESSMENT (HPI)
Pt to Er via EMS c/o increased generalized weakness and inabilty to stand up. Pt states he had a shunt placed in his brain on Monday and began feeling weak on Tuesday and has progressively gotten worse over the past few days. Per EMS pt had a low grade fever at 100.4. Pt A&O x 3

## 2024-06-21 NOTE — TELEPHONE ENCOUNTER
Msg below from PA noted.    Call placed to pt and pt spouse to inform.    Acknowledged plan and stated in agreement with plan.    Appreciative of call and pt spouse stated will keep NSGY updated.

## 2024-06-21 NOTE — TELEPHONE ENCOUNTER
Given cough and weakness, I'd recommend the patient go to the ED for evaluation     At the ED he can have a CT head and XR shunt series to rule out any concerns regarding the shunt.     Please advise.

## 2024-06-22 PROBLEM — Z98.2 S/P VP SHUNT: Status: ACTIVE | Noted: 2024-06-22

## 2024-06-22 LAB
ANION GAP SERPL CALC-SCNC: 6 MMOL/L (ref 0–18)
BASOPHILS # BLD AUTO: 0.02 X10(3) UL (ref 0–0.2)
BASOPHILS NFR BLD AUTO: 0.1 %
BUN BLD-MCNC: 22 MG/DL (ref 9–23)
CALCIUM BLD-MCNC: 8.2 MG/DL (ref 8.5–10.1)
CHLORIDE SERPL-SCNC: 106 MMOL/L (ref 98–112)
CO2 SERPL-SCNC: 25 MMOL/L (ref 21–32)
CREAT BLD-MCNC: 1.01 MG/DL
EGFRCR SERPLBLD CKD-EPI 2021: 75 ML/MIN/1.73M2 (ref 60–?)
EOSINOPHIL # BLD AUTO: 0.02 X10(3) UL (ref 0–0.7)
EOSINOPHIL NFR BLD AUTO: 0.1 %
ERYTHROCYTE [DISTWIDTH] IN BLOOD BY AUTOMATED COUNT: 12.2 %
GLUCOSE BLD-MCNC: 119 MG/DL (ref 70–99)
HCT VFR BLD AUTO: 36.7 %
HGB BLD-MCNC: 12.3 G/DL
IMM GRANULOCYTES # BLD AUTO: 0.11 X10(3) UL (ref 0–1)
IMM GRANULOCYTES NFR BLD: 0.7 %
LYMPHOCYTES # BLD AUTO: 0.61 X10(3) UL (ref 1–4)
LYMPHOCYTES NFR BLD AUTO: 3.6 %
MAGNESIUM SERPL-MCNC: 2 MG/DL (ref 1.6–2.6)
MCH RBC QN AUTO: 32 PG (ref 26–34)
MCHC RBC AUTO-ENTMCNC: 33.5 G/DL (ref 31–37)
MCV RBC AUTO: 95.6 FL
MONOCYTES # BLD AUTO: 1.66 X10(3) UL (ref 0.1–1)
MONOCYTES NFR BLD AUTO: 9.9 %
NEUTROPHILS # BLD AUTO: 14.35 X10 (3) UL (ref 1.5–7.7)
NEUTROPHILS # BLD AUTO: 14.35 X10(3) UL (ref 1.5–7.7)
NEUTROPHILS NFR BLD AUTO: 85.6 %
OSMOLALITY SERPL CALC.SUM OF ELEC: 288 MOSM/KG (ref 275–295)
PLATELET # BLD AUTO: 188 10(3)UL (ref 150–450)
POTASSIUM SERPL-SCNC: 4.1 MMOL/L (ref 3.5–5.1)
RBC # BLD AUTO: 3.84 X10(6)UL
SODIUM SERPL-SCNC: 137 MMOL/L (ref 136–145)
WBC # BLD AUTO: 16.8 X10(3) UL (ref 4–11)

## 2024-06-22 PROCEDURE — 99221 1ST HOSP IP/OBS SF/LOW 40: CPT | Performed by: NEUROLOGICAL SURGERY

## 2024-06-22 PROCEDURE — 99232 SBSQ HOSP IP/OBS MODERATE 35: CPT | Performed by: STUDENT IN AN ORGANIZED HEALTH CARE EDUCATION/TRAINING PROGRAM

## 2024-06-22 RX ORDER — POLYETHYLENE GLYCOL 3350 17 G/17G
17 POWDER, FOR SOLUTION ORAL ONCE
Status: COMPLETED | OUTPATIENT
Start: 2024-06-22 | End: 2024-06-22

## 2024-06-22 RX ORDER — ECHINACEA PURPUREA EXTRACT 125 MG
1 TABLET ORAL
Status: DISCONTINUED | OUTPATIENT
Start: 2024-06-22 | End: 2024-06-25

## 2024-06-22 RX ORDER — ONDANSETRON 2 MG/ML
4 INJECTION INTRAMUSCULAR; INTRAVENOUS EVERY 6 HOURS PRN
Status: DISCONTINUED | OUTPATIENT
Start: 2024-06-22 | End: 2024-06-25

## 2024-06-22 NOTE — PLAN OF CARE
Assumed patient care 0730  Patient alert and oriented X4, can be forgetful  On room air-2L NC, VSS, AP on tele  HA pain managed with PRN Oxy and Zofran  Patient is up with 2 max assist pivot/Suzanne Steady to chair, voiding per external male catheter, no BM this shift  Tolerating diet  Abdominal lap sites intact, see flowsheets   Head incision intact, see flowsheets  PT recommending CHRISTOS  Patient and family at beside updated on plan of care     Problem: CARDIOVASCULAR - ADULT  Goal: Maintains optimal cardiac output and hemodynamic stability  Description: INTERVENTIONS:  - Monitor vital signs, rhythm, and trends  - Monitor for bleeding, hypotension and signs of decreased cardiac output  - Evaluate effectiveness of vasoactive medications to optimize hemodynamic stability  - Monitor arterial and/or venous puncture sites for bleeding and/or hematoma  - Assess quality of pulses, skin color and temperature  - Assess for signs of decreased coronary artery perfusion - ex. Angina  - Evaluate fluid balance, assess for edema, trend weights  Outcome: Progressing  Goal: Absence of cardiac arrhythmias or at baseline  Description: INTERVENTIONS:  - Continuous cardiac monitoring, monitor vital signs, obtain 12 lead EKG if indicated  - Evaluate effectiveness of antiarrhythmic and heart rate control medications as ordered  - Initiate emergency measures for life threatening arrhythmias  - Monitor electrolytes and administer replacement therapy as ordered  Outcome: Progressing     Problem: RESPIRATORY - ADULT  Goal: Achieves optimal ventilation and oxygenation  Description: INTERVENTIONS:  - Assess for changes in respiratory status  - Assess for changes in mentation and behavior  - Position to facilitate oxygenation and minimize respiratory effort  - Oxygen supplementation based on oxygen saturation or ABGs  - Provide Smoking Cessation handout, if applicable  - Encourage broncho-pulmonary hygiene including cough, deep breathe, Incentive  Spirometry  - Assess the need for suctioning and perform as needed  - Assess and instruct to report SOB or any respiratory difficulty  - Respiratory Therapy support as indicated  - Manage/alleviate anxiety  - Monitor for signs/symptoms of CO2 retention  Outcome: Progressing

## 2024-06-22 NOTE — PLAN OF CARE
NURSING ADMISSION NOTE      Patient admitted via Cart  Oriented to room.  Safety precautions initiated.  Bed in low position.  Call light in reach.    Assumed pt care around 1930  A&O x4  RA, 1 L NC @ night, non-productive cough  Tele: A-paced  Abdominal lap sites intact, see flowsheets   Head incision intact, see flowsheets  Afebrile during shift  Pain controlled with PO oxycodone PRN  Nausea controlled with IV Zofran  Cardiac diet  External cath to void  IV antibx running per order  PT/OT to eval  Pt updated on POC  Call light within reach      Problem: RESPIRATORY - ADULT  Goal: Achieves optimal ventilation and oxygenation  Description: INTERVENTIONS:  - Assess for changes in respiratory status  - Assess for changes in mentation and behavior  - Position to facilitate oxygenation and minimize respiratory effort  - Oxygen supplementation based on oxygen saturation or ABGs  - Provide Smoking Cessation handout, if applicable  - Encourage broncho-pulmonary hygiene including cough, deep breathe, Incentive Spirometry  - Assess the need for suctioning and perform as needed  - Assess and instruct to report SOB or any respiratory difficulty  - Respiratory Therapy support as indicated  - Manage/alleviate anxiety  - Monitor for signs/symptoms of CO2 retention  Outcome: Progressing     Problem: CARDIOVASCULAR - ADULT  Goal: Maintains optimal cardiac output and hemodynamic stability  Description: INTERVENTIONS:  - Monitor vital signs, rhythm, and trends  - Monitor for bleeding, hypotension and signs of decreased cardiac output  - Evaluate effectiveness of vasoactive medications to optimize hemodynamic stability  - Monitor arterial and/or venous puncture sites for bleeding and/or hematoma  - Assess quality of pulses, skin color and temperature  - Assess for signs of decreased coronary artery perfusion - ex. Angina  - Evaluate fluid balance, assess for edema, trend weights  Outcome: Progressing  Goal: Absence of cardiac  arrhythmias or at baseline  Description: INTERVENTIONS:  - Continuous cardiac monitoring, monitor vital signs, obtain 12 lead EKG if indicated  - Evaluate effectiveness of antiarrhythmic and heart rate control medications as ordered  - Initiate emergency measures for life threatening arrhythmias  - Monitor electrolytes and administer replacement therapy as ordered  Outcome: Progressing     Problem: GENITOURINARY - ADULT  Goal: Absence of urinary retention  Description: INTERVENTIONS:  - Assess patient’s ability to void and empty bladder  - Monitor intake/output and perform bladder scan as needed  - Follow urinary retention protocol/standard of care  - Consider collaborating with pharmacy to review patient's medication profile  - Implement strategies to promote bladder emptying  Outcome: Progressing     Problem: METABOLIC/FLUID AND ELECTROLYTES - ADULT  Goal: Glucose maintained within prescribed range  Description: INTERVENTIONS:  - Monitor Blood Glucose as ordered  - Assess for signs and symptoms of hyperglycemia and hypoglycemia  - Administer ordered medications to maintain glucose within target range  - Assess barriers to adequate nutritional intake and initiate nutrition consult as needed  - Instruct patient on self management of diabetes  Outcome: Progressing  Goal: Electrolytes maintained within normal limits  Description: INTERVENTIONS:  - Monitor labs and rhythm and assess patient for signs and symptoms of electrolyte imbalances  - Administer electrolyte replacement as ordered  - Monitor response to electrolyte replacements, including rhythm and repeat lab results as appropriate  - Fluid restriction as ordered  - Instruct patient on fluid and nutrition restrictions as appropriate  Outcome: Progressing  Goal: Hemodynamic stability and optimal renal function maintained  Description: INTERVENTIONS:  - Monitor labs and assess for signs and symptoms of volume excess or deficit  - Monitor intake, output and patient  weight  - Monitor urine specific gravity, serum osmolarity and serum sodium as indicated or ordered  - Monitor response to interventions for patient's volume status, including labs, urine output, blood pressure (other measures as available)  - Encourage oral intake as appropriate  - Instruct patient on fluid and nutrition restrictions as appropriate  Outcome: Progressing

## 2024-06-22 NOTE — PROGRESS NOTES
[facility]   part of [facility]     Hospitalist Progress Note     Clay Orellana Patient Status:  Inpatient    1945 MRN [id number]   Location [facility] 7NE-A Attending Clay, Rigo Claudio, *   Hosp Day # 1 PCP Sachin Meza MD     Chief Complaint: Weakness    Subjective:      - Pt endorsing ongoing cough, congestion, generalized weakness. Denies new numbness/tingling/weakness, cp, abd pain    Objective:    Review of Systems:   A comprehensive review of systems was completed; pertinent positive and negatives stated in subjective.    Vital signs:  Temp:  [98 °F (36.7 °C)-100.6 °F (38.1 °C)] 98.6 °F (37 °C)  Pulse:  [60-75] 60  Resp:  [17-24] 18  BP: (137-199)/(58-95) 153/65  SpO2:  [91 %-95 %] 93 %    Physical Exam:    General: No acute distress  Respiratory: no wheezes, no rhonchi  Cardiovascular: S1, S2, regular rate and rhythm  Abdomen: Soft, Non-tender, non-distended, positive bowel sounds  Neuro: No new focal deficits.   Extremities: no edema    Diagnostic Data:    Labs:  Recent Labs   Lab 24  0644 24  0442 24  1511 24  0550   WBC  --  16.1* 15.7* 16.8*   HGB  --  11.7* 12.3* 12.3*   MCV  --  97.0 95.7 95.6   PLT  --  200.0 204.0 188.0   INR 1.08  --   --   --        Recent Labs   Lab 24  0442 24  1511 24  0550   * 119* 119*   BUN 22 24* 22   CREATSERUM 1.03 1.15 1.01   CA 8.2* 8.4* 8.2*   ALB  --  2.7*  --     138 137   K 4.3 4.1 4.1    107 106   CO2 21.0 25.0 25.0   ALKPHO  --  70  --    AST  --  17  --    ALT  --  16  --    BILT  --  0.7  --    TP  --  6.1*  --        Estimated Creatinine Clearance: 61.1 mL/min (based on SCr of 1.01 mg/dL).    No results for input(s): \"TROP\", \"TROPHS\", \"CK\" in the last 168 hours.    Recent Labs   Lab 24  0644   PTP 14.0   INR 1.08                  Microbiology    No results found for this visit on 24.      Imaging: Reviewed in Epic.    Medications:    acidophilus  1 capsule Oral  Daily    aspirin  81 mg Oral Daily    atorvastatin  40 mg Oral Nightly    [START ON 6/24/2024] cholecalciferol  5,000 Units Oral Once per day on Monday Wednesday Friday    cyanocobalamin  500 mcg Oral Daily    donepezil  10 mg Oral Nightly    finasteride  5 mg Oral Daily    flecainide  100 mg Oral BID    fluticasone-umeclidin-vilant  1 puff Inhalation Daily    metoprolol tartrate  25 mg Oral 2x Daily(Beta Blocker)    multivitamin with minerals  1 tablet Oral Daily    rivaroxaban  20 mg Oral Nightly    sertraline  25 mg Oral Daily    tamsulosin  0.4 mg Oral After dinner    guaiFENesin ER  600 mg Oral BID    cefTRIAXone  1 g Intravenous Q24H    azithromycin  500 mg Oral Daily       Assessment & Plan:      #Multifocal PNA with weakness  -chest xray reviewed and showing bilateral scattered infiltrate  -Shunt xray reviewed and no significant kink present  -weakness 2/2 post-operative PNA, unlikely to be encephalitis/ meningitis as no neurologic deficits or headache  -NSGY on consult, no acute neurosurgical intervention recommended currently   -CT brain without acute findings   -antibiotics: azithromycin, roephin  -Tylenol prn, duo nebs prn, mucinex  -blood cx pending  -obtain sputum cx  -maintain SpO2 >88% given hx COPD    #NPH s/p  shunt     #Normocytic anemia  -hgb 12.3: baseline 11-13  -no signs acute bleeding  -cont to monitor CBC     #Hypoalbuminemia  -albumin 2.7     #HLD  -cont home atorvastatin     #Dementia  -cont home donepezil     #BPH  -cont hme finasteride, tamsulosin     #A-fib  -cont home flecainide, metoprolol, rivaroxaban     #Aniety  -cont home sertraline     #COPD  -cont home trelegy      Rigo Williamson MD    Supplementary Documentation:     Quality:  DVT Mechanical Prophylaxis:        DVT Pharmacologic Prophylaxis   Medication    rivaroxaban (Xarelto) tab 20 mg         DVT Pharmacologic prophylaxis: Aspirin 81 mg      Code Status: Full Code  Dotson: External urinary catheter in place  Dotson  Duration (in days):   Central line:    JAQUELINE:     The 21st Century Cures Act makes medical notes like these available to patients in the interest of transparency. Please be advised this is a medical document. Medical documents are intended to carry relevant information, facts as evident, and the clinical opinion of the practitioner. The medical note is intended as peer to peer communication and may appear blunt or direct. It is written in medical language and may contain abbreviations or verbiage that are unfamiliar.             **Certification      PHYSICIAN Certification of Need for Inpatient Hospitalization - Initial Certification    Patient will require inpatient services that will reasonably be expected to span two midnight's based on the clinical documentation in H+P.   Based on patients current state of illness, I anticipate that, after discharge, patient will require TBD.

## 2024-06-22 NOTE — CONSULTS
[facility] Consult    Clay Orellana Patient Status:  Inpatient    1945 MRN [id number]   Location [address] Attending Rigo Williamson, *   Hosp Day # 1 PCP Sachin Meza MD     REASON FOR CONSULTATION: Status post  shunt, weakness, pneumonia Dr. Pringle: Right  shunt insertion    HISTORY OF PRESENT ILLNESS:  Clay Orellana is a(n) 79 year old male who is well-known to the neurosurgery service and is status post 2024: Dr. Pringle: Right  shunt insertion.    Family had called with concerns yesterday for patient's progressive weakness as well as a productive cough.  Difficulty with coming to a stand and ambulating.  Patient was recommended to go to the ED given weakness and productive cough.    CT head and x-ray shunt series without significant abnormality.  X-ray chest with concern for multifocal pneumonia.    The patient endorses he feels minimally improved since hospitalization.  Endorses no nausea or vomiting.  No headaches.    PAST MEDICAL HISTORY:  Past Medical History:    Acute nausea with nonbilious vomiting    Anxiety    Arrhythmia    Arthritis    Bilateral inguinal hernia without obstruction or gangrene    BPH (benign prostatic hyperplasia)    Calculus of kidney    Essential hypertension    High blood pressure    High cholesterol    Muscle weakness    Stroke (HCC)    TIA    Visual impairment       PAST SURGICAL HISTORY:  Past Surgical History:   Procedure Laterality Date    Anesth,pacemaker insertion  2023    Appendectomy      Appendectomy      Colonoscopy      Colonoscopy N/A 2022    Procedure: COLONOSCOPY;  Surgeon: Sudarshan Gilbert MD;  Location:  ENDOSCOPY    Colonoscopy      Cyst removal N/A     Cyst removed behind back of neck    Cyst removal Right 2020    Removed from right upper back - just below shoulder    Hernia surgery  2022    Skin surgery      Vasectomy         FAMILY HISTORY:  family history includes ALS in his brother; DM  in his sister; Diabetes in his mother; Heart Attack in his father; MS in his sister; No Known Problems in his daughter, maternal grandfather, maternal grandmother, paternal grandfather, paternal grandmother, and son; smoker in his brother.    SOCIAL HISTORY:   reports that he has quit smoking. His smoking use included cigarettes. He has never been exposed to tobacco smoke. He has never used smokeless tobacco. He reports that he does not drink alcohol and does not use drugs.    ALLERGIES:  Allergies   Allergen Reactions    Kiwi Extract ITCHING     Inner ears itch       MEDICATIONS:  Medications Prior to Admission   Medication Sig Dispense Refill Last Dose    metoprolol tartrate 25 MG Oral Tab Take 1 tablet (25 mg total) by mouth 2 (two) times daily.   6/21/2024    tamsulosin 0.4 MG Oral Cap Take 1 capsule (0.4 mg total) by mouth After dinner.   6/21/2024    sertraline (ZOLOFT) 25 MG Oral Tab Take 1 tablet (25 mg total) by mouth daily. 30 tablet 1 6/21/2024    atorvastatin 40 MG Oral Tab Take 1 tablet (40 mg total) by mouth nightly. 90 tablet 3 6/21/2024    midodrine 5 MG Oral Tab Take 1 tablet (5 mg total) by mouth in the morning and 1 tablet (5 mg total) at noon and 1 tablet (5 mg total) in the evening. 90 tablet 0 Past Month    aspirin 81 MG Oral Tab EC Take 1 tablet (81 mg total) by mouth daily.   Past Month    rivaroxaban 20 MG Oral Tab Take 1 tablet (20 mg total) by mouth nightly.   Past Week    donepezil 10 MG Oral Tab Take 1 tablet (10 mg total) by mouth nightly. 90 tablet 1 6/21/2024    fluticasone-umeclidin-vilant (TRELEGY ELLIPTA) 100-62.5-25 MCG/ACT Inhalation Aerosol Powder, Breath Activated Inhale 1 puff into the lungs daily.   6/21/2024    ipratropium 0.03 % Nasal Solution 2 sprays by Nasal route Q12H.   6/21/2024    flecainide 100 MG Oral Tab Take 1 tablet (100 mg total) by mouth 2 (two) times daily.   6/21/2024    finasteride 5 MG Oral Tab TAKE ONE TABLET BY MOUTH ONE TIME DAILY 90 tablet 2 6/21/2024     Cholecalciferol 125 MCG (5000 UT) Oral Tab Take 1 tablet (5,000 Units total) by mouth 3 (three) times a week. Three times a week. MWF   Past Week    cyanocobalamin 500 MCG Oral Tab Take 1 tablet (500 mcg total) by mouth daily.   Past Week    Multiple Vitamins-Minerals (MULTI-VITAMIN/MINERALS) Oral Tab Take 1 tablet by mouth daily.   6/21/2024    Acidophilus/Pectin Oral Cap Take 1 capsule by mouth daily.   6/21/2024    acetaminophen 500 MG Oral Tab Take 1 tablet (500 mg total) by mouth every 6 (six) hours as needed.   6/21/2024     Current Facility-Administered Medications   Medication Dose Route Frequency    ondansetron (Zofran) 4 MG/2ML injection 4 mg  4 mg Intravenous Q6H PRN    acidophilus (Probiotic) cap/tab 1 capsule  1 capsule Oral Daily    aspirin DR tab 81 mg  81 mg Oral Daily    atorvastatin (Lipitor) tab 40 mg  40 mg Oral Nightly    [START ON 6/24/2024] cholecalciferol (Vitamin D3) tab 5,000 Units  5,000 Units Oral Once per day on Monday Wednesday Friday    cyanocobalamin (Vitamin B12) tab 500 mcg  500 mcg Oral Daily    donepezil (Aricept) tab 10 mg  10 mg Oral Nightly    finasteride (Proscar) tab 5 mg  5 mg Oral Daily    flecainide (Tambocor) tab 100 mg  100 mg Oral BID    fluticasone-umeclidin-vilant (Trelegy Ellipta) 100-62.5-25 MCG/ACT inhaler 1 puff  1 puff Inhalation Daily    metoprolol tartrate (Lopressor) tab 25 mg  25 mg Oral 2x Daily(Beta Blocker)    multivitamin with minerals (Thera M Plus) tab 1 tablet  1 tablet Oral Daily    rivaroxaban (Xarelto) tab 20 mg  20 mg Oral Nightly    sertraline (Zoloft) tab 25 mg  25 mg Oral Daily    tamsulosin (Flomax) cap 0.4 mg  0.4 mg Oral After dinner    acetaminophen (Tylenol Extra Strength) tab 500 mg  500 mg Oral Q4H PRN    guaiFENesin ER (Mucinex) 12 hr tab 600 mg  600 mg Oral BID    ipratropium-albuterol (Duoneb) 0.5-2.5 (3) MG/3ML inhalation solution 3 mL  3 mL Nebulization Q6H PRN    cefTRIAXone (Rocephin) 1 g in sodium chloride 0.9% 100 mL IVPB-ADDV   1 g Intravenous Q24H    azithromycin (Zithromax) tab 500 mg  500 mg Oral Daily    oxyCODONE immediate release tab 5 mg  5 mg Oral Q6H PRN       REVIEW OF SYSTEMS:  Comprehensive Review of Systems obtained, and is negative other than that mentioned in the History of Present Illness.      PHYSICAL EXAMINATION:  VITAL SIGNS: /65 (BP Location: Right arm)   Pulse 60   Temp 98.6 °F (37 °C) (Oral)   Resp 17   Wt 205 lb 4 oz (93.1 kg)   SpO2 91%   BMI 28.63 kg/m²   GENERAL:  Pleasant patient is a 79 year old male in no acute distress.  Lying comfortably in bed.  HEENT:  Normocephalic, atraumatic  RESP: Easy and even   NEUROLOGICAL: The patient is awake and alert.  Answers questions appropriately.  Speech fluent.  Follows commands.    Incision: Cranial right superior neck incision CDI without edema, erythema or discharge.  Dermabond in place.    Moving bilateral upper and lower extremities, spontaneously full resistance    Shunt: Refills appropriately    DIAGNOSTIC DATA:   Lab Results   Component Value Date    WBC 16.8 06/22/2024    HGB 12.3 06/22/2024    HCT 36.7 06/22/2024    .0 06/22/2024    CREATSERUM 1.01 06/22/2024    BUN 22 06/22/2024     06/22/2024    K 4.1 06/22/2024     06/22/2024    CO2 25.0 06/22/2024     06/22/2024    CA 8.2 06/22/2024    ALB 2.7 06/21/2024    ALKPHO 70 06/21/2024    BILT 0.7 06/21/2024    TP 6.1 06/21/2024    AST 17 06/21/2024    ALT 16 06/21/2024    MG 2.0 06/22/2024       IMAGING:    Study Result    Narrative   PROCEDURE:  CT BRAIN OR HEAD (41569)     COMPARISON:  [facility] , CT, CT BRAIN OR HEAD (73187), 6/18/2024, 5:19 AM.     INDICATIONS:  weakness/ fatigue     TECHNIQUE:  Noncontrast CT scanning is performed through the brain. Dose reduction techniques were used. Dose information is transmitted to the ACR (American College of Radiology) NRDR (National Radiology Data Registry) which includes the Dose Index  Registry.     PATIENT STATED HISTORY: (As  transcribed by Technologist)  Weakness/fatigue.  Pt states he had a shunt placed in his brain on Monday         FINDINGS:    VENTRICLES/SULCI:  Stable symmetric enlargement of ventricular system and cortical sulci with right frontal ventriculostomy catheter in place.  Decreased intraventricular air in decreased subdural air.  No extra-axial fluid collection or midline shift.  INTRACRANIAL:  No intracranial mass or hemorrhage.  No sign of acute territorial infarction.  Gray-white matter differentiation is within normal limits.  SINUSES:           No sign of acute sinusitis.    MASTOIDS:          No sign of acute inflammation.  SKULL:             No evidence for fracture or osseous abnormality.  OTHER:             None.                   Impression   CONCLUSION:  No acute intracranial pathology.  Right frontal ventriculostomy catheter remains in place with decreased intracranial air.           LOCATION:  [facility]        Dictated by (CST): Suleman Nieto MD on 6/21/2024 at 5:24 PM      Finalized by (CST): Suleman Nieto MD on 6/21/2024 at 5:28 PM         Study Result    Narrative   PROCEDURE:  XR CHEST AP/PA (1 VIEW) (CPT=71045)     TECHNIQUE:  AP chest radiograph was obtained.     COMPARISON:  [facility] , XR, XR CHEST PA + LAT CHEST (EFS=02143), 8/02/2023, 5:59 PM.     INDICATIONS:  weakness/ fatigue     PATIENT STATED HISTORY: (As transcribed by Technologist)  Patient stated feeling weak today and shunt was placed on 06/17/2024.         FINDINGS:  There is ground-glass airspace disease within the right lung base concerning for pneumonia.  Follow-up imaging after treatment is recommended to ensure resolution.  There is a partially visualized right-sided  shunt catheter.  Left chest  pacemaker noted.  Cardiac silhouette is normal in size.  There is a left suprahilar ground-glass airspace opacity which may also represent pneumonia.  Trace right pleural effusion.  No measurable pneumothorax.                   Impression    CONCLUSION:  Imaging findings are suspicious for multifocal pneumonia.  Follow-up imaging after treatment is recommended to ensure resolution.        LOCATION:  [id number]                 Dictated by (CST): Stromberg, LeRoy, MD on 6/21/2024 at 4:15 PM      Finalized by (CST): Stromberg, LeRoy, MD on 6/21/2024 at 4:17 PM      Study Result    Narrative   PROCEDURE:  XR SHUNT POSITION SERIES  (CPT=70250/86081/78963/08242)     COMPARISON:  [facility] , XR, XR SHUNT POSITION SERIES  (CPT=70250/08628/53460/50487), 6/18/2024, 10:47 AM.     INDICATIONS:  evaluate shunt integrity     PATIENT STATED HISTORY: (As transcribed by Technologist)  Patient stated feeling weak today and shunt was placed on 06/17/2024.         FINDINGS:  A right-sided  shunt is noted.  This courses along the right neck, right chest, right abdomen, and terminates within the right paracentral abdomen.  There is a minimal coil at the mid right abdomen without significant kinking.  No definitive   shunt discontinuity is seen.  Mild bibasilar atelectasis/scarring.  Left chest pacemaker noted.                      Impression   CONCLUSION:  See above.        LOCATION:  [id number]           Dictated by (CST): Stromberg, LeRoy, MD on 6/21/2024 at 4:06 PM      Finalized by (CST): Stromberg, LeRoy, MD on 6/21/2024 at 4:11 PM     ASSESSMENT:  Patient Active Problem List   Diagnosis    Hypertension    Primary osteoarthritis involving multiple joints    Anxiety    Bilateral inguinal hernia without obstruction or gangrene    Arteriovenous malformation of colon, ascending colon, May 3, 2022    Benign prostatic hyperplasia    Post-operative state    TIA (transient ischemic attack)    Hydrocephalus ex vacuo ([facility])    Fall    Dysarthria    Paroxysmal atrial fibrillation (HCC)    Intermittent constipation    Hyperlipidemia    Chronic obstructive pulmonary disease, unspecified ([facility])    Mood disorder (HCC)    Weakness generalized    NPH (normal pressure hydrocephalus) ([facility])     Fall, initial encounter    Chronic right-sided low back pain without sciatica    Acute nausea with nonbilious vomiting    Radiculopathy    Acute pain of right shoulder    Orthostatic hypotension    Abnormal chest xray    Degenerative lumbar spinal stenosis    Generalized anxiety disorder    Coronary artery disease involving native coronary artery of native heart without angina pectoris    Memory impairment    Dementia (HCC)    Cerebrovascular disease    Multifocal pneumonia     79-year-old male status post, 6/17/2024: Dr. Pringle: Right  shunt insertion, who presented to the ED with weakness and productive cough.  CT head and x-ray shunt series without significant abnormality.  Shunt refills appropriately.  Less likely shunt malfunction.  X-ray chest with concern for multifocal pneumonia.  Leg weakness and productive cough secondary to pneumonia.    Plan:  1.  Medical management per hospitalist  2.  No acute neurosurgical intervention recommend this time  3.  Antibiotic management per hospitalist  4.  Discussed with Dr. Meza    The patient was seen and examined with Dr. Meza.  The patient agreed to the plan and was very appreciative.      Teodora Castle M.S., PA-C  [address], [address] [address]  [phone]  6/22/2024 9:21 AM    Dragon speech recognition software was used to prepare this note. If a word or phrase is confusing, it is likely due to a failure of recognition. Please contact me with any questions or clarifications.    Is this a shared or split note between Advanced Practice Provider and Physician? Yes

## 2024-06-22 NOTE — OCCUPATIONAL THERAPY NOTE
OCCUPATIONAL THERAPY EVALUATION - INPATIENT     Room Number: 7621/7621-A  Evaluation Date: 6/22/2024  Type of Evaluation: Initial  Presenting Problem: multifocal pneumonia    Physician Order: IP Consult to Occupational Therapy  Reason for Therapy: ADL/IADL Dysfunction and Discharge Planning    OCCUPATIONAL THERAPY ASSESSMENT   Patient is currently functioning below baseline with toileting, bathing, upper body dressing, lower body dressing, bed mobility, transfers, stating sitting balance, dynamic sitting balance, static standing balance, dynamic standing balance, maintaining seated position, and functional standing tolerance. Prior to admission, patient's baseline is supervision for ADLs , assistance for showering, prn assistance with being pushed in rollator to/ from dining room .  Patient is requiring moderate assist and maximum assist as a result of the following impairments: decreased functional reach, pain, impaired sitting and standing balance, impaired motor planning, and slowed processing . Occupational Therapy will continue to follow for duration of hospitalization.    Patient will benefit from continued skilled OT Services to promote return to prior level of function and safety with continuous assistance and gradual rehabilitative therapy     Recent hospitalizations:  6/17-6/18  shunt placement, DC home with [facility]  5/1-5/13 Fall, weakness, lumbar drain trial > [place]       History Related to Current Admission: Patient is a 79 year old male admitted on 6/21/2024 with Presenting Problem: multifocal pneumonia. Co-Morbidities : NPH s/p  shunt placement, JENNA, dementia, PAF,TIA, lumbar spinal stenosis    WEIGHT BEARING RESTRICTION  Weight Bearing Restriction: None       Recommendations for nursing staff:   Transfers: 2 ppl, Suzanne Steady  Toileting location: bedside commode or toilet with Suzanne Steady    EVALUATION SESSION:  Patient Start of Session: supine in bed  FUNCTIONAL TRANSFER ASSESSMENT  Sit to Stand:  Edge of Bed  Edge of Bed: Maximum Assist (MOD A x2)    BED MOBILITY  Rolling: Moderate Assist  Supine to Sit : Moderate Assist  Scooting: MOD A    BALANCE ASSESSMENT  Static Sitting: Contact Guard Assist  Sitting Bilateral: Moderate Assist  Static Standing: Minimal Assist  Standing Bilateral: Moderate Assist    FUNCTIONAL ADL ASSESSMENT  LB Dressing Seated: Maximum Assist  LB Dressing Standing: Maximum Assist      ACTIVITY TOLERANCE: stable in room air                         O2 SATURATIONS       COGNITION  Arousal/Alertness:  appropriate responses to stimuli  Attention Span:  difficulty dividing attention  Orientation Level:  oriented x4  Memory:  impaired working memory and decreased short term memory  Following Commands:  follows one step commands with increased time and follows one step commands with repetition  Initiation: cues to initiate tasks and hand over hand to initiate tasks  Motor Planning: impaired  Awareness of Errors:  decreased awareness of errors   Awareness of Deficits:  decreased awareness of deficits  Problem Solving:  assistance required to identify errors made, assistance required to generate solutions, and assistance required to implement solutions    Upper Extremity   ROM: within functional limits AROM intact except finger flexion contractures R 4th and 5th digits  Strength: within functional limits grossly 5/5 BUEs  Coordination  Gross motor: impaired due to slowed speed  Fine motor: impaired   Sensation: denied UE deficits    EDUCATION PROVIDED  Patient: Role of Occupational Therapy; Plan of Care; Discharge Recommendations; Fall Prevention; Functional Transfer Techniques  Patient's Response to Education: Verbalized Understanding; Requires Further Education    Equipment used: rw, gait belt  Demonstrates functional use, Would benefit from additional trial yes     Therapist comments: Patient required moderate cueing for sequencing bed mobility and MOD A. Loss of balance to R side with MOD A  to correct x1. Patient was educated to try to maintain midline, he was able to do so with SBA/CGA and intermittent cueing. MAX A to don socks. MOD A x2 to stand with repeated verbal and tactile cues for initiation and sequencing. Patient noted to lean to L in standing on both trials, however was improved on second trial. Patient able to sidestep and then turn and step to sit in chair  ( in preparation for commode transfers ) with MOD tactile and verbal cues and increased time due to impaired coordination/motor planning.  Patient was left in chair with needs met and alarm on . Staff updated to use Suzanne Steady for ADL transfers.     Patient End of Session: SCDs in place;All patient questions and concerns addressed;RN aware of session/findings;Call light within reach;Needs met;Up in chair;Alarm set;Other (Comment) (LEs elevated)    OCCUPATIONAL PROFILE    HOME SITUATION  Type of Home: Assisted living facility ([facility]  Home Layout: One level  Lives With: Spouse;Staff 24 hours    Toilet and Equipment: Comfort height toilet;Grab bar  Shower/Tub and Equipment: Walk-in shower;Grab bar;Shower chair     Occupation/Status: retired     Drives: No  Patient Regularly Uses: Glasses    Prior Level of Function:   Patient lives at [facility] with his spouse and HH aide for showering. H is typically able to perform ADLs and functional mobility with use of rollator PRN at supervision level. Reports needing increased assistance from spouse recently with toileting, dressing, and mobility.       SUBJECTIVE   \"My feet aren't on the floor\"  ( feet were both on the floor )    PAIN ASSESSMENT  Rating: Other (Comment)  Location: incision  Management Techniques: Repositioning;Relaxation    OBJECTIVE  Precautions: Bed/chair alarm  Fall Risk: High fall risk      ASSESSMENTS    AM-PAC ‘6-Clicks’ Inpatient Daily Activity Short Form  -   Putting on and taking off regular lower body clothing?: A Lot  -   Bathing (including washing,  rinsing, drying)?: A Lot  -   Toileting, which includes using toilet, bedpan or urinal? : A Lot  -   Putting on and taking off regular upper body clothing?: A Lot  -   Taking care of personal grooming such as brushing teeth?: A Little  -   Eating meals?: A Little    AM-PAC Score:  Score: 14  Approx Degree of Impairment: 59.67%  Standardized Score (AM-PAC Scale): 33.39    ADDITIONAL TESTS     NEUROLOGICAL FINDINGS  Coordination - Rapid Alternating Movement: Right decreased speed; Other (Comment) (only with faster speed; when moving slowly R and L are symmetrical)     COGNITION ASSESSMENTS       PLAN  OT Treatment Plan: Endurance training;UE strengthening/ROM;Functional transfer training;ADL training;Balance activities;Energy conservation/work simplification techniques;Cognitive reorientation;Patient/Family education;Patient/Family training;Equipment eval/education;Compensatory technique education;Fine motor coordination activities  Rehab Potential : Good  Frequency: 3-5x/week  Number of Visits to Meet Established Goals: 5    ADL Goals   Patient will perform grooming: with stand by assist and while standing at sink  Patient will perform lower body dressing:  with min assist and with adaptive equipment PRN  Patient will perform toileting: with min assist    Functional Transfer Goals  Patient will transfer from sit to supine:  with stand by assist  Patient will transfer from supine to sit:  with stand by assist  Patient will transfer to toilet:  with mod assist    UE Exercise Program Goal  Patient will be supervision with bilateral AROM HEP (home exercise program).        Patient Evaluation Complexity Level:   Occupational Profile/Medical History MODERATE - Expanded review of history including review of medical or therapy record   Specific performance deficits impacting engagement in ADL/IADL MODERATE  3 - 5 performance deficits   Client Assessment/Performance Deficits MODERATE - Comorbidities and min to mod  modifications of tasks    Clinical Decision Making MODERATE - Analysis of occupational profile, detailed assessments, several treatment options    Overall Complexity MODERATE     OT Session Time: 30 minutes    Therapeutic Activity: 25 minutes

## 2024-06-22 NOTE — PHYSICAL THERAPY NOTE
PHYSICAL THERAPY EVALUATION - INPATIENT     Room Number: 7621/7621-A  Evaluation Date: 6/22/2024  Type of Evaluation: Initial  Physician Order: PT Eval and Treat    Presenting Problem: increased weakness --> pneumonia  Co-Morbidities : NPH s/p  shunt placement, JENNA, dementia, PAF,TIA, lumbar spinal stenosis  Reason for Therapy: Mobility Dysfunction and Discharge Planning    Recent Admissions:  6/17-6/18  shunt placement, DC home with [facility]  5/1-5/13 Fall, weakness, lumbar drain trial > [place]    PHYSICAL THERAPY ASSESSMENT   Patient is currently functioning below baseline with bed mobility, transfers, and gait.  Prior to admission, patient's baseline is supervision w/ RW.  Patient is requiring moderate assist and maximum assist as a result of the following impairments: decreased functional strength, decreased endurance/aerobic capacity, impaired static and dynamic sitting/standing balance, impaired coordination, impaired motor planning, decreased muscular endurance, and cognitive deficits (incr processing time).  Physical Therapy will continue to follow for duration of hospitalization.    Patient will benefit from continued skilled PT Services to promote return to prior level of function and safety with continuous assistance and gradual rehabilitative therapy .    PLAN  PT Treatment Plan: Bed mobility;Body mechanics;Coordination;Endurance;Energy conservation;Patient education;Family education;Gait training;Neuromuscular re-educate;Range of motion;Strengthening;Transfer training;Balance training  Rehab Potential : Fair  Frequency (Obs): 3-5x/week  Number of Visits to Meet Established Goals: 7      CURRENT GOALS    Goal #1 Patient is able to demonstrate supine - sit EOB @ level: supervision     Goal #2 Patient is able to demonstrate transfers Sit to/from Stand at assistance level: supervision     Goal #3 Patient is able to ambulate 50 feet with assist device: walker - rolling at assistance level: supervision      Goal #4    Goal #5    Goal #6    Goal Comments: Goals established on 2024    PHYSICAL THERAPY MEDICAL/SOCIAL HISTORY  History related to current admission: Patient is a 79 year old male admitted on 2024 from home for increased weakness.  Pt diagnosed with pneumonia.    HOME SITUATION  Type of Home: Independent living facility ([facility])   Home Layout: One level                Lives With: Spouse  Drives: No  Patient Owned Equipment: Rollator;Rolling walker  Patient Regularly Uses: Glasses    Prior Level of [place]:   Pt is typically supervision assist w/ adl's, gait w/ rolling walker w/I the apartment and rollator for longer distances. Wife provides supervision and will push pt down to dining room in his rollator if he cannot make it.  Pt reports that's more often lately.  Pt has a bath aide to assist w/ showering.     SUBJECTIVE  \"My feet need to be touching the ground in order for me to stand.\" - his feet were touching the ground    OBJECTIVE  Precautions: Bed/chair alarm  Fall Risk: High fall risk    WEIGHT BEARING RESTRICTION  Weight Bearing Restriction: None                PAIN ASSESSMENT  Ratin          COGNITION  Arousal/Alertness:  appropriate responses to stimuli  Attention Span:  difficulty dividing attention  Orientation Level:  oriented x4  Memory:  impaired working memory, decreased long term memory, and decreased short term memory  Following Commands:  follows one step commands with increased time and follows one step commands with repetition  Initiation: cues to initiate tasks and hand over hand to initiate tasks  Motor Planning: impaired  Safety Judgement:  decreased awareness of need for assistance and decreased awareness of need for safety  Awareness of Errors:  assistance required to identify errors made, assistance required to correct errors made, and decreased awareness of errors   Awareness of Deficits:  decreased awareness of deficits  Problem Solving:  assistance  required to identify errors made, assistance required to generate solutions, and assistance required to implement solutions    RANGE OF MOTION AND STRENGTH ASSESSMENT  Upper extremity ROM and strength see OT note for specifics    Lower extremity ROM is within functional limits     Lower extremity strength is within functional limits except for the following:    Right Hip flexion  3/5  Left Hip flexion  4/5  Right Knee extension  3-/5  Left Knee extension  4-/5  Right Knee flexion  4-/5  Left Knee flexion  4/5  Right Dorsiflexion  3+/5  Left Dorsiflexion  4/5      BALANCE  Static Sitting: Fair -  Dynamic Sitting: Fair -  Static Standing: Poor  Dynamic Standing: Poor -    ADDITIONAL TESTS                                    ACTIVITY TOLERANCE                         O2 WALK  Oxygen Therapy  SPO2% on Room Air at Rest: 86  SPO2% on Oxygen at Rest: 93  At rest oxygen flow (liters per minute): 2    NEUROLOGICAL FINDINGS                        AM-PAC '6-Clicks' INPATIENT SHORT FORM - BASIC MOBILITY  How much difficulty does the patient currently have...  Patient Difficulty: Turning over in bed (including adjusting bedclothes, sheets and blankets)?: A Lot   Patient Difficulty: Sitting down on and standing up from a chair with arms (e.g., wheelchair, bedside commode, etc.): A Lot   Patient Difficulty: Moving from lying on back to sitting on the side of the bed?: A Lot   How much help from another person does the patient currently need...   Help from Another: Moving to and from a bed to a chair (including a wheelchair)?: A Lot   Help from Another: Need to walk in hospital room?: A Lot   Help from Another: Climbing 3-5 steps with a railing?: Total       AM-PAC Score:  Raw Score: 11   Approx Degree of Impairment: 72.57%   Standardized Score (AM-PAC Scale): 33.86   CMS Modifier (G-Code): CL    FUNCTIONAL ABILITY STATUS  Gait Assessment   Functional Mobility/Gait Assessment  Gait Assistance: Not tested    Skilled Therapy Provided      Bed Mobility:  Rolling: NT  Supine to sit: w/ HOB elevated maxA to reach static sitting EOB   Sit to supine: NT     Transfer Mobility:  Sit to stand: modA x 2 to RW   Stand to sit: modA x 2  Gait = NT    Therapist's Comments:   Patient presents sitting up in bed. Discussed role and goal of physical therapy in hospital setting. Pt in agreement to session. Reports he hasn't changed positions in over 24 hours. Reports he can barely move his legs because he is so weak. Throughout session, noted incr time to complete all tasks, incr processing time, poor motor planing.   Bed mobility at modA- incr time to complete task, and incr cues for sequencing of task. Once sitting EOB, had loss of balance to R side requiring modA to maintain midline. Pt cued on reaching midline sitting with upright posture- required cues throughout session to maintain. Able to maintain static sitting balance with close SBA/CGA. With height of bed slightly elevated, modA x 2 to RW. Pt noted to have lean to the left in standing, requiring verbal and tactile cues for midline posture. In standing, required tactile initiation to assist with sequencing/motor planning of side stepping and RW management/navigation. Pt returned to sitting EOB. Had loss of balance to left side in sitting. Sit to stand again modA x 2 to RW. Again demonstrates lean to the left. Completed side steps to the chair at modA x 2 - continues to have poor motor planning, requiring facilitation at hips to weight shift in order to side step. Upright in chair at end of session.   Recommend nursing staff use merry steady for transfers.     Exercise/Education Provided:  Bed mobility  Body mechanics  Energy conservation  Functional activity tolerated  Gait training  Neuromuscular re-educate  Posture  Transfer training    Patient End of Session: Up in chair;Needs met;Call light within reach;RN aware of session/findings;All patient questions and concerns addressed;Alarm set;Discussed  recommendations with /    Patient Evaluation Complexity Level:  History Moderate - 1 or 2 personal factors and/or co-morbidities   Examination of body systems Moderate - addressing a total of 3 or more elements   Clinical Presentation Moderate - Evolving   Clinical Decision Making Moderate - Evolving     PT Session Time: 40 minutes  Therapeutic Activity: 15 minutes  Neuromuscular Re-education: 15 minutes

## 2024-06-23 LAB
ANION GAP SERPL CALC-SCNC: 6 MMOL/L (ref 0–18)
ATRIAL RATE: 67 BPM
BASOPHILS # BLD AUTO: 0.02 X10(3) UL (ref 0–0.2)
BASOPHILS NFR BLD AUTO: 0.2 %
BUN BLD-MCNC: 23 MG/DL (ref 9–23)
CALCIUM BLD-MCNC: 7.9 MG/DL (ref 8.5–10.1)
CHLORIDE SERPL-SCNC: 106 MMOL/L (ref 98–112)
CO2 SERPL-SCNC: 24 MMOL/L (ref 21–32)
CREAT BLD-MCNC: 0.97 MG/DL
EGFRCR SERPLBLD CKD-EPI 2021: 78 ML/MIN/1.73M2 (ref 60–?)
EOSINOPHIL # BLD AUTO: 0.19 X10(3) UL (ref 0–0.7)
EOSINOPHIL NFR BLD AUTO: 1.6 %
ERYTHROCYTE [DISTWIDTH] IN BLOOD BY AUTOMATED COUNT: 12 %
GLUCOSE BLD-MCNC: 92 MG/DL (ref 70–99)
HCT VFR BLD AUTO: 31.8 %
HGB BLD-MCNC: 11 G/DL
IMM GRANULOCYTES # BLD AUTO: 0.07 X10(3) UL (ref 0–1)
IMM GRANULOCYTES NFR BLD: 0.6 %
LYMPHOCYTES # BLD AUTO: 0.67 X10(3) UL (ref 1–4)
LYMPHOCYTES NFR BLD AUTO: 5.7 %
MAGNESIUM SERPL-MCNC: 2.1 MG/DL (ref 1.6–2.6)
MCH RBC QN AUTO: 32.4 PG (ref 26–34)
MCHC RBC AUTO-ENTMCNC: 34.6 G/DL (ref 31–37)
MCV RBC AUTO: 93.5 FL
MONOCYTES # BLD AUTO: 1.59 X10(3) UL (ref 0.1–1)
MONOCYTES NFR BLD AUTO: 13.4 %
NEUTROPHILS # BLD AUTO: 9.31 X10 (3) UL (ref 1.5–7.7)
NEUTROPHILS # BLD AUTO: 9.31 X10(3) UL (ref 1.5–7.7)
NEUTROPHILS NFR BLD AUTO: 78.5 %
OSMOLALITY SERPL CALC.SUM OF ELEC: 285 MOSM/KG (ref 275–295)
P AXIS: 50 DEGREES
P-R INTERVAL: 200 MS
PHOSPHATE SERPL-MCNC: 2.5 MG/DL (ref 2.5–4.9)
PLATELET # BLD AUTO: 197 10(3)UL (ref 150–450)
POTASSIUM SERPL-SCNC: 3.9 MMOL/L (ref 3.5–5.1)
Q-T INTERVAL: 458 MS
QRS DURATION: 162 MS
QTC CALCULATION (BEZET): 483 MS
R AXIS: 118 DEGREES
RBC # BLD AUTO: 3.4 X10(6)UL
SODIUM SERPL-SCNC: 136 MMOL/L (ref 136–145)
T AXIS: 36 DEGREES
VENTRICULAR RATE: 67 BPM
WBC # BLD AUTO: 11.9 X10(3) UL (ref 4–11)

## 2024-06-23 PROCEDURE — 99232 SBSQ HOSP IP/OBS MODERATE 35: CPT | Performed by: STUDENT IN AN ORGANIZED HEALTH CARE EDUCATION/TRAINING PROGRAM

## 2024-06-23 RX ORDER — POLYETHYLENE GLYCOL 3350 17 G/17G
17 POWDER, FOR SOLUTION ORAL DAILY PRN
Status: DISCONTINUED | OUTPATIENT
Start: 2024-06-23 | End: 2024-06-25

## 2024-06-23 NOTE — PROGRESS NOTES
[facility]   part of [facility]     Hospitalist Progress Note     Clay Orellana Patient Status:  Inpatient    1945 MRN [id number]   Location [facility] 7NE-A Attending Clay, Rigo Claudio, *   Hosp Day # 2 PCP Sachin Meza MD     Chief Complaint: Weakness    Subjective:      - PT recommending [facility]   - Leukocytosis improving, Bcx NGTD, Afebrile satting well on Ra    - Denies other f/c, cp, sob, abd pain, n/v    Objective:    Review of Systems:   A comprehensive review of systems was completed; pertinent positive and negatives stated in subjective.    Vital signs:  Temp:  [97.6 °F (36.4 °C)-99.6 °F (37.6 °C)] 97.6 °F (36.4 °C)  Pulse:  [60-64] 60  Resp:  [18-21] 18  BP: (118-153)/(66-73) 153/73  SpO2:  [92 %-96 %] 94 %    Physical Exam:    General: No acute distress  Respiratory: no wheezes, no rhonchi  Cardiovascular: S1, S2, regular rate and rhythm  Abdomen: Soft, Non-tender, non-distended, positive bowel sounds  Neuro: No new focal deficits.   Extremities: no edema    Diagnostic Data:    Labs:  Recent Labs   Lab 24  0644 24  0442 24  1511 24  0550 24  0550   WBC  --  16.1* 15.7* 16.8* 11.9*   HGB  --  11.7* 12.3* 12.3* 11.0*   MCV  --  97.0 95.7 95.6 93.5   PLT  --  200.0 204.0 188.0 197.0   INR 1.08  --   --   --   --        Recent Labs   Lab 24  1511 24  0550 24  0550   * 119* 92   BUN 24* 22 23   CREATSERUM 1.15 1.01 0.97   CA 8.4* 8.2* 7.9*   ALB 2.7*  --   --     137 136   K 4.1 4.1 3.9    106 106   CO2 25.0 25.0 24.0   ALKPHO 70  --   --    AST 17  --   --    ALT 16  --   --    BILT 0.7  --   --    TP 6.1*  --   --        Estimated Creatinine Clearance: 63.6 mL/min (based on SCr of 0.97 mg/dL).    No results for input(s): \"TROP\", \"TROPHS\", \"CK\" in the last 168 hours.    Recent Labs   Lab 24  0644   PTP 14.0   INR 1.08                  Microbiology    Hospital Encounter on 24   1. Blood Culture     Status:  None (Preliminary result)    Collection Time: 06/21/24  3:11 PM    Specimen: Blood,peripheral   Result Value Ref Range    Blood Culture Result No Growth 1 Day N/A         Imaging: Reviewed in Epic.    Medications:    sodium phosphate  15 mmol Intravenous Once    acidophilus  1 capsule Oral Daily    aspirin  81 mg Oral Daily    atorvastatin  40 mg Oral Nightly    [START ON 6/24/2024] cholecalciferol  5,000 Units Oral Once per day on Monday Wednesday Friday    cyanocobalamin  500 mcg Oral Daily    donepezil  10 mg Oral Nightly    finasteride  5 mg Oral Daily    flecainide  100 mg Oral BID    fluticasone-umeclidin-vilant  1 puff Inhalation Daily    metoprolol tartrate  25 mg Oral 2x Daily(Beta Blocker)    multivitamin with minerals  1 tablet Oral Daily    rivaroxaban  20 mg Oral Nightly    sertraline  25 mg Oral Daily    tamsulosin  0.4 mg Oral After dinner    guaiFENesin ER  600 mg Oral BID    cefTRIAXone  1 g Intravenous Q24H    azithromycin  500 mg Oral Daily       Assessment & Plan:      #Multifocal PNA with weakness  -chest xray reviewed and showing bilateral scattered infiltrate  -Shunt xray reviewed and no significant kink present  -weakness 2/2 post-operative PNA, unlikely to be encephalitis/ meningitis as no neurologic deficits or headache  -NSGY on consult, no acute neurosurgical intervention recommended currently   -CT brain without acute findings   -antibiotics: azithromycin, roephin; plan to transition to PO at dc   -Tylenol prn, duo nebs prn, mucinex  -blood cx NGTD  -maintain SpO2 >88% given hx COPD    #NPH s/p  shunt     #Normocytic anemia  -hgb 12.3: baseline 11-13  -no signs acute bleeding  -cont to monitor CBC     #Hypoalbuminemia  -albumin 2.7     #HLD  -cont home atorvastatin     #Dementia  -cont home donepezil     #BPH  -cont hme finasteride, tamsulosin     #A-fib  -cont home flecainide, metoprolol, rivaroxaban     #Aniety  -cont home sertraline     #COPD  -cont home trelegy    DC likely tmr if  WBC continues to improve and afebrile     Rigo Williamson MD    Supplementary Documentation:     Quality:  DVT Mechanical Prophylaxis:        DVT Pharmacologic Prophylaxis   Medication    rivaroxaban (Xarelto) tab 20 mg         DVT Pharmacologic prophylaxis: Aspirin 81 mg      Code Status: Full Code  Dotson: External urinary catheter in place  Dotson Duration (in days):   Central line:    JAQUELINE:     The 21st Century Cures Act makes medical notes like these available to patients in the interest of transparency. Please be advised this is a medical document. Medical documents are intended to carry relevant information, facts as evident, and the clinical opinion of the practitioner. The medical note is intended as peer to peer communication and may appear blunt or direct. It is written in medical language and may contain abbreviations or verbiage that are unfamiliar.             **Certification      PHYSICIAN Certification of Need for Inpatient Hospitalization - Initial Certification    Patient will require inpatient services that will reasonably be expected to span two midnight's based on the clinical documentation in H+P.   Based on patients current state of illness, I anticipate that, after discharge, patient will require TBD.

## 2024-06-23 NOTE — CM/SW NOTE
Chart reviewed for discharge planning needs and noted PT is anticipating pt will benefit from gradual rehabilitative therapy at discharge. Anticipated therapy need: Gradual Rehabilitative Therapy    Request sent to [facility] for review. Referrals sent in AIDIN. AMINA will continue to follow.     Addendum (11am) - AMINA received message from RN stating pt's dtr Thuy is requesting to speak with SW regarding discharge plan. AMINA spoke with pt's dtr Thuy via phone.     Pt's dtr stated pt had a shunt placed on Monday, and she was hopeful pt can go to  at discharge. Pt's dtr stated pt was at  in May and did well. SW informed pt's dtr anticipated need is for gradual rehabilitative therapy, not intensive. Discussed difference between [facility] and [facility]. SW informed pt's dtr therapy will continue to work with pt while hospitalized and anticipated need may change, but encouraged pt's dtr to consider [facility]. SW will continue to follow.    KAMALA Mars  Discharge Planner

## 2024-06-23 NOTE — PLAN OF CARE
Assumed patient care 0730  Patient alert and oriented X4, can be forgetful  On room air-2L NC, VSS, AP on tele  HA pain managed with PRN Oxy and Zofran  Patient is up with 2 max assist pivot/Suzanne Steady to chair, voiding per external male catheter, no BM this shift  Tolerating diet  Abdominal lap sites intact, see flowsheets   Head incision intact, see flowsheets  PT recommending [facility]STOS, family hoping for AMINA Soler aware   Patient and family at beside updated on plan of care    Problem: CARDIOVASCULAR - ADULT  Goal: Maintains optimal cardiac output and hemodynamic stability  Description: INTERVENTIONS:  - Monitor vital signs, rhythm, and trends  - Monitor for bleeding, hypotension and signs of decreased cardiac output  - Evaluate effectiveness of vasoactive medications to optimize hemodynamic stability  - Monitor arterial and/or venous puncture sites for bleeding and/or hematoma  - Assess quality of pulses, skin color and temperature  - Assess for signs of decreased coronary artery perfusion - ex. Angina  - Evaluate fluid balance, assess for edema, trend weights  Outcome: Progressing  Goal: Absence of cardiac arrhythmias or at baseline  Description: INTERVENTIONS:  - Continuous cardiac monitoring, monitor vital signs, obtain 12 lead EKG if indicated  - Evaluate effectiveness of antiarrhythmic and heart rate control medications as ordered  - Initiate emergency measures for life threatening arrhythmias  - Monitor electrolytes and administer replacement therapy as ordered  Outcome: Progressing     Problem: RESPIRATORY - ADULT  Goal: Achieves optimal ventilation and oxygenation  Description: INTERVENTIONS:  - Assess for changes in respiratory status  - Assess for changes in mentation and behavior  - Position to facilitate oxygenation and minimize respiratory effort  - Oxygen supplementation based on oxygen saturation or ABGs  - Provide Smoking Cessation handout, if applicable  - Encourage broncho-pulmonary hygiene  including cough, deep breathe, Incentive Spirometry  - Assess the need for suctioning and perform as needed  - Assess and instruct to report SOB or any respiratory difficulty  - Respiratory Therapy support as indicated  - Manage/alleviate anxiety  - Monitor for signs/symptoms of CO2 retention  Outcome: Progressing

## 2024-06-23 NOTE — PLAN OF CARE
Assumed pt care around 1900  A&O x4  2L NC  Tele: A-paced  Cardiac diet  External cath to void, no BM during shift  Pain controlled with PO oxycodone  Nausea controlled with IV Zofran  PT recommending Suzanne steady and CHRISTOS  Patient updated on POC  Call light within reach    Problem: RESPIRATORY - ADULT  Goal: Achieves optimal ventilation and oxygenation  Description: INTERVENTIONS:  - Assess for changes in respiratory status  - Assess for changes in mentation and behavior  - Position to facilitate oxygenation and minimize respiratory effort  - Oxygen supplementation based on oxygen saturation or ABGs  - Provide Smoking Cessation handout, if applicable  - Encourage broncho-pulmonary hygiene including cough, deep breathe, Incentive Spirometry  - Assess the need for suctioning and perform as needed  - Assess and instruct to report SOB or any respiratory difficulty  - Respiratory Therapy support as indicated  - Manage/alleviate anxiety  - Monitor for signs/symptoms of CO2 retention  Outcome: Progressing     Problem: CARDIOVASCULAR - ADULT  Goal: Maintains optimal cardiac output and hemodynamic stability  Description: INTERVENTIONS:  - Monitor vital signs, rhythm, and trends  - Monitor for bleeding, hypotension and signs of decreased cardiac output  - Evaluate effectiveness of vasoactive medications to optimize hemodynamic stability  - Monitor arterial and/or venous puncture sites for bleeding and/or hematoma  - Assess quality of pulses, skin color and temperature  - Assess for signs of decreased coronary artery perfusion - ex. Angina  - Evaluate fluid balance, assess for edema, trend weights  Outcome: Progressing  Goal: Absence of cardiac arrhythmias or at baseline  Description: INTERVENTIONS:  - Continuous cardiac monitoring, monitor vital signs, obtain 12 lead EKG if indicated  - Evaluate effectiveness of antiarrhythmic and heart rate control medications as ordered  - Initiate emergency measures for life threatening  arrhythmias  - Monitor electrolytes and administer replacement therapy as ordered  Outcome: Progressing     Problem: GENITOURINARY - ADULT  Goal: Absence of urinary retention  Description: INTERVENTIONS:  - Assess patient’s ability to void and empty bladder  - Monitor intake/output and perform bladder scan as needed  - Follow urinary retention protocol/standard of care  - Consider collaborating with pharmacy to review patient's medication profile  - Implement strategies to promote bladder emptying  Outcome: Progressing     Problem: METABOLIC/FLUID AND ELECTROLYTES - ADULT  Goal: Glucose maintained within prescribed range  Description: INTERVENTIONS:  - Monitor Blood Glucose as ordered  - Assess for signs and symptoms of hyperglycemia and hypoglycemia  - Administer ordered medications to maintain glucose within target range  - Assess barriers to adequate nutritional intake and initiate nutrition consult as needed  - Instruct patient on self management of diabetes  Outcome: Progressing  Goal: Electrolytes maintained within normal limits  Description: INTERVENTIONS:  - Monitor labs and rhythm and assess patient for signs and symptoms of electrolyte imbalances  - Administer electrolyte replacement as ordered  - Monitor response to electrolyte replacements, including rhythm and repeat lab results as appropriate  - Fluid restriction as ordered  - Instruct patient on fluid and nutrition restrictions as appropriate  Outcome: Progressing  Goal: Hemodynamic stability and optimal renal function maintained  Description: INTERVENTIONS:  - Monitor labs and assess for signs and symptoms of volume excess or deficit  - Monitor intake, output and patient weight  - Monitor urine specific gravity, serum osmolarity and serum sodium as indicated or ordered  - Monitor response to interventions for patient's volume status, including labs, urine output, blood pressure (other measures as available)  - Encourage oral intake as appropriate  -  Instruct patient on fluid and nutrition restrictions as appropriate  Outcome: Progressing

## 2024-06-24 LAB — PHOSPHATE SERPL-MCNC: 3.3 MG/DL (ref 2.5–4.9)

## 2024-06-24 PROCEDURE — 99232 SBSQ HOSP IP/OBS MODERATE 35: CPT | Performed by: STUDENT IN AN ORGANIZED HEALTH CARE EDUCATION/TRAINING PROGRAM

## 2024-06-24 NOTE — CM/SW NOTE
06/24/24 1381   Choice of Post-Acute Provider   Informed patient of right to choose their preferred provider Yes   List of appropriate post-acute services provided to patient/family with quality data Yes   Patient/family choice [facility]     Notified by RN that family interested in [facility] if [facility] is the plan.  PMR still pending.    Met w/pt and his wife and daughter and confirmed they want [facility], but daughter still hopeful for possibility of [facility]    / to remain available for support and/or discharge planning.     Sarah CALDERONA MSN, RN CTL/  [id number]

## 2024-06-24 NOTE — OCCUPATIONAL THERAPY NOTE
OCCUPATIONAL THERAPY TREATMENT NOTE - INPATIENT     Room Number: 7621/7621-A  Session: 1   Number of Visits to Meet Established Goals: 5    Presenting Problem: multifocal pneumonia  Prior Level of Function:   Patient lives at [facility] with his spouse and  aide for showering. H is typically able to perform ADLs and functional mobility with use of rollator PRN at supervision level. Reports needing increased assistance from spouse recently with toileting, dressing, and mobility.     ASSESSMENT   Patient demonstrates good  progress this session, goals remain in progress.    Patient continues to function below baseline with toileting, lower body dressing, bed mobility, transfers, static standing balance, dynamic standing balance, and functional standing tolerance.   Contributing factors to remaining limitations include decreased functional strength, decreased endurance, impaired coordination, impaired motor planning, and decreased muscular endurance.  Next session anticipate patient to progress toileting, lower body dressing, bed mobility, transfers, static standing balance, dynamic standing balance, and functional standing tolerance.  Occupational Therapy will continue to follow patient for duration of hospitalization.    Patient continues to benefit from continued skilled OT services: to facilitate return to prior level of function as patient demonstrates high motivation with excellent tolerance to an intensive therapy program .   Pt made good gains with bed mobility this session. Pt would benefit from an intensive therapy program for good progress with ADLs and functional transfers.              History: Patient is a 79 year old male admitted on 6/21/2024 with Presenting Problem: multifocal pneumonia. Co-Morbidities : NPH s/p  shunt placement, JENNA, dementia, PAF,TIA, lumbar spinal stenosis    Recent hospitalizations:  6/17-6/18  shunt placement, DC home with [facility]  5/1-5/13 Fall, weakness, lumbar drain  trial > [place]    WEIGHT BEARING RESTRICTION  Weight Bearing Restriction: None                Recommendations for nursing staff:   Transfers: merry dalton  Toileting location: bedside commode    TREATMENT SESSION:  Patient Start of Session: semi supine in bed  FUNCTIONAL TRANSFER ASSESSMENT  Sit to Stand: Edge of Bed; Chair  Edge of Bed: Contact Guard Assist (pulling to merry dalton)  Chair: Minimal Assist    BED MOBILITY  Rolling: Minimal Assist  Supine to Sit : Moderate Assist  Scooting: Min A    BALANCE ASSESSMENT  Static Sitting: Supervision  Sitting Bilateral: Minimal Assist  Static Standing: Minimal Assist  Standing Bilateral: Minimal Assist    FUNCTIONAL ADL ASSESSMENT  LB Dressing Seated: Not Tested  LB Dressing Standing: Not Tested      ACTIVITY TOLERANCE: WFL                         O2 SATURATIONS       EDUCATION PROVIDED  Patient: Role of Occupational Therapy; Plan of Care; Discharge Recommendations; Functional Transfer Techniques; Fall Prevention; Posture/Positioning; Energy Conservation; Proper Body Mechanics  Patient's Response to Education: Verbalized Understanding; Returned Demonstration      Equipment used: RW  Demonstrates functional use, Would benefit from additional trial     Therapist comments: Pt received semi supine in bed, pleasant and cooperative for OT session. Pt agreeable for OOB activity/mobility in order to improve ADLs and functional transfers. Pt performs bed mobility at mod A to sit EOB with cues provided for hand placement. Pt requires min A to scoot to EOB in preparation to engage in sit to stand. Pt engages in sit to stand transfer, pulling into standing from merry dalton requiring CGA and is then transferred to bedside chair. Pt then engages in multiple bouts of sit to stand transfers from chair to RW requiring min A and good demo of hand/feet placement. Pt with good awareness as pt states that he has to shift his weight forward or else he will start to lean backwards. As noted  above, pt making good progress and would benefit from an intensive therapy program.   Patient End of Session: Up in chair;Needs met;Call light within reach;RN aware of session/findings;All patient questions and concerns addressed;Alarm set    SUBJECTIVE  Pt pleasant and cooperative for OT.    PAIN ASSESSMENT  Rating: Other (Comment)  Location: incision  Management Techniques: Repositioning;Relaxation     OBJECTIVE  Precautions: Bed/chair alarm    AM-PAC ‘6-Clicks’ Inpatient Daily Activity Short Form  -   Putting on and taking off regular lower body clothing?: A Lot  -   Bathing (including washing, rinsing, drying)?: A Lot  -   Toileting, which includes using toilet, bedpan or urinal? : A Lot  -   Putting on and taking off regular upper body clothing?: A Lot  -   Taking care of personal grooming such as brushing teeth?: A Little  -   Eating meals?: A Little    AM-PAC Score:  Score: 14  Approx Degree of Impairment: 59.67%  Standardized Score (AM-PAC Scale): 33.39    PLAN  OT Treatment Plan: Endurance training;UE strengthening/ROM;Functional transfer training;ADL training;Balance activities;Energy conservation/work simplification techniques;Cognitive reorientation;Patient/Family education;Patient/Family training;Equipment eval/education;Compensatory technique education;Fine motor coordination activities  Rehab Potential : Good  Frequency: 3-5x/week    OT Goals:     All goals ongoing 06/24    ADL Goals   Patient will perform grooming: with stand by assist and while standing at sink  Patient will perform lower body dressing:  with min assist and with adaptive equipment PRN  Patient will perform toileting: with min assist     Functional Transfer Goals  Patient will transfer from sit to supine:  with stand by assist  Patient will transfer from supine to sit:  with stand by assist  Patient will transfer to toilet:  with mod assist     UE Exercise Program Goal  Patient will be supervision with bilateral AROM HEP (home  exercise program).    OT Session Time: 25 minutes  Therapeutic Activity: 25 minutes

## 2024-06-24 NOTE — CONSULTS
PHYSICAL MEDICINE AND REHABILITATION CONSULTATION       Location [facility] 7NE-A Attending Rigo Williamson, *   Hosp Day # 3 PCP Sachin Meza MD     Patient Identification  Clay Orellana is a 79 year old male.  :  1945  Admit Date:  2024  Attending Provider:  Rigo Williamson, *                                  Primary Care Physician:  Sachin Meza MD   Admitting Diagnosis: Weakness generalized [R53.1]  Multifocal pneumonia [J18.9]    CC: Impaired mobility and ADL dysfunction secondary to Multifocal pneumonia         HPI: Clay Orellana is a 79 year old male with PMHX BPH/ HTN/ HLD/ CVA/ NPH (s/p  shunt)/ COPD/ A-fib/ CAD who presented to the hospital for weakness. He recently had a  shunt placed on  and was sent home on the . Had been at  - for lumbar drain trial and at that point had progressed to supervision. NSGY assessed patient and did not feel this was shunt malfunction. Deconditioning due to multi-focal PNA.       PM&R has now been consulted in order to assess patient's functional status and make recommendations for rehabilitation plan of care.     ROS:  All other systems were reviewed and are negative except as noted under HPI    Current medications: Full medication list has been personally reviewed and include:   [COMPLETED] sodium phosphate 15 mmol in 0.9% NaCl 100mL IVPB premix  15 mmol Intravenous Once    polyethylene glycol (PEG 3350) (Miralax) 17 g oral packet 17 g  17 g Oral Daily PRN    ondansetron (Zofran) 4 MG/2ML injection 4 mg  4 mg Intravenous Q6H PRN    sodium chloride (Saline Mist) 0.65 % nasal solution 1 spray  1 spray Each Nare Q3H PRN    [COMPLETED] polyethylene glycol (PEG 3350) (Miralax) 17 g oral packet 17 g  17 g Oral Once    [COMPLETED] sodium chloride 0.9 % IV bolus 1,000 mL  1,000 mL Intravenous Once    [COMPLETED] ampicillin-sulbactam (Unasyn) 3 g in sodium chloride 0.9% 100mL IVPB-ADD  3 g Intravenous Once    [COMPLETED]  azithromycin (Zithromax) 500 mg in sodium chloride 0.9% 250mL IVPB premix  500 mg Intravenous Once    [] ondansetron (Zofran) 4 MG/2ML injection 4 mg  4 mg Intravenous Q4H PRN    acidophilus (Probiotic) cap/tab 1 capsule  1 capsule Oral Daily    aspirin DR tab 81 mg  81 mg Oral Daily    atorvastatin (Lipitor) tab 40 mg  40 mg Oral Nightly    cholecalciferol (Vitamin D3) tab 5,000 Units  5,000 Units Oral Once per day on     cyanocobalamin (Vitamin B12) tab 500 mcg  500 mcg Oral Daily    donepezil (Aricept) tab 10 mg  10 mg Oral Nightly    finasteride (Proscar) tab 5 mg  5 mg Oral Daily    flecainide (Tambocor) tab 100 mg  100 mg Oral BID    fluticasone-umeclidin-vilant (Trelegy Ellipta) 100-62.5-25 MCG/ACT inhaler 1 puff  1 puff Inhalation Daily    metoprolol tartrate (Lopressor) tab 25 mg  25 mg Oral 2x Daily(Beta Blocker)    multivitamin with minerals (Thera M Plus) tab 1 tablet  1 tablet Oral Daily    rivaroxaban (Xarelto) tab 20 mg  20 mg Oral Nightly    sertraline (Zoloft) tab 25 mg  25 mg Oral Daily    tamsulosin (Flomax) cap 0.4 mg  0.4 mg Oral After dinner    acetaminophen (Tylenol Extra Strength) tab 500 mg  500 mg Oral Q4H PRN    guaiFENesin ER (Mucinex) 12 hr tab 600 mg  600 mg Oral BID    ipratropium-albuterol (Duoneb) 0.5-2.5 (3) MG/3ML inhalation solution 3 mL  3 mL Nebulization Q6H PRN    cefTRIAXone (Rocephin) 1 g in sodium chloride 0.9% 100 mL IVPB-ADDV  1 g Intravenous Q24H    azithromycin (Zithromax) tab 500 mg  500 mg Oral Daily    oxyCODONE immediate release tab 5 mg  5 mg Oral Q6H PRN    [COMPLETED] acetaminophen (Tylenol Extra Strength) tab 1,000 mg  1,000 mg Oral Once       Allergies:  Allergies   Allergen Reactions    Kiwi Extract ITCHING     Inner ears itch       Past Medical History:  Past Medical History:    Acute nausea with nonbilious vomiting    Anxiety    Arrhythmia    Arthritis    Bilateral inguinal hernia without obstruction or gangrene    BPH (benign  prostatic hyperplasia)    Calculus of kidney    Essential hypertension    High blood pressure    High cholesterol    Muscle weakness    Stroke (HCC)    TIA    Visual impairment       Past Surgical History:  Past Surgical History:   Procedure Laterality Date    Anesth,pacemaker insertion  08/2023    Appendectomy      Appendectomy      Colonoscopy      Colonoscopy N/A 05/03/2022    Procedure: COLONOSCOPY;  Surgeon: Sudarshan Gilbert MD;  Location:  ENDOSCOPY    Colonoscopy      Cyst removal N/A 1990    Cyst removed behind back of neck    Cyst removal Right 11/05/2020    Removed from right upper back - just below shoulder    Hernia surgery  July 2022    Skin surgery  2020    Vasectomy  1980       Family History:   Family History   Problem Relation Age of Onset    Heart Attack Father     Diabetes Mother     Other (DM) Sister     Other (ALS) Brother     No Known Problems Maternal Grandmother     No Known Problems Maternal Grandfather     No Known Problems Paternal Grandmother     No Known Problems Paternal Grandfather     Other (smoker) Brother     Other (MS) Sister     No Known Problems Son     No Known Problems Daughter          Social History:  Social History     Socioeconomic History    Marital status:    Tobacco Use    Smoking status: Former     Types: Cigarettes     Passive exposure: Never    Smokeless tobacco: Never    Tobacco comments:     Has not smoked for about 20 years   Vaping Use    Vaping status: Never Used   Substance and Sexual Activity    Alcohol use: Never     Alcohol/week: 4.0 standard drinks of alcohol     Types: 4 Cans of beer per week    Drug use: Never   Other Topics Concern    Caffeine Concern No    Exercise No    Seat Belt Yes    Special Diet No    Stress Concern Yes    Weight Concern No       FUNCTIONAL STATUS:  Premorbid functional status/Living Situation-   HOME SITUATION  Type of Home: Independent living facility ([facility])   Home Layout: One level     Lives With: Spouse  Drives:  No  Patient Owned Equipment: Rollator;Rolling walker  Patient Regularly Uses: Glasses       Current functional Status:   BED MOBILITY  Rolling: Moderate Assist  Supine to Sit : Moderate Assist  Scooting: MOD A     BALANCE ASSESSMENT  Static Sitting: Contact Guard Assist  Sitting Bilateral: Moderate Assist  Static Standing: Minimal Assist  Standing Bilateral: Moderate Assist     FUNCTIONAL ADL ASSESSMENT  LB Dressing Seated: Maximum Assist  LB Dressing Standing: Maximum Assist      OBJECTIVE:    /65 (BP Location: Right arm)   Pulse 60   Temp 98.7 °F (37.1 °C) (Oral)   Resp 16   Wt 205 lb 4 oz (93.1 kg)   SpO2 95%   BMI 28.63 kg/m²   Body mass index is 28.63 kg/m².   General: well nourished, NAD  Eyes: conjunctiva and lids intact, pupils are equal and round  ENMT: external inspection of ears and nose within normal limits. Normal functional hearing  Neck: supple, symmetrical, no thyromegaly appreciated  Lymph: no cervical and no axillary lymphadenopathy  Lungs: Non labored on 2L NC, no wheezing appreciated, No accessory muscle noted  Heart: Reg Rate, no edema noted in BLE  Abdomen: soft, non-tender, non-distended. No hepatomegaly appreciated.  Extrems: no clubbing/cyanosis noted in digits or nails  Psych: awake,alert, oriented; normal mood and affect  MSK: PROM of BUE full; MMT 5/5 bilateral UE and LE; no dislocation noted BUE  Neuro: CN 2-12 grossly intact, sensation intact to LT in BUE/BLE;  speech clear and fluent. Follows one step commands readily    Data Review:    Lab Results   Component Value Date    PHOS 3.3 06/24/2024       No results found.    ASSESSMENT:    Deficits of self care and mobility secondary to   Principal Problem:    Multifocal pneumonia  Active Problems:    Weakness generalized    S/P  shunt      Recommendations: acute rehab    This patient meets medical and rehab criteria to qualify for acute inpatient rehab:  This patient will require an intensive and coordinated interdisciplinary  team approach  Patient requires and shall be able to participate in 3 hours of therapy 5 days a week.  The patient is reasonably expected to actively participate in and benefit significantly from the intensive rehabilitation therapy program within a prescribed period of time of 14 days, with goal of discharge to community.   This patient requires active and ongoing intervention of multiple therapy disciplines including PT, OT, speech-language pathology, therapeutic recreation, rehab psychology, rehab case management, with weekly interdisciplinary team meetings to address barriers to progress and revise treatment plan as needed.  This patient will require require physician supervision by rehabilitation physician with face-to-face visit at least 3 days a week to assess the patient both medically and functionally.  Anticipated goals are to improve from min-mod assist to mod I  level to facilitate safe community discharge.  24 hr rehab nursing also beneficial for medication management, pressure sore prevention, bowel and bladder management, skin management.       Advanced directives reviewed and in chart.     Thank you for allowing me to participate in the care of this patient.     Jenny Dutta MD, FAAPM&R

## 2024-06-24 NOTE — CM/SW NOTE
Pt discussed in rounds. PMR eval entered per family request. Await determination.     KAMALA Mckay

## 2024-06-24 NOTE — PLAN OF CARE
Attempted to wean from 2L o2. Sats dropped to 83%. 2L applied  Physical therapy unable to see today  PM&R consult placed per family request  Social work on for dc planning  Left arm significant swelling  Turn [id number]  Mirlax given from no BM sine admission.  Oxy for pain  Incisions cdi

## 2024-06-24 NOTE — PROGRESS NOTES
[facility]   part of [facility]     Hospitalist Progress Note     Clay Orellana Patient Status:  Inpatient    1945 MRN [id number]   Location [facility] 7NE-A Attending Clay, Rigo Claudio, *   Hosp Day # 3 PCP Sachin Meza MD     Chief Complaint: Weakness    Subjective:      - AF, satting well on 2L NC, cough improving, denies other acute complaints, awaiting PMNR eval for possible AR placement vs. CHRISTOS    Objective:    Review of Systems:   A comprehensive review of systems was completed; pertinent positive and negatives stated in subjective.    Vital signs:  Temp:  [97.6 °F (36.4 °C)-98.4 °F (36.9 °C)] 97.6 °F (36.4 °C)  Pulse:  [60-63] 60  Resp:  [13-19] 16  BP: (132-163)/(64-72) 153/64  SpO2:  [94 %-95 %] 94 %    Physical Exam:    General: No acute distress  Respiratory: no wheezes, no rhonchi  Cardiovascular: S1, S2, regular rate and rhythm  Abdomen: Soft, Non-tender, non-distended, positive bowel sounds  Neuro: No new focal deficits.   Extremities: no edema    Diagnostic Data:    Labs:  Recent Labs   Lab 24  0442 24  1511 24  0550 24  0550   WBC 16.1* 15.7* 16.8* 11.9*   HGB 11.7* 12.3* 12.3* 11.0*   MCV 97.0 95.7 95.6 93.5   .0 204.0 188.0 197.0       Recent Labs   Lab 24  1511 24  0550 24  0550   * 119* 92   BUN 24* 22 23   CREATSERUM 1.15 1.01 0.97   CA 8.4* 8.2* 7.9*   ALB 2.7*  --   --     137 136   K 4.1 4.1 3.9    106 106   CO2 25.0 25.0 24.0   ALKPHO 70  --   --    AST 17  --   --    ALT 16  --   --    BILT 0.7  --   --    TP 6.1*  --   --        Estimated Creatinine Clearance: 63.6 mL/min (based on SCr of 0.97 mg/dL).    No results for input(s): \"TROP\", \"TROPHS\", \"CK\" in the last 168 hours.    No results for input(s): \"PTP\", \"INR\" in the last 168 hours.                 Microbiology    Hospital Encounter on 24   1. Blood Culture     Status: None (Preliminary result)    Collection Time: 24  3:11 PM     Specimen: Blood,peripheral   Result Value Ref Range    Blood Culture Result No Growth 2 Days N/A         Imaging: Reviewed in Epic.    Medications:    acidophilus  1 capsule Oral Daily    aspirin  81 mg Oral Daily    atorvastatin  40 mg Oral Nightly    cholecalciferol  5,000 Units Oral Once per day on Monday Wednesday Friday    cyanocobalamin  500 mcg Oral Daily    donepezil  10 mg Oral Nightly    finasteride  5 mg Oral Daily    flecainide  100 mg Oral BID    fluticasone-umeclidin-vilant  1 puff Inhalation Daily    metoprolol tartrate  25 mg Oral 2x Daily(Beta Blocker)    multivitamin with minerals  1 tablet Oral Daily    rivaroxaban  20 mg Oral Nightly    sertraline  25 mg Oral Daily    tamsulosin  0.4 mg Oral After dinner    guaiFENesin ER  600 mg Oral BID    cefTRIAXone  1 g Intravenous Q24H    azithromycin  500 mg Oral Daily       Assessment & Plan:      #Multifocal PNA with weakness  -chest xray reviewed and showing bilateral scattered infiltrate  -Shunt xray reviewed and no significant kink present  -weakness 2/2 post-operative PNA, unlikely to be encephalitis/ meningitis as no neurologic deficits or headache  -NSGY on consult, no acute neurosurgical intervention recommended currently   -CT brain without acute findings   -antibiotics: azithromycin, roephin; plan to transition to PO at dc   -Tylenol prn, duo nebs prn, mucinex  -blood cx NGTD  -maintain SpO2 >88% given hx COPD    #NPH s/p  shunt     #Normocytic anemia  -hgb 12.3: baseline 11-13  -no signs acute bleeding  -cont to monitor CBC     #Hypoalbuminemia  -albumin 2.7     #HLD  -cont home atorvastatin     #Dementia  -cont home donepezil     #BPH  -cont hme finasteride, tamsulosin     #A-fib  -cont home flecainide, metoprolol, rivaroxaban     #Aniety  -cont home sertraline     #COPD  -cont home trelegy    Medically ready for discharge, pending placement determination     Rigo Williamson MD    Supplementary Documentation:     Quality:  DVT  Mechanical Prophylaxis:        DVT Pharmacologic Prophylaxis   Medication    rivaroxaban (Xarelto) tab 20 mg         DVT Pharmacologic prophylaxis: Aspirin 81 mg      Code Status: Full Code  Dotson: External urinary catheter in place  Dotson Duration (in days):   Central line:    JAQUELINE:     The 21st Century Cures Act makes medical notes like these available to patients in the interest of transparency. Please be advised this is a medical document. Medical documents are intended to carry relevant information, facts as evident, and the clinical opinion of the practitioner. The medical note is intended as peer to peer communication and may appear blunt or direct. It is written in medical language and may contain abbreviations or verbiage that are unfamiliar.             **Certification      PHYSICIAN Certification of Need for Inpatient Hospitalization - Initial Certification    Patient will require inpatient services that will reasonably be expected to span two midnight's based on the clinical documentation in H+P.   Based on patients current state of illness, I anticipate that, after discharge, patient will require TBD.

## 2024-06-24 NOTE — PLAN OF CARE
Assumed pt care around 1900  A&O x4  Afebrile  2L NC  Tele: A paced  HA pain managed with PRN Oxy & Zofran  External cath to void, no BM during shift  Abdominal lap sites intact, see flowsheets  Head incision intact, see flowsheets  IV antibxs running per orders  Pt 2 max assist pivot/merry steady  Pt updated on POC  Call light within reach    Problem: RESPIRATORY - ADULT  Goal: Achieves optimal ventilation and oxygenation  Description: INTERVENTIONS:  - Assess for changes in respiratory status  - Assess for changes in mentation and behavior  - Position to facilitate oxygenation and minimize respiratory effort  - Oxygen supplementation based on oxygen saturation or ABGs  - Provide Smoking Cessation handout, if applicable  - Encourage broncho-pulmonary hygiene including cough, deep breathe, Incentive Spirometry  - Assess the need for suctioning and perform as needed  - Assess and instruct to report SOB or any respiratory difficulty  - Respiratory Therapy support as indicated  - Manage/alleviate anxiety  - Monitor for signs/symptoms of CO2 retention  Outcome: Progressing     Problem: CARDIOVASCULAR - ADULT  Goal: Maintains optimal cardiac output and hemodynamic stability  Description: INTERVENTIONS:  - Monitor vital signs, rhythm, and trends  - Monitor for bleeding, hypotension and signs of decreased cardiac output  - Evaluate effectiveness of vasoactive medications to optimize hemodynamic stability  - Monitor arterial and/or venous puncture sites for bleeding and/or hematoma  - Assess quality of pulses, skin color and temperature  - Assess for signs of decreased coronary artery perfusion - ex. Angina  - Evaluate fluid balance, assess for edema, trend weights  Outcome: Progressing  Goal: Absence of cardiac arrhythmias or at baseline  Description: INTERVENTIONS:  - Continuous cardiac monitoring, monitor vital signs, obtain 12 lead EKG if indicated  - Evaluate effectiveness of antiarrhythmic and heart rate control  medications as ordered  - Initiate emergency measures for life threatening arrhythmias  - Monitor electrolytes and administer replacement therapy as ordered  Outcome: Progressing     Problem: GENITOURINARY - ADULT  Goal: Absence of urinary retention  Description: INTERVENTIONS:  - Assess patient’s ability to void and empty bladder  - Monitor intake/output and perform bladder scan as needed  - Follow urinary retention protocol/standard of care  - Consider collaborating with pharmacy to review patient's medication profile  - Implement strategies to promote bladder emptying  Outcome: Progressing     Problem: METABOLIC/FLUID AND ELECTROLYTES - ADULT  Goal: Glucose maintained within prescribed range  Description: INTERVENTIONS:  - Monitor Blood Glucose as ordered  - Assess for signs and symptoms of hyperglycemia and hypoglycemia  - Administer ordered medications to maintain glucose within target range  - Assess barriers to adequate nutritional intake and initiate nutrition consult as needed  - Instruct patient on self management of diabetes  Outcome: Progressing  Goal: Electrolytes maintained within normal limits  Description: INTERVENTIONS:  - Monitor labs and rhythm and assess patient for signs and symptoms of electrolyte imbalances  - Administer electrolyte replacement as ordered  - Monitor response to electrolyte replacements, including rhythm and repeat lab results as appropriate  - Fluid restriction as ordered  - Instruct patient on fluid and nutrition restrictions as appropriate  Outcome: Progressing  Goal: Hemodynamic stability and optimal renal function maintained  Description: INTERVENTIONS:  - Monitor labs and assess for signs and symptoms of volume excess or deficit  - Monitor intake, output and patient weight  - Monitor urine specific gravity, serum osmolarity and serum sodium as indicated or ordered  - Monitor response to interventions for patient's volume status, including labs, urine output, blood  pressure (other measures as available)  - Encourage oral intake as appropriate  - Instruct patient on fluid and nutrition restrictions as appropriate  Outcome: Progressing

## 2024-06-24 NOTE — CONGREGATE LIVING REVIEW
[facility] Authorization    The [facility] Review Committee has reviewed this case and the patient IS APPROVED for discharge to a facility for Short Term Skilled once the following procedure is followed:     - The physician discharge instructions (contained within the GIFTY note for SNF) must inlcude the below appropriate and approved COVID instructions to the facility    For questions regarding CLRC approval process, please contact the CM assigned to the case.  For questions regarding RN discharge workflow, please contact the unit Clinical Leader.

## 2024-06-25 VITALS
OXYGEN SATURATION: 95 % | SYSTOLIC BLOOD PRESSURE: 138 MMHG | BODY MASS INDEX: 29 KG/M2 | HEART RATE: 65 BPM | DIASTOLIC BLOOD PRESSURE: 73 MMHG | TEMPERATURE: 98 F | RESPIRATION RATE: 12 BRPM | WEIGHT: 205.25 LBS

## 2024-06-25 LAB
ANION GAP SERPL CALC-SCNC: 7 MMOL/L (ref 0–18)
BASOPHILS # BLD AUTO: 0.03 X10(3) UL (ref 0–0.2)
BASOPHILS NFR BLD AUTO: 0.4 %
BUN BLD-MCNC: 18 MG/DL (ref 9–23)
CALCIUM BLD-MCNC: 8.1 MG/DL (ref 8.5–10.1)
CHLORIDE SERPL-SCNC: 103 MMOL/L (ref 98–112)
CO2 SERPL-SCNC: 27 MMOL/L (ref 21–32)
CREAT BLD-MCNC: 0.85 MG/DL
EGFRCR SERPLBLD CKD-EPI 2021: 87 ML/MIN/1.73M2 (ref 60–?)
EOSINOPHIL # BLD AUTO: 0.3 X10(3) UL (ref 0–0.7)
EOSINOPHIL NFR BLD AUTO: 3.6 %
ERYTHROCYTE [DISTWIDTH] IN BLOOD BY AUTOMATED COUNT: 12.1 %
GLUCOSE BLD-MCNC: 105 MG/DL (ref 70–99)
HCT VFR BLD AUTO: 33.5 %
HGB BLD-MCNC: 11.2 G/DL
IMM GRANULOCYTES # BLD AUTO: 0.09 X10(3) UL (ref 0–1)
IMM GRANULOCYTES NFR BLD: 1.1 %
LYMPHOCYTES # BLD AUTO: 0.7 X10(3) UL (ref 1–4)
LYMPHOCYTES NFR BLD AUTO: 8.5 %
MCH RBC QN AUTO: 32.4 PG (ref 26–34)
MCHC RBC AUTO-ENTMCNC: 33.4 G/DL (ref 31–37)
MCV RBC AUTO: 96.8 FL
MONOCYTES # BLD AUTO: 1.04 X10(3) UL (ref 0.1–1)
MONOCYTES NFR BLD AUTO: 12.6 %
NEUTROPHILS # BLD AUTO: 6.07 X10 (3) UL (ref 1.5–7.7)
NEUTROPHILS # BLD AUTO: 6.07 X10(3) UL (ref 1.5–7.7)
NEUTROPHILS NFR BLD AUTO: 73.8 %
OSMOLALITY SERPL CALC.SUM OF ELEC: 286 MOSM/KG (ref 275–295)
PLATELET # BLD AUTO: 244 10(3)UL (ref 150–450)
POTASSIUM SERPL-SCNC: 3.9 MMOL/L (ref 3.5–5.1)
RBC # BLD AUTO: 3.46 X10(6)UL
SODIUM SERPL-SCNC: 137 MMOL/L (ref 136–145)
WBC # BLD AUTO: 8.2 X10(3) UL (ref 4–11)

## 2024-06-25 PROCEDURE — 99239 HOSP IP/OBS DSCHRG MGMT >30: CPT | Performed by: STUDENT IN AN ORGANIZED HEALTH CARE EDUCATION/TRAINING PROGRAM

## 2024-06-25 RX ORDER — MIDODRINE HYDROCHLORIDE 5 MG/1
5 TABLET ORAL 3 TIMES DAILY PRN
Qty: 90 TABLET | Refills: 0 | Status: SHIPPED | OUTPATIENT
Start: 2024-06-25

## 2024-06-25 RX ORDER — AMOXICILLIN AND CLAVULANATE POTASSIUM 875; 125 MG/1; MG/1
1 TABLET, FILM COATED ORAL 2 TIMES DAILY
Qty: 4 TABLET | Refills: 0 | Status: SHIPPED | OUTPATIENT
Start: 2024-06-25 | End: 2024-06-27

## 2024-06-25 RX ORDER — POLYETHYLENE GLYCOL 3350 17 G/17G
17 POWDER, FOR SOLUTION ORAL DAILY
Qty: 30 PACKET | Refills: 0 | Status: SHIPPED | OUTPATIENT
Start: 2024-06-25 | End: 2024-07-25

## 2024-06-25 RX ORDER — SENNOSIDES A AND B 8.6 MG/1
8.6 TABLET, FILM COATED ORAL 2 TIMES DAILY
Qty: 60 TABLET | Refills: 0 | Status: SHIPPED | OUTPATIENT
Start: 2024-06-25 | End: 2024-07-25

## 2024-06-25 RX ORDER — SENNOSIDES 8.6 MG
8.6 TABLET ORAL 2 TIMES DAILY
Status: DISCONTINUED | OUTPATIENT
Start: 2024-06-25 | End: 2024-06-25

## 2024-06-25 NOTE — PLAN OF CARE
NURSING DISCHARGE NOTE    Discharged Rehab facility via Ambulance.  Accompanied by Support staff  Belongings Taken by patient/family.    Cleared for discharge by all consults. Discharge AVS completed and reviewed with patient and family. Reviewed follow-ups and the importance of maintaining those appointments. Report called to Yoana ozuna . All questions/concerns addressed. Paperwork and belongings taken by EMS.      Problem: RESPIRATORY - ADULT  Goal: Achieves optimal ventilation and oxygenation  Description: INTERVENTIONS:  - Assess for changes in respiratory status  - Assess for changes in mentation and behavior  - Position to facilitate oxygenation and minimize respiratory effort  - Oxygen supplementation based on oxygen saturation or ABGs  - Provide Smoking Cessation handout, if applicable  - Encourage broncho-pulmonary hygiene including cough, deep breathe, Incentive Spirometry  - Assess the need for suctioning and perform as needed  - Assess and instruct to report SOB or any respiratory difficulty  - Respiratory Therapy support as indicated  - Manage/alleviate anxiety  - Monitor for signs/symptoms of CO2 retention  Outcome: Adequate for Discharge     Problem: CARDIOVASCULAR - ADULT  Goal: Maintains optimal cardiac output and hemodynamic stability  Description: INTERVENTIONS:  - Monitor vital signs, rhythm, and trends  - Monitor for bleeding, hypotension and signs of decreased cardiac output  - Evaluate effectiveness of vasoactive medications to optimize hemodynamic stability  - Monitor arterial and/or venous puncture sites for bleeding and/or hematoma  - Assess quality of pulses, skin color and temperature  - Assess for signs of decreased coronary artery perfusion - ex. Angina  - Evaluate fluid balance, assess for edema, trend weights  Outcome: Adequate for Discharge  Goal: Absence of cardiac arrhythmias or at baseline  Description: INTERVENTIONS:  - Continuous cardiac monitoring, monitor vital signs,  obtain 12 lead EKG if indicated  - Evaluate effectiveness of antiarrhythmic and heart rate control medications as ordered  - Initiate emergency measures for life threatening arrhythmias  - Monitor electrolytes and administer replacement therapy as ordered  Outcome: Adequate for Discharge     Problem: GENITOURINARY - ADULT  Goal: Absence of urinary retention  Description: INTERVENTIONS:  - Assess patient’s ability to void and empty bladder  - Monitor intake/output and perform bladder scan as needed  - Follow urinary retention protocol/standard of care  - Consider collaborating with pharmacy to review patient's medication profile  - Implement strategies to promote bladder emptying  Outcome: Adequate for Discharge     Problem: METABOLIC/FLUID AND ELECTROLYTES - ADULT  Goal: Glucose maintained within prescribed range  Description: INTERVENTIONS:  - Monitor Blood Glucose as ordered  - Assess for signs and symptoms of hyperglycemia and hypoglycemia  - Administer ordered medications to maintain glucose within target range  - Assess barriers to adequate nutritional intake and initiate nutrition consult as needed  - Instruct patient on self management of diabetes  Outcome: Adequate for Discharge  Goal: Electrolytes maintained within normal limits  Description: INTERVENTIONS:  - Monitor labs and rhythm and assess patient for signs and symptoms of electrolyte imbalances  - Administer electrolyte replacement as ordered  - Monitor response to electrolyte replacements, including rhythm and repeat lab results as appropriate  - Fluid restriction as ordered  - Instruct patient on fluid and nutrition restrictions as appropriate  Outcome: Adequate for Discharge  Goal: Hemodynamic stability and optimal renal function maintained  Description: INTERVENTIONS:  - Monitor labs and assess for signs and symptoms of volume excess or deficit  - Monitor intake, output and patient weight  - Monitor urine specific gravity, serum osmolarity and  serum sodium as indicated or ordered  - Monitor response to interventions for patient's volume status, including labs, urine output, blood pressure (other measures as available)  - Encourage oral intake as appropriate  - Instruct patient on fluid and nutrition restrictions as appropriate  Outcome: Adequate for Discharge

## 2024-06-25 NOTE — CM/SW NOTE
06/25/24 1336   Discharge disposition   Expected discharge disposition Rehab Facili   Post Acute Care Provider Ruddy   Discharge transportation [facility]     Pt cleared for DC to [facility]. Discussed with pt/family. Ambulance scheduled for 5:30 PM    Pt to dc to ria Fournier RN to call [phone] for report.

## 2024-06-25 NOTE — PROGRESS NOTES
[facility]   part of [facility]     Hospitalist Progress Note     Clay Orellana Patient Status:  Inpatient    1945 MRN [id number]   Location [facility] 7NE-A Attending Clay, Rigo Claudio, *   Hosp Day # 4 PCP Sachin Meza MD     Chief Complaint: Weakness    Subjective:      - PMNR evaluated pt, recommending AR   - AF, satting well on 2L NC   - Feels breathing has improved, cough improved, denies other acute complaints     Objective:    Review of Systems:   A comprehensive review of systems was completed; pertinent positive and negatives stated in subjective.    Vital signs:  Temp:  [97.6 °F (36.4 °C)-98.7 °F (37.1 °C)] 97.6 °F (36.4 °C)  Pulse:  [60-68] 65  Resp:  [13-17] 14  BP: (143-167)/(65-72) 167/68  SpO2:  [92 %-97 %] 95 %    Physical Exam:    General: No acute distress  Respiratory: no wheezes, no rhonchi  Cardiovascular: S1, S2, regular rate and rhythm  Abdomen: Soft, Non-tender, non-distended, positive bowel sounds  Neuro: No new focal deficits.   Extremities: no edema    Diagnostic Data:    Labs:  Recent Labs   Lab 24  1511 24  0550 24  0550   WBC 15.7* 16.8* 11.9*   HGB 12.3* 12.3* 11.0*   MCV 95.7 95.6 93.5   .0 188.0 197.0       Recent Labs   Lab 24  1511 24  0550 24  0550   * 119* 92   BUN * 22 23   CREATSERUM 1.15 1.01 0.97   CA 8.4* 8.2* 7.9*   ALB 2.7*  --   --     137 136   K 4.1 4.1 3.9    106 106   CO2 25.0 25.0 24.0   ALKPHO 70  --   --    AST 17  --   --    ALT 16  --   --    BILT 0.7  --   --    TP 6.1*  --   --        Estimated Creatinine Clearance: 63.6 mL/min (based on SCr of 0.97 mg/dL).    No results for input(s): \"TROP\", \"TROPHS\", \"CK\" in the last 168 hours.    No results for input(s): \"PTP\", \"INR\" in the last 168 hours.                 Microbiology    Hospital Encounter on 24   1. Blood Culture     Status: None (Preliminary result)    Collection Time: 24  3:11 PM    Specimen:  Blood,peripheral   Result Value Ref Range    Blood Culture Result No Growth 3 Days N/A         Imaging: Reviewed in Epic.    Medications:    acidophilus  1 capsule Oral Daily    aspirin  81 mg Oral Daily    atorvastatin  40 mg Oral Nightly    cholecalciferol  5,000 Units Oral Once per day on Monday Wednesday Friday    cyanocobalamin  500 mcg Oral Daily    donepezil  10 mg Oral Nightly    finasteride  5 mg Oral Daily    flecainide  100 mg Oral BID    fluticasone-umeclidin-vilant  1 puff Inhalation Daily    metoprolol tartrate  25 mg Oral 2x Daily(Beta Blocker)    multivitamin with minerals  1 tablet Oral Daily    rivaroxaban  20 mg Oral Nightly    sertraline  25 mg Oral Daily    tamsulosin  0.4 mg Oral After dinner    guaiFENesin ER  600 mg Oral BID    cefTRIAXone  1 g Intravenous Q24H       Assessment & Plan:      #Multifocal PNA with weakness  -chest xray reviewed and showing bilateral scattered infiltrate  -Shunt xray reviewed and no significant kink present  -weakness 2/2 post-operative PNA, unlikely to be encephalitis/ meningitis as no neurologic deficits or headache  -NSGY on consult, no acute neurosurgical intervention recommended currently   -CT brain without acute findings   -antibiotics: azithromycin, roephin; plan to transition to PO at dc to complete 7 day course of abx   -Tylenol prn, duo nebs prn, mucinex  -blood cx NGTD  -maintain SpO2 >88% given hx COPD    #NPH s/p  shunt  -NSGY on consult, no acute neurosurgical intervention recommended currently      #Normocytic anemia  -hgb 12.3: baseline 11-13  -no signs acute bleeding  -cont to monitor CBC     #Hypoalbuminemia  -albumin 2.7     #HLD  -cont home atorvastatin     #Dementia  -cont home donepezil     #BPH  -cont hme finasteride, tamsulosin     #A-fib  -cont home flecainide, metoprolol, rivaroxaban     #Aniety  -cont home sertraline     #COPD  -cont home trelegy    Medically ready for discharge, plan for AR      Rigo Williamson,  MD    Supplementary Documentation:     Quality:  DVT Mechanical Prophylaxis:        DVT Pharmacologic Prophylaxis   Medication    rivaroxaban (Xarelto) tab 20 mg         DVT Pharmacologic prophylaxis: Aspirin 81 mg      Code Status: Full Code  Dotson: External urinary catheter in place  Dotson Duration (in days):   Central line:    JAQUELINE: 6/25/2024    The 21st Century Cures Act makes medical notes like these available to patients in the interest of transparency. Please be advised this is a medical document. Medical documents are intended to carry relevant information, facts as evident, and the clinical opinion of the practitioner. The medical note is intended as peer to peer communication and may appear blunt or direct. It is written in medical language and may contain abbreviations or verbiage that are unfamiliar.             **Certification      PHYSICIAN Certification of Need for Inpatient Hospitalization - Initial Certification    Patient will require inpatient services that will reasonably be expected to span two midnight's based on the clinical documentation in H+P.   Based on patients current state of illness, I anticipate that, after discharge, patient will require TBD.

## 2024-06-25 NOTE — PLAN OF CARE
Received pt at 1930  Pt AOx4, Apaced, 2L NC, VSS  Head staples KRISTY  PRN Oxy & Zofran for pain  IV abx  D/c Planning: Approved for [facility]. Family would like AR; PMR pending  Call light within reach.  Needs currently met      Problem: RESPIRATORY - ADULT  Goal: Achieves optimal ventilation and oxygenation  Description: INTERVENTIONS:  - Assess for changes in respiratory status  - Assess for changes in mentation and behavior  - Position to facilitate oxygenation and minimize respiratory effort  - Oxygen supplementation based on oxygen saturation or ABGs  - Provide Smoking Cessation handout, if applicable  - Encourage broncho-pulmonary hygiene including cough, deep breathe, Incentive Spirometry  - Assess the need for suctioning and perform as needed  - Assess and instruct to report SOB or any respiratory difficulty  - Respiratory Therapy support as indicated  - Manage/alleviate anxiety  - Monitor for signs/symptoms of CO2 retention  Outcome: Progressing     Problem: CARDIOVASCULAR - ADULT  Goal: Maintains optimal cardiac output and hemodynamic stability  Description: INTERVENTIONS:  - Monitor vital signs, rhythm, and trends  - Monitor for bleeding, hypotension and signs of decreased cardiac output  - Evaluate effectiveness of vasoactive medications to optimize hemodynamic stability  - Monitor arterial and/or venous puncture sites for bleeding and/or hematoma  - Assess quality of pulses, skin color and temperature  - Assess for signs of decreased coronary artery perfusion - ex. Angina  - Evaluate fluid balance, assess for edema, trend weights  Outcome: Progressing  Goal: Absence of cardiac arrhythmias or at baseline  Description: INTERVENTIONS:  - Continuous cardiac monitoring, monitor vital signs, obtain 12 lead EKG if indicated  - Evaluate effectiveness of antiarrhythmic and heart rate control medications as ordered  - Initiate emergency measures for life threatening arrhythmias  - Monitor electrolytes and administer  replacement therapy as ordered  Outcome: Progressing     Problem: GENITOURINARY - ADULT  Goal: Absence of urinary retention  Description: INTERVENTIONS:  - Assess patient’s ability to void and empty bladder  - Monitor intake/output and perform bladder scan as needed  - Follow urinary retention protocol/standard of care  - Consider collaborating with pharmacy to review patient's medication profile  - Implement strategies to promote bladder emptying  Outcome: Progressing     Problem: METABOLIC/FLUID AND ELECTROLYTES - ADULT  Goal: Glucose maintained within prescribed range  Description: INTERVENTIONS:  - Monitor Blood Glucose as ordered  - Assess for signs and symptoms of hyperglycemia and hypoglycemia  - Administer ordered medications to maintain glucose within target range  - Assess barriers to adequate nutritional intake and initiate nutrition consult as needed  - Instruct patient on self management of diabetes  Outcome: Progressing  Goal: Electrolytes maintained within normal limits  Description: INTERVENTIONS:  - Monitor labs and rhythm and assess patient for signs and symptoms of electrolyte imbalances  - Administer electrolyte replacement as ordered  - Monitor response to electrolyte replacements, including rhythm and repeat lab results as appropriate  - Fluid restriction as ordered  - Instruct patient on fluid and nutrition restrictions as appropriate  Outcome: Progressing  Goal: Hemodynamic stability and optimal renal function maintained  Description: INTERVENTIONS:  - Monitor labs and assess for signs and symptoms of volume excess or deficit  - Monitor intake, output and patient weight  - Monitor urine specific gravity, serum osmolarity and serum sodium as indicated or ordered  - Monitor response to interventions for patient's volume status, including labs, urine output, blood pressure (other measures as available)  - Encourage oral intake as appropriate  - Instruct patient on fluid and nutrition  restrictions as appropriate  Outcome: Progressing

## 2024-06-25 NOTE — PHYSICAL THERAPY NOTE
PHYSICAL THERAPY TREATMENT NOTE - INPATIENT    Room Number: 7621/7621-A     Session: 1     Number of Visits to Meet Established Goals: 7    Presenting Problem: increased weakness --> pneumonia  Co-Morbidities : NPH s/p  shunt placement, JENNA, dementia, PAF,TIA, lumbar spinal stenosis    PHYSICAL THERAPY MEDICAL/SOCIAL HISTORY  History related to current admission: Patient is a 79 year old male admitted on 6/21/2024 from home for increased weakness.  Pt diagnosed with pneumonia.     HOME SITUATION  Type of Home: Independent living facility ([facility])   Home Layout: One level     Lives With: Spouse  Drives: No  Patient Owned Equipment: Rollator;Rolling walker  Patient Regularly Uses: Glasses     Prior Level of [place]:   Pt is typically supervision assist w/ adl's, gait w/ rolling walker w/I the apartment and rollator for longer distances. Wife provides supervision and will push pt down to dining room in his rollator if he cannot make it.  Pt reports that's more often lately.  Pt has a bath aide to assist w/ showering.    ASSESSMENT   Patient demonstrates fair progress this session, goals  remain in progress.    Patient continues to function below baseline with bed mobility, transfers, gait, and standing prolonged periods.  Contributing factors to remaining limitations include decreased functional strength, decreased endurance/aerobic capacity, and decreased muscular endurance.  Next session anticipate patient to progress gait and standing prolonged periods.  Physical Therapy will continue to follow patient for duration of hospitalization.    Patient continues to benefit from continued skilled PT services: to facilitate return to prior level of function as patient demonstrates high motivation with excellent tolerance to an intensive therapy program .    PLAN  PT Treatment Plan: Bed mobility;Body mechanics;Coordination;Endurance;Energy conservation;Patient education;Family education;Gait  training;Neuromuscular re-educate;Range of motion;Strengthening;Transfer training;Balance training  Rehab Potential : Fair  Frequency (Obs): 3-5x/week    CURRENT GOALS        Goal #1 Patient is able to demonstrate supine - sit EOB @ level: supervision      Goal #2 Patient is able to demonstrate transfers Sit to/from Stand at assistance level: supervision      Goal #3 Patient is able to ambulate 50 feet with assist device: walker - rolling at assistance level: supervision      Goal #4     Goal #5     Goal #6     Goal Comments: Goals established on 2024 all goals ongoing     SUBJECTIVE  \"I am trying but I can work with you for 30minutes at a time\"    OBJECTIVE  Precautions: Bed/chair alarm    WEIGHT BEARING RESTRICTION  Weight Bearing Restriction: None                PAIN ASSESSMENT   Ratin  Location: denied       BALANCE                                                                                                                       Static Sitting: Fair +  Dynamic Sitting: Fair           Static Standing: Fair -  Dynamic Standing: Poor +    ACTIVITY TOLERANCE                         O2 WALK         AM-PAC '6-Clicks' INPATIENT SHORT FORM - BASIC MOBILITY  How much difficulty does the patient currently have...  Patient Difficulty: Turning over in bed (including adjusting bedclothes, sheets and blankets)?: A Lot   Patient Difficulty: Sitting down on and standing up from a chair with arms (e.g., wheelchair, bedside commode, etc.): A Little   Patient Difficulty: Moving from lying on back to sitting on the side of the bed?: A Lot   How much help from another person does the patient currently need...   Help from Another: Moving to and from a bed to a chair (including a wheelchair)?: A Little   Help from Another: Need to walk in hospital room?: A Little   Help from Another: Climbing 3-5 steps with a railing?: Total       AM-PAC Score:  Raw Score: 14   Approx Degree of Impairment: 61.29%    Standardized Score (AM-PAC Scale): 38.1   CMS Modifier (G-Code): CL    FUNCTIONAL ABILITY STATUS  Gait Assessment   Functional Mobility/Gait Assessment  Gait Assistance: Minimum assistance  Distance (ft): 30 x 2  Assistive Device: Rolling walker  Pattern: R Foot drag;L Foot drag;Shuffle    Skilled Therapy Provided    Bed Mobility:  Rolling: SBA   Supine<>Sit: SBA   Sit<>Supine: NT     Transfer Mobility:  Sit<>Stand: CGA  Stand<>Sit: CGA   Gait: Min A with RW    Therapist's Comments: NA      THERAPEUTIC EXERCISES  Lower Extremity Alternating marching     Upper Extremity Elbow flex/ext and  - open/close     Position Sitting     Repetitions   10   Sets   1     Patient End of Session: Up in chair;Needs met;Call light within reach;RN aware of session/findings;All patient questions and concerns addressed;Alarm set    PT Session Time: 23 minutes  Gait Training: 15 minutes  Therapeutic Activity: 8 minutes  Therapeutic Exercise: 0 minutes   Neuromuscular Re-education: 0 minutes

## 2024-06-25 NOTE — OCCUPATIONAL THERAPY NOTE
OCCUPATIONAL THERAPY TREATMENT NOTE - INPATIENT     Room Number: 7621/7621-A  Session: 2  Number of Visits to Meet Established Goals: 5    Presenting Problem: multifocal pneumonia  Prior Level of Function:   Patient lives at [facility] with his spouse and  aide for showering. H is typically able to perform ADLs and functional mobility with use of rollator PRN at supervision level. Reports needing increased assistance from spouse recently with toileting, dressing, and mobility.     ASSESSMENT   Patient demonstrates good  progress this session, goals remain in progress.    Patient continues to function below baseline with toileting, lower body dressing, bed mobility, transfers, static standing balance, dynamic standing balance, and functional standing tolerance.   Contributing factors to remaining limitations include decreased functional strength, decreased endurance, impaired coordination, impaired motor planning, and decreased muscular endurance.  Next session anticipate patient to progress toileting, lower body dressing, bed mobility, transfers, static standing balance, dynamic standing balance, and functional standing tolerance.  Occupational Therapy will continue to follow patient for duration of hospitalization.    Patient continues to benefit from continued skilled OT services: to facilitate return to prior level of function as patient demonstrates high motivation with excellent tolerance to an intensive therapy program .   Pt made good gains with bed mobility this session. Pt would benefit from an intensive therapy program for good progress with ADLs and functional transfers.              History: Patient is a 79 year old male admitted on 6/21/2024 with Presenting Problem: multifocal pneumonia. Co-Morbidities : NPH s/p  shunt placement, JENNA, dementia, PAF,TIA, lumbar spinal stenosis    Recent hospitalizations:  6/17-6/18  shunt placement, DC home with [facility]  5/1-5/13 Fall, weakness, lumbar drain  trial > [facility]    WEIGHT BEARING RESTRICTION  Weight Bearing Restriction: None                Recommendations for nursing staff:   Transfers: 1 person  Toileting location: toilet    TREATMENT SESSION:  Patient Start of Session: semi supine in bed  FUNCTIONAL TRANSFER ASSESSMENT  Sit to Stand: Edge of Bed; Chair  Edge of Bed: Stand-by Assist (pulling from merry stedy)  Chair: Contact Guard Assist    BED MOBILITY  Rolling: Supervision  Supine to Sit : Supervision  Sit to Supine (OT): Supervision  Scooting: SBA    BALANCE ASSESSMENT  Static Sitting: Supervision  Sitting Bilateral: Minimal Assist  Static Standing: Contact Guard Assist  Standing Bilateral: Minimal Assist    FUNCTIONAL ADL ASSESSMENT  LB Dressing Seated: Minimal Assist  LB Dressing Standing: Minimal Assist  Toileting Standing: Moderate Assist (simulated)      ACTIVITY TOLERANCE: WFL                         O2 SATURATIONS       EDUCATION PROVIDED  Patient: Role of Occupational Therapy; Plan of Care; Discharge Recommendations; Functional Transfer Techniques; Fall Prevention; Posture/Positioning; Energy Conservation; Proper Body Mechanics  Patient's Response to Education: Verbalized Understanding; Returned Demonstration      Equipment used: RW  Demonstrates functional use    Therapist comments: Pt received semi supine in bed, pleasant and cooperative for OT session. Pt agreeable for OOB activity/mobility in order to improve ADLs and functional transfers. Pt performs bed mobility at SB A to sit EOB with cues provided for hand placement and increased time required for command processing. Pt requires SBA to scoot to EOB in preparation to engage in sit to stand. Pt engages in sit to stand transfer, pulling into standing from merry stedy requiring SBA and is then transferred to bedside chair. Pt then engages in sit to stand transfer from chair to RW requiring CGA and good demo of hand/feet placement. Pt engages in 2 bouts of short distance functional mobility  with RW requiring min A and close chair follow in preparation for ambulatory toilet transfer. Pt tolerated well and left in chair with all needs.  Pt seen in afternoon per PMR request for ADL scores. Pt demos bed mobility at supervision to sit EOB. While seated EOB, pt demos footwear management at supervision to doff socks and requires min A to don. Pt engages in simulated LB dressing requiring min A to advance up past hips in standing. Pt also engages in simulated toileting pericare hygiene in standing requiring mod A for posterior reaching. Pt returns to supine at supervision.    Patient End of Session: Up in chair;Needs met;Call light within reach;RN aware of session/findings;All patient questions and concerns addressed;Alarm set    SUBJECTIVE  Pt pleasant and cooperative for OT.    PAIN ASSESSMENT  Ratin  Location: denies  Management Techniques: Repositioning;Relaxation     OBJECTIVE  Precautions: Bed/chair alarm    AM-PAC ‘6-Clicks’ Inpatient Daily Activity Short Form  -   Putting on and taking off regular lower body clothing?: A Lot  -   Bathing (including washing, rinsing, drying)?: A Lot  -   Toileting, which includes using toilet, bedpan or urinal? : A Lot  -   Putting on and taking off regular upper body clothing?: A Lot  -   Taking care of personal grooming such as brushing teeth?: A Little  -   Eating meals?: A Little    AM-PAC Score:  Score: 14  Approx Degree of Impairment: 59.67%  Standardized Score (AM-PAC Scale): 33.39    PLAN  OT Treatment Plan: Endurance training;UE strengthening/ROM;Functional transfer training;ADL training;Balance activities;Energy conservation/work simplification techniques;Cognitive reorientation;Patient/Family education;Patient/Family training;Equipment eval/education;Compensatory technique education;Fine motor coordination activities  Rehab Potential : Good  Frequency: 3-5x/week    OT Goals:     All goals ongoing     ADL Goals   Patient will perform grooming: with  stand by assist and while standing at sink  Patient will perform lower body dressing:  with min assist and with adaptive equipment PRN  Patient will perform toileting: with min assist     Functional Transfer Goals  Patient will transfer from sit to supine:  with stand by assist- goal met 06/25  Patient will transfer from supine to sit:  with stand by assist  Patient will transfer to toilet:  with mod assist     UE Exercise Program Goal  Patient will be supervision with bilateral AROM HEP (home exercise program).    OT Session Time: 30 minutes  Therapeutic Activity: 15 minutes (AM) + 15 minutes (PM)

## 2024-06-26 ENCOUNTER — TELEPHONE (OUTPATIENT)
Dept: SURGERY | Facility: CLINIC | Age: 79
End: 2024-06-26

## 2024-06-26 NOTE — TELEPHONE ENCOUNTER
Return the range was called.  Discussed with practitioner that the patient has repeated Vicryl sutures with Dermabond.  No need for suture removal.  He has Dermabond at the right neck as well.  I believe Dermabond over the abdominal incisions well.  Practitioner confirmed Dermabond to the abdominal incisions.  Advised patient could also alter his appointment to phone/video televisit.  At this time the patient would like to cancel his appointment.  He has no new neurologic complaints.  Given he is clinically doing well and practitioner advises no concerns at the incision sites, okay to cancel appointment for tomorrow.  Will plan to see Dr. Pringle for his 6-week postoperative visit.    Practitioner agreed to the plan, verbalized understanding was appreciative.

## 2024-06-26 NOTE — TELEPHONE ENCOUNTER
Yolanda from [facility] in [place] [phone], would like to know if appointment with Teodora on 6/27/24 can be cancel and if they are able to  remove sutures for patient ?Patient  was just transferred from the hospital on 6/25/24 due to having pneumonia and weakness and they want to avoid having take the patient out.

## 2024-06-27 NOTE — DISCHARGE SUMMARY
[facility]OSPITAL[facility]  DISCHARGE SUMMARY     Clay Orellana Patient Status:  Inpatient    1945 MRN [id number]   Location [facility] 7NE-A Attending Eda Williamson, DO   Hosp Day # 4 PCP Sachin Meza MD     Date of Admission: 2024  Date of Discharge: 2024  Discharge Disposition: Inpt Physical Rehab Facility or Physical Rehab Unit    Admitting Diagnosis:   Weakness generalized [R53.1]  Multifocal pneumonia [J18.9]    Hospital Discharge Diagnoses:   Multifocal pneumonia  NPH with history of  shunt  Normocytic anemia  Hypoalbuminemia  Hyperlipidemia  Dementia  BPH  A-fib  Anxiety  COPD    Lace+ Score: 81  59-90 High Risk  29-58 Medium Risk  0-28   Low Risk.    TCM Follow-Up Recommendation:  LACE > 58: High Risk of readmission after discharge from the hospital.        Discharge Diagnosis:   Multifocal pneumonia    History of Present Illness: Clay Orellana is a 79 year old male with PMHX BPH/ HTN/ HLD/ CVA/ NPH (s/p  shunt)/ COPD/ A-fib/ CAD who presented to the hospital for weakness. He recently had a  shunt placed on  and was sent home on the . For the past 3 days, noticed progressive weakness as well as a cough and a fever of 100.4 F. He has been so weak he is having problems ambulating. He also began to have increased low back pain from normal which was dull and rated 7/10 with no alleviating or exacerbating factors. He had increased nocturia without dysuria or change in urgency, He notes a headache since his surgery which has been unchanged since discharge. He denied any new paresthesia, specific muscle weakness, or visual changes. His mentation has been at his baseline as well. He denied any dyspnea, chest pain, palpitations, abdominal pain, nausea, emesis, diarrhea.     Brief Synopsis: Patient presented with weakness, chest x-ray with findings concerning for multifocal pneumonia.  Shunt x-ray completed with no significant kinking noted.  Neurosurgery consulted and did not feel there  were any complications with shunt, with no further workup recommended.  Started on IV antibiotics, with improvement in weakness.  Evaluated by PT and PMNR and recommended for acute rehab at discharge.  Patient discharged to acute rehab with oral antibiotics to complete course for pneumonia.    Procedures during hospitalization:   None    Incidental or significant findings and recommendations (brief descriptions):  None    Lab/Test results pending at Discharge:   None    Consultants:  PM&R, neurosurgery    Discharge Medication List:     Discharge Medications        START taking these medications        Instructions Prescription details   amoxicillin clavulanate 875-125 MG Tabs  Commonly known as: Augmentin      Take 1 tablet by mouth 2 (two) times daily for 2 days.   Stop taking on: June 27, 2024  Quantity: 4 tablet  Refills: 0     Polyethylene Glycol 3350 17 g Pack  Commonly known as: MIRALAX      Take 17 g by mouth daily.   Stop taking on: July 25, 2024  Quantity: 30 packet  Refills: 0     sennosides 8.6 MG Tabs  Commonly known as: Senokot      Take 1 tablet (8.6 mg total) by mouth 2 (two) times daily.   Stop taking on: July 25, 2024  Quantity: 60 tablet  Refills: 0            CHANGE how you take these medications        Instructions Prescription details   midodrine 5 MG Tabs  Commonly known as: ProAmatine  What changed:   when to take this  reasons to take this  Notes to patient: As needed      Take 1 tablet (5 mg total) by mouth 3 (three) times daily as needed.   Quantity: 90 tablet  Refills: 0            CONTINUE taking these medications        Instructions Prescription details   acetaminophen 500 MG Tabs  Commonly known as: Tylenol Extra Strength      Take 1 tablet (500 mg total) by mouth every 6 (six) hours as needed.   Refills: 0     acidophilus-pectin Caps  Commonly known as: Probiotic      Take 1 capsule by mouth daily.   Refills: 0     aspirin 81 MG Tbec      Take 1 tablet (81 mg total) by mouth daily.    Refills: 0     atorvastatin 40 MG Tabs  Commonly known as: Lipitor      Take 1 tablet (40 mg total) by mouth nightly.   Quantity: 90 tablet  Refills: 3     Cholecalciferol 125 MCG (5000 UT) Tabs  Commonly known as: VITAMIN D-3      Take 1 tablet (5,000 Units total) by mouth 3 (three) times a week. Three times a week. MWF   Refills: 0     cyanocobalamin 500 MCG Tabs  Commonly known as: Vitamin B12      Take 1 tablet (500 mcg total) by mouth daily.   Refills: 0     donepezil 10 MG Tabs  Commonly known as: Aricept      Take 1 tablet (10 mg total) by mouth nightly.   Quantity: 90 tablet  Refills: 1     finasteride 5 MG Tabs  Commonly known as: Proscar      TAKE ONE TABLET BY MOUTH ONE TIME DAILY   Quantity: 90 tablet  Refills: 2     flecainide 100 MG Tabs  Commonly known as: Tambocor      Take 1 tablet (100 mg total) by mouth 2 (two) times daily.   Refills: 0     ipratropium 0.03 % Soln  Commonly known as: Atrovent      2 sprays by Nasal route Q12H.   Refills: 0     metoprolol tartrate 25 MG Tabs  Commonly known as: Lopressor      Take 1 tablet (25 mg total) by mouth 2 (two) times daily.   Refills: 0     Multi-Vitamin/Minerals Tabs      Take 1 tablet by mouth daily.   Refills: 0     rivaroxaban 20 MG Tabs  Commonly known as: Xarelto      Take 1 tablet (20 mg total) by mouth nightly.   Refills: 0     sertraline 25 MG Tabs  Commonly known as: Zoloft      Take 1 tablet (25 mg total) by mouth daily.   Quantity: 30 tablet  Refills: 1     tamsulosin 0.4 MG Caps  Commonly known as: Flomax      Take 1 capsule (0.4 mg total) by mouth After dinner.   Refills: 0     Trelegy Ellipta 100-62.5-25 MCG/ACT Aepb  Generic drug: fluticasone-umeclidin-vilant      Inhale 1 puff into the lungs daily.   Refills: 0               Where to Get Your Medications        These medications [facility] to [id number] [place] [facility] [address] [phone], [phone]  [facility] [address] [place] [address]      Phone: [phone]    amoxicillin clavulanate 875-125 MG Tabs  midodrine 5 MG Tabs  Polyethylene Glycol 3350 17 g Pack  sennosides 8.6 MG Tabs         [place]    Follow-up appointment:   Sachin Meza MD  [address] [place] [address]  [address]  [place] [address]  [phone]    Schedule an appointment as soon as possible for a visit in 1 week(s)  For routine follow-up after hospitilization    Brian Pringle MD  [address] [address]  [phone]    Call in 1 week(s)  For follow-up after hospitilization      Vital signs:       Physical Exam:  See exam from day of discharge note  -----------------------------------------------------------------------------------------------  PATIENT DISCHARGE INSTRUCTIONS: See electronic chart    Rigo Williamson MD 6/27/2024    Time spent:  33 minutes     ADMIT

## 2024-07-03 RX ORDER — DONEPEZIL HYDROCHLORIDE 10 MG/1
10 TABLET, FILM COATED ORAL NIGHTLY
Qty: 90 TABLET | Refills: 1 | Status: SHIPPED | OUTPATIENT
Start: 2024-07-03

## 2024-07-03 NOTE — TELEPHONE ENCOUNTER
Medication: Donepezil 5 mg     Date of last refill: 12/27/2023  with 1 addt refill  [place]      Last office visit: 05/292024  Due back to clinic per last office note:    Date next office visit scheduled:    Future Appointments   Date Time Provider Department Center   7/18/2024 11:00 AM Vinny Preciado MD [facility]   8/1/2024  2:15 PM Teodora Castle PA-C [facility]           Last OV note recommendation:    Discussion plus Diagnostics & Treatment Orders:  Alternative means to prove NPH would be a cisternogram but at same time there is an ongoing issue about right leg weakness which could possibly be from the foraminal stenosis noted on the MRI as shown in the pictures.  Both at L3-4 and L4-5 there is also some degree of central stenosis.     I will order the cisternogram but I will go ahead and schedule an EMG of the right leg and lumbar paraspinal focusing on detecting whether there is any active radicular process.     Keep the scheduled visit with neurosurgery to address the same issues above.     Another possible approach would be to try Parkinson medication to see whether gait improvement will occur but this may take months for us to be able to conclude whether it helps or not.     (x) Discussed potential side effects of any treatment relevant to above.  Includes explanation of tests as necessary.     Return for scheduled [facility] test.

## 2024-07-05 RX ORDER — FINASTERIDE 5 MG/1
5 TABLET, FILM COATED ORAL DAILY
Qty: 90 TABLET | Refills: 0 | Status: SHIPPED | OUTPATIENT
Start: 2024-07-05

## 2024-07-09 ENCOUNTER — EXTERNAL FACILITY (OUTPATIENT)
Dept: FAMILY MEDICINE CLINIC | Facility: CLINIC | Age: 79
End: 2024-07-09

## 2024-07-09 VITALS
TEMPERATURE: 97 F | RESPIRATION RATE: 18 BRPM | DIASTOLIC BLOOD PRESSURE: 70 MMHG | SYSTOLIC BLOOD PRESSURE: 146 MMHG | OXYGEN SATURATION: 98 % | HEART RATE: 78 BPM

## 2024-07-09 DIAGNOSIS — I10 PRIMARY HYPERTENSION: ICD-10-CM

## 2024-07-09 DIAGNOSIS — I48.20 CHRONIC ATRIAL FIBRILLATION (HCC): ICD-10-CM

## 2024-07-09 DIAGNOSIS — R35.1 BENIGN PROSTATIC HYPERPLASIA WITH NOCTURIA: ICD-10-CM

## 2024-07-09 DIAGNOSIS — F39 MOOD DISORDER (HCC): ICD-10-CM

## 2024-07-09 DIAGNOSIS — N40.1 BENIGN PROSTATIC HYPERPLASIA WITH NOCTURIA: ICD-10-CM

## 2024-07-09 DIAGNOSIS — J18.9 MULTIFOCAL PNEUMONIA: Primary | ICD-10-CM

## 2024-07-09 DIAGNOSIS — Z98.2 S/P VP SHUNT: ICD-10-CM

## 2024-07-09 DIAGNOSIS — G91.2 NORMAL PRESSURE HYDROCEPHALUS (HCC): ICD-10-CM

## 2024-07-09 PROCEDURE — 99495 TRANSJ CARE MGMT MOD F2F 14D: CPT | Performed by: FAMILY MEDICINE

## 2024-07-09 RX ORDER — ASCORBIC ACID 100 MG
1 TABLET,CHEWABLE ORAL DAILY
COMMUNITY
Start: 2024-07-09

## 2024-07-09 RX ORDER — GUAIFENESIN 600 MG/1
1 TABLET, EXTENDED RELEASE ORAL 2 TIMES DAILY PRN
COMMUNITY
Start: 2024-07-08

## 2024-07-09 RX ORDER — SACCHAROMYCES BOULARDII 250 MG
250 CAPSULE ORAL 2 TIMES DAILY
COMMUNITY
Start: 2024-07-08

## 2024-07-09 RX ORDER — FLUTICASONE PROPIONATE 50 MCG
1 SPRAY, SUSPENSION (ML) NASAL DAILY
COMMUNITY
Start: 2024-07-09

## 2024-07-09 RX ORDER — ECHINACEA PURPUREA EXTRACT 125 MG
2 TABLET ORAL AS NEEDED
COMMUNITY
Start: 2024-07-08

## 2024-07-09 RX ORDER — ALBUTEROL SULFATE 90 UG/1
2 AEROSOL, METERED RESPIRATORY (INHALATION) EVERY 4 HOURS PRN
COMMUNITY
Start: 2024-07-08

## 2024-07-09 NOTE — PROGRESS NOTES
Subjective:   Clay Orellana is a 79 year old male who presents for hospital follow up.   He was discharged from Inpatient hospital, [facility] followed by [facility]  to Home   Admit Date: 6/21/2024  Discharge Date: 7/9/2024  Hospital Discharge Diagnosis:   Multifocal pneumonia   generalized weakness  Normal pressure hydrocephalus with recent  shunt placement  Anemia  BPH  Dementia  A-fib  Anxiety  COPD  Interactive contact within 2 business days post discharge first initiated on Date: 6/19/2024        HPI: 79-year-old male with history of normal pressure hydrocephalus who had  shunt placement recently, will discharge home to the assisted living  After he went home developed cough runny nose's fever and worsening weakness, he came back to the emergency room at [facility] where workup revealed multifocal pneumonia.  Patient was treated with IV antibiotics.  His shunt was found to be working well, patient was discharged to [facility] for acute rehab on 6/25  Patient underwent acute rehab and was discharged to assisted living on 7/9/2024    Patient is seen today at the assisted [facility], he is doing well, feels like he has good strength  He walked from the car to the assisted living and to his apartment and went down to dining room for his lunch  No dizziness, no weakness, no falls.  Patient feels pretty confident of walking, did not lose any balance  His mobility has improved.  Patient states that his sleep is improved and he slept all night yesterday with frequent waking up as he has to go to the bathroom.  Patient is on finasteride and Flomax  Denies any pain on surgical site on the scalp,        History/Other:   Current Medications:  Medication Reconciliation:  I am aware of an inpatient discharge within the last 30 days.  The discharge medication list has been reconciled with the patient's current medication list and reviewed by me.  See medication list for additions of new medication, and changes to  current doses of medications and discontinued medications.  Outpatient Medications Marked as Taking for the 7/9/24 encounter (External Facility) with Sachin Meza MD   Medication Sig    fluticasone propionate 50 MCG/ACT Nasal Suspension 1 spray by Nasal route daily.    guaiFENesin  MG Oral Tablet 12 Hr Take 1 tablet (600 mg total) by mouth 2 (two) times daily as needed.    Multiple Vitamin (QUINTABS) Oral Tab Take 1 tablet by mouth daily.    saccharomyces boulardii 250 MG Oral Cap Take 1 capsule (250 mg total) by mouth 2 (two) times daily.    albuterol 108 (90 Base) MCG/ACT Inhalation Aero Soln Inhale 2 puffs into the lungs every 4 (four) hours as needed for Wheezing.    sodium chloride 0.65 % Nasal Solution 2 sprays by Nasal route as needed.    FINASTERIDE 5 MG Oral Tab TAKE ONE TABLET BY MOUTH ONE TIME DAILY    donepezil 10 MG Oral Tab Take 1 tablet (10 mg total) by mouth nightly.    polyethylene glycol, PEG 3350, 17 g Oral Powd Pack Take 17 g by mouth daily.    sennosides 8.6 MG Oral Tab Take 1 tablet (8.6 mg total) by mouth 2 (two) times daily.    metoprolol tartrate 25 MG Oral Tab Take 1 tablet (25 mg total) by mouth 2 (two) times daily.    tamsulosin 0.4 MG Oral Cap Take 1 capsule (0.4 mg total) by mouth After dinner.    sertraline (ZOLOFT) 25 MG Oral Tab Take 1 tablet (25 mg total) by mouth daily.    atorvastatin 40 MG Oral Tab Take 1 tablet (40 mg total) by mouth nightly.    aspirin 81 MG Oral Tab EC Take 1 tablet (81 mg total) by mouth daily.    rivaroxaban 20 MG Oral Tab Take 1 tablet (20 mg total) by mouth nightly.    fluticasone-umeclidin-vilant (TRELEGY ELLIPTA) 100-62.5-25 MCG/ACT Inhalation Aerosol Powder, Breath Activated Inhale 1 puff into the lungs daily.    flecainide 100 MG Oral Tab Take 1 tablet (100 mg total) by mouth 2 (two) times daily.    Cholecalciferol 125 MCG (5000 UT) Oral Tab Take 1 tablet (5,000 Units total) by mouth 3 (three) times a week. Three times a week. F     cyanocobalamin 500 MCG Oral Tab Take 1 tablet (500 mcg total) by mouth daily.    Multiple Vitamins-Minerals (MULTI-VITAMIN/MINERALS) Oral Tab Take 1 tablet by mouth daily.    acetaminophen 500 MG Oral Tab Take 1 tablet (500 mg total) by mouth every 6 (six) hours as needed.       Review of Systems:  GENERAL: weight stable, energy stable, no sweating  SKIN: denies any unusual skin lesions  EYES: denies blurred vision or double vision  HEENT: denies nasal congestion, sinus pain or ST  LUNGS: denies shortness of breath with exertion  CARDIOVASCULAR: denies chest pain on exertion or palpitations  GI: denies abdominal pain, denies heartburn, denies diarrhea  MUSCULOSKELETAL: denies pain, normal range of motion of extremities  NEURO: denies headaches, denies dizziness, denies weakness  PSYCHE: denies depression or anxiety  HEMATOLOGIC: denies hx of anemia, or bruising, denies bleeding  ENDOCRINE: denies thyroid history  ALL/ASTHMA: denies hx of allergy or asthma    Objective:   No LMP for male patient.  Estimated body mass index is 28.63 kg/m² as calculated from the following:    Height as of 6/7/24: 5' 11\" (1.803 m).    Weight as of 6/21/24: 205 lb 4 oz (93.1 kg).   /70   Pulse 78   Temp 97 °F (36.1 °C) (Temporal)   Resp 18   SpO2 98%    GENERAL: well developed, well nourished, in no apparent distress  SKIN: Wound on the scalp, clean dry and intact, sutures in place  HEENT: atraumatic, normocephalic, ears and throat are clear  EYES: PERRLA, EOMI, conjunctiva are clear  NECK: supple, no adenopathy, no bruits  CHEST: no chest tenderness  LUNGS: clear to auscultation  CARDIO: RRR without murmur  GI: good BS's, no masses, HSM or tenderness  MUSCULOSKELETAL: back is not tender, FROM of the extremities  EXTREMITIES: no cyanosis, clubbing or edema  NEURO: Oriented times three, cranial nerves are intact, motor and sensory are grossly intact    Assessment & Plan:   1. Multifocal pneumonia (Primary)  2. Normal pressure  hydrocephalus (HCC)  3. S/P  shunt  4. Benign prostatic hyperplasia with nocturia    During the visit, the following was completed:  Obtained and reviewed discharge summary, continuity of care documents, Hospitalist notes, and Surgery Notes  Reviewed Labs (CBC, CMP), Labs (Cardiac markers), Labs (Pathology), Labs (Microbiology), and X-Ray radiology results  He is otherwise doing well  Labs and medications reviewed reconciled and restarted  Regarding his BPH/nocturia patient is advised to follow-up with urology, he may benefit from oxybutynin  Needs follow-up with neurosurgery, neurology  Will check his labs in 2 weeks      No follow-ups on file.

## 2024-07-18 ENCOUNTER — PROCEDURE VISIT (OUTPATIENT)
Dept: NEUROLOGY | Facility: CLINIC | Age: 79
End: 2024-07-18
Payer: MEDICARE

## 2024-07-18 DIAGNOSIS — G91.2 NPH (NORMAL PRESSURE HYDROCEPHALUS) (HCC): Primary | ICD-10-CM

## 2024-07-18 DIAGNOSIS — M48.062 SPINAL STENOSIS OF LUMBAR REGION WITH NEUROGENIC CLAUDICATION: ICD-10-CM

## 2024-07-18 DIAGNOSIS — R29.898 WEAKNESS OF RIGHT LEG: ICD-10-CM

## 2024-07-18 DIAGNOSIS — R26.9 GAIT ABNORMALITY: ICD-10-CM

## 2024-07-18 NOTE — PATIENT INSTRUCTIONS
Refill policies:    Allow 2-3 business days for refills; controlled substances may take longer.  Contact your pharmacy at least 5 days prior to running out of medication and have them send an electronic request or submit request through the “request refill” option in your [employer] account.  Refills are not addressed on weekends; covering physicians do not authorize routine medications on weekends.  No narcotics or controlled substances are refilled after noon on Fridays or by on call physicians.  By law, narcotics must be electronically prescribed.  A 30 day supply with no refills is the maximum allowed.  If your prescription is due for a refill, you may be due for a follow up appointment.  To best provide you care, patients receiving routine medications need to be seen at least once a year.  Patients receiving narcotic/controlled substance medications need to be seen at least once every 3 months.  In the event that your preferred pharmacy does not have the requested medication in stock (e.g. Backordered), it is your responsibility to find another pharmacy that has the requested medication available.  We will gladly send a new prescription to that pharmacy at your request.    Scheduling Tests:    If your physician has ordered radiology tests such as MRI or CT scans, please contact Central Scheduling at [phone] right away to schedule the test.  Once scheduled, the [facility] Centralized Referral Team will work with your insurance carrier to obtain pre-certification or prior authorization.  Depending on your insurance carrier, approval may take 3-10 days.  It is highly recommended patients assure they have received an authorization before having a test performed.  If test is done without insurance authorization, patient may be responsible for the entire amount billed.      Precertification and Prior Authorizations:  If your physician has recommended that you have a procedure or additional testing performed the [facility]  Centralized Referral Team will contact your insurance carrier to obtain pre-certification or prior authorization.    You are strongly encouraged to contact your insurance carrier to verify that your procedure/test has been approved and is a COVERED benefit.  Although the [facility] Centralized Referral Team does its due diligence, the insurance carrier gives the disclaimer that \"Although the procedure is authorized, this does not guarantee payment.\"    Ultimately the patient is responsible for payment.   Thank you for your understanding in this matter.  Paperwork Completion:  If you require FMLA or disability paperwork for your recovery, please make sure to either drop it off or have it faxed to our office at [phone]. Be sure the form has your name and date of birth on it.  The form will be faxed to our Forms Department and they will complete it for you.  There is a 25$ fee for all forms that need to be filled out.  Please be aware there is a 10-14 day turnaround time.  You will need to sign a release of information (ZAKIYA) form if your paperwork does not come with one.  You may call the Forms Department with any questions at [phone].  Their fax number is [phone].

## 2024-07-18 NOTE — PROGRESS NOTES
[facility]   [address] [place] [address]  [phone]    Fax  [phone]    Electrophysiologic Consultation      Patient: Clay Orellana  7/18/2024  YOB: 1945    Referred by:  MADISYN Preciado MD    Reason for testing/History:   79 year oldmale,   history of NPH with recent shunt placement and some improvement in gait noted,  Has concomittant lumbar stenosis at 2 levels and right leg weakness and pain      Relevant findings on Exam:          RESULTS:   Right superficial peroneal sensory response absent  Diminished amplitudes of the right peroneal and tibial compound muscle action potentials with normal velocities noted  EMG showed acute denervation potentials right quadricep, tibialis anterior, Ext Hallucis longus and medial gastrocnemius   Chronic motor units firing from the tibialis anterior, peroneous longus, and EHL      IMPRESSION:    Distal axonal polyneuropathy, mild  Acute superimposed on chronic L4 radiculopathy right, and milder L5 as well as S1 radiculopathy were noted also with acute and chronic component        Vinny Preciado MD  Neurology    Data:  Sensory NCS      Nerve / Sites Rec. Site Onset Lat Peak Lat NP Amp PP Amp Segments Distance Velocity Comment     ms ms µV µV  cm m/s    R Superficial peroneal - (Antidromic)      Lat leg Ankle NR NR NR NR Lat leg - Ankle 14 NR        Motor NCS      Nerve / Sites Muscle Latency Amplitude Segments Dist. Lat Diff Velocity Comments     ms mV  cm ms m/s    R Peroneal - EDB      Ankle EDB 6.08 1.0 Ankle - EDB 8         B. Fib Head EDB 14.04 0.9 B. Fib Head - Ankle 32.5 7.96 40.8       A. Fib Head EDB 16.33 0.9 A. Fib Head - B. Fib Head 10 2.29 43.6    R Tibial - AH      Ankle AH 5.96 2.7 Ankle - AH 8         Knee AH 15.17 2.2 Knee - Ankle 39 9.21 42.4        F  Wave      Nerve M Latency F Latency F-M Lat    ms ms ms   R Tibial - AH 7.0 67.4 60.4       EMG Summary Table     Spontaneous MUAP Recruitment   Muscle IA Fib  PSW Fasc H.F. Amp Dur. PPP Pattern   R. Gastrocnemius N None None None None N N N Reduced   R. Tibialis anterior N None None None None N 2+ 2+ N   R. Peroneus longus N None None None None N N N Reduced   R. Extensor hallucis longus N None 2+ None None N 3+ 1+ Reduced   R. Quadriceps 1+ 2+ 3+ None None N 2+ 2+ N

## 2024-07-31 ENCOUNTER — TELEPHONE (OUTPATIENT)
Dept: PAIN CLINIC | Facility: CLINIC | Age: 79
End: 2024-07-31

## 2024-07-31 ENCOUNTER — OFFICE VISIT (OUTPATIENT)
Dept: PAIN CLINIC | Facility: CLINIC | Age: 79
End: 2024-07-31
Payer: MEDICARE

## 2024-07-31 VITALS
WEIGHT: 204 LBS | SYSTOLIC BLOOD PRESSURE: 126 MMHG | BODY MASS INDEX: 28 KG/M2 | OXYGEN SATURATION: 99 % | DIASTOLIC BLOOD PRESSURE: 78 MMHG | HEART RATE: 60 BPM

## 2024-07-31 DIAGNOSIS — G91.2 NPH (NORMAL PRESSURE HYDROCEPHALUS) (HCC): ICD-10-CM

## 2024-07-31 DIAGNOSIS — M54.16 LUMBAR RADICULOPATHY: Primary | ICD-10-CM

## 2024-07-31 PROCEDURE — 99204 OFFICE O/P NEW MOD 45 MIN: CPT | Performed by: ANESTHESIOLOGY

## 2024-07-31 NOTE — PATIENT INSTRUCTIONS
Refill policies:    Allow 2-3 business days for refills; controlled substances may take longer.  Contact your pharmacy at least 5 days prior to running out of medication and have them send an electronic request or submit request through the “request refill” option in your [employer] account.  Refills are not addressed on weekends; covering physicians do not authorize routine medications on weekends.  No narcotics or controlled substances are refilled after noon on Fridays or by on call physicians.  By law, narcotics must be electronically prescribed.  A 30 day supply with no refills is the maximum allowed.  If your prescription is due for a refill, you may be due for a follow up appointment.  To best provide you care, patients receiving routine medications need to be seen at least once a year.  Patients receiving narcotic/controlled substance medications need to be seen at least once every 3 months.  In the event that your preferred pharmacy does not have the requested medication in stock (e.g. Backordered), it is your responsibility to find another pharmacy that has the requested medication available.  We will gladly send a new prescription to that pharmacy at your request.    Scheduling Tests:    If your physician has ordered radiology tests such as MRI or CT scans, please contact Central Scheduling at [phone] right away to schedule the test.  Once scheduled, the [facility] Centralized Referral Team will work with your insurance carrier to obtain pre-certification or prior authorization.  Depending on your insurance carrier, approval may take 3-10 days.  It is highly recommended patients assure they have received an authorization before having a test performed.  If test is done without insurance authorization, patient may be responsible for the entire amount billed.      Precertification and Prior Authorizations:  If your physician has recommended that you have a procedure or additional testing performed the [facility]  Centralized Referral Team will contact your insurance carrier to obtain pre-certification or prior authorization.    You are strongly encouraged to contact your insurance carrier to verify that your procedure/test has been approved and is a COVERED benefit.  Although the [facility] Centralized Referral Team does its due diligence, the insurance carrier gives the disclaimer that \"Although the procedure is authorized, this does not guarantee payment.\"    Ultimately the patient is responsible for payment.   Thank you for your understanding in this matter.  Paperwork Completion:  If you require FMLA or disability paperwork for your recovery, please make sure to either drop it off or have it faxed to our office at [phone]. Be sure the form has your name and date of birth on it.  The form will be faxed to our Forms Department and they will complete it for you.  There is a 25$ fee for all forms that need to be filled out.  Please be aware there is a 10-14 day turnaround time.  You will need to sign a release of information (ZAKIYA) form if your paperwork does not come with one.  You may call the Forms Department with any questions at [phone].  Their fax number is [phone].

## 2024-07-31 NOTE — PROGRESS NOTES
Patient presents in office today with reported pain in bilateral lower back pain radiating down both legs    Current pain level reported = 5-6/10    Last reported dose of Tylenol around 9a      Narcotic Contract renewal NA    Urine Drug screen NA

## 2024-07-31 NOTE — PROGRESS NOTES
Location of Pain:  bilateral lower back pain radiating down both legs     Date Pain Began: chronic          Work Related:   No        Receiving Work Comp/Disability:   No    Numeric Rating Scale:  Pain at Present:  5                                                                                                            (No Pain) 0  to  10 (Worst Pain)                 Minimum Pain:   4  Maximum Pain  6    Distribution of Pain:    bilateral    Quality of Pain:    cramping, aching, numbness, tingling    Origin of Pain:    Degenerative    Aggravating Factors:    Standing and Walking    Past Treatments for Current Pain Condition:   Other shunt    Prior diagnostic testing for your pain:  Xray MRI EMG

## 2024-07-31 NOTE — H&P
Name: Clay Orellana   : 1945   DOS: 2024     Chief complaint: Lumbar radiculitis    History of present illness:  Clay Orellana is a 79 year old male with a history of normal pressure hydrocephalus status post shunt placement referred for evaluation of weakness in the right greater than left lower extremities.  Patient reports symptoms are made worse by standing and walking.  Symptoms is described as cramping, aching.  There is numbness and tingling.  Rates discomfort as 5 out of 10 with exacerbation to 6 out of 10.  Denies any fevers or chills.      Past Medical History:    Acute nausea with nonbilious vomiting    Anxiety    Arrhythmia    Arthritis    Bilateral inguinal hernia without obstruction or gangrene    BPH (benign prostatic hyperplasia)    Calculus of kidney    Essential hypertension    High blood pressure    High cholesterol    Muscle weakness    Stroke (HCC)    TIA    Visual impairment      Current Outpatient Medications   Medication Sig Dispense Refill    fluticasone propionate 50 MCG/ACT Nasal Suspension 1 spray by Nasal route daily.      guaiFENesin  MG Oral Tablet 12 Hr Take 1 tablet (600 mg total) by mouth 2 (two) times daily as needed.      Multiple Vitamin (QUINTABS) Oral Tab Take 1 tablet by mouth daily.      saccharomyces boulardii 250 MG Oral Cap Take 1 capsule (250 mg total) by mouth 2 (two) times daily.      albuterol 108 (90 Base) MCG/ACT Inhalation Aero Soln Inhale 2 puffs into the lungs every 4 (four) hours as needed for Wheezing.      sodium chloride 0.65 % Nasal Solution 2 sprays by Nasal route as needed.      FINASTERIDE 5 MG Oral Tab TAKE ONE TABLET BY MOUTH ONE TIME DAILY 90 tablet 0    donepezil 10 MG Oral Tab Take 1 tablet (10 mg total) by mouth nightly. 90 tablet 1    metoprolol tartrate 25 MG Oral Tab Take 1 tablet (25 mg total) by mouth 2 (two) times daily.      tamsulosin 0.4 MG Oral Cap Take 1 capsule (0.4 mg total) by mouth After dinner.      sertraline  (ZOLOFT) 25 MG Oral Tab Take 1 tablet (25 mg total) by mouth daily. 30 tablet 1    atorvastatin 40 MG Oral Tab Take 1 tablet (40 mg total) by mouth nightly. 90 tablet 3    aspirin 81 MG Oral Tab EC Take 1 tablet (81 mg total) by mouth daily.      rivaroxaban 20 MG Oral Tab Take 1 tablet (20 mg total) by mouth nightly.      fluticasone-umeclidin-vilant (TRELEGY ELLIPTA) 100-62.5-25 MCG/ACT Inhalation Aerosol Powder, Breath Activated Inhale 1 puff into the lungs daily.      flecainide 100 MG Oral Tab Take 1 tablet (100 mg total) by mouth 2 (two) times daily.      Cholecalciferol 125 MCG (5000 UT) Oral Tab Take 1 tablet (5,000 Units total) by mouth 3 (three) times a week. Three times a week. MWF      cyanocobalamin 500 MCG Oral Tab Take 1 tablet (500 mcg total) by mouth daily.      Multiple Vitamins-Minerals (MULTI-VITAMIN/MINERALS) Oral Tab Take 1 tablet by mouth daily.      acetaminophen 500 MG Oral Tab Take 1 tablet (500 mg total) by mouth every 6 (six) hours as needed.      midodrine 5 MG Oral Tab Take 1 tablet (5 mg total) by mouth 3 (three) times daily as needed. (Patient not taking: Reported on 7/9/2024) 90 tablet 0     Past Surgical History:   Procedure Laterality Date    Anesth,pacemaker insertion  08/2023    Appendectomy      Appendectomy      Colonoscopy      Colonoscopy N/A 05/03/2022    Procedure: COLONOSCOPY;  Surgeon: Sudarshan Gilbert MD;  Location:  ENDOSCOPY    Colonoscopy      Cyst removal N/A 1990    Cyst removed behind back of neck    Cyst removal Right 11/05/2020    Removed from right upper back - just below shoulder    Hernia surgery  July 2022    Skin surgery  2020    Vasectomy  1980      Family History   Problem Relation Age of Onset    Heart Attack Father     Diabetes Mother     Other (DM) Sister     Other (ALS) Brother     No Known Problems Maternal Grandmother     No Known Problems Maternal Grandfather     No Known Problems Paternal Grandmother     No Known Problems Paternal Grandfather      Other (smoker) Brother     Other (MS) Sister     No Known Problems Son     No Known Problems Daughter      Social History     Socioeconomic History    Marital status:    Tobacco Use    Smoking status: Former     Types: Cigarettes     Passive exposure: Never    Smokeless tobacco: Never    Tobacco comments:     Has not smoked for about 20 years   Vaping Use    Vaping status: Never Used   Substance and Sexual Activity    Alcohol use: Never     Alcohol/week: 4.0 standard drinks of alcohol     Types: 4 Cans of beer per week    Drug use: Never   Other Topics Concern    Caffeine Concern No    Exercise No    Seat Belt Yes    Special Diet No    Stress Concern Yes    Weight Concern No     Social Determinants of Health     Financial Resource Strain: Low Risk  (6/19/2024)    Financial Resource Strain     Difficulty of Paying Living Expenses: Not hard at all     Med Affordability: No   Food Insecurity: Low Risk  (7/8/2024)    Received from [facility]    Food Insecurity     Have there been times that your food ran out, and you didn't have money to get more?: No     Are there times that you worry that this might happen?: No   Transportation Needs: Low Risk  (7/8/2024)    Received from [facility]    Transportation Needs     Has lack of transportation kept you from medical appointments, meetings, work, or from getting things needed for daily living: No   Housing Stability: Low Risk  (7/8/2024)    Received from [facility]    Housing Stability     Are you worried that your electric, gas, oil, or water might be shut off?: No     Are you concerned about having a safe and reliable place to live?: No       Review of  other systems:  10 point ROS otherwise negative Extension:       Inspection: Alert and oriented x 4 no acute distress  HEENT: EOMI  Cardiovascular system: No peripheral edema  Respiratory system: Breath sounds equal bilaterally.   Abdomen: Soft,    Neurologic:  Cranial nerves II through XII are grossly intact.       Examination of the lower back:     Gait is intact with walker       Radiology diagnostic studies:   Lumbar MRI reviewed with evidence of severe foraminal stenosis on the right L4-5  EMG reviewed with chronic L4 radiculopathy    Assessment:  Encounter Diagnoses   Name Primary?    Lumbar radiculopathy Yes    NPH (normal pressure hydrocephalus) (HCC)    .      Plan:     The patient is 79-year-old gentleman with a complex past medical history.  The patient has atrial fibrillation, pacemaker, and is anticoagulated.  He recently had shunt placement for normal pressure hydrocephalus.  Complains of weakness and discomfort on the right side more than the left.  Does have EMG with evidence of L4 radiculitis along with imaging with severe foraminal stenosis at the L4-5 level.  Recommended trial of lumbar transforaminal epidural steroid injection.  Patient will need medical clearance to hold anticoagulation.  Risk benefits discussed.  All procedure related questions addressed.      Sudarshan Chase MD MPH  Pain Management

## 2024-07-31 NOTE — TELEPHONE ENCOUNTER
Order Questions    Question Answer Comment   Anesthesia Type Local    Provider Salvatore    Location [facility]    Procedure Transforaminal    Laterality/Level right L4    CPT (Hit enter after each entry) INJECTION, ANESTHETIC/STEROID, TRANSFORAMINAL EPIDURAL; LUMBAR/SACRAL, SINGLE LEVEL    Medications to hold xarelto    Medical clearance requested (will send to Pain Navigator) Yes rogers   Patient has Medicare coverage? Yes    Comments (Please list entire procedure name here.) right L4 transforaminal epidural steroid injection

## 2024-08-01 ENCOUNTER — TELEPHONE (OUTPATIENT)
Dept: SURGERY | Facility: CLINIC | Age: 79
End: 2024-08-01

## 2024-08-01 ENCOUNTER — OFFICE VISIT (OUTPATIENT)
Dept: SURGERY | Facility: CLINIC | Age: 79
End: 2024-08-01
Payer: MEDICARE

## 2024-08-01 DIAGNOSIS — Z98.2 S/P VP SHUNT: Primary | ICD-10-CM

## 2024-08-01 DIAGNOSIS — G91.2 NPH (NORMAL PRESSURE HYDROCEPHALUS) (HCC): ICD-10-CM

## 2024-08-01 DIAGNOSIS — Z98.890 POST-OPERATIVE STATE: ICD-10-CM

## 2024-08-01 PROCEDURE — 99024 POSTOP FOLLOW-UP VISIT: CPT | Performed by: PHYSICIAN ASSISTANT

## 2024-08-01 NOTE — PATIENT INSTRUCTIONS
Refill policies:    Allow 2-3 business days for refills; controlled substances may take longer.  Contact your pharmacy at least 5 days prior to running out of medication and have them send an electronic request or submit request through the “request refill” option in your [employer] account.  Refills are not addressed on weekends; covering physicians do not authorize routine medications on weekends.  No narcotics or controlled substances are refilled after noon on Fridays or by on call physicians.  By law, narcotics must be electronically prescribed.  A 30 day supply with no refills is the maximum allowed.  If your prescription is due for a refill, you may be due for a follow up appointment.  To best provide you care, patients receiving routine medications need to be seen at least once a year.  Patients receiving narcotic/controlled substance medications need to be seen at least once every 3 months.  In the event that your preferred pharmacy does not have the requested medication in stock (e.g. Backordered), it is your responsibility to find another pharmacy that has the requested medication available.  We will gladly send a new prescription to that pharmacy at your request.    Scheduling Tests:    If your physician has ordered radiology tests such as MRI or CT scans, please contact Central Scheduling at [phone] right away to schedule the test.  Once scheduled, the [facility] Centralized Referral Team will work with your insurance carrier to obtain pre-certification or prior authorization.  Depending on your insurance carrier, approval may take 3-10 days.  It is highly recommended patients assure they have received an authorization before having a test performed.  If test is done without insurance authorization, patient may be responsible for the entire amount billed.      Precertification and Prior Authorizations:  If your physician has recommended that you have a procedure or additional testing performed the [facility]  Centralized Referral Team will contact your insurance carrier to obtain pre-certification or prior authorization.    You are strongly encouraged to contact your insurance carrier to verify that your procedure/test has been approved and is a COVERED benefit.  Although the [facility] Centralized Referral Team does its due diligence, the insurance carrier gives the disclaimer that \"Although the procedure is authorized, this does not guarantee payment.\"    Ultimately the patient is responsible for payment.   Thank you for your understanding in this matter.  Paperwork Completion:  If you require FMLA or disability paperwork for your recovery, please make sure to either drop it off or have it faxed to our office at [phone]. Be sure the form has your name and date of birth on it.  The form will be faxed to our Forms Department and they will complete it for you.  There is a 25$ fee for all forms that need to be filled out.  Please be aware there is a 10-14 day turnaround time.  You will need to sign a release of information (ZAKIYA) form if your paperwork does not come with one.  You may call the Forms Department with any questions at [phone].  Their fax number is [phone].

## 2024-08-01 NOTE — TELEPHONE ENCOUNTER
Pt's daughter states Teodora was going to print out shunt information for them.  I advised pt that information would have to be sent via [employer] or mailed once she has a chance to get the information.

## 2024-08-01 NOTE — PROGRESS NOTES
Patient: Clay Orellana  Medical Record Number: [id number]  YOB: 1945  PCP: Sachin Meza MD    Reason for visit: S/p  shunt, post op visit    HISTORY OF CHIEF COMPLAINT:    Clay Orellana is a very pleasant 79 year old male who presents today for: S/p  shunt, post op visit  S/p 6/17/24: Dr. Pringle: Right  shunt insertion   Patient was subsequently hospitalized after discharge for the  shunt for multifocal PNA and weakness.   Patient and family endorse improvement of memory and urinary incontinence. No incisional complaints. Patient would like to adjust his shunt as he'd like further improvement of symptoms.     Past Medical History:    Acute nausea with nonbilious vomiting    Anxiety    Arrhythmia    Arthritis    Bilateral inguinal hernia without obstruction or gangrene    BPH (benign prostatic hyperplasia)    Calculus of kidney    Essential hypertension    High blood pressure    High cholesterol    Muscle weakness    Stroke (HCC)    TIA    Visual impairment      Past Surgical History:   Procedure Laterality Date    Anesth,pacemaker insertion  08/2023    Appendectomy      Appendectomy      Colonoscopy      Colonoscopy N/A 05/03/2022    Procedure: COLONOSCOPY;  Surgeon: Sudarshan Gilbert MD;  Location:  ENDOSCOPY    Colonoscopy      Cyst removal N/A 1990    Cyst removed behind back of neck    Cyst removal Right 11/05/2020    Removed from right upper back - just below shoulder    Hernia surgery  July 2022    Skin surgery  2020    Vasectomy  1980      Family History   Problem Relation Age of Onset    Heart Attack Father     Diabetes Mother     Other (DM) Sister     Other (ALS) Brother     No Known Problems Maternal Grandmother     No Known Problems Maternal Grandfather     No Known Problems Paternal Grandmother     No Known Problems Paternal Grandfather     Other (smoker) Brother     Other (MS) Sister     No Known Problems Son     No Known Problems Daughter       Social History     Socioeconomic  History    Marital status:    Tobacco Use    Smoking status: Former     Types: Cigarettes     Passive exposure: Never    Smokeless tobacco: Never    Tobacco comments:     Has not smoked for about 20 years   Vaping Use    Vaping status: Never Used   Substance and Sexual Activity    Alcohol use: Never     Alcohol/week: 4.0 standard drinks of alcohol     Types: 4 Cans of beer per week    Drug use: Never   Other Topics Concern    Caffeine Concern No    Exercise No    Seat Belt Yes    Special Diet No    Stress Concern Yes    Weight Concern No      Allergies   Allergen Reactions    Kiwi Extract ITCHING     Inner ears itch      Current Medications:  Current Outpatient Medications   Medication Sig Dispense Refill    fluticasone propionate 50 MCG/ACT Nasal Suspension 1 spray by Nasal route daily.      guaiFENesin  MG Oral Tablet 12 Hr Take 1 tablet (600 mg total) by mouth 2 (two) times daily as needed.      Multiple Vitamin (QUINTABS) Oral Tab Take 1 tablet by mouth daily.      saccharomyces boulardii 250 MG Oral Cap Take 1 capsule (250 mg total) by mouth 2 (two) times daily.      albuterol 108 (90 Base) MCG/ACT Inhalation Aero Soln Inhale 2 puffs into the lungs every 4 (four) hours as needed for Wheezing.      sodium chloride 0.65 % Nasal Solution 2 sprays by Nasal route as needed.      FINASTERIDE 5 MG Oral Tab TAKE ONE TABLET BY MOUTH ONE TIME DAILY 90 tablet 0    donepezil 10 MG Oral Tab Take 1 tablet (10 mg total) by mouth nightly. 90 tablet 1    midodrine 5 MG Oral Tab Take 1 tablet (5 mg total) by mouth 3 (three) times daily as needed. (Patient not taking: Reported on 7/9/2024) 90 tablet 0    metoprolol tartrate 25 MG Oral Tab Take 1 tablet (25 mg total) by mouth 2 (two) times daily.      tamsulosin 0.4 MG Oral Cap Take 1 capsule (0.4 mg total) by mouth After dinner.      sertraline (ZOLOFT) 25 MG Oral Tab Take 1 tablet (25 mg total) by mouth daily. 30 tablet 1    atorvastatin 40 MG Oral Tab Take 1 tablet  (40 mg total) by mouth nightly. 90 tablet 3    aspirin 81 MG Oral Tab EC Take 1 tablet (81 mg total) by mouth daily.      rivaroxaban 20 MG Oral Tab Take 1 tablet (20 mg total) by mouth nightly.      fluticasone-umeclidin-vilant (TRELEGY ELLIPTA) 100-62.5-25 MCG/ACT Inhalation Aerosol Powder, Breath Activated Inhale 1 puff into the lungs daily.      flecainide 100 MG Oral Tab Take 1 tablet (100 mg total) by mouth 2 (two) times daily.      Cholecalciferol 125 MCG (5000 UT) Oral Tab Take 1 tablet (5,000 Units total) by mouth 3 (three) times a week. Three times a week. MWF      cyanocobalamin 500 MCG Oral Tab Take 1 tablet (500 mcg total) by mouth daily.      Multiple Vitamins-Minerals (MULTI-VITAMIN/MINERALS) Oral Tab Take 1 tablet by mouth daily.      acetaminophen 500 MG Oral Tab Take 1 tablet (500 mg total) by mouth every 6 (six) hours as needed.          REVIEW OF SYSTEMS   Comprehensive review of systems done. Negative except what is outlined in the above HPI.     PHYSICAL EXAMIMATION    vitals were not taken for this visit.   GENERAL: Very pleasant patient is in no apparent distress. Sitting comfortably in the examination chair.   HEENT: Normocephalic, atraumatic.  RESPIRATORY RATE: Easy and Even   SKIN: Warm and dry  NEURO: Awake, alert and orientated. Speech fluent, comprehension intact, answering questions appropriately.     SPINE:  Gait/Coordination: Gait deferred    Moving bilateral upper and lower extremities spontaneously to full resistance.   Incision:   - Cranial: Multiple stitch protrusions, slight opening noted without edema, erythema or discharge. Stitches removed after cleansing with alcohol swab. Cleansed with alcohol swab and antibiotic ointment placed. Dermabond placed.    - Abdominal: CDI   - Neck: CDI    DATA:   6/17/24:  Shunt placement: Medtronic catheter, strata valve set at 1.5  8/1/24: Set at 1.5.     IMAGING:   No recent imaging     MEDICAL DECISION MAKING:     ASSESSMENT and PLAN:     ICD-10-CM    1. S/p 6/17/24: Dr. Pringle: Right  shunt insertion  Z98.2       2. Post-operative state  Z98.890       3. NPH (normal pressure hydrocephalus) (HCC)  G91.2         PLAN:   1. Medication: None prescribed  2. Imaging:    - Reviewed today:    - No recent imaging   - Ordered today:    - CT head:     - Complete prior to follow up in 4 weeks  3. Activity:    - Gradually increase activity overtime   - Closely monitor cranial incision for signs/symptoms of infection, including but not limited to, fevers and discharge. If any concerns, follow up as stated below.   4. Follow up in 4 weeks or call or follow up sooner or go to the ED for any new, worsening or concerning signs or symptoms     Dr. Pringle and I discussed the plan. Dr. Pringle agrees with the plan. Dr. Pringle and I discussed the plan with the patient. The patient agrees with the plan, verbalized understanding and is appreciative. All questions were sought out and thoroughly answered to satisfaction.       Total visit time: 30 min  More than 50% spent coordinating care, providing patient education, reviewing imaging, discussing activity, discussing further imaging and counseling.    Teodora Castle M.S., PA-C  [address], [place] [address]  [phone]  8/1/2024 2:13 PM    Dragon speech recognition software was used to prepare this note. If a word or phrase is confusing, it is likely due to a failure of recognition. Please contact me with any questions or clarifications.

## 2024-08-02 NOTE — TELEPHONE ENCOUNTER
Received Medical Clearance from Julie Frommelt,APRN /  at [facility] - patient is cleared to hold Xarelto for 3 days prior to injection.     Sent to scan.

## 2024-08-02 NOTE — TELEPHONE ENCOUNTER
Call rcvd from psr.    Nursing spoke with pt to inform of message from PA.    Informed info may also be found in [facility] if needed in the future.    Pt spouse appreciative of call.    Nothing further needed at this time, closing encounter.

## 2024-08-03 DIAGNOSIS — F39 MOOD DISORDER (HCC): ICD-10-CM

## 2024-08-07 RX ORDER — SERTRALINE HYDROCHLORIDE 25 MG/1
25 TABLET, FILM COATED ORAL DAILY
Qty: 90 TABLET | Refills: 3 | Status: SHIPPED | OUTPATIENT
Start: 2024-08-07

## 2024-08-07 NOTE — TELEPHONE ENCOUNTER
Please review. Protocol Failed; No Protocol    Requested Prescriptions   Pending Prescriptions Disp Refills    SERTRALINE 25 MG Oral Tab [Pharmacy Med Name: Sertraline Hydrochloride 25 Mg Tab Auro] 30 tablet 0     Sig: Take 1 tablet (25 mg total) by mouth daily.       Psychiatric Non-Scheduled (Anti-Anxiety) Failed - 8/3/2024  4:10 AM        Failed - In person appointment or virtual visit in the past 6 mos or appointment in next 3 mos     Recent Outpatient Visits              6 days ago S/p 6/17/24: Dr. Pringle: Right  shunt insertion    [facility] Teodora Castle PA-C    Office Visit    1 week ago Lumbar radiculopathy    [facility] Sudarshan Chase MD    Office Visit    1 month ago Gross hematuria    [facility] Naina Zabala PA-C    Office Visit    2 months ago Weakness of right leg    [facility] Kaitlyn Martinez APN    Office Visit    2 months ago NPH (normal pressure hydrocephalus) (HCC)    [facility], [facility] Vinny Preciado MD    Office Visit          Future Appointments         Provider Department Appt Notes    In 2 weeks  CT MAIN [address]     In 2 weeks  CT MAIN [address]     In 1 month Teodora Castle PA-C [facility] 4 week follow up wants when Dr Pringle avail  same day                    Passed - Depression Screening completed within the past 12 months               Future Appointments         Provider Department Appt Notes    In 2 weeks  CT MAIN [address]     In 2 weeks  CT MAIN [address]     In 1 month Teodora Castle PA-C [facility] 4 week follow up wants when Dr Pringle avail  same day          Recent  Outpatient Visits              6 days ago S/p 6/17/24: Dr. Pringle: Right  shunt insertion    [facility] Teodora Castle PA-C    Office Visit    1 week ago Lumbar radiculopathy    [facility] Sudarshan Chase MD    Office Visit    1 month ago Gross hematuria    [facility] Naina Zabala PA-C    Office Visit    2 months ago Weakness of right leg    [facility] Kaitlyn Martinez APN    Office Visit    2 months ago NPH (normal pressure hydrocephalus) (HCC)    [facility], [facility] Vinny Preciado MD    Office Visit

## 2024-08-19 ENCOUNTER — HOME HEALTH CHARGES (OUTPATIENT)
Dept: FAMILY MEDICINE CLINIC | Facility: CLINIC | Age: 79
End: 2024-08-19

## 2024-08-19 DIAGNOSIS — Z98.2 PRESENCE OF CEREBROSPINAL FLUID DRAINAGE DEVICE: ICD-10-CM

## 2024-08-19 DIAGNOSIS — G91.2 IDIOPATHIC NORMAL PRESSURE HYDROCEPHALUS (INPH) (HCC): Primary | ICD-10-CM

## 2024-08-20 ENCOUNTER — HOSPITAL ENCOUNTER (OUTPATIENT)
Facility: HOSPITAL | Age: 79
Setting detail: HOSPITAL OUTPATIENT SURGERY
Discharge: HOME | End: 2024-08-20
Attending: ANESTHESIOLOGY | Admitting: ANESTHESIOLOGY
Payer: MEDICARE

## 2024-08-20 ENCOUNTER — MED REC SCAN ONLY (OUTPATIENT)
Dept: FAMILY MEDICINE CLINIC | Facility: CLINIC | Age: 79
End: 2024-08-20

## 2024-08-20 ENCOUNTER — APPOINTMENT (OUTPATIENT)
Dept: GENERAL RADIOLOGY | Facility: HOSPITAL | Age: 79
End: 2024-08-20
Attending: ANESTHESIOLOGY
Payer: MEDICARE

## 2024-08-20 VITALS
HEART RATE: 59 BPM | HEIGHT: 71 IN | DIASTOLIC BLOOD PRESSURE: 74 MMHG | TEMPERATURE: 98 F | BODY MASS INDEX: 28.56 KG/M2 | WEIGHT: 204 LBS | OXYGEN SATURATION: 99 % | RESPIRATION RATE: 18 BRPM | SYSTOLIC BLOOD PRESSURE: 167 MMHG

## 2024-08-20 PROCEDURE — 64483 NJX AA&/STRD TFRM EPI L/S 1: CPT | Performed by: ANESTHESIOLOGY

## 2024-08-20 PROCEDURE — 3E0S33Z INTRODUCTION OF ANTI-INFLAMMATORY INTO EPIDURAL SPACE, PERCUTANEOUS APPROACH: ICD-10-PCS | Performed by: ANESTHESIOLOGY

## 2024-08-20 RX ORDER — LIDOCAINE HYDROCHLORIDE 10 MG/ML
INJECTION, SOLUTION EPIDURAL; INFILTRATION; INTRACAUDAL; PERINEURAL
Status: DISCONTINUED | OUTPATIENT
Start: 2024-08-20 | End: 2024-08-20

## 2024-08-20 RX ORDER — DEXAMETHASONE SODIUM PHOSPHATE 10 MG/ML
INJECTION, SOLUTION INTRAMUSCULAR; INTRAVENOUS
Status: DISCONTINUED | OUTPATIENT
Start: 2024-08-20 | End: 2024-08-20

## 2024-08-20 RX ORDER — SODIUM CHLORIDE, SODIUM LACTATE, POTASSIUM CHLORIDE, CALCIUM CHLORIDE 600; 310; 30; 20 MG/100ML; MG/100ML; MG/100ML; MG/100ML
100 INJECTION, SOLUTION INTRAVENOUS CONTINUOUS
Status: DISCONTINUED | OUTPATIENT
Start: 2024-08-20 | End: 2024-08-20

## 2024-08-20 RX ORDER — NALOXONE HYDROCHLORIDE 0.4 MG/ML
0.08 INJECTION, SOLUTION INTRAMUSCULAR; INTRAVENOUS; SUBCUTANEOUS AS NEEDED
Status: DISCONTINUED | OUTPATIENT
Start: 2024-08-20 | End: 2024-08-20

## 2024-08-20 RX ORDER — SODIUM CHLORIDE 9 MG/ML
INJECTION, SOLUTION INTRAMUSCULAR; INTRAVENOUS; SUBCUTANEOUS
Status: DISCONTINUED | OUTPATIENT
Start: 2024-08-20 | End: 2024-08-20

## 2024-08-20 NOTE — OPERATIVE REPORT
[facility]  Operative Report  2024     Clay Orellana Patient Status:  Hospital Outpatient Surgery    1945 MRN [id number]   Location [facility] Attending Sudarshan Chase MD   Hosp Day # 0 PCP Sachin Meza MD     Indication: Clay is a 79 year old male lumbar radiculitis. MRI reviewed consistent with radiculopathy.    Preoperative Diagnosis:  Lumbar radiculitis [M54.16]    Postoperative Diagnosis: Same as above.    Procedure performed: Right lumbar 4 TRANSFORAMINAL EPIDURAL STEROID INJECTION SINGLE LEVEL with local    Anesthesia: Local      EBL: Less than 1 ml.    Procedure Description:   After reviewing the patient's history and performing a focused physical examination, the diagnosis was confirmed and contraindications such as infection and coagulopathy were ruled out.  Following review of potential side effects and complications, including but not necessarily limited to infection, allergic reaction, local tissue breakdown, nerve injury, and paresis, the patient indicated they understood and agreed to proceed. After obtaining the informed consent, the patient was brought to the procedure room and monitored.     In the prone position, following sterile prep and drape of the lumbar region, the L4 neural foramen was identified under fluoroscopy.  The skin and subcutaneous tissue was anesthetized via 25-gauge 1.5\" needle with approximately 2 cc of 1% lidocaine.  A 22-gauge 5\" Quincke spinal needle was introduced toward the inferior aspect of the junction between the transverse process and pedicle of the L4 level atraumatically under fluoroscopic guidance. The needle was advanced into the anterior epidural space at this level. The needle position was confirmed under AP and lateral fluoroscopic view.  Following negative aspiration for CSF and blood, approximately 1 cc of Omnipaque 240 was injected.  An excellent contrast spread along the epidural space and the nerve root was  obtained.  At this point, 2 cc of normal saline with 10 mg of dexamethasone was injected without complication.  The needle was withdrawn with stylet in situ. The patient tolerated procedure very well.  The patient was observed until discharge criteria met.  Discharge instructions were given and patient was released to a responsible adult.       Complications: None.    Follow up: The patient was followed in the pain clinic as needed basis.      Sudarshan Chase MD

## 2024-08-20 NOTE — H&P
History & Physical Examination    Patient Name: Clay Orellana  MRN: [id number]  [facility]: [id number]  YOB: 1945    Pre-Operative Diagnosis:  Lumbar radiculitis [M54.16]    Present Illness: Lumbar radiculitis    ASA: 2  MP class: 1  Sedation: Local      Medications Prior to Admission   Medication Sig Dispense Refill Last Dose    sertraline 25 MG Oral Tab Take 1 tablet (25 mg total) by mouth daily. 90 tablet 3     fluticasone propionate 50 MCG/ACT Nasal Suspension 1 spray by Nasal route daily.       guaiFENesin  MG Oral Tablet 12 Hr Take 1 tablet (600 mg total) by mouth 2 (two) times daily as needed.       Multiple Vitamin (QUINTABS) Oral Tab Take 1 tablet by mouth daily.       saccharomyces boulardii 250 MG Oral Cap Take 1 capsule (250 mg total) by mouth 2 (two) times daily.       albuterol 108 (90 Base) MCG/ACT Inhalation Aero Soln Inhale 2 puffs into the lungs every 4 (four) hours as needed for Wheezing.       sodium chloride 0.65 % Nasal Solution 2 sprays by Nasal route as needed.       FINASTERIDE 5 MG Oral Tab TAKE ONE TABLET BY MOUTH ONE TIME DAILY 90 tablet 0     donepezil 10 MG Oral Tab Take 1 tablet (10 mg total) by mouth nightly. 90 tablet 1     [] polyethylene glycol, PEG 3350, 17 g Oral Powd Pack Take 17 g by mouth daily. 30 packet 0     [] sennosides 8.6 MG Oral Tab Take 1 tablet (8.6 mg total) by mouth 2 (two) times daily. 60 tablet 0     [] amoxicillin clavulanate 875-125 MG Oral Tab Take 1 tablet by mouth 2 (two) times daily for 2 days. 4 tablet 0     midodrine 5 MG Oral Tab Take 1 tablet (5 mg total) by mouth 3 (three) times daily as needed. 90 tablet 0     metoprolol tartrate 25 MG Oral Tab Take 1 tablet (25 mg total) by mouth 2 (two) times daily.       tamsulosin 0.4 MG Oral Cap Take 1 capsule (0.4 mg total) by mouth After dinner.       atorvastatin 40 MG Oral Tab Take 1 tablet (40 mg total) by mouth nightly. 90 tablet 3     aspirin 81 MG Oral Tab EC Take 1 tablet  (81 mg total) by mouth daily.       rivaroxaban 20 MG Oral Tab Take 1 tablet (20 mg total) by mouth nightly.   8/17/2024    fluticasone-umeclidin-vilant (TRELEGY ELLIPTA) 100-62.5-25 MCG/ACT Inhalation Aerosol Powder, Breath Activated Inhale 1 puff into the lungs daily.       flecainide 100 MG Oral Tab Take 1 tablet (100 mg total) by mouth 2 (two) times daily.       Cholecalciferol 125 MCG (5000 UT) Oral Tab Take 1 tablet (5,000 Units total) by mouth 3 (three) times a week. Three times a week. MWF       cyanocobalamin 500 MCG Oral Tab Take 1 tablet (500 mcg total) by mouth daily.       Multiple Vitamins-Minerals (MULTI-VITAMIN/MINERALS) Oral Tab Take 1 tablet by mouth daily.       acetaminophen 500 MG Oral Tab Take 1 tablet (500 mg total) by mouth every 6 (six) hours as needed.        Current Facility-Administered Medications   Medication Dose Route Frequency    lactated ringers infusion  100 mL/hr Intravenous Continuous       Allergies:   Allergies   Allergen Reactions    Kiwi Extract ITCHING     Inner ears itch       Past Medical History:    Acute nausea with nonbilious vomiting    Anxiety    Arrhythmia    Arthritis    Bilateral inguinal hernia without obstruction or gangrene    BPH (benign prostatic hyperplasia)    Calculus of kidney    Essential hypertension    High blood pressure    High cholesterol    Muscle weakness    Stroke (HCC)    TIA    Visual impairment     Past Surgical History:   Procedure Laterality Date    Anesth,pacemaker insertion  08/2023    Appendectomy      Appendectomy      Colonoscopy      Colonoscopy N/A 05/03/2022    Procedure: COLONOSCOPY;  Surgeon: Sudarshan Gilbert MD;  Location:  ENDOSCOPY    Colonoscopy      Cyst removal N/A 1990    Cyst removed behind back of neck    Cyst removal Right 11/05/2020    Removed from right upper back - just below shoulder    Hernia surgery  July 2022    Skin surgery  2020    Vasectomy  1980     Family History   Problem Relation Age of Onset    Heart Attack  Father     Diabetes Mother     Other (DM) Sister     Other (ALS) Brother     No Known Problems Maternal Grandmother     No Known Problems Maternal Grandfather     No Known Problems Paternal Grandmother     No Known Problems Paternal Grandfather     Other (smoker) Brother     Other (MS) Sister     No Known Problems Son     No Known Problems Daughter      Social History     Tobacco Use    Smoking status: Former     Types: Cigarettes     Passive exposure: Never    Smokeless tobacco: Never    Tobacco comments:     Has not smoked for about 20 years   Substance Use Topics    Alcohol use: Never     Alcohol/week: 4.0 standard drinks of alcohol     Types: 4 Cans of beer per week       SYSTEM Check if Review is Normal Check if Physical Exam is Normal If not normal, please explain:   HEENT [x ] [x ]    NECK & BACK [x ] [x ]    HEART [x ] [x ]    LUNGS [x ] [x ]    ABDOMEN [x ] [x ]    UROGENITAL [x ] [x ]    EXTREMITIES [x ] [x ]    OTHER        [ x ] I have discussed the risks and benefits and alternatives with the patient/family.  They understand and agree to proceed with plan of care.  [ x ] I have reviewed the History and Physical done within the last 30 days.  Any changes noted above.    Sudarshan Chase MD

## 2024-08-20 NOTE — DISCHARGE INSTRUCTIONS
Home Care Instructions Following Your Pain Procedure     Clay,  It has been a pleasure to have you as our patient. To help you at home, you must follow these general discharge instructions. We will review these with you before you are discharged. It is our hope that you have a complete and uneventful recovery from our procedure.     General Instructions:  What to Expect:  Bandages from your procedure today can be removed when you get home.  Please avoid soaking and/or swimming for 24 hours.  Showering is okay  It is normal to have increased pain symptoms and/or pain at injection site for up to 3-5 days after procedure, you can use heat or ice (20 minutes on 20 minutes off) for comfort.  You may experience some temporary side effects which may include restlessness or insomnia, flushing of the face, or heart palpitations.  Please contact the provider if these symptoms do not resolve within 3-4 days.  Lightheadedness or nausea may occur and should resolve within 24 to 48 hours.  If you develop a headache after treatment, rest, drink fluids (with caffeine, if possible) and take mild over-the-counter pain medication.  If the headache does not improve with the above treatment, contact the physician.  Home Medications:  Resume all previously prescribed medication.  Please avoid taking NSAIDs (Non-Steriodal Anti-Inflammatory Drugs) such as:  Ibuprofen ( Advil, Motrin) Aleve (Naproxen), Diclofenac, Meloxicam for 6 hours after procedure.   If you are on Coumadin (Warfarin) or any other anti-coagulant (or \"blood thinning\") medication such as Plavix (Clopidogrel), Xarelto (Rivaroxaban), Eliquis (Apixaban), Effient (Prasugrel) etc., restart on the following day from the procedure unless otherwise directed by your provider.  If you are a diabetic, please increase the frequency of your glucose monitoring after the procedure as steroids may cause a temporary (2-3 day) increase in your blood sugar.  Contact your primary care  physician if your blood sugar remains elevated as you may require some medication adjustment.  Diet:  Resume your regular diet as tolerated.  Activity:  We recommend that you relax and rest during the rest of your procedure day.  If you feel weakness in your arms or legs do not drive.  Follow-up Appointment  Please schedule a follow-up visit within 3 to 4 weeks after your last procedure date.  Question or Concerns:  Feel free to call our office with any questions or concerns at [phone] (option #2)    Clay  Thank you for coming to [facility] for your procedure.  The nurses try very hard to make sure you receive the best care possible.  Your trust in them as well as us is greatly appreciated.    Thanks so much,   Dr. Sudarshan Chase

## 2024-08-21 ENCOUNTER — TELEPHONE (OUTPATIENT)
Dept: PAIN CLINIC | Facility: CLINIC | Age: 79
End: 2024-08-21

## 2024-08-21 NOTE — TELEPHONE ENCOUNTER
Tried calling pt for a FU after procedure call  Pt phone kept ringing, no VM set up  Unable to LVM

## 2024-08-26 ENCOUNTER — HOSPITAL ENCOUNTER (OUTPATIENT)
Dept: CT IMAGING | Facility: HOSPITAL | Age: 79
Discharge: HOME | End: 2024-08-26
Attending: PHYSICIAN ASSISTANT
Payer: MEDICARE

## 2024-08-26 DIAGNOSIS — N20.0 NEPHROLITHIASIS: ICD-10-CM

## 2024-08-26 DIAGNOSIS — Z98.890 POST-OPERATIVE STATE: ICD-10-CM

## 2024-08-26 DIAGNOSIS — G91.2 NPH (NORMAL PRESSURE HYDROCEPHALUS) (HCC): ICD-10-CM

## 2024-08-26 DIAGNOSIS — Z98.2 S/P VP SHUNT: ICD-10-CM

## 2024-08-26 PROCEDURE — 74176 CT ABD & PELVIS W/O CONTRAST: CPT | Performed by: PHYSICIAN ASSISTANT

## 2024-08-26 PROCEDURE — 70450 CT HEAD/BRAIN W/O DYE: CPT | Performed by: PHYSICIAN ASSISTANT

## 2024-09-04 ENCOUNTER — TELEPHONE (OUTPATIENT)
Dept: PAIN CLINIC | Facility: CLINIC | Age: 79
End: 2024-09-04

## 2024-09-04 ENCOUNTER — OFFICE VISIT (OUTPATIENT)
Dept: PAIN CLINIC | Facility: CLINIC | Age: 79
End: 2024-09-04
Payer: MEDICARE

## 2024-09-04 VITALS
OXYGEN SATURATION: 97 % | WEIGHT: 204 LBS | BODY MASS INDEX: 28 KG/M2 | SYSTOLIC BLOOD PRESSURE: 126 MMHG | DIASTOLIC BLOOD PRESSURE: 78 MMHG | HEART RATE: 74 BPM

## 2024-09-04 DIAGNOSIS — M54.16 LUMBAR RADICULITIS: Primary | ICD-10-CM

## 2024-09-04 DIAGNOSIS — R53.1 WEAKNESS GENERALIZED: ICD-10-CM

## 2024-09-04 PROCEDURE — 99214 OFFICE O/P EST MOD 30 MIN: CPT | Performed by: ANESTHESIOLOGY

## 2024-09-04 NOTE — TELEPHONE ENCOUNTER
Per  medical clearance for Xarelto is not required, patient can hold 3  days prior to procedure.     Patient advised of insurance approval to proceed with injections and is agreeable to scheduling. Patient scheduled for procedure, pre-procedure instructions reviewed. Patient prefers Local sedation. Reviewed sedation instructions including No Fasting & No  Required. Patient advised to hold Xarelto for 3 days prior (10/18/24) to procedure. Patient also encouraged but not required to hold ASA 81 mg for 24 hours prior to procedure. Patient verbalized understanding of instructions, no further needs at this time.    Pre Procedure Instruction Sheet provided to patient at office visit.       [facility] PAIN [facility]  PRE-PROCEDURE INSTRUCTIONS WITHOUT SEDATION    Procedure: Right L4 TLESI       Appointment Date: 10/21/2024  Check-In Time: 10:45 AM    Phone Visit Follow-Up Date/Time: 11/04/24 @ 01:30 PM    Prior to the procedure:  Please update us prior to the procedure if you are experiencing any symptoms of infection such as cough, fever, chills, urinary symptoms, or have recently been prescribed antibiotics, have open wounds, have recently had surgery or dental procedures.    Day of Procedure:  **Drivers will be required for patients who receive prescriptions for Valium.    NO FASTING REQUIRED  Please bring your Insurance Card, Photo ID, List of Current Medications and Referral (if applicable) to your appointment.  Please park in the [facility] and follow the signs to the [facility].  Check in at [facility] ([address] [place] [address]) outpatient registration in the [facility].  Please note-No prescriptions will be written by Pain Clinic in OR on the day of procedure. If you require a refill of medications, please contact the office 48 hours prior to your procedure.  If you have an implanted Spinal Cord or Peripheral Nerve Stimulator: Please remember to turn device off for  procedure.        Medication Hold:    Number of days you need to be off for the following medications:    Aggrenox 10 days   Agrylin (Anagrelide) 10 days  Brilinta (Ticagrelor) 7 days  Imbruvica (Ibrutinib) 3 days   Enbrel (Etanercept) 24 hours   Fragmin (Dalteparin) 24 hours   Pletal (Cilostazol) 7 days  Effient (Prasugrel) 7 days  Pradaxa 10 days  Trental 7 days  Eliquis (Apixaban) 3 days  Xarelto (Rivaroxaban) 3 days  Lovenox (Enoxaparin) 24 hours  Aspirin  Greater than 81mg but less than 325mg   5 days  325mg and greater                  7 days  Coumadin       5 days  Procedure may be cancelled if INR is elevated.   Excedrin (with aspirin) 7 days  Plavix (Clopidogrel)                            7 days    NSAIDs: 24 hours preferred      Ibuprofen (Motrin, Advil, Vicoprofen), Naproxen (Naprosyn, Aleve), Piroxcam (Feldene), Meloxicam (Mobic), Oxaprozin (Daypro), Diclofenac (Voltaren), Indomethacin (Indocin), Etodolac (Lodine), Nabumetone (Relafen), Celebrex (Celecoxib)           HERBAL SUPPLEMENTS  5 days preferred  Fish oil, krill oil, Omega-3, Vascepa, Vitamin E, Turmeric, Garlic                       Insurance Authorization:   Most insurances are now requiring a preauthorization for all procedures.  In the event that your insurance does not authorize your procedure within 48 hours of the scheduled date, your procedure will be cancelled and rescheduled to a later date.  Please contact your insurance carrier to determine what your financial responsibility will be for the procedure(s).      Cancellation/Rescheduling Appointment:   In the event you need to cancel or reschedule your appointment, you must notify the office 24 hours prior.    Post-procedure instructions:        Please schedule a follow up visit within 2 to 4 weeks after your last procedure date   Please call our office with any questions or concerns before or after your procedure at  [phone].  If you are a diabetic, please increase the frequency  of your glucose monitoring after the procedure as this may cause a temporary increase in your blood sugar.  Contact your primary care physician if your blood sugar rises as you may require some medication adjustment.  It is normal to have increased pain at injection site for up to 3-5 days after procedure, you can use heat or ice (20 minutes on 20 minutes off) for comfort.    **To hear a recorded version of these instructions, please call [phone] and follow the prompts.  **Para escuchar las instrucciones en Español, por favor de llamar el oliver [phone] opción 4.

## 2024-09-04 NOTE — PROGRESS NOTES
Name: Clay Orellana   : 1945   DOS: 2024     Pain Clinic Follow Up Visit:     Chief Complaint   Patient presents with    Procedure Follow Up     Right lumbar 4 TRANSFORAMINAL EPIDURAL STEROID INJECTION SINGLE LEVEL with local       Clay Orellana is a 79 year old male with normal pressure hydrocephalus status post  shunt placement.  The patient also complains of right leg pain and weakness.  He is status post lumbar transforaminal epidural steroid injection with some improvement in overall function.    Pt denies any chills, fever, or weakness. There is no bladder or bowel incontinence associated with the pain.    REVIEW OF SYSTEMS:  A ten point review of systems was performed with pertinent positives and negatives in the HPI.    Allergies   Allergen Reactions    Kiwi Extract ITCHING     Inner ears itch       Current Outpatient Medications   Medication Sig Dispense Refill    sertraline 25 MG Oral Tab Take 1 tablet (25 mg total) by mouth daily. 90 tablet 3    fluticasone propionate 50 MCG/ACT Nasal Suspension 1 spray by Nasal route daily.      guaiFENesin  MG Oral Tablet 12 Hr Take 1 tablet (600 mg total) by mouth 2 (two) times daily as needed.      Multiple Vitamin (QUINTABS) Oral Tab Take 1 tablet by mouth daily.      saccharomyces boulardii 250 MG Oral Cap Take 1 capsule (250 mg total) by mouth 2 (two) times daily.      albuterol 108 (90 Base) MCG/ACT Inhalation Aero Soln Inhale 2 puffs into the lungs every 4 (four) hours as needed for Wheezing.      sodium chloride 0.65 % Nasal Solution 2 sprays by Nasal route as needed.      FINASTERIDE 5 MG Oral Tab TAKE ONE TABLET BY MOUTH ONE TIME DAILY 90 tablet 0    donepezil 10 MG Oral Tab Take 1 tablet (10 mg total) by mouth nightly. 90 tablet 1    midodrine 5 MG Oral Tab Take 1 tablet (5 mg total) by mouth 3 (three) times daily as needed. 90 tablet 0    metoprolol tartrate 25 MG Oral Tab Take 1 tablet (25 mg total) by mouth 2 (two) times daily.       tamsulosin 0.4 MG Oral Cap Take 1 capsule (0.4 mg total) by mouth After dinner.      atorvastatin 40 MG Oral Tab Take 1 tablet (40 mg total) by mouth nightly. 90 tablet 3    aspirin 81 MG Oral Tab EC Take 1 tablet (81 mg total) by mouth daily.      rivaroxaban 20 MG Oral Tab Take 1 tablet (20 mg total) by mouth nightly.      fluticasone-umeclidin-vilant (TRELEGY ELLIPTA) 100-62.5-25 MCG/ACT Inhalation Aerosol Powder, Breath Activated Inhale 1 puff into the lungs daily.      flecainide 100 MG Oral Tab Take 1 tablet (100 mg total) by mouth 2 (two) times daily.      Cholecalciferol 125 MCG (5000 UT) Oral Tab Take 1 tablet (5,000 Units total) by mouth 3 (three) times a week. Three times a week. MWF      cyanocobalamin 500 MCG Oral Tab Take 1 tablet (500 mcg total) by mouth daily.      Multiple Vitamins-Minerals (MULTI-VITAMIN/MINERALS) Oral Tab Take 1 tablet by mouth daily.      acetaminophen 500 MG Oral Tab Take 1 tablet (500 mg total) by mouth every 6 (six) hours as needed.           EXAM:   /78   Pulse 74   Wt 204 lb (92.5 kg)   SpO2 97%   BMI 28.45 kg/m²   General:  Patient is a(n) 79 year old year old male in no acute distress.  Neurologic:: WNL-Orientation to time, place and person, normal mood & affect, concentration & attention span intact.   Inspection:   gait intact with walker  Neck: Full range of motion  Cranial nerves: Grossly intact  Respiratory: Nonlabored  Back: Injection site is clear  IMAGES:     MRI with diffuse disc bulge and foraminal disc extrusion at the right L4-5 foramen    ASSESSMENT AND PLAN:     1. Lumbar radiculitis    2. Weakness generalized      The patient is a 79-year-old with a history of normal pressure hydrocephalus status post repeat shunt placement.  He is status post lumbar transforaminal epidural steroid injection which has led to some symptom improvement.  Does have MRI finding of extruded disc at L4-5.  Discussed that his symptoms are likely multifactorial.  Patient  desires repeat injection.  Patient is under 2 and will need to hold anticoagulation appropriately.    Orders:  Orders Placed This Encounter   Procedures    [facility] PAIN NAVIGATOR         Radiology orders and consultations:None  The patient indicates understanding of these issues and agrees to the plan.  No follow-ups on file.    Sudarshan Chase MD, 9/4/2024, 11:04 AM

## 2024-09-04 NOTE — PATIENT INSTRUCTIONS
Refill policies:    Allow 2-3 business days for refills; controlled substances may take longer.  Contact your pharmacy at least 5 days prior to running out of medication and have them send an electronic request or submit request through the “request refill” option in your [employer] account.  Refills are not addressed on weekends; covering physicians do not authorize routine medications on weekends.  No narcotics or controlled substances are refilled after noon on Fridays or by on call physicians.  By law, narcotics must be electronically prescribed.  A 30 day supply with no refills is the maximum allowed.  If your prescription is due for a refill, you may be due for a follow up appointment.  To best provide you care, patients receiving routine medications need to be seen at least once a year.  Patients receiving narcotic/controlled substance medications need to be seen at least once every 3 months.  In the event that your preferred pharmacy does not have the requested medication in stock (e.g. Backordered), it is your responsibility to find another pharmacy that has the requested medication available.  We will gladly send a new prescription to that pharmacy at your request.    Scheduling Tests:    If your physician has ordered radiology tests such as MRI or CT scans, please contact Central Scheduling at [phone] right away to schedule the test.  Once scheduled, the [facility] Centralized Referral Team will work with your insurance carrier to obtain pre-certification or prior authorization.  Depending on your insurance carrier, approval may take 3-10 days.  It is highly recommended patients assure they have received an authorization before having a test performed.  If test is done without insurance authorization, patient may be responsible for the entire amount billed.      Precertification and Prior Authorizations:  If your physician has recommended that you have a procedure or additional testing performed the [facility]  Centralized Referral Team will contact your insurance carrier to obtain pre-certification or prior authorization.    You are strongly encouraged to contact your insurance carrier to verify that your procedure/test has been approved and is a COVERED benefit.  Although the [facility] Centralized Referral Team does its due diligence, the insurance carrier gives the disclaimer that \"Although the procedure is authorized, this does not guarantee payment.\"    Ultimately the patient is responsible for payment.   Thank you for your understanding in this matter.  Paperwork Completion:  If you require FMLA or disability paperwork for your recovery, please make sure to either drop it off or have it faxed to our office at [phone]. Be sure the form has your name and date of birth on it.  The form will be faxed to our Forms Department and they will complete it for you.  There is a 25$ fee for all forms that need to be filled out.  Please be aware there is a 10-14 day turnaround time.  You will need to sign a release of information (ZAKIYA) form if your paperwork does not come with one.  You may call the Forms Department with any questions at [phone].  Their fax number is [phone].

## 2024-09-04 NOTE — PROGRESS NOTES
Last procedure: Right lumbar 4 TRANSFORAMINAL EPIDURAL STEROID INJECTION SINGLE LEVEL with local   Date: 08/20/24  Percentage of relief obtained: 10%  Duration of relief: current, but still feels some improvement    Current Pain Score: 5-6

## 2024-09-04 NOTE — TELEPHONE ENCOUNTER
Order Questions    Question Answer   Anesthesia Type Local   Provider Salvatore   Location [facility] Procedure Lab   Procedure Transforaminal   Laterality/Level right l4   CPT (Hit enter after each entry) INJECTION, ANESTHETIC/STEROID, TRANSFORAMINAL EPIDURAL; LUMBAR/SACRAL, SINGLE LEVEL   Medications to hold xarelto   Medical clearance requested (will send to Pain Navigator) No   Patient has Medicare coverage? Yes   Comments (Please list entire procedure name here.) right lumbar 4 transforaminal epidural steroid injection

## 2024-09-10 ENCOUNTER — EXTERNAL FACILITY (OUTPATIENT)
Dept: FAMILY MEDICINE CLINIC | Facility: CLINIC | Age: 79
End: 2024-09-10

## 2024-09-10 VITALS
TEMPERATURE: 97 F | RESPIRATION RATE: 18 BRPM | OXYGEN SATURATION: 96 % | DIASTOLIC BLOOD PRESSURE: 62 MMHG | SYSTOLIC BLOOD PRESSURE: 144 MMHG | HEART RATE: 59 BPM

## 2024-09-10 DIAGNOSIS — R25.1 TREMORS OF NERVOUS SYSTEM: ICD-10-CM

## 2024-09-10 DIAGNOSIS — L98.9 SKIN LESION OF FACE: Primary | ICD-10-CM

## 2024-09-10 PROCEDURE — 99348 HOME/RES VST EST LOW MDM 30: CPT | Performed by: FAMILY MEDICINE

## 2024-09-10 NOTE — PROGRESS NOTES
Clay Orellana Author: Sachin Meza MD     1945 MRN [id number]   Last Hospital  Admission 24      Last Hospital Discharge 24 PCP Sachin Meza MD   [facility]  [facility]             CC--  Chief Complaint   Patient presents with    Hypertension       H.P.I Clay Orellana is a 79 year old male with history of multiple medical problems recent shunt placement for normal pressure hydrocephalus is here today for follow-up on his blood pressure  Patient states that he has developed some tremors mostly on the right upper extremity  Patient states that after the surgery for normal pressure hydrocephalus he is able to stand but feels weak in his legs despite getting therapy  His condition definitely improved but he has developed tremors  Patient is currently on metoprolol for his A-fib    He also has history of skin cancers on his scalp  Developed a lesion that is not healing on the left side of the face, it is small      Wt Readings from Last 3 Encounters:   24 204 lb (92.5 kg)   24 204 lb (92.5 kg)   24 204 lb (92.5 kg)     BP Readings from Last 3 Encounters:   09/10/24 144/62   24 126/78   24 (!) 167/74      Past Medical History:    Acute nausea with nonbilious vomiting    Anxiety    Arrhythmia    Arthritis    Bilateral inguinal hernia without obstruction or gangrene    BPH (benign prostatic hyperplasia)    Calculus of kidney    Essential hypertension    High blood pressure    High cholesterol    Muscle weakness    Stroke (HCC)    TIA    Visual impairment     Past Surgical History:   Procedure Laterality Date    Anesth,pacemaker insertion  2023    Appendectomy      Appendectomy      Colonoscopy      Colonoscopy N/A 2022    Procedure: COLONOSCOPY;  Surgeon: Sudarshan Gilbert MD;  Location:  ENDOSCOPY    Colonoscopy      Cyst removal N/A     Cyst removed behind back of neck    Cyst removal Right 2020    Removed from right upper  back - just below shoulder    Hernia surgery  July 2022    Skin surgery  2020    Vasectomy  1980     Family History   Problem Relation Age of Onset    Heart Attack Father     Diabetes Mother     Other (DM) Sister     Other (ALS) Brother     No Known Problems Maternal Grandmother     No Known Problems Maternal Grandfather     No Known Problems Paternal Grandmother     No Known Problems Paternal Grandfather     Other (smoker) Brother     Other (MS) Sister     No Known Problems Son     No Known Problems Daughter      Social History     Socioeconomic History    Marital status:    Tobacco Use    Smoking status: Former     Types: Cigarettes     Passive exposure: Never    Smokeless tobacco: Never    Tobacco comments:     Has not smoked for about 20 years   Vaping Use    Vaping status: Never Used   Substance and Sexual Activity    Alcohol use: Never     Alcohol/week: 4.0 standard drinks of alcohol     Types: 4 Cans of beer per week    Drug use: Never   Other Topics Concern    Caffeine Concern No    Exercise No    Seat Belt Yes    Special Diet No    Stress Concern Yes    Weight Concern No     Social Determinants of Health     Financial Resource Strain: Low Risk  (6/19/2024)    Financial Resource Strain     Difficulty of Paying Living Expenses: Not hard at all     Med Affordability: No   Food Insecurity: Low Risk  (7/8/2024)    Received from [facility]    Food Insecurity     Have there been times that your food ran out, and you didn't have money to get more?: No     Are there times that you worry that this might happen?: No   Transportation Needs: Low Risk  (7/8/2024)    Received from [facility]    Transportation Needs     Has lack of transportation kept you from medical appointments, meetings, work, or from getting things needed for daily living: No   Housing Stability: Low Risk  (7/8/2024)    Received from [facility]    Housing Stability     Are you  worried that your electric, gas, oil, or water might be shut off?: No     Are you concerned about having a safe and reliable place to live?: No       ALLERGIES:  Allergies   Allergen Reactions    Kiwi Extract ITCHING     Inner ears itch       CURRENT MEDICATIONS   Current Outpatient Medications   Medication Sig Dispense Refill    sertraline 25 MG Oral Tab Take 1 tablet (25 mg total) by mouth daily. 90 tablet 3    fluticasone propionate 50 MCG/ACT Nasal Suspension 1 spray by Nasal route daily.      guaiFENesin  MG Oral Tablet 12 Hr Take 1 tablet (600 mg total) by mouth 2 (two) times daily as needed.      saccharomyces boulardii 250 MG Oral Cap Take 1 capsule (250 mg total) by mouth 2 (two) times daily.      albuterol 108 (90 Base) MCG/ACT Inhalation Aero Soln Inhale 2 puffs into the lungs every 4 (four) hours as needed for Wheezing.      sodium chloride 0.65 % Nasal Solution 2 sprays by Nasal route as needed.      FINASTERIDE 5 MG Oral Tab TAKE ONE TABLET BY MOUTH ONE TIME DAILY 90 tablet 0    donepezil 10 MG Oral Tab Take 1 tablet (10 mg total) by mouth nightly. 90 tablet 1    midodrine 5 MG Oral Tab Take 1 tablet (5 mg total) by mouth 3 (three) times daily as needed. 90 tablet 0    metoprolol tartrate 25 MG Oral Tab Take 1 tablet (25 mg total) by mouth 2 (two) times daily.      tamsulosin 0.4 MG Oral Cap Take 1 capsule (0.4 mg total) by mouth After dinner.      atorvastatin 40 MG Oral Tab Take 1 tablet (40 mg total) by mouth nightly. 90 tablet 3    aspirin 81 MG Oral Tab EC Take 1 tablet (81 mg total) by mouth daily.      rivaroxaban 20 MG Oral Tab Take 1 tablet (20 mg total) by mouth nightly.      fluticasone-umeclidin-vilant (TRELEGY ELLIPTA) 100-62.5-25 MCG/ACT Inhalation Aerosol Powder, Breath Activated Inhale 1 puff into the lungs daily.      flecainide 100 MG Oral Tab Take 1 tablet (100 mg total) by mouth 2 (two) times daily.      Cholecalciferol 125 MCG (5000 UT) Oral Tab Take 1 tablet (5,000 Units  total) by mouth 3 (three) times a week. Three times a week. MWF      cyanocobalamin 500 MCG Oral Tab Take 1 tablet (500 mcg total) by mouth daily.      Multiple Vitamins-Minerals (MULTI-VITAMIN/MINERALS) Oral Tab Take 1 tablet by mouth daily.      acetaminophen 500 MG Oral Tab Take 1 tablet (500 mg total) by mouth every 6 (six) hours as needed.         Review of Systems:   Constitutional: No fevers, chills, fatigue or night sweats.  ENT: No mouth pain, neck pain, running nose, headaches or swollen glands.  Skin: No rashes, pruritus or skin changes,  Respiratory: Denies cough, wheezing or shortness of breath.  CV: Denies chest pain, palpitations, orthopnea, PND or dizziness.  Musculoskeletal: No joint pain, stiffness or swelling.  GI: No nausea, vomiting or diarrhea. No blood in stools.  Neurologic: No seizures, tremors, weakness or numbness.    VITALS:  /62   Pulse 59   Temp 97 °F (36.1 °C) (Temporal)   Resp 18   SpO2 96%       GENERAL: well developed, well nourished, in no apparent distress  SKIN: n 2 mm lesion on the left side of the face  HEENT: atraumatic, normocephalic, e  EYES: PERRLA, EOMI, conjunctiva are clear  NECK: supple, no adenopathy, no bruits  CHEST: no chest tenderness  LUNGS: clear to auscultation  CARDIO: RRR without murmurMUSCULOSKELETAL: back is not tender, FROM of the back  EXTREMITIES: no cyanosis, clubbing or edema  NEURO: Oriented times three, cranial nerves are intact, motor and sensory are grossly intact    ASSESSMENT AND PLAN:  Diagnoses and all orders for this visit:    Skin lesion of face  -Discussed the differential diagnosis that may be seborrheic keratosis, actinic keratosis or skin cancer  Sending the patient to dermatology for evaluation     Derm Referral - In Network    Tremors of nervous system  Encouraged the patient to do exercises given while he sitting on the chair  Given the history of normal pressure hydrocephalus gait instability and now tremors I am sending the  patient to movement clinic at [facility]-     Neuro Referral - In Network            Sachin Meza MD   [facility]  [address]  [facility] [address]    Electronically signed        This dictation was performed with a verbal recognition program (DRAGON) and it was checked for errors. It is possible that there are still dictated errors within this office note. If so, please bring any errors to my attention for an addendum. All efforts were made to ensure that this office note is accurate

## 2024-09-12 ENCOUNTER — OFFICE VISIT (OUTPATIENT)
Dept: SURGERY | Facility: CLINIC | Age: 79
End: 2024-09-12
Payer: MEDICARE

## 2024-09-12 VITALS
BODY MASS INDEX: 29.26 KG/M2 | WEIGHT: 209 LBS | DIASTOLIC BLOOD PRESSURE: 70 MMHG | HEART RATE: 60 BPM | HEIGHT: 71 IN | SYSTOLIC BLOOD PRESSURE: 126 MMHG

## 2024-09-12 DIAGNOSIS — Z98.890 POST-OPERATIVE STATE: ICD-10-CM

## 2024-09-12 DIAGNOSIS — Z98.2 S/P VP SHUNT: Primary | ICD-10-CM

## 2024-09-12 PROCEDURE — 99024 POSTOP FOLLOW-UP VISIT: CPT | Performed by: PHYSICIAN ASSISTANT

## 2024-09-12 NOTE — PATIENT INSTRUCTIONS
Refill policies:    Allow 2-3 business days for refills; controlled substances may take longer.  Contact your pharmacy at least 5 days prior to running out of medication and have them send an electronic request or submit request through the “request refill” option in your [employer] account.  Refills are not addressed on weekends; covering physicians do not authorize routine medications on weekends.  No narcotics or controlled substances are refilled after noon on Fridays or by on call physicians.  By law, narcotics must be electronically prescribed.  A 30 day supply with no refills is the maximum allowed.  If your prescription is due for a refill, you may be due for a follow up appointment.  To best provide you care, patients receiving routine medications need to be seen at least once a year.  Patients receiving narcotic/controlled substance medications need to be seen at least once every 3 months.  In the event that your preferred pharmacy does not have the requested medication in stock (e.g. Backordered), it is your responsibility to find another pharmacy that has the requested medication available.  We will gladly send a new prescription to that pharmacy at your request.    Scheduling Tests:    If your physician has ordered radiology tests such as MRI or CT scans, please contact Central Scheduling at [phone] right away to schedule the test.  Once scheduled, the [facility] Centralized Referral Team will work with your insurance carrier to obtain pre-certification or prior authorization.  Depending on your insurance carrier, approval may take 3-10 days.  It is highly recommended patients assure they have received an authorization before having a test performed.  If test is done without insurance authorization, patient may be responsible for the entire amount billed.      Precertification and Prior Authorizations:  If your physician has recommended that you have a procedure or additional testing performed the [facility]  Centralized Referral Team will contact your insurance carrier to obtain pre-certification or prior authorization.    You are strongly encouraged to contact your insurance carrier to verify that your procedure/test has been approved and is a COVERED benefit.  Although the [facility] Centralized Referral Team does its due diligence, the insurance carrier gives the disclaimer that \"Although the procedure is authorized, this does not guarantee payment.\"    Ultimately the patient is responsible for payment.   Thank you for your understanding in this matter.  Paperwork Completion:  If you require FMLA or disability paperwork for your recovery, please make sure to either drop it off or have it faxed to our office at [phone]. Be sure the form has your name and date of birth on it.  The form will be faxed to our Forms Department and they will complete it for you.  There is a 25$ fee for all forms that need to be filled out.  Please be aware there is a 10-14 day turnaround time.  You will need to sign a release of information (ZAKIYA) form if your paperwork does not come with one.  You may call the Forms Department with any questions at [phone].  Their fax number is [phone].

## 2024-09-12 NOTE — PROGRESS NOTES
Patient: Clay Orellana  Medical Record Number: [id number]  YOB: 1945  PCP: Sachin Meza MD    Reason for visit: S/p  shunt, post op visit     HISTORY OF CHIEF COMPLAINT:    Clay Orellana is a very pleasant 79 year old male who presents today for:S/p  shunt, post op visit   S/p 6/17/24: Dr. Pringle: Right  shunt insertion   S/p 8/20/24: Dr. Chase: Right L4 TFESI  Per wife, patient has noticed improvements regarding urination as well as memory.  Patient has an appointment coming up with neurology for tremors.  He would like further improvement of his NPH symptoms.  He endorses no significant headaches no new weakness, numbness, tingling.  He did have some relief with the injection provided by pain services.  He would like to repeat this injection.    Last hx: 8/1/24  Clay Orellana is a very pleasant 79 year old male who presents today for: S/p  shunt, post op visit  S/p 6/17/24: Dr. Pringle: Right  shunt insertion   Patient was subsequently hospitalized after discharge for the  shunt for multifocal PNA and weakness.   Patient and family endorse improvement of memory and urinary incontinence. No incisional complaints. Patient would like to adjust his shunt as he'd like further improvement of symptoms.     Past Medical History:    Acute nausea with nonbilious vomiting    Anxiety    Arrhythmia    Arthritis    Bilateral inguinal hernia without obstruction or gangrene    BPH (benign prostatic hyperplasia)    Calculus of kidney    Essential hypertension    High blood pressure    High cholesterol    Muscle weakness    Stroke (HCC)    TIA    Visual impairment      Past Surgical History:   Procedure Laterality Date    Anesth,pacemaker insertion  08/2023    Appendectomy      Appendectomy      Colonoscopy      Colonoscopy N/A 05/03/2022    Procedure: COLONOSCOPY;  Surgeon: Sudarshan Gilbert MD;  Location:  ENDOSCOPY    Colonoscopy      Cyst removal N/A 1990    Cyst removed behind back of neck    Cyst  removal Right 11/05/2020    Removed from right upper back - just below shoulder    Hernia surgery  July 2022    Other surgical history  06/17/2024    RIGHT VENTRICULOPERITONEAL SHUNT INSERTION    Skin surgery  2020    Vasectomy  1980      Family History   Problem Relation Age of Onset    Heart Attack Father     Diabetes Mother     Other (DM) Sister     Other (ALS) Brother     No Known Problems Maternal Grandmother     No Known Problems Maternal Grandfather     No Known Problems Paternal Grandmother     No Known Problems Paternal Grandfather     Other (smoker) Brother     Other (MS) Sister     No Known Problems Son     No Known Problems Daughter       Social History     Socioeconomic History    Marital status:    Tobacco Use    Smoking status: Former     Types: Cigarettes     Passive exposure: Never    Smokeless tobacco: Never    Tobacco comments:     Has not smoked for about 20 years   Vaping Use    Vaping status: Never Used   Substance and Sexual Activity    Alcohol use: Never     Alcohol/week: 4.0 standard drinks of alcohol     Types: 4 Cans of beer per week    Drug use: Never   Other Topics Concern    Caffeine Concern No    Exercise No    Seat Belt Yes    Special Diet No    Stress Concern Yes    Weight Concern No      Allergies   Allergen Reactions    Kiwi Extract ITCHING     Inner ears itch      Current Medications:  Current Outpatient Medications   Medication Sig Dispense Refill    sertraline 25 MG Oral Tab Take 1 tablet (25 mg total) by mouth daily. 90 tablet 3    fluticasone propionate 50 MCG/ACT Nasal Suspension 1 spray by Nasal route daily.      guaiFENesin  MG Oral Tablet 12 Hr Take 1 tablet (600 mg total) by mouth 2 (two) times daily as needed.      saccharomyces boulardii 250 MG Oral Cap Take 1 capsule (250 mg total) by mouth 2 (two) times daily.      albuterol 108 (90 Base) MCG/ACT Inhalation Aero Soln Inhale 2 puffs into the lungs every 4 (four) hours as needed for Wheezing.      sodium  chloride 0.65 % Nasal Solution 2 sprays by Nasal route as needed.      FINASTERIDE 5 MG Oral Tab TAKE ONE TABLET BY MOUTH ONE TIME DAILY 90 tablet 0    donepezil 10 MG Oral Tab Take 1 tablet (10 mg total) by mouth nightly. 90 tablet 1    midodrine 5 MG Oral Tab Take 1 tablet (5 mg total) by mouth 3 (three) times daily as needed. 90 tablet 0    metoprolol tartrate 25 MG Oral Tab Take 1 tablet (25 mg total) by mouth 2 (two) times daily.      tamsulosin 0.4 MG Oral Cap Take 1 capsule (0.4 mg total) by mouth After dinner.      atorvastatin 40 MG Oral Tab Take 1 tablet (40 mg total) by mouth nightly. 90 tablet 3    aspirin 81 MG Oral Tab EC Take 1 tablet (81 mg total) by mouth daily.      rivaroxaban 20 MG Oral Tab Take 1 tablet (20 mg total) by mouth nightly.      fluticasone-umeclidin-vilant (TRELEGY ELLIPTA) 100-62.5-25 MCG/ACT Inhalation Aerosol Powder, Breath Activated Inhale 1 puff into the lungs daily.      flecainide 100 MG Oral Tab Take 1 tablet (100 mg total) by mouth 2 (two) times daily.      Cholecalciferol 125 MCG (5000 UT) Oral Tab Take 1 tablet (5,000 Units total) by mouth 3 (three) times a week. Three times a week. MWF      cyanocobalamin 500 MCG Oral Tab Take 1 tablet (500 mcg total) by mouth daily.      Multiple Vitamins-Minerals (MULTI-VITAMIN/MINERALS) Oral Tab Take 1 tablet by mouth daily.      acetaminophen 500 MG Oral Tab Take 1 tablet (500 mg total) by mouth every 6 (six) hours as needed.          REVIEW OF SYSTEMS   Comprehensive review of systems done. Negative except what is outlined in the above HPI.     PHYSICAL EXAMIMATION    height is 71\" and weight is 209 lb (94.8 kg). His blood pressure is 126/70 and his pulse is 60.   GENERAL: Very pleasant patient is in no apparent distress. Sitting comfortably in the examination chair.   HEENT: Normocephalic, atraumatic.  RESPIRATORY RATE: Easy and Even   SKIN: Warm and dry  NEURO: Awake, alert and orientated. Speech fluent, comprehension intact,  answering questions appropriately.     SPINE:  Gait/Coordination: Gait deferred  Moving bilateral lower and upper extremities spontaneously to full resistance.   Shunt: Set at 1.0. Adjusted to 0.5. Confirmed x 2 manually and 1x electronically at 0.5. Refills appropriately. Incisions well healed.     DATA:   6/17/24:  Shunt placement: Medtronic catheter, strata valve set at 1.5  8/1/24: Set at 1.5. Adjusted to 1.0  9/12/24: Set at 1.0, adjusted to 0.5    IMAGING:     Study Result    Narrative   PROCEDURE:  CT BRAIN OR HEAD (47007)     COMPARISON:  [facility] , CT, CT BRAIN OR HEAD (14589), 6/21/2024, 4:54 PM.     INDICATIONS:  Z98.2 S/P  shunt G91.2 NPH (normal pressure hydrocephalus) (HCC) Z98.890 Post-operative state     TECHNIQUE:  Noncontrast CT scanning is performed through the brain. Dose reduction techniques were used. Dose information is transmitted to the ACR (American College of Radiology) NRDR (National Radiology Data Registry) which includes the Dose Index  Registry.     PATIENT STATED HISTORY: (As transcribed by Technologist)  Evaluation of recent shunt placement. Patient reports no complications at time of exam      FINDINGS:  A right frontal approach ventriculostomy catheter is again seen with the tip terminating along right side of the septum pellucidum along the anterior body of the right lateral ventricle.  There is minimal interval decrease in degree of dilatation of  lateral ventricles when compared to the previous exam, now measuring 5.6 cm in transverse dimension, previously 5.9 cm.  The 3rd ventricle has a reference diameter of 1.3 cm, previously 1.4 cm.  The 4th ventricle has a reference AP dimension 0.8 cm which   is unchanged.       There is no midline shift or mass-effect.  The basal cisterns are patent.  The gray-white matter differentiation is intact.     There is no acute intracranial hemorrhage or extra-axial fluid collection.  No evidence of acute territorial infarction.     There is  no evident fracture.  The visualized paranasal sinuses and mastoid air cells are unremarkable.  Bilateral intra-ocular lens implants.                      Impression   CONCLUSION:       1. No acute intracranial abnormality identified.     2. Right frontal approach ventriculostomy catheter as above.  Minimal decrease in prominence of the ventricular system as above.       LOCATION:  [id number]        Dictated by (CST): Nabeel Flores MD on 8/27/2024 at 6:58 AM      Finalized by (CST): Nabeel Flores MD on 8/27/2024 at 7:00 AM       MEDICAL DECISION MAKING:     ASSESSMENT and PLAN:    ICD-10-CM    1. S/p 6/17/24: Dr. Pringle: Right  shunt insertion  Z98.2       2. Post-operative state  Z98.890         PLAN:   1. Medication: None prescribed  2. Imaging:    - Reviewed today:    - CT head:     - Agree with radiology    - Ordered today:    - CT head:     - Complete prior to follow up in 6 weeks. Monitor given recent shunt adjustment to 0.5 today  3. Activity:     - Continue to gradually increase activity overtime  4. Patient education:   - Signs/symptoms of over draining and/or subdural collection development reviewed, including but not limited to, headaches and/or new onset weakness, numbness/tingling.   5. Follow up in 6-8 weeks or call or follow up sooner or go to the ED for any new, worsening or concerning signs or symptoms     I reviewed imaging. I discussed the plan and reviewed imaging with the patient. The patient agrees with the plan, verbalized understanding and is appreciative. All questions were sought out and thoroughly answered to satisfaction.       Total visit time: 30 min  More than 50% spent coordinating care, providing patient education, reviewing imaging, discussing further imaging, discussing activity and counseling.    Teodora Castle M.S., PA-C  [address], [address]  [place] [address]  [phone]  9/12/2024 12:56 PM    Dragon speech recognition  software was used to prepare this note. If a word or phrase is confusing, it is likely due to a failure of recognition. Please contact me with any questions or clarifications.

## 2024-09-12 NOTE — PROGRESS NOTES
Established patient:  Reason for follow up:   4 week follow up-s/p R  shunt insertion     Numeric Rating Scale:        Pain at Present: 0/10       New imaging or testing since your last office visit:    CT head DOS 08/26/24

## 2024-09-30 ENCOUNTER — MED REC SCAN ONLY (OUTPATIENT)
Dept: FAMILY MEDICINE CLINIC | Facility: CLINIC | Age: 79
End: 2024-09-30

## 2024-10-14 ENCOUNTER — LAB ENCOUNTER (OUTPATIENT)
Dept: LAB | Facility: HOSPITAL | Age: 79
End: 2024-10-14
Payer: MEDICARE

## 2024-10-14 ENCOUNTER — OFFICE VISIT (OUTPATIENT)
Dept: NEUROLOGY | Facility: CLINIC | Age: 79
End: 2024-10-14
Payer: MEDICARE

## 2024-10-14 VITALS
SYSTOLIC BLOOD PRESSURE: 132 MMHG | DIASTOLIC BLOOD PRESSURE: 80 MMHG | RESPIRATION RATE: 16 BRPM | HEART RATE: 60 BPM | BODY MASS INDEX: 29.82 KG/M2 | WEIGHT: 213 LBS | HEIGHT: 71 IN | OXYGEN SATURATION: 97 %

## 2024-10-14 DIAGNOSIS — G57.93 UNSPECIFIED MONONEUROPATHY OF BILATERAL LOWER LIMBS: ICD-10-CM

## 2024-10-14 DIAGNOSIS — R29.898 WEAKNESS OF BOTH LOWER EXTREMITIES: Primary | ICD-10-CM

## 2024-10-14 DIAGNOSIS — R29.898 WEAKNESS OF BOTH LOWER EXTREMITIES: ICD-10-CM

## 2024-10-14 LAB
CK SERPL-CCNC: 84 U/L
ERYTHROCYTE [SEDIMENTATION RATE] IN BLOOD: 7 MM/HR
FOLATE SERPL-MCNC: 26.8 NG/ML (ref 5.4–?)
TSI SER-ACNC: 1.47 MIU/ML (ref 0.55–4.78)
VIT B12 SERPL-MCNC: 637 PG/ML (ref 211–911)

## 2024-10-14 PROCEDURE — 82746 ASSAY OF FOLIC ACID SERUM: CPT

## 2024-10-14 PROCEDURE — 82607 VITAMIN B-12: CPT

## 2024-10-14 PROCEDURE — 85652 RBC SED RATE AUTOMATED: CPT

## 2024-10-14 PROCEDURE — 84443 ASSAY THYROID STIM HORMONE: CPT

## 2024-10-14 PROCEDURE — 82550 ASSAY OF CK (CPK): CPT

## 2024-10-14 PROCEDURE — 36415 COLL VENOUS BLD VENIPUNCTURE: CPT

## 2024-10-14 PROCEDURE — 99215 OFFICE O/P EST HI 40 MIN: CPT | Performed by: OTHER

## 2024-10-14 NOTE — PATIENT INSTRUCTIONS
Plan:    Complete MRI of cervical and thoracic spine   Complete Labwork  Return to clinic in 1 month with Dr. Jarrett Santoyo                Refill policies:    Allow 2-3 business days for refills; controlled substances may take longer.  Contact your pharmacy at least 5 days prior to running out of medication and have them send an electronic request or submit request through the “request refill” option in your [employer] account.  Refills are not addressed on weekends; covering physicians do not authorize routine medications on weekends.  No narcotics or controlled substances are refilled after noon on Fridays or by on call physicians.  By law, narcotics must be electronically prescribed.  A 30 day supply with no refills is the maximum allowed.  If your prescription is due for a refill, you may be due for a follow up appointment.  To best provide you care, patients receiving routine medications need to be seen at least once a year.  Patients receiving narcotic/controlled substance medications need to be seen at least once every 3 months.  In the event that your preferred pharmacy does not have the requested medication in stock (e.g. Backordered), it is your responsibility to find another pharmacy that has the requested medication available.  We will gladly send a new prescription to that pharmacy at your request.    Scheduling Tests:    If your physician has ordered radiology tests such as MRI or CT scans, please contact Central Scheduling at [phone] right away to schedule the test.  Once scheduled, the [facility] Centralized Referral Team will work with your insurance carrier to obtain pre-certification or prior authorization.  Depending on your insurance carrier, approval may take 3-10 days.  It is highly recommended patients assure they have received an authorization before having a test performed.  If test is done without insurance authorization, patient may be responsible for the entire amount billed.       Precertification and Prior Authorizations:  If your physician has recommended that you have a procedure or additional testing performed the [facility] Centralized Referral Team will contact your insurance carrier to obtain pre-certification or prior authorization.    You are strongly encouraged to contact your insurance carrier to verify that your procedure/test has been approved and is a COVERED benefit.  Although the [facility] Centralized Referral Team does its due diligence, the insurance carrier gives the disclaimer that \"Although the procedure is authorized, this does not guarantee payment.\"    Ultimately the patient is responsible for payment.   Thank you for your understanding in this matter.  Paperwork Completion:  If you require FMLA or disability paperwork for your recovery, please make sure to either drop it off or have it faxed to our office at [phone]. Be sure the form has your name and date of birth on it.  The form will be faxed to our Forms Department and they will complete it for you.  There is a 25$ fee for all forms that need to be filled out.  Please be aware there is a 10-14 day turnaround time.  You will need to sign a release of information (ZAKIYA) form if your paperwork does not come with one.  You may call the Forms Department with any questions at [phone].  Their fax number is [phone].

## 2024-10-14 NOTE — PROGRESS NOTES
Patient is here with his daughter and wife.  Patient is here today for tremors and weakness in the right leg  Patient stated he has numbness and cramping in the feet.

## 2024-10-14 NOTE — PROGRESS NOTES
HPI:    Patient ID: Clay Orellana is a 79 year old male.    HPI    He is here referred by Dr. Preciado for further evaluation and management of his gait and balance problems.  He is accompanied by his wife and his daughter who help with the history.  He had a diagnosis of normal pressure hydrocephalus and had a lumbar drain and eventually an ventriculoperitoneal shunt placed.  His wife, his daughter told me that prior to  Having the ventriculoperitoneal shunt he had major problems ambulating and was falling, not frequently but about couple or 3 times a year. He had a major fall according to his daughter few days prior to when a lumbar drain was placed.  He had a positive response to lumbar drain.  His cognition has been bad, he was withdrawn, communicating minimally  and having short-term memory loss.  He also had urinary incontinence, all the symptoms have improved significantly after the shunt was placed in June 2024.   cognition has improved the most, he is back to his baseline after the shunt was placed according to his family.  He can now ambulate with the help of a walker, no further falls after placement of the shunt.  Urinary incontinence has also improved, he continues to wear depends still.     He underwent physical therapy and he felt that he is maxed out with the benefit of physical therapy as everyone keeps telling him to get physical therapy but he is not feeling any further benefit.  He still complains of weakness in bilateral lower extremities and gait problems as a result.  He has chronic back pain both in his neck and his lower back.  He does have neuropathy in his lower extremities.  Gets lumbar transforaminal epidural steroid injection for his lower back pain and that helps to some extent. He had EMG/nerve conduction study performed by Dr. 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    He has been  getting tremors, mostly in action but not at rest.  Tremors do not affect the lower extremities but only the upper extremities.  No tremors in his jaw.  His speech is more hypophonic specially over the past year.  His handwriting has changed and he told me that it is hard for him to read what he writes.    Has a pacemaker, history of atrial fibrillation and is on chronic anticoagulation with Rivaroxaban        HISTORY:  Past Medical History:    Acute nausea with nonbilious vomiting    Anxiety    Arrhythmia    Arthritis    Bilateral inguinal hernia without obstruction or gangrene    BPH (benign prostatic hyperplasia)    Calculus of kidney    Essential hypertension    High blood pressure    High cholesterol    Muscle weakness    Stroke (HCC)    TIA    Visual impairment      Past Surgical History:   Procedure Laterality Date    Anesth,pacemaker insertion  08/2023    Appendectomy      Appendectomy      Colonoscopy      Colonoscopy N/A 05/03/2022    Procedure: COLONOSCOPY;  Surgeon: Sudarshan Gilbert MD;  Location:  ENDOSCOPY    Colonoscopy      Cyst removal N/A 1990    Cyst removed behind back of neck    Cyst removal Right 11/05/2020    Removed from right upper back - just below shoulder    Hernia surgery  July 2022    Other surgical history  06/17/2024    RIGHT VENTRICULOPERITONEAL SHUNT INSERTION    Skin surgery  2020    Vasectomy  1980      Family History   Problem Relation Age of Onset    Heart Attack Father     Diabetes Mother     Other (DM) Sister     Other (ALS) Brother     No Known Problems Maternal Grandmother     No Known Problems Maternal Grandfather     No Known Problems Paternal Grandmother     No Known Problems Paternal Grandfather     Other (smoker) Brother     Other (MS) Sister     No Known Problems Son     No Known Problems Daughter       Social History     Socioeconomic History    Marital status:    Tobacco Use    Smoking status: Former     Types: Cigarettes     Passive exposure: Never     Smokeless tobacco: Never    Tobacco comments:     Has not smoked for about 20 years   Vaping Use    Vaping status: Never Used   Substance and Sexual Activity    Alcohol use: Never     Alcohol/week: 4.0 standard drinks of alcohol     Types: 4 Cans of beer per week    Drug use: Never   Other Topics Concern    Caffeine Concern No    Exercise No    Seat Belt Yes    Special Diet No    Stress Concern Yes    Weight Concern No     Social Drivers of Health     Financial Resource Strain: Low Risk  (6/19/2024)    Financial Resource Strain     Difficulty of Paying Living Expenses: Not hard at all     Med Affordability: No   Food Insecurity: Low Risk  (7/8/2024)    Received from [facility]    Food Insecurity     Have there been times that your food ran out, and you didn't have money to get more?: No     Are there times that you worry that this might happen?: No   Transportation Needs: Low Risk  (7/8/2024)    Received from [facility]    Transportation Needs     Has lack of transportation kept you from medical appointments, meetings, work, or from getting things needed for daily living: No   Housing Stability: Low Risk  (7/8/2024)    Received from [facility]    Housing Stability     Are you worried that your electric, gas, oil, or water might be shut off?: No     Are you concerned about having a safe and reliable place to live?: No        Review of Systems  Negative Except as in HPI       Current Outpatient Medications   Medication Sig Dispense Refill    sertraline 25 MG Oral Tab Take 1 tablet (25 mg total) by mouth daily. 90 tablet 3    albuterol 108 (90 Base) MCG/ACT Inhalation Aero Soln Inhale 2 puffs into the lungs every 4 (four) hours as needed for Wheezing.      FINASTERIDE 5 MG Oral Tab TAKE ONE TABLET BY MOUTH ONE TIME DAILY 90 tablet 0    donepezil 10 MG Oral Tab Take 1 tablet (10 mg total) by mouth nightly. 90 tablet 1    metoprolol tartrate 25 MG Oral Tab  Take 1 tablet (25 mg total) by mouth 2 (two) times daily.      tamsulosin 0.4 MG Oral Cap Take 1 capsule (0.4 mg total) by mouth After dinner.      atorvastatin 40 MG Oral Tab Take 1 tablet (40 mg total) by mouth nightly. 90 tablet 3    aspirin 81 MG Oral Tab EC Take 1 tablet (81 mg total) by mouth daily.      rivaroxaban 20 MG Oral Tab Take 1 tablet (20 mg total) by mouth nightly.      fluticasone-umeclidin-vilant (TRELEGY ELLIPTA) 100-62.5-25 MCG/ACT Inhalation Aerosol Powder, Breath Activated Inhale 1 puff into the lungs daily.      Cholecalciferol 125 MCG (5000 UT) Oral Tab Take 1 tablet (5,000 Units total) by mouth 3 (three) times a week. Three times a week. MWF      cyanocobalamin 500 MCG Oral Tab Take 1 tablet (500 mcg total) by mouth daily.      Multiple Vitamins-Minerals (MULTI-VITAMIN/MINERALS) Oral Tab Take 1 tablet by mouth daily.      acetaminophen 500 MG Oral Tab Take 1 tablet (500 mg total) by mouth every 6 (six) hours as needed.      fluticasone propionate 50 MCG/ACT Nasal Suspension 1 spray by Nasal route daily.      guaiFENesin  MG Oral Tablet 12 Hr Take 1 tablet (600 mg total) by mouth 2 (two) times daily as needed.      saccharomyces boulardii 250 MG Oral Cap Take 1 capsule (250 mg total) by mouth 2 (two) times daily.      sodium chloride 0.65 % Nasal Solution 2 sprays by Nasal route as needed.      midodrine 5 MG Oral Tab Take 1 tablet (5 mg total) by mouth 3 (three) times daily as needed. 90 tablet 0    flecainide 100 MG Oral Tab Take 1 tablet (100 mg total) by mouth 2 (two) times daily.       Allergies:Allergies[1]  PHYSICAL EXAM:   Physical Exam    General Appearance: Well nourished, well developed, no apparent distress.     HEENT: Normocephalic and atraumatic. Normal sclera.  Neck: Normal range of motion. Neck mildly rigid.  Right upper extremity Dupuytren's contracture in the right hand:     Mental Status Exam: Patient is awake, alert and oriented to person, place and time with normal  memory, fund of knowledge, attention/concentration and language.    Cranial Nerves: normal, funduscopic exam is not possible as his pupils were pinpoint  II: Visual fields: normal to confrontation test  III: Pupils: pinpoint, hard to assess for light reaction  III,IV,VI: Normal EOM.  Normal saccades  V: Facial sensation: intact  VII: Facial strength: Normal  VIII: Hearing: Decreased bilaterally  IX: Palate elevates symmetrically  XI: Shoulder shrug: Slow bilaterally  XII: Tongue protrudes in midline    Motor Exam: Mild spasticity in bilateral lower extremities.  Strength is  5 out of 5 in proximal and distal muscles of upper extremities.  He has 4/5 motor power in hip flexors bilaterally, 5/5 motor power in knee extensors, knee flexors, distal muscles of lower extremities with ankle dorsiflexion, ankle plantarflexion, ankle eversion and inversion.    DTR:  Exaggerated reflexes all over.  3+ reflexes in upper extremities and symmetric, negative Gucci sign  3+ right patellar reflex with a cross adductor sign, 4+ reflex in the left patella with clonus  Few beats of clonus in the left ankle.  Mute toes    Sensory: decreased to superficial touch and vibration in his feet bilaterally up to knees slightly worse on the right side    Coordination: Normal finger-nose-finger test  with mild kinetic tremors    Gait: Could stand up by pushing himself up, he walks with a walker, no shuffling no freezing of gait.  He has a slightly antalgic gait due to right hip/right knee pain    TESTS/IMAGING:         ASSESSMENT/PLAN:     Encounter Diagnoses   Name Primary?    Weakness of both lower extremities Yes    Unspecified mononeuropathy of bilateral lower limbs        Orders Placed This Encounter   Procedures    Creatine Kinase (CK) (Not Creatinine)    TSH W Reflex To Free T4    Vitamin B12    Folic Acid Serum    Sed Rate, Westergren (Automated)     Impression and Plan:  NPH:  He reported significant improvement with his cognitive  function, gait as well as urinary incontinence after the ventriculoperitoneal shunt.  His wife and daughter agreed at bedside.  I told him that normal pressure hydrocephalus is rare and many times there would be an initial improvement after the shunt is placed but if there is an underlying neurodegenerative illness, most commonly this will progress in his clinical condition could deteriorate. See MRI brain attached above, there is global cerebral atrophy     Weakness in bilateral lower extremities:  This is at the level of the hip flexors bilaterally.  He is hyperreflexic in bilateral lower extremities and is mildly spastic.  I told him I will check MRI cervical and thoracic spine.  I will also check vitamin levels, TSH, CPK and sed rate.      I will see him in 1 month for follow-up      Thank you for allowing us to participate in your patient's care.      Please do not hesitate to call if you have any questions.   I will continue to follow with you and will make further recommendations based on their progress.     60 total minutes spent with patient >50% of visit was spent in counseling and coordination of care      Meds This Visit:  Requested Prescriptions      No prescriptions requested or ordered in this encounter       Imaging & Referrals:  MRI SPINE CERVICAL (CPT=72141)  MRI SPINE THORACIC (CPT=72146)     ID#[id number]         [1]   Allergies  Allergen Reactions    Kiwi Extract ITCHING     Inner ears itch

## 2024-10-21 ENCOUNTER — HOSPITAL ENCOUNTER (OUTPATIENT)
Facility: HOSPITAL | Age: 79
Setting detail: HOSPITAL OUTPATIENT SURGERY
Discharge: HOME | End: 2024-10-21
Attending: ANESTHESIOLOGY | Admitting: ANESTHESIOLOGY
Payer: MEDICARE

## 2024-10-21 ENCOUNTER — APPOINTMENT (OUTPATIENT)
Dept: GENERAL RADIOLOGY | Facility: HOSPITAL | Age: 79
End: 2024-10-21
Attending: ANESTHESIOLOGY
Payer: MEDICARE

## 2024-10-21 ENCOUNTER — HOSPITAL ENCOUNTER (OUTPATIENT)
Dept: CT IMAGING | Facility: HOSPITAL | Age: 79
Discharge: HOME | End: 2024-10-21
Attending: PHYSICIAN ASSISTANT | Admitting: ANESTHESIOLOGY
Payer: MEDICARE

## 2024-10-21 VITALS
OXYGEN SATURATION: 100 % | HEIGHT: 71 IN | WEIGHT: 213 LBS | SYSTOLIC BLOOD PRESSURE: 178 MMHG | HEART RATE: 60 BPM | TEMPERATURE: 98 F | BODY MASS INDEX: 29.82 KG/M2 | RESPIRATION RATE: 18 BRPM | DIASTOLIC BLOOD PRESSURE: 80 MMHG

## 2024-10-21 DIAGNOSIS — Z98.890 POST-OPERATIVE STATE: ICD-10-CM

## 2024-10-21 DIAGNOSIS — Z98.2 S/P VP SHUNT: ICD-10-CM

## 2024-10-21 PROCEDURE — 3E0R33Z INTRODUCTION OF ANTI-INFLAMMATORY INTO SPINAL CANAL, PERCUTANEOUS APPROACH: ICD-10-PCS | Performed by: ANESTHESIOLOGY

## 2024-10-21 PROCEDURE — 64483 NJX AA&/STRD TFRM EPI L/S 1: CPT | Performed by: ANESTHESIOLOGY

## 2024-10-21 PROCEDURE — 70450 CT HEAD/BRAIN W/O DYE: CPT | Performed by: PHYSICIAN ASSISTANT

## 2024-10-21 RX ORDER — NALOXONE HYDROCHLORIDE 0.4 MG/ML
0.08 INJECTION, SOLUTION INTRAMUSCULAR; INTRAVENOUS; SUBCUTANEOUS AS NEEDED
Status: DISCONTINUED | OUTPATIENT
Start: 2024-10-21 | End: 2024-10-21

## 2024-10-21 RX ORDER — SODIUM CHLORIDE 9 MG/ML
INJECTION, SOLUTION INTRAMUSCULAR; INTRAVENOUS; SUBCUTANEOUS
Status: DISCONTINUED | OUTPATIENT
Start: 2024-10-21 | End: 2024-10-21

## 2024-10-21 RX ORDER — LIDOCAINE HYDROCHLORIDE 10 MG/ML
INJECTION, SOLUTION EPIDURAL; INFILTRATION; INTRACAUDAL; PERINEURAL
Status: DISCONTINUED | OUTPATIENT
Start: 2024-10-21 | End: 2024-10-21

## 2024-10-21 RX ORDER — DEXAMETHASONE SODIUM PHOSPHATE 10 MG/ML
INJECTION, SOLUTION INTRAMUSCULAR; INTRAVENOUS
Status: DISCONTINUED | OUTPATIENT
Start: 2024-10-21 | End: 2024-10-21

## 2024-10-21 NOTE — DISCHARGE INSTRUCTIONS
Home Care Instructions Following Your Pain Procedure     Clay,  It has been a pleasure to have you as our patient. To help you at home, you must follow these general discharge instructions. We will review these with you before you are discharged. It is our hope that you have a complete and uneventful recovery from our procedure.     General Instructions:  What to Expect:  Bandages from your procedure today can be removed when you get home.  Please avoid soaking and/or swimming for 24 hours.  Showering is okay  It is normal to have increased pain symptoms and/or pain at injection site for up to 3-5 days after procedure, you can use heat or ice (20 minutes on 20 minutes off) for comfort.  You may experience some temporary side effects which may include restlessness or insomnia, flushing of the face, or heart palpitations.  Please contact the provider if these symptoms do not resolve within 3-4 days.  Lightheadedness or nausea may occur and should resolve within 24 to 48 hours.  If you develop a headache after treatment, rest, drink fluids (with caffeine, if possible) and take mild over-the-counter pain medication.  If the headache does not improve with the above treatment, contact the physician.  Home Medications:  Resume all previously prescribed medication.  Please avoid taking NSAIDs (Non-Steriodal Anti-Inflammatory Drugs) such as:  Ibuprofen ( Advil, Motrin) Aleve (Naproxen), Diclofenac, Meloxicam for 6 hours after procedure.   If you are on Coumadin (Warfarin) or any other anti-coagulant (or \"blood thinning\") medication such as Plavix (Clopidogrel), Xarelto (Rivaroxaban), Eliquis (Apixaban), Effient (Prasugrel) etc., restart on the following day from the procedure unless otherwise directed by your provider.  If you are a diabetic, please increase the frequency of your glucose monitoring after the procedure as steroids may cause a temporary (2-3 day) increase in your blood sugar.  Contact your primary care  physician if your blood sugar remains elevated as you may require some medication adjustment.  Diet:  Resume your regular diet as tolerated.  Activity:  We recommend that you relax and rest during the rest of your procedure day.  If you feel weakness in your arms or legs do not drive.  Follow-up Appointment  Please schedule a follow-up visit within 3 to 4 weeks after your last procedure date.  Question or Concerns:  Feel free to call our office with any questions or concerns at [phone] (option #2)    Clay  Thank you for coming to [facility] for your procedure.  The nurses try very hard to make sure you receive the best care possible.  Your trust in them as well as us is greatly appreciated.    Thanks so much,   Dr. Sudarshan Chase

## 2024-10-21 NOTE — OPERATIVE REPORT
[facility]  Operative Report  10/21/2024     Clay Orellana Patient Status:  Hospital Outpatient Surgery    1945 MRN [id number]   Location [facility] Attending Sudarshan Chase MD   Hosp Day # 0 PCP Sachin Meza MD     Indication: Clay is a 79 year old male with lumbar radiculitis. MRI reviewed consistent with radiculopathy.    Preoperative Diagnosis:  Lumbar radiculitis [M54.16]    Postoperative Diagnosis: Same as above.    Procedure performed: Right lumbar 4 TRANSFORAMINAL EPIDURAL STEROID INJECTION SINGLE LEVEL with local    Anesthesia: Local      EBL: Less than 1 ml.    Procedure Description:   After reviewing the patient's history and performing a focused physical examination, the diagnosis was confirmed and contraindications such as infection and coagulopathy were ruled out.  Following review of potential side effects and complications, including but not necessarily limited to infection, allergic reaction, local tissue breakdown, nerve injury, and paresis, the patient indicated they understood and agreed to proceed. After obtaining the informed consent, the patient was brought to the procedure room and monitored.      In the prone position, following sterile prep and drape of the lumbar region, the L4 neural foramen was identified under fluoroscopy.  The skin and subcutaneous tissue was anesthetized via 25-gauge 1.5\" needle with approximately 2 cc of 1% lidocaine.  A 22-gauge 5\" Quincke spinal needle was introduced toward the inferior aspect of the junction between the transverse process and pedicle of the L4 level atraumatically under fluoroscopic guidance. The needle was advanced into the anterior epidural space at this level. The needle position was confirmed under AP and lateral fluoroscopic view.  Following negative aspiration for CSF and blood, approximately 1 cc of Omnipaque 240 was injected.  An excellent contrast spread along the epidural space and the nerve root  was obtained.  At this point, 2 cc of normal saline with 10 mg of dexamethasone was injected without complication.  The needle was withdrawn with stylet in situ. The patient tolerated procedure very well.  The patient was observed until discharge criteria met.  Discharge instructions were given and patient was released to a responsible adult.       Complications: None.    Follow up: The patient was followed in the pain clinic as needed basis.      Sudarshan Chase MD

## 2024-10-21 NOTE — H&P
History & Physical Examination    Patient Name: Clay Orellana  MRN: [id number]  CSN: [id number]  YOB: 1945    Pre-Operative Diagnosis:  Lumbar radiculitis [M54.16]    Present Illness: Lumbar radiculitis    ASA: 2  MP class: 1  Sedation: Local Prescriptions Prior to Admission[1]  No current facility-administered medications for this encounter.       Allergies: Allergies[2]    Past Medical History:    Acute nausea with nonbilious vomiting    Anxiety    Arrhythmia    Arthritis    Bilateral inguinal hernia without obstruction or gangrene    BPH (benign prostatic hyperplasia)    Calculus of kidney    Essential hypertension    High blood pressure    High cholesterol    Muscle weakness    Stroke (HCC)    TIA    Visual impairment     Past Surgical History:   Procedure Laterality Date    Anesth,pacemaker insertion  08/2023    Appendectomy      Appendectomy      Colonoscopy      Colonoscopy N/A 05/03/2022    Procedure: COLONOSCOPY;  Surgeon: Sudarshan Gilbert MD;  Location:  ENDOSCOPY    Colonoscopy      Cyst removal N/A 1990    Cyst removed behind back of neck    Cyst removal Right 11/05/2020    Removed from right upper back - just below shoulder    Hernia surgery  July 2022    Other surgical history  06/17/2024    RIGHT VENTRICULOPERITONEAL SHUNT INSERTION    Skin surgery  2020    Vasectomy  1980     Family History   Problem Relation Age of Onset    Heart Attack Father     Diabetes Mother     Other (DM) Sister     Other (ALS) Brother     No Known Problems Maternal Grandmother     No Known Problems Maternal Grandfather     No Known Problems Paternal Grandmother     No Known Problems Paternal Grandfather     Other (smoker) Brother     Other (MS) Sister     No Known Problems Son     No Known Problems Daughter      Social History     Tobacco Use    Smoking status: Former     Types: Cigarettes     Passive exposure: Never    Smokeless tobacco: Never    Tobacco comments:     Has not smoked for about 20 years   Substance  Use Topics    Alcohol use: Never     Alcohol/week: 4.0 standard drinks of alcohol     Types: 4 Cans of beer per week       SYSTEM Check if Review is Normal Check if Physical Exam is Normal If not normal, please explain:   HEENT [x ] [x ]    NECK & BACK [x ] [x ]    HEART [x ] [x ]    LUNGS [x ] [x ]    ABDOMEN [x ] [x ]    UROGENITAL [x ] [x ]    EXTREMITIES [x ] [x ]    OTHER        [ x ] I have discussed the risks and benefits and alternatives with the patient/family.  They understand and agree to proceed with plan of care.  [ x ] I have reviewed the History and Physical done within the last 30 days.  Any changes noted above.    Sudarshan Chase MD              [1]   Medications Prior to Admission   Medication Sig Dispense Refill Last Dose/Taking    sertraline 25 MG Oral Tab Take 1 tablet (25 mg total) by mouth daily. 90 tablet 3     fluticasone propionate 50 MCG/ACT Nasal Suspension 1 spray by Nasal route daily.       guaiFENesin  MG Oral Tablet 12 Hr Take 1 tablet (600 mg total) by mouth 2 (two) times daily as needed.       saccharomyces boulardii 250 MG Oral Cap Take 1 capsule (250 mg total) by mouth 2 (two) times daily.       albuterol 108 (90 Base) MCG/ACT Inhalation Aero Soln Inhale 2 puffs into the lungs every 4 (four) hours as needed for Wheezing.       sodium chloride 0.65 % Nasal Solution 2 sprays by Nasal route as needed.       FINASTERIDE 5 MG Oral Tab TAKE ONE TABLET BY MOUTH ONE TIME DAILY 90 tablet 0     donepezil 10 MG Oral Tab Take 1 tablet (10 mg total) by mouth nightly. 90 tablet 1     [] polyethylene glycol, PEG 3350, 17 g Oral Powd Pack Take 17 g by mouth daily. 30 packet 0     [] sennosides 8.6 MG Oral Tab Take 1 tablet (8.6 mg total) by mouth 2 (two) times daily. 60 tablet 0     midodrine 5 MG Oral Tab Take 1 tablet (5 mg total) by mouth 3 (three) times daily as needed. 90 tablet 0     metoprolol tartrate 25 MG Oral Tab Take 1 tablet (25 mg total) by mouth 2 (two) times  daily.       tamsulosin 0.4 MG Oral Cap Take 1 capsule (0.4 mg total) by mouth After dinner.       atorvastatin 40 MG Oral Tab Take 1 tablet (40 mg total) by mouth nightly. 90 tablet 3     aspirin 81 MG Oral Tab EC Take 1 tablet (81 mg total) by mouth daily.       rivaroxaban 20 MG Oral Tab Take 1 tablet (20 mg total) by mouth nightly.       fluticasone-umeclidin-vilant (TRELEGY ELLIPTA) 100-62.5-25 MCG/ACT Inhalation Aerosol Powder, Breath Activated Inhale 1 puff into the lungs daily.       flecainide 100 MG Oral Tab Take 1 tablet (100 mg total) by mouth 2 (two) times daily.       Cholecalciferol 125 MCG (5000 UT) Oral Tab Take 1 tablet (5,000 Units total) by mouth 3 (three) times a week. Three times a week. MWF       cyanocobalamin 500 MCG Oral Tab Take 1 tablet (500 mcg total) by mouth daily.       Multiple Vitamins-Minerals (MULTI-VITAMIN/MINERALS) Oral Tab Take 1 tablet by mouth daily.       acetaminophen 500 MG Oral Tab Take 1 tablet (500 mg total) by mouth every 6 (six) hours as needed.      [2]   Allergies  Allergen Reactions    Kiwi Extract ITCHING     Inner ears itch

## 2024-10-22 ENCOUNTER — TELEPHONE (OUTPATIENT)
Dept: NEUROLOGY | Facility: CLINIC | Age: 79
End: 2024-10-22

## 2024-10-22 ENCOUNTER — EXTERNAL FACILITY (OUTPATIENT)
Dept: FAMILY MEDICINE CLINIC | Facility: CLINIC | Age: 79
End: 2024-10-22

## 2024-10-22 ENCOUNTER — TELEPHONE (OUTPATIENT)
Dept: PAIN CLINIC | Facility: CLINIC | Age: 79
End: 2024-10-22

## 2024-10-22 VITALS
HEART RATE: 75 BPM | SYSTOLIC BLOOD PRESSURE: 140 MMHG | TEMPERATURE: 98 F | RESPIRATION RATE: 14 BRPM | OXYGEN SATURATION: 98 % | DIASTOLIC BLOOD PRESSURE: 70 MMHG

## 2024-10-22 DIAGNOSIS — R29.898 WEAKNESS OF BOTH LOWER EXTREMITIES: Primary | ICD-10-CM

## 2024-10-22 DIAGNOSIS — I48.20 CHRONIC ATRIAL FIBRILLATION (HCC): ICD-10-CM

## 2024-10-22 DIAGNOSIS — I10 PRIMARY HYPERTENSION: Primary | ICD-10-CM

## 2024-10-22 DIAGNOSIS — M25.472 ANKLE EDEMA, BILATERAL: ICD-10-CM

## 2024-10-22 DIAGNOSIS — M25.471 ANKLE EDEMA, BILATERAL: ICD-10-CM

## 2024-10-22 DIAGNOSIS — G57.93 UNSPECIFIED MONONEUROPATHY OF BILATERAL LOWER LIMBS: ICD-10-CM

## 2024-10-22 PROCEDURE — 99349 HOME/RES VST EST MOD MDM 40: CPT | Performed by: FAMILY MEDICINE

## 2024-10-22 RX ORDER — SPIRONOLACTONE 25 MG/1
25 TABLET ORAL DAILY
Qty: 90 TABLET | Refills: 3 | Status: SHIPPED | OUTPATIENT
Start: 2024-10-22 | End: 2025-10-17

## 2024-10-22 NOTE — TELEPHONE ENCOUNTER
Patients wife requesting new MRI orders for patient. States she called [facility] and they could not do his MRI's there due to his pacemaker- referred him back to [facility]. [facility] needs orders to be updated.

## 2024-10-22 NOTE — TELEPHONE ENCOUNTER
Courtesy called placed to patient for post procedure follow up. Patient stated he is feeling fine. Informed patient that soreness is to be expected after the procedure. Educated patient that it takes 3-5 days for the steroid to be effective and to allow adequate time for medication to work. Encouraged patient to alternate ice and heat and to take medications as prescribed. Pt verbalized understanding to call with any questions or concerns.      Procedure: Right lumbar 4 TRANSFORAMINAL EPIDURAL STEROID INJECTION SINGLE LEVEL with local   Date: 10/21/24  Follow up Visit Scheduled: 11/04/24

## 2024-10-22 NOTE — PROGRESS NOTES
Clay Orellana Author: Sachin Meza MD     1945 MRN [id number]   Last Hospital  Admission 10/21/24      Last Hospital Discharge 10/21/24 PCP Sachin Meza MD   Hospital of Discharge  [facility]             CC--  Chief Complaint   Patient presents with    Hypertension       H.P.I Clay Orellana is a 79 year old male   Elevated bp --170s at home   Log reviewed - [id number], 140s   Diastolic - 80-95  Worsening ankle swelling, patient also has midodrine in his medication list and he is not taking that  Patient is currently on metoprolol  Patient denies any chest pain shortness of breath dizziness headache nausea vomiting diarrhea abdominal pain  He was recently seen by neurology for weakness on both lower extremities, patient has history of normal pressure hydrocephalus and has a  shunt ,patient is scheduled for MRI      Wt Readings from Last 3 Encounters:   10/24/24 213 lb (96.6 kg)   10/17/24 213 lb (96.6 kg)   10/14/24 213 lb (96.6 kg)     BP Readings from Last 3 Encounters:   10/24/24 136/58   10/22/24 140/70   10/21/24 (!) 178/80      Past Medical History:    Acute nausea with nonbilious vomiting    Anxiety    Arrhythmia    Arthritis    Bilateral inguinal hernia without obstruction or gangrene    BPH (benign prostatic hyperplasia)    Calculus of kidney    Essential hypertension    High blood pressure    High cholesterol    Muscle weakness    Stroke (HCC)    TIA    Visual impairment     Past Surgical History:   Procedure Laterality Date    Anesth,pacemaker insertion  2023    Appendectomy      Appendectomy      Colonoscopy      Colonoscopy N/A 2022    Procedure: COLONOSCOPY;  Surgeon: Sudarshan Gilbert MD;  Location:  ENDOSCOPY    Colonoscopy      Cyst removal N/A     Cyst removed behind back of neck    Cyst removal Right 2020    Removed from right upper back - just below shoulder    Hernia surgery  2022    Other surgical history  2024    RIGHT  VENTRICULOPERITONEAL SHUNT INSERTION    Skin surgery  2020    Vasectomy  1980     Family History   Problem Relation Age of Onset    Heart Attack Father     Diabetes Mother     Other (DM) Sister     Other (ALS) Brother     No Known Problems Maternal Grandmother     No Known Problems Maternal Grandfather     No Known Problems Paternal Grandmother     No Known Problems Paternal Grandfather     Other (smoker) Brother     Other (MS) Sister     No Known Problems Son     No Known Problems Daughter      Social History     Socioeconomic History    Marital status:    Tobacco Use    Smoking status: Former     Types: Cigarettes     Passive exposure: Never    Smokeless tobacco: Never    Tobacco comments:     Has not smoked for about 20 years   Vaping Use    Vaping status: Never Used   Substance and Sexual Activity    Alcohol use: Never     Alcohol/week: 4.0 standard drinks of alcohol     Types: 4 Cans of beer per week    Drug use: Never   Other Topics Concern    Caffeine Concern No    Exercise No    Seat Belt Yes    Special Diet No    Stress Concern Yes    Weight Concern No     Social Drivers of Health     Financial Resource Strain: Low Risk  (6/19/2024)    Financial Resource Strain     Difficulty of Paying Living Expenses: Not hard at all     Med Affordability: No   Food Insecurity: Low Risk  (7/8/2024)    Received from [facility]    Food Insecurity     Have there been times that your food ran out, and you didn't have money to get more?: No     Are there times that you worry that this might happen?: No   Transportation Needs: Low Risk  (7/8/2024)    Received from [facility]    Transportation Needs     Has lack of transportation kept you from medical appointments, meetings, work, or from getting things needed for daily living: No   Housing Stability: Low Risk  (7/8/2024)    Received from [facility]    Housing Stability     Are you worried that your  electric, gas, oil, or water might be shut off?: No     Are you concerned about having a safe and reliable place to live?: No       ALLERGIES:  Allergies[1]    CURRENT MEDICATIONS   Current Outpatient Medications   Medication Sig Dispense Refill    spironolactone 25 MG Oral Tab Take 1 tablet (25 mg total) by mouth daily. 90 tablet 3    sertraline 25 MG Oral Tab Take 1 tablet (25 mg total) by mouth daily. 90 tablet 3    saccharomyces boulardii 250 MG Oral Cap Take 1 capsule (250 mg total) by mouth 2 (two) times daily.      albuterol 108 (90 Base) MCG/ACT Inhalation Aero Soln Inhale 2 puffs into the lungs every 4 (four) hours as needed for Wheezing.      FINASTERIDE 5 MG Oral Tab TAKE ONE TABLET BY MOUTH ONE TIME DAILY 90 tablet 0    donepezil 10 MG Oral Tab Take 1 tablet (10 mg total) by mouth nightly. 90 tablet 1    metoprolol tartrate 25 MG Oral Tab Take 1 tablet (25 mg total) by mouth 2 (two) times daily.      tamsulosin 0.4 MG Oral Cap Take 1 capsule (0.4 mg total) by mouth After dinner.      atorvastatin 40 MG Oral Tab Take 1 tablet (40 mg total) by mouth nightly. 90 tablet 3    aspirin 81 MG Oral Tab EC Take 1 tablet (81 mg total) by mouth daily.      rivaroxaban 20 MG Oral Tab Take 1 tablet (20 mg total) by mouth nightly.      fluticasone-umeclidin-vilant (TRELEGY ELLIPTA) 100-62.5-25 MCG/ACT Inhalation Aerosol Powder, Breath Activated Inhale 1 puff into the lungs daily.      flecainide 100 MG Oral Tab Take 1 tablet (100 mg total) by mouth 2 (two) times daily.      Cholecalciferol 125 MCG (5000 UT) Oral Tab Take 1 tablet (5,000 Units total) by mouth 3 (three) times a week. Three times a week. MWF      cyanocobalamin 500 MCG Oral Tab Take 1 tablet (500 mcg total) by mouth daily.      Multiple Vitamins-Minerals (MULTI-VITAMIN/MINERALS) Oral Tab Take 1 tablet by mouth daily.      acetaminophen 500 MG Oral Tab Take 1 tablet (500 mg total) by mouth every 6 (six) hours as needed.      sodium chloride 0.65 % Nasal  Solution 2 sprays by Nasal route as needed.         Review of Systems:   Constitutional: No fevers, chills, fatigue or night sweats.  ENT: No mouth pain, neck pain, running nose, headaches or swollen glands.  Skin: No rashes, pruritus or skin changes,  Respiratory: Denies cough, wheezing or shortness of breath.  CV: Denies chest pain, palpitations, orthopnea, PND or dizziness.  Musculoskeletal: No joint pain, stiffness or swelling.  GI: No nausea, vomiting or diarrhea. No blood in stools.  Neurologic: No seizures, tremors, weakness or numbness.    VITALS:  /70   Pulse 75   Temp 97.6 °F (36.4 °C) (Temporal)   Resp 14   SpO2 98%       GENERAL: well developed, well nourished, in no apparent distress ambulating with the help of a walker  SKIN: no rashes, no suspicious lesions  HEENT: atraumatic, normocephalic,   EYES: PERRLA, EOMI, conjunctiva are clear  NECK: supple, no adenopathy, no bruits  CHEST: no chest tenderness  LUNGS: clear to auscultation  CARDIO: RRR without murmur  GI: good BS's, no masses, HSM or tenderness  MUSCULOSKELETAL: back is not tender, FROM of the back  EXTREMITIES: 2+ bilateral pedal edema  NEURO: Oriented times three, cranial nerves are intact, motor and sensory are grossly intact    ASSESSMENT AND PLAN:  Diagnoses and all orders for this visit:    Primary hypertension  -     spironolactone 25 MG Oral Tab; Take 1 tablet (25 mg total) by mouth daily.  -     CBC With Differential With Platelet; Future  -     Comp Metabolic Panel (14) [E]; Future    Chronic atrial fibrillation (HCC)  Rate controlled  Continue metoprolol  Anticoagulated on aspirin  Ankle edema, bilateral  Probably positional  Patient is advised elevation of his feet  -     spironolactone 25 MG Oral Tab; Take 1 tablet (25 mg total) by mouth daily.  Continue metoprolol 25 mg p.o. daily  Starting the patient on spironolactone 25 mg p.o. daily  We will recheck his labs in 2 weeks      Sachin Meza MD   [facility]  [facility]  [address]  [facility] [address]    Electronically signed        This dictation was performed with a verbal recognition program (DRAGON) and it was checked for errors. It is possible that there are still dictated errors within this office note. If so, please bring any errors to my attention for an addendum. All efforts were made to ensure that this office note is accurate                [1]   Allergies  Allergen Reactions    Kiwi Extract ITCHING     Inner ears itch

## 2024-10-23 ENCOUNTER — TELEPHONE (OUTPATIENT)
Dept: NEUROLOGY | Facility: CLINIC | Age: 79
End: 2024-10-23

## 2024-10-24 ENCOUNTER — OFFICE VISIT (OUTPATIENT)
Dept: SURGERY | Facility: CLINIC | Age: 79
End: 2024-10-24
Payer: MEDICARE

## 2024-10-24 VITALS
DIASTOLIC BLOOD PRESSURE: 58 MMHG | WEIGHT: 213 LBS | HEIGHT: 71 IN | OXYGEN SATURATION: 96 % | BODY MASS INDEX: 29.82 KG/M2 | HEART RATE: 70 BPM | SYSTOLIC BLOOD PRESSURE: 136 MMHG

## 2024-10-24 DIAGNOSIS — Z98.2 S/P VP SHUNT: Primary | ICD-10-CM

## 2024-10-24 PROCEDURE — 99213 OFFICE O/P EST LOW 20 MIN: CPT | Performed by: PHYSICIAN ASSISTANT

## 2024-10-24 NOTE — PATIENT INSTRUCTIONS
Refill policies:    Allow 2-3 business days for refills; controlled substances may take longer.  Contact your pharmacy at least 5 days prior to running out of medication and have them send an electronic request or submit request through the “request refill” option in your [facility] account.  Refills are not addressed on weekends; covering physicians do not authorize routine medications on weekends.  No narcotics or controlled substances are refilled after noon on Fridays or by on call physicians.  By law, narcotics must be electronically prescribed.  A 30 day supply with no refills is the maximum allowed.  If your prescription is due for a refill, you may be due for a follow up appointment.  To best provide you care, patients receiving routine medications need to be seen at least once a year.  Patients receiving narcotic/controlled substance medications need to be seen at least once every 3 months.  In the event that your preferred pharmacy does not have the requested medication in stock (e.g. Backordered), it is your responsibility to find another pharmacy that has the requested medication available.  We will gladly send a new prescription to that pharmacy at your request.    Scheduling Tests:    If your physician has ordered radiology tests such as MRI or CT scans, please contact Central Scheduling at [phone] right away to schedule the test.  Once scheduled, the [facility] Centralized Referral Team will work with your insurance carrier to obtain pre-certification or prior authorization.  Depending on your insurance carrier, approval may take 3-10 days.  It is highly recommended patients assure they have received an authorization before having a test performed.  If test is done without insurance authorization, patient may be responsible for the entire amount billed.      Precertification and Prior Authorizations:  If your physician has recommended that you have a procedure or additional testing performed the [facility]  Centralized Referral Team will contact your insurance carrier to obtain pre-certification or prior authorization.    You are strongly encouraged to contact your insurance carrier to verify that your procedure/test has been approved and is a COVERED benefit.  Although the [facility] Centralized Referral Team does its due diligence, the insurance carrier gives the disclaimer that \"Although the procedure is authorized, this does not guarantee payment.\"    Ultimately the patient is responsible for payment.   Thank you for your understanding in this matter.  Paperwork Completion:  If you require FMLA or disability paperwork for your recovery, please make sure to either drop it off or have it faxed to our office at [phone]. Be sure the form has your name and date of birth on it.  The form will be faxed to our Forms Department and they will complete it for you.  There is a 25$ fee for all forms that need to be filled out.  Please be aware there is a 10-14 day turnaround time.  You will need to sign a release of information (ZAKIYA) form if your paperwork does not come with one.  You may call the Forms Department with any questions at [phone].  Their fax number is [phone].

## 2024-10-24 NOTE — PROGRESS NOTES
Patient: Clay Orellana  Medical Record Number: [id number]  YOB: 1945  PCP: Sachin Meza MD    Reason for visit: S/p  shunt     HISTORY OF CHIEF COMPLAINT:    Clay Orellana is a very pleasant 79 year old male who presents today for: S/p  shunt   S/p 6/17/24: Dr. Pringle: Right  shunt insertion   The patient endorses great improvement with the  shunt. Is scheduled for MRI cervical and lumbar imaging to rule out compression contributing to his symptoms. Has a mild headache intermittently, which is his baseline prior to surgery. No new numbness/tingling or weakness.     Last hx:9/12/24  Clay Orellana is a very pleasant 79 year old male who presents today for:S/p  shunt, post op visit   S/p 6/17/24: Dr. Pringle: Right  shunt insertion   S/p 8/20/24: Dr. Chase: Right L4 TFESI  Per wife, patient has noticed improvements regarding urination as well as memory.  Patient has an appointment coming up with neurology for tremors.  He would like further improvement of his NPH symptoms.  He endorses no significant headaches no new weakness, numbness, tingling.  He did have some relief with the injection provided by pain services.  He would like to repeat this injection.     Last hx: 8/1/24  Clay Orellana is a very pleasant 79 year old male who presents today for: S/p  shunt, post op visit  S/p 6/17/24: Dr. Pringle: Right  shunt insertion   Patient was subsequently hospitalized after discharge for the  shunt for multifocal PNA and weakness.   Patient and family endorse improvement of memory and urinary incontinence. No incisional complaints. Patient would like to adjust his shunt as he'd like further improvement of symptoms.     Past Medical History:    Acute nausea with nonbilious vomiting    Anxiety    Arrhythmia    Arthritis    Bilateral inguinal hernia without obstruction or gangrene    BPH (benign prostatic hyperplasia)    Calculus of kidney    Essential hypertension    High blood pressure     High cholesterol    Muscle weakness    Stroke (HCC)    TIA    Visual impairment      Past Surgical History:   Procedure Laterality Date    Anesth,pacemaker insertion  08/2023    Appendectomy      Appendectomy      Colonoscopy      Colonoscopy N/A 05/03/2022    Procedure: COLONOSCOPY;  Surgeon: Sudarshan Gilbert MD;  Location:  ENDOSCOPY    Colonoscopy      Cyst removal N/A 1990    Cyst removed behind back of neck    Cyst removal Right 11/05/2020    Removed from right upper back - just below shoulder    Hernia surgery  July 2022    Other surgical history  06/17/2024    RIGHT VENTRICULOPERITONEAL SHUNT INSERTION    Skin surgery  2020    Vasectomy  1980      Family History   Problem Relation Age of Onset    Heart Attack Father     Diabetes Mother     Other (DM) Sister     Other (ALS) Brother     No Known Problems Maternal Grandmother     No Known Problems Maternal Grandfather     No Known Problems Paternal Grandmother     No Known Problems Paternal Grandfather     Other (smoker) Brother     Other (MS) Sister     No Known Problems Son     No Known Problems Daughter       Social History     Socioeconomic History    Marital status:    Tobacco Use    Smoking status: Former     Types: Cigarettes     Passive exposure: Never    Smokeless tobacco: Never    Tobacco comments:     Has not smoked for about 20 years   Vaping Use    Vaping status: Never Used   Substance and Sexual Activity    Alcohol use: Never     Alcohol/week: 4.0 standard drinks of alcohol     Types: 4 Cans of beer per week    Drug use: Never   Other Topics Concern    Caffeine Concern No    Exercise No    Seat Belt Yes    Special Diet No    Stress Concern Yes    Weight Concern No      Allergies[1]   Current Medications:  Current Outpatient Medications   Medication Sig Dispense Refill    spironolactone 25 MG Oral Tab Take 1 tablet (25 mg total) by mouth daily. 90 tablet 3    sertraline 25 MG Oral Tab Take 1 tablet (25 mg total) by mouth daily. 90 tablet 3     saccharomyces boulardii 250 MG Oral Cap Take 1 capsule (250 mg total) by mouth 2 (two) times daily.      albuterol 108 (90 Base) MCG/ACT Inhalation Aero Soln Inhale 2 puffs into the lungs every 4 (four) hours as needed for Wheezing.      sodium chloride 0.65 % Nasal Solution 2 sprays by Nasal route as needed.      FINASTERIDE 5 MG Oral Tab TAKE ONE TABLET BY MOUTH ONE TIME DAILY 90 tablet 0    donepezil 10 MG Oral Tab Take 1 tablet (10 mg total) by mouth nightly. 90 tablet 1    metoprolol tartrate 25 MG Oral Tab Take 1 tablet (25 mg total) by mouth 2 (two) times daily.      tamsulosin 0.4 MG Oral Cap Take 1 capsule (0.4 mg total) by mouth After dinner.      atorvastatin 40 MG Oral Tab Take 1 tablet (40 mg total) by mouth nightly. 90 tablet 3    aspirin 81 MG Oral Tab EC Take 1 tablet (81 mg total) by mouth daily.      rivaroxaban 20 MG Oral Tab Take 1 tablet (20 mg total) by mouth nightly.      fluticasone-umeclidin-vilant (TRELEGY ELLIPTA) 100-62.5-25 MCG/ACT Inhalation Aerosol Powder, Breath Activated Inhale 1 puff into the lungs daily.      flecainide 100 MG Oral Tab Take 1 tablet (100 mg total) by mouth 2 (two) times daily.      Cholecalciferol 125 MCG (5000 UT) Oral Tab Take 1 tablet (5,000 Units total) by mouth 3 (three) times a week. Three times a week. MWF      cyanocobalamin 500 MCG Oral Tab Take 1 tablet (500 mcg total) by mouth daily.      Multiple Vitamins-Minerals (MULTI-VITAMIN/MINERALS) Oral Tab Take 1 tablet by mouth daily.      acetaminophen 500 MG Oral Tab Take 1 tablet (500 mg total) by mouth every 6 (six) hours as needed.          REVIEW OF SYSTEMS   Comprehensive review of systems done. Negative except what is outlined in the above HPI.     PHYSICAL EXAMIMATION    height is 71\" and weight is 213 lb (96.6 kg). His blood pressure is 136/58 and his pulse is 70. His oxygen saturation is 96%.   GENERAL: Very pleasant patient is in no apparent distress. Sitting comfortably in the examination chair.    HEENT: Normocephalic, atraumatic.  RESPIRATORY RATE: Easy and Even   SKIN: Warm and dry  NEURO: Awake, alert and orientated. Speech fluent, comprehension intact, answering questions appropriately.     SPINE:  Gait/Coordination: Gait deferred  Moving bilateral upper and lower extremities spontaneously to full resistance.   Shunt: Refills appropriately. Set at 0.5, checked manually and electronically     DATA:   6/17/24:  Shunt placement: Medtronic catheter, strata valve set at 1.5  8/1/24: Set at 1.5. Adjusted to 1.0  9/12/24: Set at 1.0, adjusted to 0.5  10/24/24: Set at 0.5. Kept at 0.5    IMAGING:     Study Result    Narrative   PROCEDURE:  CT BRAIN OR HEAD (26127)     COMPARISON:  [facility] , CT, CT BRAIN OR HEAD (73518), 8/26/2024, 6:48 PM.     INDICATIONS:  Z98.890 Post-operative state Z98.2 S/P  shunt     TECHNIQUE:  Noncontrast CT scanning is performed through the brain. Dose reduction techniques were used. Dose information is transmitted to the ACR (American College of Radiology) NRDR (National Radiology Data Registry) which includes the Dose Index  Registry.     PATIENT STATED HISTORY: (As transcribed by Technologist)  S/P  shunt         FINDINGS:          No intracranial hemorrhage, mass effect, or evidence of acute large vessel transcortical infarct.     Diffuse sulcal prominence concordant with age.  Small remote lacunar infarct left basal ganglia.  Gray-white differentiation is preserved.     Right frontal approach ventriculostomy terminates within the frontal horn of the right lateral ventricle.  Slight interval decrease in size of ventricular caliber compared to 08/26/2024.  For reference the frontal horn of the right lateral ventricle has  a caliber of 2.0 cm compared to 2.3 cm on 08/26/2024 (series 4, image 32).  The 3rd ventricle has a caliber of 1.2 cm (series 4, image 36) compared to 1.3 cm previously.  Basilar cisterns are patent.     Paranasal sinuses and mastoid air cells are clear.   Stable asymmetric sclerosis of the mastoid segment of the left temporal bone.     Regional soft tissues are within normal limits.  The calvarium is intact.                   Impression   CONCLUSION:       1. No acute intracranial process.     2. Right frontal approach ventriculostomy terminates within the frontal horn of the right lateral ventricle.  Slight interval decrease in size and ventricular caliber compared to 08/26/2024, as above.           LOCATION:  [id number]        Dictated by (CST): Nneka Pope MD on 10/21/2024 at 3:00 PM      Finalized by (CST): Nneka Pope MD on 10/21/2024 at 3:05 PM         MEDICAL DECISION MAKING:     ASSESSMENT and PLAN:    ICD-10-CM    1. S/p 6/17/24: Dr. Pringle: Right  shunt insertion  Z98.2         PLAN:   1. Medication: None prescribed  2. Imaging:    - Reviewed today:    - CT head:     - Agree with radiology    - Ordered today:    - None  3. Follow up 12/12 for shunt check post MRI and review of MRI imaging or call or follow up sooner or go to the ED for any new, worsening or concerning signs or symptoms     I discussed the plan with the patient. The patient agrees with the plan, verbalized understanding and is appreciative. All questions were sought out and thoroughly answered to satisfaction.       Total visit time: 30 min  More than 50% spent coordinating care, providing patient education, reviewing imaging and counseling.    Teodora Castle M.S., PA-C  [address], [address] [address]  [phone]  10/24/2024 1:52 PM    Dragon speech recognition software was used to prepare this note. If a word or phrase is confusing, it is likely due to a failure of recognition. Please contact me with any questions or clarifications.         [1]   Allergies  Allergen Reactions    Kiwi Extract ITCHING     Inner ears itch

## 2024-10-24 NOTE — PROGRESS NOTES
Established patient:  Reason for follow up:   Discuss head CT  Sx 06/17/24 Right  shunt insertion     Numeric Rating Scale:    Pain at Present: 0/10       New imaging or testing since your last office visit:    CT head DOS 10/21/24

## 2024-11-04 ENCOUNTER — VIRTUAL PHONE E/M (OUTPATIENT)
Dept: PAIN CLINIC | Facility: CLINIC | Age: 79
End: 2024-11-04
Payer: MEDICARE

## 2024-11-04 DIAGNOSIS — M48.061 DEGENERATIVE LUMBAR SPINAL STENOSIS: Primary | ICD-10-CM

## 2024-11-04 NOTE — PROGRESS NOTES
Virtual Telephone Check-In    Clay Orellana verbally consents to a Virtual/Telephone Check-In visit on 11/04/24.  Patient has been referred to the [facility] website at [email or web address] to review the yearly Consent to Treat document.    Patient understands and accepts financial responsibility for any deductible, co-insurance and/or co-pays associated with this service.    Duration of the service: 15 minutes      Summary of topics discussed:     Patient with lumbar degenerative disc disease and radicular symptoms.  Following up after transforaminal epidural steroid injection.  Patient reports some symptom improvement.  Still has some occasional back pain.  This is overall better.  Can follow-up as needed basis.  Discussed surgical alternatives which patient declined.    Sudarshan Chase MD

## 2024-11-05 ENCOUNTER — TELEPHONE (OUTPATIENT)
Dept: NEUROLOGY | Facility: CLINIC | Age: 79
End: 2024-11-05

## 2024-11-05 NOTE — TELEPHONE ENCOUNTER
Pharmacy confirmed patient has refill remaining, will prepare for patient.    Medication: Donepezil 10mg     Date of last refill: 7/3/24 (#90/1)  [place]      Last office visit: 5/29/24  Due back to clinic per last office note:  not noted  Date next office visit scheduled:    Future Appointments   Date Time Provider Department Center   12/4/2024  2:15 PM EMG AUDIO [facility] EMGAUDIONAPE [id number]   12/4/2024  2:30 PM Oleksandr Gilbert MD EMGOTONAPER [id number]   12/11/2024 12:00 PM EH MR RM2 (1.5T WIDE)  [facility]   12/11/2024  1:00 PM E[facility] MR RM[facility] (1.5T WIDE)  [facility]   12/12/2024  2:15 PM Teodora Castle PA-C ENINAPER2 [facility]   12/12/2024  4:00 PM Richard Dinh MD ENINAPER [facility]           Last OV note recommendation:    Alternative means to prove NPH would be a cisternogram but at same time there is an ongoing issue about right leg weakness which could possibly be from the foraminal stenosis noted on the MRI as shown in the pictures.  Both at L3-4 and L4-5 there is also some degree of central stenosis.     I will order the cisternogram but I will go ahead and schedule an EMG of the right leg and lumbar paraspinal focusing on detecting whether there is any active radicular process.     Keep the scheduled visit with neurosurgery to address the same issues above.     Another possible approach would be to try Parkinson medication to see whether gait improvement will occur but this may take months for us to be able to conclude whether it helps or not.     (x) Discussed potential side effects of any treatment relevant to above.  Includes explanation of tests as necessary.     Return for scheduled EMG-NCV test.

## 2024-11-13 ENCOUNTER — MED REC SCAN ONLY (OUTPATIENT)
Dept: FAMILY MEDICINE CLINIC | Facility: CLINIC | Age: 79
End: 2024-11-13

## 2024-11-18 ENCOUNTER — LAB ENCOUNTER (OUTPATIENT)
Dept: LAB | Age: 79
End: 2024-11-18
Attending: FAMILY MEDICINE
Payer: MEDICARE

## 2024-11-18 DIAGNOSIS — R53.1 GENERALIZED WEAKNESS: ICD-10-CM

## 2024-11-18 DIAGNOSIS — I10 PRIMARY HYPERTENSION: ICD-10-CM

## 2024-11-18 LAB
ALBUMIN SERPL-MCNC: 4 G/DL (ref 3.2–4.8)
ALBUMIN/GLOB SERPL: 1.7 {RATIO} (ref 1–2)
ALP LIVER SERPL-CCNC: 58 U/L
ALT SERPL-CCNC: 23 U/L
ANION GAP SERPL CALC-SCNC: 4 MMOL/L (ref 0–18)
AST SERPL-CCNC: 20 U/L (ref ?–34)
BASOPHILS # BLD AUTO: 0.03 X10(3) UL (ref 0–0.2)
BASOPHILS NFR BLD AUTO: 0.3 %
BILIRUB SERPL-MCNC: 0.5 MG/DL (ref 0.2–1.1)
BUN BLD-MCNC: 28 MG/DL (ref 9–23)
CALCIUM BLD-MCNC: 9.1 MG/DL (ref 8.7–10.4)
CHLORIDE SERPL-SCNC: 108 MMOL/L (ref 98–112)
CO2 SERPL-SCNC: 28 MMOL/L (ref 21–32)
CREAT BLD-MCNC: 1.32 MG/DL
EGFRCR SERPLBLD CKD-EPI 2021: 54 ML/MIN/1.73M2 (ref 60–?)
EOSINOPHIL # BLD AUTO: 0.15 X10(3) UL (ref 0–0.7)
EOSINOPHIL NFR BLD AUTO: 1.5 %
ERYTHROCYTE [DISTWIDTH] IN BLOOD BY AUTOMATED COUNT: 13.2 %
ERYTHROCYTE [SEDIMENTATION RATE] IN BLOOD: 20 MM/HR
FASTING STATUS PATIENT QL REPORTED: YES
FOLATE SERPL-MCNC: 33.3 NG/ML (ref 5.4–?)
GLOBULIN PLAS-MCNC: 2.4 G/DL (ref 2–3.5)
GLUCOSE BLD-MCNC: 101 MG/DL (ref 70–99)
HCT VFR BLD AUTO: 41.1 %
HGB BLD-MCNC: 13.9 G/DL
IMM GRANULOCYTES # BLD AUTO: 0.08 X10(3) UL (ref 0–1)
IMM GRANULOCYTES NFR BLD: 0.8 %
LYMPHOCYTES # BLD AUTO: 1.07 X10(3) UL (ref 1–4)
LYMPHOCYTES NFR BLD AUTO: 10.5 %
MCH RBC QN AUTO: 32.2 PG (ref 26–34)
MCHC RBC AUTO-ENTMCNC: 33.8 G/DL (ref 31–37)
MCV RBC AUTO: 95.1 FL
MONOCYTES # BLD AUTO: 0.6 X10(3) UL (ref 0.1–1)
MONOCYTES NFR BLD AUTO: 5.9 %
NEUTROPHILS # BLD AUTO: 8.27 X10 (3) UL (ref 1.5–7.7)
NEUTROPHILS # BLD AUTO: 8.27 X10(3) UL (ref 1.5–7.7)
NEUTROPHILS NFR BLD AUTO: 81 %
OSMOLALITY SERPL CALC.SUM OF ELEC: 296 MOSM/KG (ref 275–295)
PLATELET # BLD AUTO: 250 10(3)UL (ref 150–450)
POTASSIUM SERPL-SCNC: 4.3 MMOL/L (ref 3.5–5.1)
PROT SERPL-MCNC: 6.4 G/DL (ref 5.7–8.2)
RBC # BLD AUTO: 4.32 X10(6)UL
SODIUM SERPL-SCNC: 140 MMOL/L (ref 136–145)
T4 FREE SERPL-MCNC: 1.3 NG/DL (ref 0.8–1.7)
TSI SER-ACNC: 1.07 UIU/ML (ref 0.55–4.78)
VIT B12 SERPL-MCNC: 674 PG/ML (ref 211–911)
WBC # BLD AUTO: 10.2 X10(3) UL (ref 4–11)

## 2024-11-18 PROCEDURE — 85652 RBC SED RATE AUTOMATED: CPT

## 2024-11-18 PROCEDURE — 85025 COMPLETE CBC W/AUTO DIFF WBC: CPT

## 2024-11-18 PROCEDURE — 84439 ASSAY OF FREE THYROXINE: CPT

## 2024-11-18 PROCEDURE — 82607 VITAMIN B-12: CPT

## 2024-11-18 PROCEDURE — 36415 COLL VENOUS BLD VENIPUNCTURE: CPT

## 2024-11-18 PROCEDURE — 82746 ASSAY OF FOLIC ACID SERUM: CPT

## 2024-11-18 PROCEDURE — 84443 ASSAY THYROID STIM HORMONE: CPT

## 2024-11-18 PROCEDURE — 80053 COMPREHEN METABOLIC PANEL: CPT

## 2024-11-19 ENCOUNTER — EXTERNAL FACILITY (OUTPATIENT)
Dept: FAMILY MEDICINE CLINIC | Facility: CLINIC | Age: 79
End: 2024-11-19

## 2024-11-19 VITALS
SYSTOLIC BLOOD PRESSURE: 140 MMHG | TEMPERATURE: 97 F | HEART RATE: 59 BPM | OXYGEN SATURATION: 95 % | DIASTOLIC BLOOD PRESSURE: 70 MMHG

## 2024-11-19 DIAGNOSIS — I10 PRIMARY HYPERTENSION: ICD-10-CM

## 2024-11-19 DIAGNOSIS — M25.472 ANKLE EDEMA, BILATERAL: ICD-10-CM

## 2024-11-19 DIAGNOSIS — N64.4 BREAST PAIN, LEFT: ICD-10-CM

## 2024-11-19 DIAGNOSIS — M25.471 ANKLE EDEMA, BILATERAL: ICD-10-CM

## 2024-11-19 DIAGNOSIS — R79.89 PRERENAL AZOTEMIA: Primary | ICD-10-CM

## 2024-11-19 RX ORDER — SPIRONOLACTONE 25 MG/1
TABLET ORAL
Qty: 90 TABLET | Refills: 3 | Status: SHIPPED | OUTPATIENT
Start: 2024-11-19

## 2024-11-19 NOTE — PROGRESS NOTES
Results discussed with the patient and his wife  Slightly elevated BUN and creatinine  Plan is to decrease spironolactone to 3 times a week Monday Wednesday and Friday  Will recheck his labs in a month

## 2024-11-19 NOTE — PROGRESS NOTES
.  Clay Orellana Author: Sachin Meza MD     1945 MRN [id number]   Last Hospital  Admission 10/21/24      Last Hospital Discharge 10/21/24 PCP Sachin Meza MD   Hospital of Discharge  [facility]--  Chief Complaint   Patient presents with    Lab Results       H.P.I Clay Orellana is a 79 year old male with history of atrial fibrillation, congestive heart failure, was last seen by me about 2 weeks ago due to worsening pedal edema as well as shortness of breath on exertion  He was started on spironolactone  Patient started the medication and felt much better  He was advised to check the blood test a week later  His blood workThat was done on 2024 included TSH and T4 that was normal his ESR was 20  Slightly elevated BUN and creatinine but is double from his previous number      Patient is seen today, he is doing very well denies any complaints, he feels much better his shortness of breath has resolved, also there is improvement in his pedal edema  Patient has been taking his medications regularly    Wt Readings from Last 3 Encounters:   10/24/24 213 lb (96.6 kg)   10/17/24 213 lb (96.6 kg)   10/14/24 213 lb (96.6 kg)     BP Readings from Last 3 Encounters:   24 140/70   10/24/24 136/58   10/22/24 140/70      Past Medical History:    Acute nausea with nonbilious vomiting    Anxiety    Arrhythmia    Arthritis    Bilateral inguinal hernia without obstruction or gangrene    BPH (benign prostatic hyperplasia)    Calculus of kidney    Essential hypertension    High blood pressure    High cholesterol    Muscle weakness    Stroke (HCC)    TIA    Visual impairment     Past Surgical History:   Procedure Laterality Date    Anesth,pacemaker insertion  2023    Appendectomy      Appendectomy      Colonoscopy      Colonoscopy N/A 2022    Procedure: COLONOSCOPY;  Surgeon: Sudarshan Gilbert MD;  Location:  ENDOSCOPY    Colonoscopy      Cyst removal N/A     Cyst removed  behind back of neck    Cyst removal Right 11/05/2020    Removed from right upper back - just below shoulder    Hernia surgery  July 2022    Other surgical history  06/17/2024    RIGHT VENTRICULOPERITONEAL SHUNT INSERTION    Skin surgery  2020    Vasectomy  1980     Family History   Problem Relation Age of Onset    Heart Attack Father     Diabetes Mother     Other (DM) Sister     Other (ALS) Brother     No Known Problems Maternal Grandmother     No Known Problems Maternal Grandfather     No Known Problems Paternal Grandmother     No Known Problems Paternal Grandfather     Other (smoker) Brother     Other (MS) Sister     No Known Problems Son     No Known Problems Daughter      Social History     Socioeconomic History    Marital status:    Tobacco Use    Smoking status: Former     Types: Cigarettes     Passive exposure: Never    Smokeless tobacco: Never    Tobacco comments:     Has not smoked for about 20 years   Vaping Use    Vaping status: Never Used   Substance and Sexual Activity    Alcohol use: Never     Alcohol/week: 4.0 standard drinks of alcohol     Types: 4 Cans of beer per week    Drug use: Never   Other Topics Concern    Caffeine Concern No    Exercise No    Seat Belt Yes    Special Diet No    Stress Concern Yes    Weight Concern No     Social Drivers of Health     Financial Resource Strain: Low Risk  (6/19/2024)    Financial Resource Strain     Difficulty of Paying Living Expenses: Not hard at all     Med Affordability: No   Food Insecurity: Low Risk  (7/8/2024)    Received from [facility]    Food Insecurity     Have there been times that your food ran out, and you didn't have money to get more?: No     Are there times that you worry that this might happen?: No   Transportation Needs: Low Risk  (7/8/2024)    Received from [facility]    Transportation Needs     Has lack of transportation kept you from medical appointments, meetings, work, or from getting  things needed for daily living: No   Housing Stability: Low Risk  (7/8/2024)    Received from [facility]    Housing Stability     Are you worried that your electric, gas, oil, or water might be shut off?: No     Are you concerned about having a safe and reliable place to live?: No       ALLERGIES:  Allergies[1]    CURRENT MEDICATIONS   Current Outpatient Medications   Medication Sig Dispense Refill    spironolactone 25 MG Oral Tab 1 tab po MWF 90 tablet 3    sertraline 25 MG Oral Tab Take 1 tablet (25 mg total) by mouth daily. 90 tablet 3    saccharomyces boulardii 250 MG Oral Cap Take 1 capsule (250 mg total) by mouth 2 (two) times daily.      albuterol 108 (90 Base) MCG/ACT Inhalation Aero Soln Inhale 2 puffs into the lungs every 4 (four) hours as needed for Wheezing.      sodium chloride 0.65 % Nasal Solution 2 sprays by Nasal route as needed.      FINASTERIDE 5 MG Oral Tab TAKE ONE TABLET BY MOUTH ONE TIME DAILY 90 tablet 0    donepezil 10 MG Oral Tab Take 1 tablet (10 mg total) by mouth nightly. 90 tablet 1    metoprolol tartrate 25 MG Oral Tab Take 1 tablet (25 mg total) by mouth 2 (two) times daily.      tamsulosin 0.4 MG Oral Cap Take 1 capsule (0.4 mg total) by mouth After dinner.      atorvastatin 40 MG Oral Tab Take 1 tablet (40 mg total) by mouth nightly. 90 tablet 3    aspirin 81 MG Oral Tab EC Take 1 tablet (81 mg total) by mouth daily.      rivaroxaban 20 MG Oral Tab Take 1 tablet (20 mg total) by mouth nightly.      fluticasone-umeclidin-vilant (TRELEGY ELLIPTA) 100-62.5-25 MCG/ACT Inhalation Aerosol Powder, Breath Activated Inhale 1 puff into the lungs daily.      flecainide 100 MG Oral Tab Take 1 tablet (100 mg total) by mouth 2 (two) times daily.      Cholecalciferol 125 MCG (5000 UT) Oral Tab Take 1 tablet (5,000 Units total) by mouth 3 (three) times a week. Three times a week. MWF      cyanocobalamin 500 MCG Oral Tab Take 1 tablet (500 mcg total) by mouth daily.       Multiple Vitamins-Minerals (MULTI-VITAMIN/MINERALS) Oral Tab Take 1 tablet by mouth daily.      acetaminophen 500 MG Oral Tab Take 1 tablet (500 mg total) by mouth every 6 (six) hours as needed.         Review of Systems:   Constitutional: No fevers, chills, fatigue or night sweats.  ENT: No mouth pain, neck pain, running nose, headaches or swollen glands.  Skin: No rashes, pruritus or skin changes,  Respiratory: Denies cough, wheezing or shortness of breath.  CV: Denies chest pain, palpitations, orthopnea, PND or dizziness.  Musculoskeletal: No joint pain, stiffness or swelling.  GI: No nausea, vomiting or diarrhea. No blood in stools.  Neurologic: No seizures, tremors, weakness or numbness.    VITALS:  /70   Pulse 59   Temp 97 °F (36.1 °C) (Temporal)   SpO2 95%       GENERAL: well developed, well nourished, in no apparent distress  SKIN: no rashes, no suspicious lesions  HEENT: atraumatic, normocephalic,  EYES: PERRLA, EOMI, conjunctiva are clear  NECK: supple, no adenopathy, no bruits  CHEST: no chest tenderness  LUNGS: clear to auscultation  CARDIO: Irregular  GI: good BS's, no masses, HSM or tenderness  MUSCULOSKELETAL: back is not tender, FROM of the back  EXTREMITIES: Trace bilateral pedal edema, much improved  NEURO: Oriented times three, cranial nerves are intact, motor and sensory are grossly intact    ASSESSMENT AND PLAN:  Diagnoses and all orders for this visit:    Prerenal azotemia  Decreased spironolactone to 3 times a week  Primary hypertension  Low-salt diet  Continue metoprolol 25 mg daily   and spironolactone 25 MG Oral Tab; 1 tab po MWF    Ankle edema, bilateral  -Improved       Spironolactone 25 MG Oral Tab; 1 tab po MWF    Breast pain, left  Prolonged discussion with the patient regarding the side effect of spironolactone and breast pain could be due to this medication  Wanted to stop the medication but it is working pretty good and the pain is not bad  Patient wants to wait and watch  over 2 weeks, if the pain does not go away, I will change his diuretic to low-dose Lasix          Sachin Meza MD   [facility]  [address] [place] [place] [id number]    Electronically signed        This dictation was performed with a verbal recognition program (DRAGON) and it was checked for errors. It is possible that there are still dictated errors within this office note. If so, please bring any errors to my attention for an addendum. All efforts were made to ensure that this office note is accurate                [1]   Allergies  Allergen Reactions    Kiwi Extract ITCHING     Inner ears itch

## 2024-12-03 ENCOUNTER — TELEPHONE (OUTPATIENT)
Dept: SURGERY | Facility: CLINIC | Age: 79
End: 2024-12-03

## 2024-12-03 ENCOUNTER — TELEPHONE (OUTPATIENT)
Dept: FAMILY MEDICINE CLINIC | Facility: CLINIC | Age: 79
End: 2024-12-03

## 2024-12-03 RX ORDER — FINASTERIDE 5 MG/1
5 TABLET, FILM COATED ORAL DAILY
Qty: 90 TABLET | Refills: 2 | Status: SHIPPED | OUTPATIENT
Start: 2024-12-03

## 2024-12-03 NOTE — TELEPHONE ENCOUNTER
Patient requesting for medication refill   FINASTERIDE 5 MG Oral Tab   Has a week left of medication

## 2024-12-04 ENCOUNTER — MED REC SCAN ONLY (OUTPATIENT)
Dept: FAMILY MEDICINE CLINIC | Facility: CLINIC | Age: 79
End: 2024-12-04

## 2024-12-11 ENCOUNTER — HOSPITAL ENCOUNTER (OUTPATIENT)
Dept: MRI IMAGING | Facility: HOSPITAL | Age: 79
Discharge: HOME | End: 2024-12-11
Attending: Other
Payer: MEDICARE

## 2024-12-11 VITALS
HEART RATE: 70 BPM | SYSTOLIC BLOOD PRESSURE: 138 MMHG | RESPIRATION RATE: 16 BRPM | OXYGEN SATURATION: 94 % | DIASTOLIC BLOOD PRESSURE: 70 MMHG

## 2024-12-11 DIAGNOSIS — G57.93 UNSPECIFIED MONONEUROPATHY OF BILATERAL LOWER LIMBS: ICD-10-CM

## 2024-12-11 DIAGNOSIS — R29.898 WEAKNESS OF BOTH LOWER EXTREMITIES: ICD-10-CM

## 2024-12-11 PROCEDURE — 72141 MRI NECK SPINE W/O DYE: CPT | Performed by: OTHER

## 2024-12-11 PROCEDURE — 72146 MRI CHEST SPINE W/O DYE: CPT | Performed by: OTHER

## 2024-12-11 NOTE — IMAGING NOTE
1220- Pt taken to MRI holding by [employer] and PPM placed in MRI safe mode by Carey Dee. Settings are DOO at 70 bpm per order.  Pt assisted on to MRI table. Pt connected to cardiac monitor, pulse ox and b/p cuff.   1342- Pt taken back to MRI holding and PPM settings resumed. Pt tolerated MRI scan well and denies any complaints.

## 2024-12-12 ENCOUNTER — OFFICE VISIT (OUTPATIENT)
Dept: NEUROLOGY | Facility: CLINIC | Age: 79
End: 2024-12-12
Payer: MEDICARE

## 2024-12-12 ENCOUNTER — TELEPHONE (OUTPATIENT)
Dept: SURGERY | Facility: CLINIC | Age: 79
End: 2024-12-12

## 2024-12-12 ENCOUNTER — OFFICE VISIT (OUTPATIENT)
Dept: SURGERY | Facility: CLINIC | Age: 79
End: 2024-12-12
Payer: MEDICARE

## 2024-12-12 ENCOUNTER — HOME HEALTH CHARGES (OUTPATIENT)
Dept: FAMILY MEDICINE CLINIC | Facility: CLINIC | Age: 79
End: 2024-12-12

## 2024-12-12 VITALS
DIASTOLIC BLOOD PRESSURE: 72 MMHG | HEIGHT: 71 IN | SYSTOLIC BLOOD PRESSURE: 118 MMHG | WEIGHT: 213 LBS | BODY MASS INDEX: 29.82 KG/M2 | HEART RATE: 78 BPM

## 2024-12-12 VITALS
RESPIRATION RATE: 16 BRPM | DIASTOLIC BLOOD PRESSURE: 76 MMHG | OXYGEN SATURATION: 99 % | BODY MASS INDEX: 30 KG/M2 | HEART RATE: 72 BPM | WEIGHT: 214 LBS | SYSTOLIC BLOOD PRESSURE: 124 MMHG

## 2024-12-12 DIAGNOSIS — R29.898 HAND WEAKNESS: ICD-10-CM

## 2024-12-12 DIAGNOSIS — I10 ESSENTIAL HYPERTENSION, MALIGNANT: Primary | ICD-10-CM

## 2024-12-12 DIAGNOSIS — R20.2 NUMBNESS AND TINGLING OF HAND: ICD-10-CM

## 2024-12-12 DIAGNOSIS — R29.898 WEAKNESS OF RIGHT LEG: ICD-10-CM

## 2024-12-12 DIAGNOSIS — M50.30 DDD (DEGENERATIVE DISC DISEASE), CERVICAL: ICD-10-CM

## 2024-12-12 DIAGNOSIS — R20.0 NUMBNESS AND TINGLING OF LEG: ICD-10-CM

## 2024-12-12 DIAGNOSIS — Z98.2 S/P VP SHUNT: ICD-10-CM

## 2024-12-12 DIAGNOSIS — R20.0 NUMBNESS AND TINGLING OF HAND: ICD-10-CM

## 2024-12-12 DIAGNOSIS — R20.2 NUMBNESS AND TINGLING OF LEG: ICD-10-CM

## 2024-12-12 DIAGNOSIS — M48.02 MYELOPATHY CONCURRENT WITH AND DUE TO SPINAL STENOSIS OF CERVICAL REGION (HCC): ICD-10-CM

## 2024-12-12 DIAGNOSIS — G95.9 CERVICAL MYELOPATHY (HCC): Primary | ICD-10-CM

## 2024-12-12 DIAGNOSIS — R29.898 DECREASED GRIP STRENGTH: ICD-10-CM

## 2024-12-12 DIAGNOSIS — M48.02 MYELOPATHY CONCURRENT WITH AND DUE TO SPINAL STENOSIS OF CERVICAL REGION (HCC): Primary | ICD-10-CM

## 2024-12-12 DIAGNOSIS — G99.2 MYELOPATHY CONCURRENT WITH AND DUE TO SPINAL STENOSIS OF CERVICAL REGION (HCC): Primary | ICD-10-CM

## 2024-12-12 DIAGNOSIS — I48.20 CHRONIC ATRIAL FIBRILLATION (HCC): ICD-10-CM

## 2024-12-12 DIAGNOSIS — G99.2 MYELOPATHY CONCURRENT WITH AND DUE TO SPINAL STENOSIS OF CERVICAL REGION (HCC): ICD-10-CM

## 2024-12-12 DIAGNOSIS — R29.898 WEAKNESS OF RIGHT LEG: Primary | ICD-10-CM

## 2024-12-12 DIAGNOSIS — G91.2 NPH (NORMAL PRESSURE HYDROCEPHALUS) (HCC): ICD-10-CM

## 2024-12-12 PROCEDURE — 99215 OFFICE O/P EST HI 40 MIN: CPT | Performed by: OTHER

## 2024-12-12 PROCEDURE — 99214 OFFICE O/P EST MOD 30 MIN: CPT | Performed by: PHYSICIAN ASSISTANT

## 2024-12-12 RX ORDER — AMLODIPINE BESYLATE 2.5 MG/1
TABLET ORAL
COMMUNITY

## 2024-12-12 NOTE — PROGRESS NOTES
Patient: Clay Orellana  Medical Record Number: [id number]  YOB: 1945  PCP: Sachin Meza MD    Reason for visit: Imaging review, hand/leg weakness, shunt check post MRI    HISTORY OF CHIEF COMPLAINT:    Clay Orellana is a very pleasant 79 year old male who presents today for: Imaging review, hand/leg weakness, shunt check post MRI  S/p 6/17/24: Dr. Pringle: Right  shunt insertion   The patient presents status post MRI of the cervical and thoracic spine for shunt check.  He would also like to review his MRI of the cervical and thoracic spine, ordered by his neurologist.  His main complaints are proximal leg weakness, continued gait instability (although he has noticed an improvement of this since his shunt placement), hand weakness, dropping items, numbness and tingling of the lower extremities and upper extremities.  No lower meats endorsed.  No drastic decline in his symptoms.  This has been a gradual progression.  He did have some improvement of balance since his shunt.  He has a follow-up appointment today with neurology.  He is unable to climb stairs due to leg weakness.    Last hx: 10/24/24  Clay Orellana is a very pleasant 79 year old male who presents today for: S/p  shunt   S/p 6/17/24: Dr. Pringle: Right  shunt insertion   The patient endorses great improvement with the  shunt. Is scheduled for MRI cervical and lumbar imaging to rule out compression contributing to his symptoms. Has a mild headache intermittently, which is his baseline prior to surgery. No new numbness/tingling or weakness.      Last hx:9/12/24  Clay Orellana is a very pleasant 79 year old male who presents today for:S/p  shunt, post op visit   S/p 6/17/24: Dr. Pringle: Right  shunt insertion   S/p 8/20/24: Dr. Chase: Right L4 TFESI  Per wife, patient has noticed improvements regarding urination as well as memory.  Patient has an appointment coming up with neurology for tremors.  He would like further improvement  of his NPH symptoms.  He endorses no significant headaches no new weakness, numbness, tingling.  He did have some relief with the injection provided by pain services.  He would like to repeat this injection.     Last hx: 8/1/24  Clay Orellana is a very pleasant 79 year old male who presents today for: S/p  shunt, post op visit  S/p 6/17/24: Dr. Pringle: Right  shunt insertion   Patient was subsequently hospitalized after discharge for the  shunt for multifocal PNA and weakness.   Patient and family endorse improvement of memory and urinary incontinence. No incisional complaints. Patient would like to adjust his shunt as he'd like further improvement of symptoms    Past Medical History:    Acute nausea with nonbilious vomiting    Anxiety    Arrhythmia    Arthritis    Bilateral inguinal hernia without obstruction or gangrene    BPH (benign prostatic hyperplasia)    Calculus of kidney    Essential hypertension    High blood pressure    High cholesterol    Muscle weakness    Stroke (HCC)    TIA    Visual impairment      Past Surgical History:   Procedure Laterality Date    Anesth,pacemaker insertion  08/2023    Appendectomy      Appendectomy      Colonoscopy      Colonoscopy N/A 05/03/2022    Procedure: COLONOSCOPY;  Surgeon: Sudarshan Gilbert MD;  Location:  ENDOSCOPY    Colonoscopy      Cyst removal N/A 1990    Cyst removed behind back of neck    Cyst removal Right 11/05/2020    Removed from right upper back - just below shoulder    Hernia surgery  July 2022    Other surgical history  06/17/2024    RIGHT VENTRICULOPERITONEAL SHUNT INSERTION    Skin surgery  2020    Vasectomy  1980      Family History   Problem Relation Age of Onset    Heart Attack Father     Diabetes Mother     Other (DM) Sister     Other (ALS) Brother     No Known Problems Maternal Grandmother     No Known Problems Maternal Grandfather     No Known Problems Paternal Grandmother     No Known Problems Paternal Grandfather     Other (smoker)  Brother     Other (MS) Sister     No Known Problems Son     No Known Problems Daughter       Social History     Socioeconomic History    Marital status:    Tobacco Use    Smoking status: Former     Types: Cigarettes     Passive exposure: Never    Smokeless tobacco: Never    Tobacco comments:     Has not smoked for about 20 years   Vaping Use    Vaping status: Never Used   Substance and Sexual Activity    Alcohol use: Never     Alcohol/week: 4.0 standard drinks of alcohol     Types: 4 Cans of beer per week    Drug use: Never   Other Topics Concern    Caffeine Concern No    Exercise No    Seat Belt Yes    Special Diet No    Stress Concern Yes    Weight Concern No      Allergies[1]   Current Medications:  Current Outpatient Medications   Medication Sig Dispense Refill    amLODIPine 2.5 MG Oral Tab Take 1 tablet by mouth once a day at night. Start on 11/12      finasteride 5 MG Oral Tab Take 1 tablet (5 mg total) by mouth daily. 90 tablet 2    spironolactone 25 MG Oral Tab 1 tab po MWF 90 tablet 3    sertraline 25 MG Oral Tab Take 1 tablet (25 mg total) by mouth daily. 90 tablet 3    saccharomyces boulardii 250 MG Oral Cap Take 1 capsule (250 mg total) by mouth 2 (two) times daily.      albuterol 108 (90 Base) MCG/ACT Inhalation Aero Soln Inhale 2 puffs into the lungs every 4 (four) hours as needed for Wheezing.      sodium chloride 0.65 % Nasal Solution 2 sprays by Nasal route as needed.      donepezil 10 MG Oral Tab Take 1 tablet (10 mg total) by mouth nightly. 90 tablet 1    metoprolol tartrate 25 MG Oral Tab Take 1 tablet (25 mg total) by mouth 2 (two) times daily.      tamsulosin 0.4 MG Oral Cap Take 1 capsule (0.4 mg total) by mouth After dinner.      atorvastatin 40 MG Oral Tab Take 1 tablet (40 mg total) by mouth nightly. 90 tablet 3    aspirin 81 MG Oral Tab EC Take 1 tablet (81 mg total) by mouth daily.      rivaroxaban 20 MG Oral Tab Take 1 tablet (20 mg total) by mouth nightly.       fluticasone-umeclidin-vilant (TRELEGY ELLIPTA) 100-62.5-25 MCG/ACT Inhalation Aerosol Powder, Breath Activated Inhale 1 puff into the lungs daily.      flecainide 100 MG Oral Tab Take 1 tablet (100 mg total) by mouth 2 (two) times daily.      Cholecalciferol 125 MCG (5000 UT) Oral Tab Take 1 tablet (5,000 Units total) by mouth 3 (three) times a week. Three times a week. MWF      cyanocobalamin 500 MCG Oral Tab Take 1 tablet (500 mcg total) by mouth daily.      Multiple Vitamins-Minerals (MULTI-VITAMIN/MINERALS) Oral Tab Take 1 tablet by mouth daily.      acetaminophen 500 MG Oral Tab Take 1 tablet (500 mg total) by mouth every 6 (six) hours as needed.          REVIEW OF SYSTEMS   Comprehensive review of systems done. Negative except what is outlined in the above HPI.     PHYSICAL EXAMIMATION    height is 71\" and weight is 213 lb (96.6 kg). His blood pressure is 118/72 and his pulse is 78.   GENERAL: Very pleasant patient is in no apparent distress. Sitting comfortably in the examination chair. Rolling 4 point walker at chairside  HEENT: Normocephalic, atraumatic.  RESPIRATORY RATE: Easy and Even   SKIN: Warm and dry  NEURO: Awake, alert and orientated. Speech fluent, comprehension intact, answering questions appropriately.     SPINE:  Gait/Coordination: Gait deferred    Upper Extremity Strength:     Deltoid  Biceps  Triceps    Intrinsics      Right 5 5 5 5 5     Left 5 5 5 5 5   Right claw hand appearance 2/2 chronic dupuytren contracture  Lower Extremity Strength:     Iliopsoas  Hamstrings   Quads    D-flexion P-flexion   Right       4+         5       5         5 5   Left       5         5       5         5 5     Tests:   Test Right   (POS or NEG) Left   (POS or NEG)   Cordova's Sign Neg Neg   Clonus Neg Neg     3/4 DTR's throughout BUE and BLE except right patellar, 1/4    Shunt refills appropriately.  Set between 1.5-2.0.  Shunt readjusted back to 0.5.  Confirmed x 2 electronically and manually.    DATA:    6/17/24:  Shunt placement: Medtronic catheter, strata valve set at 1.5  8/1/24: Set at 1.5. Adjusted to 1.0  9/12/24: Set at 1.0, adjusted to 0.5  10/24/24: Set at 0.5. Kept at 0.5  12/12/14: Set between 1.5-2.0 s/p MRI. Shunt readjusted back to 0.5    IMAGING:   Study Result    Narrative   PROCEDURE:  MRI SPINE THORACIC (CPT=72146)     LOCATION:  [facility]       COMPARISON:  None.     INDICATIONS:  G57.93 Unspecified mononeuropathy of bilateral lower limbs R29.898 Weakness of both lower extremities     TECHNIQUE:  A variety of imaging planes and parameters were utilized for visualization of suspected pathology.  Images were performed without contrast.     PATIENT STATED HISTORY: (As transcribed by Technologist)  Patient complains of neck pain, back pain, bilateral hip and lower extremity pain.         FINDINGS:       Degenerative disc disease with focal disc protrusion along the posterolateral disc margin toward the right at the T8-9 level for example series 100, image 53, series 13 image 11 and others.  This causes indentation of the right ventrolateral thecal sac  and causes slight contact with the spinal cord without sign of cord compression or significant central canal stenosis.     Scoliosis present, concave right upper thoracic and left mid-lower thoracic.  This measures 16ø of scoliosis concave right upper, and 18ø of scoliosis concave left lower.  No sign of compression fracture or other acute fracture of the spine.  No sign of  additional levels of disc protrusion or thoracic central canal stenosis.  Thoracic spinal cord shows normal signal, no mass, syrinx, impingement.  No paraspinal mass identified.     Partially visualized small right pleural effusion.                   Impression   CONCLUSION:  Focal disc protrusion, on the right at T8-9 without causing cord compression or central canal stenosis.  No other disc protrusion seen.  Central canal patent otherwise.  Spinal cord normal signal.  No fracture  or subluxation.  Thoracic  scoliosis is noted, 16ø concave right along the upper thoracic, and 18ø concave left along the lower thoracic..  Small right pleural effusion is noted.           Dictated by (CST): Nikhil Valdovinos MD on 12/11/2024 at 2:45 PM      Finalized by (CST): Nikhil Valdovinos MD on 12/11/2024 at 2:55 PM       Study Result    Narrative   PROCEDURE:  MRI SPINE CERVICAL (CPT=72141)     COMPARISON:  None.     INDICATIONS:  G57.93 Unspecified mononeuropathy of bilateral lower limbs R29.898 Weakness of both lower extremities     TECHNIQUE:  Multiplanar T1 and T2 weighted images including fat suppression sequences.  Images acquired in sagittal and axial planes.       PATIENT STATED HISTORY: (As transcribed by Technologist)  Patient complains of neck pain, back pain, bilateral hip and lower extremity pain.         FINDINGS:    Congenital shortening cervical spine pedicles.  Also, there are degenerative disc changes with loss of T2 disc signal, as well as facet changes present in the spine, of varying degrees at different levels, described individually below.     DECRIPTION OF INDIVIDUAL DISC LEVELS     The craniocervical junction appears without disruption, malalignment or other active abnormality. The C1-2 articulation appears without disruption, or other active abnormality.     C2-C3:  Disc degeneration with ventral ridging of the thecal sac from disc osteophyte complex along the posterior disc margin.  No stenosis identified     C3-C4:  Disc degeneration with ventral ridging of the thecal sac from disc osteophyte complex along the posterior disc margin. There is also ridging of the posterior aspect of the thecal sac, secondary to thickening/redundancy of the ligamentum flavum,  resulting from facet degeneration. This produces effacement of the central canal from both anterior and posterior.  There is significant central spinal canal stenosis as well as cord compression at this level from prominent disc  osteophyte complex along  the posterior disc margin and posterior osteophytes as well.  Severe bilateral foraminal stenosis also present.     C4-C5:  Disc degeneration with ventral ridging of the thecal sac from disc osteophyte complex along the posterior disc margin.  Effacement of the ventral thecal sac but no sign of cord compression.  Mild bilateral foraminal stenosis.     C5-C6:  Disc degeneration with ventral ridging of the thecal sac from disc osteophyte complex along the posterior disc margin. There is also ridging of the posterior aspect of the thecal sac, secondary to thickening/redundancy of the ligamentum flavum,  resulting from facet degeneration. This produces effacement of the central canal from both anterior and posterior.  Central canal stenosis and moderate cord deformity, with likely mild compression although less severe than at the C3-4 level.  Severe  bilateral foraminal stenosis.        C6-C7:  Disc degeneration with ventral ridging of the thecal sac from disc osteophyte complex along the posterior disc margin.  Mild central canal stenosis with mild cord deformity.  No significant foraminal stenosis.     C7-T1  No central canal or significant foraminal stenosis.     Cervical spinal cord shows no abnormal signal, enlargement, atrophy, mass, or syrinx.     No evidence for metastatic disease to the bone, or other pathologic appearance to the bone marrow.     No prevertebral tissue swelling or sign of paraspinal mass.     Cerebellar tonsils appear normal in position.                      Impression   CONCLUSION:  Congenital shortening of the cervical pedicles along with acquired multilevel degenerative disc disease and facet disease, with severe central canal stenosis as well as cord compression at C3-4, milder central canal stenoses and cord  deformities of the levels, and foraminal stenoses.  No cord signal abnormality or syrinx.        LOCATION:  [facility]        Dictated by (CST): Nikhil Valdovinos  MD on 12/11/2024 at 2:28 PM      Finalized by (CST): Nikhil Valdovinos MD on 12/11/2024 at 2:32 PM      MEDICAL DECISION MAKING:     ASSESSMENT and PLAN:    ICD-10-CM    1. Myelopathy concurrent with and due to spinal stenosis of cervical region ([facility])  M48.02     G99.2       2. S/p 6/17/24: Dr. Pringle: Right  shunt insertion  Z98.2       3. NPH (normal pressure hydrocephalus) ([facility])  G91.2       4. Weakness of right leg  R29.898       5. DDD (degenerative disc disease), cervical  M50.30       6. Hand weakness  R29.898       7. Decreased  strength  R29.898       8. Numbness and tingling of hand  R20.0     R20.2       9. Numbness and tingling of leg  R20.0     R20.2         PLAN:   1. Medication: None prescribed  2. Imaging:    - Reviewed today:    - MRI cervical spine:     -Extensive cervical degenerative disc disease most prominent at C3-4 and C5-6 with significant spinal stenosis, greater at C3-4.   - Ordered today:    -None.  Patient is currently not interested in surgical intervention at this time and would like to proceed with PT and strengthening.  He would also like to see neurology for the recommendations.  If the patient is not improving with PT over the next few months, advised to call and I will place a CT of the cervical spine as well as x-ray with flexion/extension views for potential surgical planning with Dr. Pringle for follow-up.  3. Activity:    - PT offered.  Patient would like to discuss with neurology to see if he has any further diagnoses to add to his PT order   4.  Follow-up with neurology as scheduled for imaging review  5. Follow up with Dr. Pringle in mid March or call or follow up sooner or go to the ED for any new, worsening or concerning signs or symptoms    -This is a pleasant 79-year-old male with a history of  shunt placement in June 2024.  His shunt was readjusted to 0.5 status post MRI.  He has noticed improvements in his symptoms from NPH, mainly in his gait.  He still has  persistent gait issues, weakness of the lower extremities, numbness tingling of the upper and lower extremities, hand numbness, dropping things and hand weakness.  He recently took up water coloring and has dropped his brush a few times.  He has significant cervical spinal stenosis which is likely contributing to his symptoms, patient with likely cervical myelopathy.  His symptoms appear stable, no drastic decline/progression.  He is not interested in surgical intervention at this time.  He would like to proceed with physical therapy and strengthening to see if improvement of his symptoms.  He would also like to discuss with neurology recommendations.  We will see the patient back for strength check and reassessment in 3 months with Dr. Pringle.    I reviewed imaging. I discussed the plan and reviewed imaging with the patient. The patient agrees with the plan, verbalized understanding and is appreciative. All questions were sought out and thoroughly answered to satisfaction.       Total visit time: 40 min  More than 50% spent coordinating care, providing patient education, reviewing imaging, adjusting shunt, discussing conservative vs surgical treatment options, discussing neurology and counseling.    Teodora Castle M.S., PA-C  [address], [address] [address]  [phone]  12/12/2024 2:59 PM    Dragon speech recognition software was used to prepare this note. If a word or phrase is confusing, it is likely due to a failure of recognition. Please contact me with any questions or clarifications.         [1]   Allergies  Allergen Reactions    Kiwi Extract ITCHING     Inner ears itch

## 2024-12-12 NOTE — TELEPHONE ENCOUNTER
Discussed with Dr. Jarrett Santoyo.     Patient is considering surgical intervention.  I will order a CT cervical spine as well as x-ray cervical spine.  Neurology agrees, likely myelopathy contributing to the patient's symptoms. Ordered physical therapy as requested per today's office visit per patient.  Patient will follow-up with Dr. Pringle as scheduled once imaging is complete.

## 2024-12-12 NOTE — PATIENT INSTRUCTIONS
Refill policies:    Allow 2-3 business days for refills; controlled substances may take longer.  Contact your pharmacy at least 5 days prior to running out of medication and have them send an electronic request or submit request through the “request refill” option in your [employer] account.  Refills are not addressed on weekends; covering physicians do not authorize routine medications on weekends.  No narcotics or controlled substances are refilled after noon on Fridays or by on call physicians.  By law, narcotics must be electronically prescribed.  A 30 day supply with no refills is the maximum allowed.  If your prescription is due for a refill, you may be due for a follow up appointment.  To best provide you care, patients receiving routine medications need to be seen at least once a year.  Patients receiving narcotic/controlled substance medications need to be seen at least once every 3 months.  In the event that your preferred pharmacy does not have the requested medication in stock (e.g. Backordered), it is your responsibility to find another pharmacy that has the requested medication available.  We will gladly send a new prescription to that pharmacy at your request.    Scheduling Tests:    If your physician has ordered radiology tests such as MRI or CT scans, please contact Central Scheduling at [phone] right away to schedule the test.  Once scheduled, the [facility] Centralized Referral Team will work with your insurance carrier to obtain pre-certification or prior authorization.  Depending on your insurance carrier, approval may take 3-10 days.  It is highly recommended patients assure they have received an authorization before having a test performed.  If test is done without insurance authorization, patient may be responsible for the entire amount billed.      Precertification and Prior Authorizations:  If your physician has recommended that you have a procedure or additional testing performed the [facility]  Centralized Referral Team will contact your insurance carrier to obtain pre-certification or prior authorization.    You are strongly encouraged to contact your insurance carrier to verify that your procedure/test has been approved and is a COVERED benefit.  Although the [facility] Centralized Referral Team does its due diligence, the insurance carrier gives the disclaimer that \"Although the procedure is authorized, this does not guarantee payment.\"    Ultimately the patient is responsible for payment.   Thank you for your understanding in this matter.  Paperwork Completion:  If you require FMLA or disability paperwork for your recovery, please make sure to either drop it off or have it faxed to our office at [phone]. Be sure the form has your name and date of birth on it.  The form will be faxed to our Forms Department and they will complete it for you.  There is a 25$ fee for all forms that need to be filled out.  Please be aware there is a 10-14 day turnaround time.  You will need to sign a release of information (ZAKIYA) form if your paperwork does not come with one.  You may call the Forms Department with any questions at [phone].  Their fax number is [phone].

## 2024-12-12 NOTE — PROGRESS NOTES
HPI:    Patient ID: Clay Orellana is a 79 year old male.    HPI    The purpose of this visit is to discuss MRI cervical spine.   On his last visit he had weakness in proximal muscles of the lower extremities, worse on the right, was hyper-reflexic  See images of MRI cervical spine below      HISTORY:  Past Medical History:    Acute nausea with nonbilious vomiting    Anxiety    Arrhythmia    Arthritis    Bilateral inguinal hernia without obstruction or gangrene    BPH (benign prostatic hyperplasia)    Calculus of kidney    Essential hypertension    High blood pressure    High cholesterol    Muscle weakness    Stroke (HCC)    TIA    Visual impairment      Past Surgical History:   Procedure Laterality Date    Anesth,pacemaker insertion  08/2023    Appendectomy      Appendectomy      Colonoscopy      Colonoscopy N/A 05/03/2022    Procedure: COLONOSCOPY;  Surgeon: Sudarshan Gilbert MD;  Location:  ENDOSCOPY    Colonoscopy      Cyst removal N/A 1990    Cyst removed behind back of neck    Cyst removal Right 11/05/2020    Removed from right upper back - just below shoulder    Hernia surgery  July 2022    Other surgical history  06/17/2024    RIGHT VENTRICULOPERITONEAL SHUNT INSERTION    Skin surgery  2020    Vasectomy  1980      Family History   Problem Relation Age of Onset    Heart Attack Father     Diabetes Mother     Other (DM) Sister     Other (ALS) Brother     No Known Problems Maternal Grandmother     No Known Problems Maternal Grandfather     No Known Problems Paternal Grandmother     No Known Problems Paternal Grandfather     Other (smoker) Brother     Other (MS) Sister     No Known Problems Son     No Known Problems Daughter       Social History     Socioeconomic History    Marital status:    Tobacco Use    Smoking status: Former     Types: Cigarettes     Passive exposure: Never    Smokeless tobacco: Never    Tobacco comments:     Has not smoked for about 20 years   Vaping Use    Vaping status: Never Used    Substance and Sexual Activity    Alcohol use: Never     Alcohol/week: 4.0 standard drinks of alcohol     Types: 4 Cans of beer per week    Drug use: Never   Other Topics Concern    Caffeine Concern No    Exercise No    Seat Belt Yes    Special Diet No    Stress Concern Yes    Weight Concern No     Social Drivers of Health     Financial Resource Strain: Low Risk  (6/19/2024)    Financial Resource Strain     Difficulty of Paying Living Expenses: Not hard at all     Med Affordability: No   Food Insecurity: Low Risk  (7/8/2024)    Received [facility]    Food Insecurity     Have there been times that your food ran out, and you didn't have money to get more?: No     Are there times that you worry that this might happen?: No   Transportation Needs: Low Risk  (7/8/2024)    Received from [facility]    Transportation Needs     Has lack of transportation kept you from medical appointments, meetings, work, or from getting things needed for daily living: No   Housing Stability: Low Risk  (7/8/2024)    Received from [facility]    Housing Stability     Are you worried that your electric, gas, oil, or water might be shut off?: No     Are you concerned about having a safe and reliable place to live?: No        Review of Systems  Negative except as in HPI       Current Outpatient Medications   Medication Sig Dispense Refill    amLODIPine 2.5 MG Oral Tab Take 1 tablet by mouth once a day at night. Start on 11/12      finasteride 5 MG Oral Tab Take 1 tablet (5 mg total) by mouth daily. 90 tablet 2    spironolactone 25 MG Oral Tab 1 tab po MWF 90 tablet 3    sertraline 25 MG Oral Tab Take 1 tablet (25 mg total) by mouth daily. 90 tablet 3    saccharomyces boulardii 250 MG Oral Cap Take 1 capsule (250 mg total) by mouth 2 (two) times daily.      sodium chloride 0.65 % Nasal Solution 2 sprays by Nasal route as needed.      donepezil 10 MG Oral Tab Take 1 tablet (10  mg total) by mouth nightly. 90 tablet 1    metoprolol tartrate 25 MG Oral Tab Take 1 tablet (25 mg total) by mouth 2 (two) times daily.      tamsulosin 0.4 MG Oral Cap Take 1 capsule (0.4 mg total) by mouth After dinner.      atorvastatin 40 MG Oral Tab Take 1 tablet (40 mg total) by mouth nightly. 90 tablet 3    aspirin 81 MG Oral Tab EC Take 1 tablet (81 mg total) by mouth daily.      rivaroxaban 20 MG Oral Tab Take 1 tablet (20 mg total) by mouth nightly.      fluticasone-umeclidin-vilant (TRELEGY ELLIPTA) 100-62.5-25 MCG/ACT Inhalation Aerosol Powder, Breath Activated Inhale 1 puff into the lungs daily.      flecainide 100 MG Oral Tab Take 1 tablet (100 mg total) by mouth 2 (two) times daily.      Cholecalciferol 125 MCG (5000 UT) Oral Tab Take 1 tablet (5,000 Units total) by mouth 3 (three) times a week. Three times a week. MWF      cyanocobalamin 500 MCG Oral Tab Take 1 tablet (500 mcg total) by mouth daily.      Multiple Vitamins-Minerals (MULTI-VITAMIN/MINERALS) Oral Tab Take 1 tablet by mouth daily.      acetaminophen 500 MG Oral Tab Take 1 tablet (500 mg total) by mouth every 6 (six) hours as needed.       Allergies:Allergies[1]  PHYSICAL EXAM:   Physical Exam    General Appearance: Well nourished, well developed, no apparent distress.     Motor Exam: Mild spasticity in bilateral lower extremities.  Strength is  5 out of 5 in proximal and distal muscles of upper extremities.  He has 4/5 motor power in hip flexors bilaterally, 5/5 motor power in knee extensors on the right, 4/5 knee extensors on the left, knee flexors 4/5, 5/5 distal muscles of lower extremities with ankle dorsiflexion, ankle plantarflexion, ankle eversion and inversion.     DTR:  Exaggerated reflexes all over.  3+ reflexes in upper extremities and symmetric, negative Gucci sign  3+ right patellar reflex with a cross adductor sign, 4+ reflex in the left patella with clonus  Few beats of clonus in the left ankle.  Mute toes      Walks  with a walker    TESTS/IMAGING:         ASSESSMENT/PLAN:   Compressive cervical myelopathy:    Reviewed MRI cervical spine images with him and his wife.    Discussed the case with Dr. Das  from neurosurgery and advanced practice nurse Teodora.   Patient will return to see Dr. Das for further discussion about spine surgery  Further treatment by neurosurgery department    He will see me as needed        Meds This Visit:  Requested Prescriptions      No prescriptions requested or ordered in this encounter       Imaging & Referrals:  None     ID#[id number]       [1]   Allergies  Allergen Reactions    Kiwi Extract ITCHING     Inner ears itch

## 2024-12-12 NOTE — PATIENT INSTRUCTIONS
Refill policies:    Allow 2-3 business days for refills; controlled substances may take longer.  Contact your pharmacy at least 5 days prior to running out of medication and have them send an electronic request or submit request through the “request refill” option in your [employer]r account.  Refills are not addressed on weekends; covering physicians do not authorize routine medications on weekends.  No narcotics or controlled substances are refilled after noon on Fridays or by on call physicians.  By law, narcotics must be electronically prescribed.  A 30 day supply with no refills is the maximum allowed.  If your prescription is due for a refill, you may be due for a follow up appointment.  To best provide you care, patients receiving routine medications need to be seen at least once a year.  Patients receiving narcotic/controlled substance medications need to be seen at least once every 3 months.  In the event that your preferred pharmacy does not have the requested medication in stock (e.g. Backordered), it is your responsibility to find another pharmacy that has the requested medication available.  We will gladly send a new prescription to that pharmacy at your request.    Scheduling Tests:    If your physician has ordered radiology tests such as MRI or CT scans, please contact Central Scheduling at [phone] right away to schedule the test.  Once scheduled, the [facility] Centralized Referral Team will work with your insurance carrier to obtain pre-certification or prior authorization.  Depending on your insurance carrier, approval may take 3-10 days.  It is highly recommended patients assure they have received an authorization before having a test performed.  If test is done without insurance authorization, patient may be responsible for the entire amount billed.      Precertification and Prior Authorizations:  If your physician has recommended that you have a procedure or additional testing performed the [facility]  Centralized Referral Team will contact your insurance carrier to obtain pre-certification or prior authorization.    You are strongly encouraged to contact your insurance carrier to verify that your procedure/test has been approved and is a COVERED benefit.  Although the [facility] Centralized Referral Team does its due diligence, the insurance carrier gives the disclaimer that \"Although the procedure is authorized, this does not guarantee payment.\"    Ultimately the patient is responsible for payment.   Thank you for your understanding in this matter.  Paperwork Completion:  If you require FMLA or disability paperwork for your recovery, please make sure to either drop it off or have it faxed to our office at [phone]. Be sure the form has your name and date of birth on it.  The form will be faxed to our Forms Department and they will complete it for you.  There is a 25$ fee for all forms that need to be filled out.  Please be aware there is a 10-14 day turnaround time.  You will need to sign a release of information (ZAKIYA) form if your paperwork does not come with one.  You may call the Forms Department with any questions at [phone].  Their fax number is [phone].     Mask Type (Optional): soft based Exfoliation Type Override: microdermabrasion cream Facial Steaming: steamed Treatment Type Override: custom 60 Exfoliation Type: exfoliant Assessment (Optional): Acne Treatment Type (Optional): Deep Cleanse Treatment Detail Level: Zone

## 2025-01-08 ENCOUNTER — TELEPHONE (OUTPATIENT)
Dept: FAMILY MEDICINE CLINIC | Facility: CLINIC | Age: 80
End: 2025-01-08

## 2025-01-08 DIAGNOSIS — M25.471 ANKLE EDEMA, BILATERAL: ICD-10-CM

## 2025-01-08 DIAGNOSIS — I10 PRIMARY HYPERTENSION: ICD-10-CM

## 2025-01-08 DIAGNOSIS — M25.472 ANKLE EDEMA, BILATERAL: ICD-10-CM

## 2025-01-08 DIAGNOSIS — F39 MOOD DISORDER (HCC): ICD-10-CM

## 2025-01-08 RX ORDER — SERTRALINE HYDROCHLORIDE 25 MG/1
25 TABLET, FILM COATED ORAL DAILY
Qty: 90 TABLET | Refills: 3 | Status: SHIPPED | OUTPATIENT
Start: 2025-01-08

## 2025-01-08 RX ORDER — METOPROLOL TARTRATE 25 MG/1
25 TABLET, FILM COATED ORAL 2 TIMES DAILY
Qty: 180 TABLET | Refills: 1 | Status: SHIPPED | OUTPATIENT
Start: 2025-01-08

## 2025-01-08 RX ORDER — FLUTICASONE FUROATE, UMECLIDINIUM BROMIDE AND VILANTEROL TRIFENATATE 100; 62.5; 25 UG/1; UG/1; UG/1
1 POWDER RESPIRATORY (INHALATION) DAILY
Qty: 60 EACH | Refills: 2 | Status: SHIPPED | OUTPATIENT
Start: 2025-01-08

## 2025-01-08 RX ORDER — ATORVASTATIN CALCIUM 40 MG/1
40 TABLET, FILM COATED ORAL NIGHTLY
Qty: 90 TABLET | Refills: 1 | Status: SHIPPED | OUTPATIENT
Start: 2025-01-08

## 2025-01-08 RX ORDER — DONEPEZIL HYDROCHLORIDE 10 MG/1
10 TABLET, FILM COATED ORAL NIGHTLY
Qty: 90 TABLET | Refills: 1 | Status: SHIPPED | OUTPATIENT
Start: 2025-01-08

## 2025-01-08 RX ORDER — FINASTERIDE 5 MG/1
5 TABLET, FILM COATED ORAL DAILY
Qty: 90 TABLET | Refills: 2 | Status: SHIPPED | OUTPATIENT
Start: 2025-01-08

## 2025-01-08 RX ORDER — TAMSULOSIN HYDROCHLORIDE 0.4 MG/1
0.4 CAPSULE ORAL
Qty: 30 CAPSULE | Refills: 0 | Status: SHIPPED | OUTPATIENT
Start: 2025-01-08

## 2025-01-08 RX ORDER — SPIRONOLACTONE 25 MG/1
TABLET ORAL
Qty: 90 TABLET | Refills: 3 | Status: SHIPPED | OUTPATIENT
Start: 2025-01-08

## 2025-01-08 RX ORDER — FLECAINIDE ACETATE 100 MG/1
100 TABLET ORAL 2 TIMES DAILY
Qty: 90 TABLET | Refills: 0 | Status: SHIPPED | OUTPATIENT
Start: 2025-01-08

## 2025-01-09 ENCOUNTER — HOSPITAL ENCOUNTER (OUTPATIENT)
Dept: CT IMAGING | Facility: HOSPITAL | Age: 80
Discharge: HOME | End: 2025-01-09
Attending: PHYSICIAN ASSISTANT
Payer: MEDICARE

## 2025-01-09 ENCOUNTER — HOSPITAL ENCOUNTER (OUTPATIENT)
Dept: GENERAL RADIOLOGY | Facility: HOSPITAL | Age: 80
Discharge: HOME | End: 2025-01-09
Attending: PHYSICIAN ASSISTANT
Payer: MEDICARE

## 2025-01-09 ENCOUNTER — MED REC SCAN ONLY (OUTPATIENT)
Dept: FAMILY MEDICINE CLINIC | Facility: CLINIC | Age: 80
End: 2025-01-09

## 2025-01-09 DIAGNOSIS — R20.0 NUMBNESS AND TINGLING OF LEG: ICD-10-CM

## 2025-01-09 DIAGNOSIS — G99.2 MYELOPATHY CONCURRENT WITH AND DUE TO SPINAL STENOSIS OF CERVICAL REGION (HCC): ICD-10-CM

## 2025-01-09 DIAGNOSIS — R20.2 NUMBNESS AND TINGLING OF LEG: ICD-10-CM

## 2025-01-09 DIAGNOSIS — R29.898 WEAKNESS OF RIGHT LEG: ICD-10-CM

## 2025-01-09 DIAGNOSIS — M48.02 MYELOPATHY CONCURRENT WITH AND DUE TO SPINAL STENOSIS OF CERVICAL REGION (HCC): ICD-10-CM

## 2025-01-09 DIAGNOSIS — R20.2 NUMBNESS AND TINGLING OF HAND: ICD-10-CM

## 2025-01-09 DIAGNOSIS — R29.898 DECREASED GRIP STRENGTH: ICD-10-CM

## 2025-01-09 DIAGNOSIS — M50.30 DDD (DEGENERATIVE DISC DISEASE), CERVICAL: ICD-10-CM

## 2025-01-09 DIAGNOSIS — R29.898 HAND WEAKNESS: ICD-10-CM

## 2025-01-09 DIAGNOSIS — R20.0 NUMBNESS AND TINGLING OF HAND: ICD-10-CM

## 2025-01-09 PROCEDURE — 72050 X-RAY EXAM NECK SPINE 4/5VWS: CPT | Performed by: PHYSICIAN ASSISTANT

## 2025-01-09 PROCEDURE — 72125 CT NECK SPINE W/O DYE: CPT | Performed by: PHYSICIAN ASSISTANT

## 2025-02-04 RX ORDER — TAMSULOSIN HYDROCHLORIDE 0.4 MG/1
0.4 CAPSULE ORAL
Qty: 90 CAPSULE | Refills: 3 | Status: SHIPPED | OUTPATIENT
Start: 2025-02-04

## 2025-02-05 ENCOUNTER — MED REC SCAN ONLY (OUTPATIENT)
Dept: FAMILY MEDICINE CLINIC | Facility: CLINIC | Age: 80
End: 2025-02-05

## 2025-02-06 ENCOUNTER — TELEPHONE (OUTPATIENT)
Dept: FAMILY MEDICINE CLINIC | Facility: CLINIC | Age: 80
End: 2025-02-06

## 2025-02-06 NOTE — TELEPHONE ENCOUNTER
RN to pt wife call, spoke to wife and patient whom was present at time of call.    Relayed Dr. Meza's message, they are relieved there is not evidence of Pneumonia.    RN asked pt how he is feeling, he states he \"feels fine.\"  Reports that he coughs every once in awhile, but otherwise feels ok and at baseline.    Pt has appt in March, advised to call office if anything needed from now until then.  They both verbalized understanding.  
Received call from pt's wife, Aniya. Per Aniya, pt had a CXR the other day and is wondering if Dr. Meza has gotten the results. CXR was done at [facility], completed because  nurse thought she heard abnormality in lungs.     Dr. Meza- aware you are no longer going to [facility], but would these results still come to you given your PCP? Have you received anything?   
The chest x-ray was done yesterday  I just got the results  There is no pneumonia.  Is he having symptoms?  
no

## 2025-02-12 ENCOUNTER — HOME HEALTH CHARGES (OUTPATIENT)
Dept: FAMILY MEDICINE CLINIC | Facility: CLINIC | Age: 80
End: 2025-02-12

## 2025-02-12 DIAGNOSIS — I48.20 CHRONIC ATRIAL FIBRILLATION (HCC): Primary | ICD-10-CM

## 2025-02-12 DIAGNOSIS — I10 ESSENTIAL HYPERTENSION, MALIGNANT: ICD-10-CM

## 2025-02-13 ENCOUNTER — OFFICE VISIT (OUTPATIENT)
Dept: SURGERY | Facility: CLINIC | Age: 80
End: 2025-02-13
Payer: MEDICARE

## 2025-02-13 VITALS
HEART RATE: 62 BPM | WEIGHT: 216 LBS | BODY MASS INDEX: 30.24 KG/M2 | OXYGEN SATURATION: 95 % | SYSTOLIC BLOOD PRESSURE: 118 MMHG | DIASTOLIC BLOOD PRESSURE: 62 MMHG | HEIGHT: 71 IN

## 2025-02-13 DIAGNOSIS — G91.2 NPH (NORMAL PRESSURE HYDROCEPHALUS) (HCC): ICD-10-CM

## 2025-02-13 DIAGNOSIS — G99.2 MYELOPATHY CONCURRENT WITH AND DUE TO SPINAL STENOSIS OF CERVICAL REGION (HCC): Primary | ICD-10-CM

## 2025-02-13 DIAGNOSIS — M48.02 MYELOPATHY CONCURRENT WITH AND DUE TO SPINAL STENOSIS OF CERVICAL REGION (HCC): Primary | ICD-10-CM

## 2025-02-13 PROCEDURE — 99215 OFFICE O/P EST HI 40 MIN: CPT | Performed by: NEUROLOGICAL SURGERY

## 2025-02-13 RX ORDER — FUROSEMIDE 20 MG/1
TABLET ORAL
COMMUNITY
Start: 2025-02-03

## 2025-02-13 NOTE — PROGRESS NOTES
Neurosurgery Clinic Visit  2025    Clay Orellana PCP:  Sachin Meza MD    1945 MRN [id number]     HISTORY OF PRESENT ILLNESS:  Clay Orellana is a(n) 79 year old male presents for follow-up  He got CT scans and x-rays of his neck  He had previous MRIs of his back and neck  His main complaint is back pain with standing and walking  This is really affecting his life  He does note occasionally drops things  He was suspected possibly having myelopathy on previous exams  Going back to currently, his main complaint is back pain  He has noted a little weak with walking  He is always walking a few times a day  He is here to review imaging and come up with a plan      PHYSICAL EXAMINATION:  Vital Signs:  /62   Pulse 62   Ht 71\"   Wt 216 lb (98 kg)   SpO2 95%   BMI 30.13 kg/m²   Awake alert, orient x 3  Follows commands x 4  Reflexes are 2+ bilateral and symmetrical  No hyperreflexia   no Gucci's  No clonus  He walks with a walker, no shuffling gait    REVIEW OF STUDIES:    Cervical spine shows stenosis C3-4 and to a lesser degree at 4 5 and 5 6  Lumbar MRI shows stenosis at L3-4 and 4 5, worse at L4-5  CT scans reviewed which shows lots of anterior osteophytes in the cervical spine  X-rays reviewed show no abnormal movement    ASSESSMENT and PLAN:  79-year-old gentleman with lumbar stenosis and cervical stenosis  Regarding cervical stenosis  He is minimally symptomatic  He was more symptomatic in early exam by neurologist  On today's examination of no signs of cervical myelopathy we had long discussion about this  Regarding his lumbar spine  I do think he is symptomatic from an L4-5 stenosis  He would need a couple level laminectomy here as well  I recommend getting a L5/S1 interlaminar epidural steroid injection  His previous epidurals were little higher up  I would like to see how he does with that  He will call me after his epidural and let me know how he is doing  Will make further plans for  there from there  We discussed cervical laminectomies as well as lumbar laminectomies  Depending what he wanted to do we do 1 or the other or both  If we are just to do his lumbar spine I would do neuromonitoring on his neck during the case  All questions were answered  He will call me after his injection let me know how it went  Patient and family appreciative        Time spent on counseling/coordination of care:  50 minutes    Total time spent with patient:  50 minutes      Brian Pringle MD   [facility]  2/13/2025  4:43 PM   Dictated but not proofread

## 2025-02-13 NOTE — PROGRESS NOTES
Established patient:  Reason for follow up:   Review imaging results     Numeric Rating Scale:    Pain at Present: 0/10       New imaging or testing since your last office visit:    CT spine cervical DOS 01/09/25  XR cervical spine DOS 01/09/25

## 2025-02-14 RX ORDER — FLECAINIDE ACETATE 100 MG/1
100 TABLET ORAL 2 TIMES DAILY
Qty: 180 TABLET | Refills: 3 | Status: SHIPPED | OUTPATIENT
Start: 2025-02-14

## 2025-02-14 NOTE — TELEPHONE ENCOUNTER
Please review.  Protocol failed / Has no protocol.     Requested Prescriptions   Pending Prescriptions Disp Refills    FLECAINIDE 100 MG Oral Tab [Pharmacy Med Name: Flecainide Acetate 100 MG Oral Tablet] 180 tablet 3     Sig: TAKE 1 TABLET BY MOUTH TWICE  DAILY       There is no refill protocol information for this order

## 2025-02-24 ENCOUNTER — TELEPHONE (OUTPATIENT)
Dept: PAIN CLINIC | Facility: CLINIC | Age: 80
End: 2025-02-24

## 2025-02-24 ENCOUNTER — OFFICE VISIT (OUTPATIENT)
Dept: PAIN CLINIC | Facility: CLINIC | Age: 80
End: 2025-02-24
Payer: MEDICARE

## 2025-02-24 ENCOUNTER — TELEPHONE (OUTPATIENT)
Dept: FAMILY MEDICINE CLINIC | Facility: CLINIC | Age: 80
End: 2025-02-24

## 2025-02-24 VITALS — SYSTOLIC BLOOD PRESSURE: 118 MMHG | HEART RATE: 59 BPM | DIASTOLIC BLOOD PRESSURE: 62 MMHG | OXYGEN SATURATION: 98 %

## 2025-02-24 DIAGNOSIS — M48.062 SPINAL STENOSIS OF LUMBAR REGION WITH NEUROGENIC CLAUDICATION: Primary | ICD-10-CM

## 2025-02-24 DIAGNOSIS — M54.16 LUMBAR RADICULITIS: Primary | ICD-10-CM

## 2025-02-24 PROCEDURE — 99213 OFFICE O/P EST LOW 20 MIN: CPT | Performed by: ANESTHESIOLOGY

## 2025-02-24 PROCEDURE — G2211 COMPLEX E/M VISIT ADD ON: HCPCS | Performed by: ANESTHESIOLOGY

## 2025-02-24 NOTE — PROGRESS NOTES
Name: Clay Orellana   : 1945   DOS: 2025     Pain Clinic Follow Up Visit:     Chief Complaint   Patient presents with    Follow - Up     Myelopathy concurrent w/ and due to spinal stenosis of cervical region        Clay Orellana is a 79 year old male with multilevel lumbar degenerative disc disease and central canal stenosis here for follow-up.  The patient was recently seen by Dr. Rose.  Recommended lumbar epidural injection at the L5-S1 level.    Pt denies any chills, fever, or weakness. There is no bladder or bowel incontinence associated with the pain.    REVIEW OF SYSTEMS:  A ten point review of systems was performed with pertinent positives and negatives in the HPI.    Allergies[1]    Current Outpatient Medications   Medication Sig Dispense Refill    flecainide 100 MG Oral Tab Take 1 tablet (100 mg total) by mouth 2 (two) times daily. 180 tablet 3    tamsulosin 0.4 MG Oral Cap Take 1 capsule (0.4 mg total) by mouth After dinner. 90 capsule 3    atorvastatin 40 MG Oral Tab Take 1 tablet (40 mg total) by mouth nightly. 90 tablet 1    donepezil 10 MG Oral Tab Take 1 tablet (10 mg total) by mouth nightly. 90 tablet 1    finasteride 5 MG Oral Tab Take 1 tablet (5 mg total) by mouth daily. 90 tablet 2    sertraline 25 MG Oral Tab Take 1 tablet (25 mg total) by mouth daily. 90 tablet 3    spironolactone 25 MG Oral Tab 1 tab po MWF 90 tablet 3    metoprolol tartrate 25 MG Oral Tab Take 1 tablet (25 mg total) by mouth 2 (two) times daily. 180 tablet 1    rivaroxaban 20 MG Oral Tab Take 1 tablet (20 mg total) by mouth nightly. 90 tablet 0    fluticasone-umeclidin-vilant (TRELEGY ELLIPTA) 100-62.5-25 MCG/ACT Inhalation Aerosol Powder, Breath Activated Inhale 1 puff into the lungs daily. 60 each 2    amLODIPine 2.5 MG Oral Tab Take 1 tablet by mouth once a day at night. Start on       aspirin 81 MG Oral Tab EC Take 1 tablet (81 mg total) by mouth daily.      Cholecalciferol 125 MCG (5000 UT) Oral Tab  Take 1 tablet (5,000 Units total) by mouth 3 (three) times a week. Three times a week. MWF      cyanocobalamin 500 MCG Oral Tab Take 1 tablet (500 mcg total) by mouth daily.      Multiple Vitamins-Minerals (MULTI-VITAMIN/MINERALS) Oral Tab Take 1 tablet by mouth daily.      acetaminophen 500 MG Oral Tab Take 1 tablet (500 mg total) by mouth every 6 (six) hours as needed.           EXAM:   /62   Pulse 59   SpO2 98%   General:  Patient is a(n) 79 year old year old male in no acute distress.  Neurologic:: WNL-Orientation to time, place and person, normal mood & affect, concentration & attention span intact.   Inspection: Uses walker shuffling gait.  Neck: Full range of motion  Cranial nerves, grossly intact  Respiratory: Nonlabored  Back: Gait is intact with walker    IMAGES:   Lumbar MRI with diffuse disc bulge at L4-5 L5-S1      ASSESSMENT AND PLAN:     1. Spinal stenosis of lumbar region with neurogenic claudication        The patient is a pleasant 79-year-old gentleman who presents today for follow-up.  The patient was recently seen by Dr. Rose and referred back to discuss lumbar epidural steroid injection.  Wants to target the L5-S1 level.  Patient is on rivaroxaban and will need medical clearance.  Procedure discussed with patient.  He like to proceed.    Orders:  Orders Placed This Encounter   Procedures    [facility] PAIN NAVIGATOR         Radiology orders and consultations:None  The patient indicates understanding of these issues and agrees to the plan.  No follow-ups on file.    Sudarshan Chase MD, 2/24/2025, 11:01 AM              [1]   Allergies  Allergen Reactions    Kiwi Extract ITCHING     Inner ears itch

## 2025-02-24 NOTE — TELEPHONE ENCOUNTER
Order Questions    Question Answer Comment   Anesthesia Type Local    Provider Salvatore    Location [facility] Procedure Lab    Procedure Lumbar ERNST    CPT (Hit enter after each entry) NJX INTERLAMINAR LMBR/SAC    Medications to hold rivaroxaban    Medical clearance requested (will send to Pain Navigator) Yes kalin   Patient has Medicare coverage? Yes    Comments (Please list entire procedure name here.) lumbar epdiural steroid injection (L5-S1)

## 2025-02-24 NOTE — TELEPHONE ENCOUNTER
Medical Clearance faxed to 's Office at Fax #: [phone];confirmation r'cvd     25      RE: Clay KELLY Esteban    : 1945    Dear Dr. Meza,    Your patient is being scheduled for a pain management procedure at [facility].    Procedure:  Lumbar Epidural Steroid Injection.  Date of Procedure: TBD -pending medical clearance.  Physician: Dr. Chase- Anesthesiologist     Your patient is currently taking Xarelto. Dr. Chase usually recommends this medication to be held for 3 days prior to injection.     Please verify patient is cleared to proceed with pain management procedures.

## 2025-02-24 NOTE — PATIENT INSTRUCTIONS
Refill policies:    Allow 2-3 business days for refills; controlled substances may take longer.  Contact your pharmacy at least 5 days prior to running out of medication and have them send an electronic request or submit request through the “request refill” option in your [employer] account.  Refills are not addressed on weekends; covering physicians do not authorize routine medications on weekends.  No narcotics or controlled substances are refilled after noon on Fridays or by on call physicians.  By law, narcotics must be electronically prescribed.  A 30 day supply with no refills is the maximum allowed.  If your prescription is due for a refill, you may be due for a follow up appointment.  To best provide you care, patients receiving routine medications need to be seen at least once a year.  Patients receiving narcotic/controlled substance medications need to be seen at least once every 3 months.  In the event that your preferred pharmacy does not have the requested medication in stock (e.g. Backordered), it is your responsibility to find another pharmacy that has the requested medication available.  We will gladly send a new prescription to that pharmacy at your request.    Scheduling Tests:    If your physician has ordered radiology tests such as MRI or CT scans, please contact Central Scheduling at [phone] right away to schedule the test.  Once scheduled, the [facility] Centralized Referral Team will work with your insurance carrier to obtain pre-certification or prior authorization.  Depending on your insurance carrier, approval may take 3-10 days.  It is highly recommended patients assure they have received an authorization before having a test performed.  If test is done without insurance authorization, patient may be responsible for the entire amount billed.      Precertification and Prior Authorizations:  If your physician has recommended that you have a procedure or additional testing performed the [facility]  Centralized Referral Team will contact your insurance carrier to obtain pre-certification or prior authorization.    You are strongly encouraged to contact your insurance carrier to verify that your procedure/test has been approved and is a COVERED benefit.  Although the [facility] Centralized Referral Team does its due diligence, the insurance carrier gives the disclaimer that \"Although the procedure is authorized, this does not guarantee payment.\"    Ultimately the patient is responsible for payment.   Thank you for your understanding in this matter.  Paperwork Completion:  If you require FMLA or disability paperwork for your recovery, please make sure to either drop it off or have it faxed to our office at [phone]. Be sure the form has your name and date of birth on it.  The form will be faxed to our Forms Department and they will complete it for you.  There is a 25$ fee for all forms that need to be filled out.  Please be aware there is a 10-14 day turnaround time.  You will need to sign a release of information (ZAKIYA) form if your paperwork does not come with one.  You may call the Forms Department with any questions at [phone].  Their fax number is [phone].

## 2025-02-24 NOTE — PROGRESS NOTES
Patient presents in office today with reported pain in lower back and neck    Current pain level reported = 6/10    Last reported dose of NA      Narcotic Contract renewal NA    Urine Drug screen NA

## 2025-02-24 NOTE — TELEPHONE ENCOUNTER
[facility]      Patient is to have a \"Lumbar Epidural Steroid Injection. It is not  yet.  It will be scheduled once medical clearance is received back to them  ( As per the form)    The form was placed in Dr Little Pre-op folder in the MA Area.    As per Clinical Manager the patient does not need a separate Pre-op appointment.  She said Dr. Meza can fill out at patients 3-3-25 appointment.

## 2025-03-01 ENCOUNTER — MED REC SCAN ONLY (OUTPATIENT)
Dept: FAMILY MEDICINE CLINIC | Facility: CLINIC | Age: 80
End: 2025-03-01

## 2025-03-03 ENCOUNTER — OFFICE VISIT (OUTPATIENT)
Dept: FAMILY MEDICINE CLINIC | Facility: CLINIC | Age: 80
End: 2025-03-03
Payer: MEDICARE

## 2025-03-03 VITALS
RESPIRATION RATE: 18 BRPM | WEIGHT: 220.31 LBS | BODY MASS INDEX: 30.84 KG/M2 | HEART RATE: 86 BPM | DIASTOLIC BLOOD PRESSURE: 72 MMHG | HEIGHT: 71 IN | OXYGEN SATURATION: 95 % | SYSTOLIC BLOOD PRESSURE: 120 MMHG

## 2025-03-03 DIAGNOSIS — K63.5 POLYP OF COLON, UNSPECIFIED PART OF COLON, UNSPECIFIED TYPE: ICD-10-CM

## 2025-03-03 DIAGNOSIS — G91.2 NPH (NORMAL PRESSURE HYDROCEPHALUS) (HCC): ICD-10-CM

## 2025-03-03 DIAGNOSIS — I10 ESSENTIAL HYPERTENSION, MALIGNANT: ICD-10-CM

## 2025-03-03 DIAGNOSIS — F39 MOOD DISORDER: ICD-10-CM

## 2025-03-03 DIAGNOSIS — Z00.00 ENCOUNTER FOR ANNUAL HEALTH EXAMINATION: Primary | ICD-10-CM

## 2025-03-03 DIAGNOSIS — Z13.6 SCREENING FOR CARDIOVASCULAR CONDITION: ICD-10-CM

## 2025-03-03 DIAGNOSIS — M25.472 ANKLE EDEMA, BILATERAL: ICD-10-CM

## 2025-03-03 DIAGNOSIS — M25.471 ANKLE EDEMA, BILATERAL: ICD-10-CM

## 2025-03-03 DIAGNOSIS — Z01.818 PREOP EXAMINATION: ICD-10-CM

## 2025-03-03 DIAGNOSIS — Z98.2 PRESENCE OF CEREBROSPINAL FLUID DRAINAGE DEVICE: ICD-10-CM

## 2025-03-03 DIAGNOSIS — Z13.1 SCREENING FOR DIABETES MELLITUS (DM): ICD-10-CM

## 2025-03-03 DIAGNOSIS — I48.20 CHRONIC ATRIAL FIBRILLATION (HCC): ICD-10-CM

## 2025-03-03 RX ORDER — FUROSEMIDE 20 MG/1
20 TABLET ORAL 2 TIMES DAILY
COMMUNITY

## 2025-03-03 NOTE — PATIENT INSTRUCTIONS
Clay Orellana's SCREENING SCHEDULE   Tests on this list are recommended by your physician but may not be covered, or covered at this frequency, by your insurer.   Please check with your insurance carrier before scheduling to verify coverage.   PREVENTATIVE SERVICES FREQUENCY &  COVERAGE DETAILS LAST COMPLETION DATE   Diabetes Screening    Fasting Blood Sugar / Glucose    One screening every 12 months if never tested or if previously tested but not diagnosed with pre-diabetes   One screening every 6 months if diagnosed with pre-diabetes Lab Results   Component Value Date     (H) 11/18/2024        Cardiovascular Disease Screening    Lipid Panel  Cholesterol  Lipoprotein (HDL)  Triglycerides Covered every 5 years for all Medicare beneficiaries without apparent signs or symptoms of cardiovascular disease Lab Results   Component Value Date    CHOLEST 148 03/20/2023    HDL 47 03/20/2023    LDL 90 03/20/2023    TRIG 49 03/20/2023         Electrocardiogram (EKG)   Covered if needed at Welcome to Medicare, and non-screening if indicated for medical reasons 06/23/2024      Ultrasound Screening for Abdominal Aortic Aneurysm (AAA) Covered once in a lifetime for one of the following risk factors   • Men who are 65-75 years old and have ever smoked   • Anyone with a family history -     Colorectal Cancer Screening  Covered for ages 50-85; only need ONE of the following:    Colonoscopy   Covered every 10 years    Covered every 2 years if patient is at high risk or previous colonoscopy was abnormal 05/03/2022    Health Maintenance   Topic Date Due   • Colorectal Cancer Screening  05/03/2023       Flexible Sigmoidoscopy   Covered every 4 years -    Fecal Occult Blood Test Covered annually -   Prostate Cancer Screening    Prostate-Specific Antigen (PSA) Annually No results found for: \"PSA\"  There are no preventive care reminders to display for this patient.   Immunizations    Influenza Covered once per flu season  Please get  every year -  Influenza Vaccine(1) due on 10/01/2024    Pneumococcal Each vaccine ([id number] & [id number]) covered once after 65 Prevnar 13: 10/09/2015    [id number]: 09/11/2018     No recommendations at this time    Hepatitis B One screening covered for patients with certain risk factors   -  No recommendations at this time    Tetanus Toxoid Not covered by Medicare Part B unless medically necessary (cut with metal); may be covered with your pharmacy prescription benefits -    Tetanus, Diptheria and Pertusis TD and TDaP Not covered by Medicare Part B -  No recommendations at this time    Zoster Not covered by Medicare Part B; may be covered with your pharmacy  prescription benefits -  Zoster Vaccines(2 of 2) due on 11/09/2023     Annual Monitoring of Persistent Medications (ACE/ARB, digoxin diuretics, anticonvulsants)    Potassium Annually Lab Results   Component Value Date    K 4.3 11/18/2024         Creatinine   Annually Lab Results   Component Value Date    CREATSERUM 1.32 (H) 11/18/2024         BUN Annually Lab Results   Component Value Date    BUN 28 (H) 11/18/2024       Drug Serum Conc Annually No results found for: \"DIGOXIN\", \"DIG\", \"VALP\"         Chronic Obstructive Pulmonary Disease (COPD)    Spirometry Annually Spirometry date:

## 2025-03-03 NOTE — PROGRESS NOTES
Subjective:   Clay Orellana is a 79 year old male who presents for a Medicare Subsequent Annual Wellness visit (Pt already had Initial Annual Wellness) and scheduled follow up of multiple significant but stable problems.   History of Present Illness  Clay Orellana is an 79 year old male who presents for an annual physical exam.    He experiences ongoing back and neck pain, which has been evaluated with MRI and x-rays. He has consulted with specialists regarding these issues and has received two back injections, which provided minimal relief. He is currently awaiting medical clearance to stop Xarelto three days prior to another injection.    He has a history of irregular heartbeat and is under the care of a cardiologist, who prescribed Lasix a couple of months ago. He takes Lasix as needed for swelling in his legs and ankles, which worsens in the evening. He takes the medication in the morning and notes that it effectively reduces swelling without noticeable side effects.    He has a history of normal pressure hydrocephalus, for which he had a shunt placed about a year ago. Since the surgery, his gait, cognitive abilities, and incontinence have improved significantly. He notes occasional wobbly walking, which he attributes to weakness and back pain.    He underwent a colonoscopy in May 2022, which revealed a polyp, leading to a recommendation for a repeat procedure in one year. He takes Miralax daily to maintain regular bowel movements.      History/Other:   Fall Risk Assessment:   He has been screened for Falls and is High Risk. Fall Prevention information provided to patient in After Visit Summary.    Do you feel unsteady when standing or walking?: Yes  Do you worry about falling?: Yes  Have you fallen in the past year?: No     Cognitive Assessment:   He had a completely normal cognitive assessment - see flowsheet entries     Functional Ability/Status:   Clay Orellana has some abnormal functions as listed  below:  He has Dressing and/or Bathing issues based on screening of functional status.  Difficulty dressing or bathing?: Yes  Dressing: Able without help  He has Driving difficulties based on screening of functional status. He has difficulties Affording Meds based on screening of functional status. He has Hearing problems based on screening of functional status.He has Vision problems based on screening of functional status. He has Walking problems based on screening of functional status. He has problems with Daily Activities based on screening of functional status.       Depression Screening (PHQ):  PHQ-2 SCORE: 0  , done 3/3/2025            Advanced Directives:   He does have a Living Will but we do NOT have it on file in Epic.    He has a Power of  for Health Care on file in [facility].  Patient has Advance Care Planning documents present in EMR. Reviewed documents with patient (and family/surrogate if present).      Patient Active Problem List   Diagnosis    Hypertension    Primary osteoarthritis involving multiple joints    Anxiety    Bilateral inguinal hernia without obstruction or gangrene    Arteriovenous malformation of colon, ascending colon, May 3, 2022    Benign prostatic hyperplasia    Post-operative state    TIA (transient ischemic attack)    Hydrocephalus ex vacuo (HCC)    Fall    Dysarthria    Paroxysmal atrial fibrillation (HCC)    Intermittent constipation    Hyperlipidemia    Chronic obstructive pulmonary disease, unspecified (HCC)    Mood disorder    Weakness generalized    NPH (normal pressure hydrocephalus) ([facility])    Fall, initial encounter    Chronic right-sided low back pain without sciatica    Acute nausea with nonbilious vomiting    Radiculopathy    Acute pain of right shoulder    Orthostatic hypotension    Abnormal chest xray    Spinal stenosis of lumbar region with neurogenic claudication    Generalized anxiety disorder    Coronary artery disease involving native coronary artery of native  heart without angina pectoris    Memory impairment    Dementia (HCC)    Cerebrovascular disease    Multifocal pneumonia    S/P  shunt     Allergies:  He is allergic to kiwi extract.    Current Medications:  Outpatient Medications Marked as Taking for the 3/3/25 encounter (Office Visit) with Sachin Meza MD   Medication Sig    furosemide 20 MG Oral Tab Take 1 tablet (20 mg total) by mouth 2 (two) times daily.    flecainide 100 MG Oral Tab Take 1 tablet (100 mg total) by mouth 2 (two) times daily.    tamsulosin 0.4 MG Oral Cap Take 1 capsule (0.4 mg total) by mouth After dinner.    atorvastatin 40 MG Oral Tab Take 1 tablet (40 mg total) by mouth nightly.    donepezil 10 MG Oral Tab Take 1 tablet (10 mg total) by mouth nightly.    finasteride 5 MG Oral Tab Take 1 tablet (5 mg total) by mouth daily.    sertraline 25 MG Oral Tab Take 1 tablet (25 mg total) by mouth daily.    spironolactone 25 MG Oral Tab 1 tab po MWF    metoprolol tartrate 25 MG Oral Tab Take 1 tablet (25 mg total) by mouth 2 (two) times daily.    rivaroxaban 20 MG Oral Tab Take 1 tablet (20 mg total) by mouth nightly.    fluticasone-umeclidin-vilant (TRELEGY ELLIPTA) 100-62.5-25 MCG/ACT Inhalation Aerosol Powder, Breath Activated Inhale 1 puff into the lungs daily.    amLODIPine 2.5 MG Oral Tab Take 1 tablet by mouth once a day at night. Start on 11/12    aspirin 81 MG Oral Tab EC Take 1 tablet (81 mg total) by mouth daily.    Cholecalciferol 125 MCG (5000 UT) Oral Tab Take 1 tablet (5,000 Units total) by mouth 3 (three) times a week. Three times a week. MWF    cyanocobalamin 500 MCG Oral Tab Take 1 tablet (500 mcg total) by mouth daily.    Multiple Vitamins-Minerals (MULTI-VITAMIN/MINERALS) Oral Tab Take 1 tablet by mouth daily.    acetaminophen 500 MG Oral Tab Take 1 tablet (500 mg total) by mouth every 6 (six) hours as needed.       Medical History:  He  has a past medical history of Acute nausea with nonbilious vomiting (05/02/2024), Anxiety,  Arrhythmia, Arthritis, Bilateral inguinal hernia without obstruction or gangrene (04/04/2022), BPH (benign prostatic hyperplasia), Calculus of kidney, Essential hypertension, High blood pressure, High cholesterol, Muscle weakness, Stroke (HCC) (2023), and Visual impairment.  Surgical History:  He  has a past surgical history that includes appendectomy; appendectomy; cyst removal (N/A, 1990); cyst removal (Right, 11/05/2020); colonoscopy; colonoscopy (N/A, 05/03/2022); colonoscopy; hernia surgery (July 2022); skin surgery (2020); vasectomy (1980); anesth,pacemaker insertion (08/2023); and other surgical history (06/17/2024).   Family History:  His family history includes ALS in his brother; DM in his sister; Diabetes in his mother; Heart Attack in his father; MS in his sister; No Known Problems in his daughter, maternal grandfather, maternal grandmother, paternal grandfather, paternal grandmother, and son; smoker in his brother.  Social History:  He  reports that he has quit smoking. His smoking use included cigarettes. He has never been exposed to tobacco smoke. He has never used smokeless tobacco. He reports that he does not drink alcohol and does not use drugs.    Tobacco:  He smoked tobacco in the past but quit greater than 12 months ago.  Social History     Tobacco Use   Smoking Status Former    Types: Cigarettes    Passive exposure: Never   Smokeless Tobacco Never   Tobacco Comments    Has not smoked for about 20 years          CAGE Alcohol Screen:   CAGE screening score of 0 on 3/3/2025, showing low risk of alcohol abuse.      Patient Care Team:  Sachin Meza MD as PCP - General (Family Medicine)  Abraham Vasques MD (DERMATOLOGY)  Vinny Preciado MD (NEUROLOGY)  Estephania Champagne MD (Clinical Cardiac Electrophysiology)  Teodora Castle PA-C (Physician Assistant)  Brian Pringle MD (NEUROSURGERY)    Review of Systems   Review of Systems:   Constitutional: No fevers, chills, fatigue or night  sweats.  ENT: No mouth pain, neck pain, running nose, headaches or swollen glands.  Skin: No rashes, pruritus or skin changes,  Respiratory: Denies cough, wheezing or shortness of breath.  CV: Denies chest pain, palpitations, orthopnea, PND or dizziness.  Musculoskeletal: No joint pain, stiffness or swelling.  GI: No nausea, vomiting or diarrhea. No blood in stools.  Neurologic: No seizures, tremors, weakness or numbness     Objective:   Physical Exam     /72   Pulse 86   Resp 18   Ht 5' 11\" (1.803 m)   Wt 220 lb 4.8 oz (99.9 kg)   SpO2 95%   BMI 30.73 kg/m²  Estimated body mass index is 30.73 kg/m² as calculated from the following:    Height as of this encounter: 5' 11\" (1.803 m).    Weight as of this encounter: 220 lb 4.8 oz (99.9 kg).    Medicare Hearing Assessment:   Hearing Screening    Time taken: 3/3/2025  3:51 PM  Screening Method: Whisper Test  Whisper Test Result: Pass       Assessment & Plan:   Clay Orellana is a 79 year old male who presents for a Medicare Assessment.     1. Encounter for annual health examination (Primary)  -     Lipid Panel; Future; Expected date: 03/03/2025  -     Comp Metabolic Panel (14); Future; Expected date: 03/03/2025  -     TSH W Reflex To Free T4; Future; Expected date: 03/03/2025  -     CBC With Differential With Platelet; Future; Expected date: 03/03/2025  Colonoscopy Follow-Up  Missed follow-up colonoscopy after polyp found in May 2022.  - Refer to Dr. Whelan for follow-up colonoscopy.  - Send approval for colonoscopy.  2. Preop examination  Back and Neck Pain with Spinal Stenosis  Chronic pain in cervical and lumbar regions with minimal relief from prior injections. Considering surgery; advised one more injection in a different area.  - Coordinate with Dr. Chase for third injection in a different area.  - Obtain medical clearance from cardiologist to stop Xarelto for three days prior to the injection.  - Follow up with Dr. Das post-injection to discuss  surgical options.    3. Polyp of colon, unspecified part of colon, unspecified type  -     Surgery Referral - In Network  4. Screening for diabetes mellitus (DM)  -     Hemoglobin A1C; Future; Expected date: 03/03/2025  5. Screening for cardiovascular condition  -     Lipid Panel; Future; Expected date: 03/03/2025  6. Chronic atrial fibrillation (HCC)  Anticoagulated on Xarelto  Rate controlled at this time  Continue flecainide  7. Essential hypertension, malignant  BP shows good control with last BP of 120/72. Continue lifestyle changes, diet, exercise and weight loss.   11/18/2024: Potassium 4.3; Creatinine 1.32 (H); eGFR-Cr 54 (L)  BP Meds: amLODIPine Tabs - 2.5 MG; furosemide Tabs - 20 MG; metoprolol tartrate Tabs - 25 MG; spironolactone Tabs - 25 MG     8. Mood disorder   Major Depressive Disorder, Last PHQ-9 Depression Screening score was 13, done 11/29/2023., Severity: Moderate (PHQ Score 10-14)  Single episode currently active  Clinical Course: unchanged Good control Plan: Medications: Zoloft. Currently stable and controlled with the current treatment plan. Continue present management.   Antidepressant Meds: sertraline Tabs - 25 MG.   9. Presence of cerebrospinal fluid drainage device  10. NPH (normal pressure hydrocephalus) (HCC)  Normal Pressure Hydrocephalus  Significant improvement post-shunt placement. No recent falls.  11. Ankle edema, bilateral  Continue Lasix as needed  Peripheral Edema  Intermittent edema managed effectively with Lasix without side effects.  - Continue Lasix as needed for swelling.  - Contact Dr. Champagne for Lasix refill when needed.  Assessment & Plan    Assessment & Plan                               Return in 3 months (on 6/3/2025).     Sachin Meza MD, 3/3/2025     Supplementary Documentation:   General Health:  In the past six months, have you lost more than 10 pounds without trying?: 2 - No  Has your appetite been poor?: No  Type of Diet: Balanced  How does the patient  maintain a good energy level?: Appropriate Exercise  How would you describe your daily physical activity?: Light  How would you describe your current health state?: Fair  How do you maintain positive mental well-being?: Visiting Friends;Visiting Family  On a scale of 0 to 10, with 0 being no pain and 10 being severe pain, what is your pain level?: 6 - (Moderate)  In the past six months, have you experienced urine leakage?: 1-Yes  At any time do you feel concerned for the safety/well-being of yourself and/or your children, in your home or elsewhere?: No  Have you had any immunizations at another office such as Influenza, Hepatitis B, Tetanus, or Pneumococcal?: No    Health Maintenance   Topic Date Due    Colorectal Cancer Screening  05/03/2023    Zoster Vaccines (2 of 2) 11/09/2023    Annual Physical  07/31/2024    COVID-19 Vaccine (6 - 2024-25 season) 09/01/2024    Influenza Vaccine (1) 10/01/2024    Annual Depression Screening  Completed    Fall Risk Screening (Annual)  Completed    Pneumococcal Vaccine: 50+ Years  Completed    Meningococcal B Vaccine  Aged Out

## 2025-03-05 RX ORDER — FLUTICASONE FUROATE, UMECLIDINIUM BROMIDE AND VILANTEROL TRIFENATATE 100; 62.5; 25 UG/1; UG/1; UG/1
1 POWDER RESPIRATORY (INHALATION) DAILY
Qty: 180 EACH | Refills: 3 | OUTPATIENT
Start: 2025-03-05

## 2025-03-05 RX ORDER — RIVAROXABAN 20 MG/1
20 TABLET, FILM COATED ORAL NIGHTLY
Qty: 90 TABLET | Refills: 3 | OUTPATIENT
Start: 2025-03-05

## 2025-03-06 ENCOUNTER — TELEPHONE (OUTPATIENT)
Dept: FAMILY MEDICINE CLINIC | Facility: CLINIC | Age: 80
End: 2025-03-06

## 2025-03-06 NOTE — TELEPHONE ENCOUNTER
Returned patient call to schedule procedure.     Patient advised of insurance approval to proceed with injections and is agreeable to scheduling.  pre-procedure instructions reviewed. Patient prefers Local sedation. Reviewed sedation instructions including No Fasting & No  Required. Patient advised to hold Xarelto for 3 days (03/14/25) prior to procedure. Patient encouraged but not required to hold ASA 81 mg for 24 hours prior to procedure. Patient verbalized understanding of instructions, no further needs at this time.       [facility]  PRE-PROCEDURE INSTRUCTIONS WITHOUT SEDATION    Procedure: DREW       Appointment Date: 03/17/2025  Check-In Time: 09:45 AM    Follow-Up Date/Time: 04/09/2025 @ 11:15 AM    Prior to the procedure:  Please update us prior to the procedure if you are experiencing any symptoms of infection such as cough, fever, chills, urinary symptoms, or have recently been prescribed antibiotics, have open wounds, have recently had surgery or dental procedures.    Day of Procedure:  **Drivers will be required for patients who receive prescriptions for Valium.    NO FASTING REQUIRED  Please bring your Insurance Card, Photo ID, List of Current Medications and Referral (if applicable) to your appointment.  Please park in the [place] and follow the signs to the [facility].  Check in at [facility] ([address] [place] [address]) outpatient registration in the [facility].  Please note-No prescriptions will be written by Pain Clinic in OR on the day of procedure. If you require a refill of medications, please contact the office 48 hours prior to your procedure.  If you have an implanted Spinal Cord or Peripheral Nerve Stimulator: Please remember to turn device off for procedure.        Medication Hold:    Number of days you need to be off for the following medications:    Aggrenox 10 days   Agrylin (Anagrelide) 10 days  Brilinta (Ticagrelor) 7  days  Imbruvica (Ibrutinib) 3 days   Enbrel (Etanercept) 24 hours   Fragmin (Dalteparin) 24 hours   Pletal (Cilostazol) 7 days  Effient (Prasugrel) 7 days  Pradaxa 10 days  Trental 7 days  Eliquis (Apixaban) 3 days  Xarelto (Rivaroxaban) 3 days  Lovenox (Enoxaparin) 24 hours  Aspirin  Greater than 81mg but less than 325mg   5 days  325mg and greater                  7 days  Coumadin       5 days  Procedure may be cancelled if INR is elevated.   Excedrin (with aspirin) 7 days  Plavix (Clopidogrel)                            7 days    NSAIDs: 24 hours preferred      Ibuprofen (Motrin, Advil, Vicoprofen), Naproxen (Naprosyn, Aleve), Piroxcam (Feldene), Meloxicam (Mobic), Oxaprozin (Daypro), Diclofenac (Voltaren), Indomethacin (Indocin), Etodolac (Lodine), Nabumetone (Relafen), Celebrex (Celecoxib)           HERBAL SUPPLEMENTS  5 days preferred  Fish oil, krill oil, Omega-3, Vascepa, Vitamin E, Turmeric, Garlic                       Insurance Authorization:   Most insurances are now requiring a preauthorization for all procedures.  In the event that your insurance does not authorize your procedure within 48 hours of the scheduled date, your procedure will be cancelled and rescheduled to a later date.  Please contact your insurance carrier to determine what your financial responsibility will be for the procedure(s).      Cancellation/Rescheduling Appointment:   In the event you need to cancel or reschedule your appointment, you must notify the office 24 hours prior.    Post-procedure instructions:        Please schedule a follow up visit within 2 to 4 weeks after your last procedure date   Please call our office with any questions or concerns before or after your procedure at  [phone].  If you are a diabetic, please increase the frequency of your glucose monitoring after the procedure as this may cause a temporary increase in your blood sugar.  Contact your primary care physician if your blood sugar rises as you may  require some medication adjustment.  It is normal to have increased pain at injection site for up to 3-5 days after procedure, you can use heat or ice (20 minutes on 20 minutes off) for comfort.    **To hear a recorded version of these instructions, please call [phone] and follow the prompts.  **Para escuchar las instrucciones en Español, por favor de llamar el oliver [phone] opción 4.

## 2025-03-06 NOTE — TELEPHONE ENCOUNTER
Received faxed Partial lab results from [facility] .  TSH and FT4 still pending.Last office visit with Dr Meza was 3/3/25.    Lab results from 3/5/25 ordered by Dr Meza from [facility]. Results in Dr Meza inbox for review.    Abnormals:    Creatinine                   1.65  Cholesterol              105  Glucose                   113  Lymphocytes%          18.8  RBC                             3.91  BUN                            36  HDL                             43  GFR                             51

## 2025-03-06 NOTE — TELEPHONE ENCOUNTER
PA initiated for Sonata 10mg and approved until 10/11/2023. Pt called and left detailed message of approval and to call with any questions.    Received Medical Clearance from  - patient is cleared to hold Xarelto for 3 days prior to injection.     Sent to scan.

## 2025-03-17 ENCOUNTER — HOSPITAL ENCOUNTER (OUTPATIENT)
Facility: HOSPITAL | Age: 80
Setting detail: HOSPITAL OUTPATIENT SURGERY
Discharge: HOME | End: 2025-03-17
Attending: ANESTHESIOLOGY | Admitting: ANESTHESIOLOGY
Payer: MEDICARE

## 2025-03-17 ENCOUNTER — APPOINTMENT (OUTPATIENT)
Dept: GENERAL RADIOLOGY | Facility: HOSPITAL | Age: 80
End: 2025-03-17
Attending: ANESTHESIOLOGY
Payer: MEDICARE

## 2025-03-17 VITALS
TEMPERATURE: 97 F | SYSTOLIC BLOOD PRESSURE: 136 MMHG | BODY MASS INDEX: 30.8 KG/M2 | OXYGEN SATURATION: 100 % | HEIGHT: 71 IN | RESPIRATION RATE: 18 BRPM | DIASTOLIC BLOOD PRESSURE: 69 MMHG | HEART RATE: 59 BPM | WEIGHT: 220 LBS

## 2025-03-17 PROCEDURE — 3E0R33Z INTRODUCTION OF ANTI-INFLAMMATORY INTO SPINAL CANAL, PERCUTANEOUS APPROACH: ICD-10-PCS | Performed by: ANESTHESIOLOGY

## 2025-03-17 PROCEDURE — 62323 NJX INTERLAMINAR LMBR/SAC: CPT | Performed by: ANESTHESIOLOGY

## 2025-03-17 RX ORDER — DEXAMETHASONE SODIUM PHOSPHATE 10 MG/ML
INJECTION, SOLUTION INTRAMUSCULAR; INTRAVENOUS
Status: DISCONTINUED | OUTPATIENT
Start: 2025-03-17 | End: 2025-03-17

## 2025-03-17 RX ORDER — NALOXONE HYDROCHLORIDE 0.4 MG/ML
0.08 INJECTION, SOLUTION INTRAMUSCULAR; INTRAVENOUS; SUBCUTANEOUS AS NEEDED
Status: DISCONTINUED | OUTPATIENT
Start: 2025-03-17 | End: 2025-03-17

## 2025-03-17 RX ORDER — LIDOCAINE HYDROCHLORIDE 10 MG/ML
INJECTION, SOLUTION EPIDURAL; INFILTRATION; INTRACAUDAL; PERINEURAL
Status: DISCONTINUED | OUTPATIENT
Start: 2025-03-17 | End: 2025-03-17

## 2025-03-17 RX ORDER — SODIUM CHLORIDE 9 MG/ML
INJECTION, SOLUTION INTRAMUSCULAR; INTRAVENOUS; SUBCUTANEOUS
Status: DISCONTINUED | OUTPATIENT
Start: 2025-03-17 | End: 2025-03-17

## 2025-03-17 NOTE — DISCHARGE INSTRUCTIONS
Home Care Instructions Following Your Pain Procedure     Clay,  It has been a pleasure to have you as our patient. To help you at home, you must follow these general discharge instructions. We will review these with you before you are discharged. It is our hope that you have a complete and uneventful recovery from our procedure.     General Instructions:  What to Expect:  Bandages from your procedure today can be removed when you get home.  Please avoid soaking and/or swimming for 24 hours.  Showering is okay  It is normal to have increased pain symptoms and/or pain at injection site for up to 3-5 days after procedure, you can use heat or ice (20 minutes on 20 minutes off) for comfort.  You may experience some temporary side effects which may include restlessness or insomnia, flushing of the face, or heart palpitations.  Please contact the provider if these symptoms do not resolve within 3-4 days.  Lightheadedness or nausea may occur and should resolve within 24 to 48 hours.  If you develop a headache after treatment, rest, drink fluids (with caffeine, if possible) and take mild over-the-counter pain medication.  If the headache does not improve with the above treatment, contact the physician.  Home Medications:  Resume all previously prescribed medication.  Please avoid taking NSAIDs (Non-Steriodal Anti-Inflammatory Drugs) such as:  Ibuprofen ( Advil, Motrin) Aleve (Naproxen), Diclofenac, Meloxicam for 6 hours after procedure.   If you are on Coumadin (Warfarin) or any other anti-coagulant (or \"blood thinning\") medication such as Plavix (Clopidogrel), Xarelto (Rivaroxaban), Eliquis (Apixaban), Effient (Prasugrel) etc., restart on the following day from the procedure unless otherwise directed by your provider.  If you are a diabetic, please increase the frequency of your glucose monitoring after the procedure as steroids may cause a temporary (2-3 day) increase in your blood sugar.  Contact your primary care  physician if your blood sugar remains elevated as you may require some medication adjustment.  Diet:  Resume your regular diet as tolerated.  Activity:  We recommend that you relax and rest during the rest of your procedure day.  If you feel weakness in your arms or legs do not drive.  Follow-up Appointment  Please schedule a follow-up visit within 3 to 4 weeks after your last procedure date.  Question or Concerns:  Feel free to call our office with any questions or concerns at [phone] (option #2)    Clay  Thank you for coming to [facility] for your procedure.  The nurses try very hard to make sure you receive the best care possible.  Your trust in them as well as us is greatly appreciated.    Thanks so much,   Dr. Sudarshan Chase

## 2025-03-17 NOTE — H&P
History & Physical Examination    Patient Name: Clay Orellana  MRN: [id number]  CSN: [id number]  YOB: 1945    Pre-Operative Diagnosis:  Lumbar radiculitis [M54.16]    Present Illness: Spinal stenosis    ASA: 3  MP class: 1  Sedation: Local      Prescriptions Prior to Admission[1]  No current facility-administered medications for this encounter.       Allergies: Allergies[2]    Past Medical History:    Acute nausea with nonbilious vomiting    Anxiety    Arrhythmia    Arthritis    Bilateral inguinal hernia without obstruction or gangrene    BPH (benign prostatic hyperplasia)    Calculus of kidney    Essential hypertension    High blood pressure    High cholesterol    Muscle weakness    Stroke (HCC)    TIA    Visual impairment     Past Surgical History:   Procedure Laterality Date    Anesth,pacemaker insertion  08/2023    Appendectomy      Appendectomy      Colonoscopy      Colonoscopy N/A 05/03/2022    Procedure: COLONOSCOPY;  Surgeon: Sudarshan Gilbert MD;  Location:  ENDOSCOPY    Colonoscopy      Cyst removal N/A 1990    Cyst removed behind back of neck    Cyst removal Right 11/05/2020    Removed from right upper back - just below shoulder    Hernia surgery  July 2022    Other surgical history  06/17/2024    RIGHT VENTRICULOPERITONEAL SHUNT INSERTION    Skin surgery  2020    Vasectomy  1980     Family History   Problem Relation Age of Onset    Heart Attack Father     Diabetes Mother     Other (DM) Sister     Other (ALS) Brother     No Known Problems Maternal Grandmother     No Known Problems Maternal Grandfather     No Known Problems Paternal Grandmother     No Known Problems Paternal Grandfather     Other (smoker) Brother     Other (MS) Sister     No Known Problems Son     No Known Problems Daughter      Social History     Tobacco Use    Smoking status: Former     Types: Cigarettes     Passive exposure: Never    Smokeless tobacco: Never    Tobacco comments:     Has not smoked for about 20 years    Substance Use Topics    Alcohol use: Never     Alcohol/week: 4.0 standard drinks of alcohol     Types: 4 Cans of beer per week       SYSTEM Check if Review is Normal Check if Physical Exam is Normal If not normal, please explain:   HEENT [x ] [x ]    NECK & BACK [x ] [x ]    HEART [x ] [x ]    LUNGS [x ] [x ]    ABDOMEN [x ] [x ]    UROGENITAL [x ] [x ]    EXTREMITIES [x ] [x ]    OTHER        [ x ] I have discussed the risks and benefits and alternatives with the patient/family.  They understand and agree to proceed with plan of care.  [ x ] I have reviewed the History and Physical done within the last 30 days.  Any changes noted above.    Sudarshan Chase MD              [1]   Medications Prior to Admission   Medication Sig Dispense Refill Last Dose/Taking    furosemide 20 MG Oral Tab Take 1 tablet (20 mg total) by mouth 2 (two) times daily.       flecainide 100 MG Oral Tab Take 1 tablet (100 mg total) by mouth 2 (two) times daily. 180 tablet 3     tamsulosin 0.4 MG Oral Cap Take 1 capsule (0.4 mg total) by mouth After dinner. 90 capsule 3     atorvastatin 40 MG Oral Tab Take 1 tablet (40 mg total) by mouth nightly. 90 tablet 1     donepezil 10 MG Oral Tab Take 1 tablet (10 mg total) by mouth nightly. 90 tablet 1     finasteride 5 MG Oral Tab Take 1 tablet (5 mg total) by mouth daily. 90 tablet 2     sertraline 25 MG Oral Tab Take 1 tablet (25 mg total) by mouth daily. 90 tablet 3     spironolactone 25 MG Oral Tab 1 tab po MWF 90 tablet 3     metoprolol tartrate 25 MG Oral Tab Take 1 tablet (25 mg total) by mouth 2 (two) times daily. 180 tablet 1     rivaroxaban 20 MG Oral Tab Take 1 tablet (20 mg total) by mouth nightly. 90 tablet 0 3/12/2025    fluticasone-umeclidin-vilant (TRELEGY ELLIPTA) 100-62.5-25 MCG/ACT Inhalation Aerosol Powder, Breath Activated Inhale 1 puff into the lungs daily. 60 each 2     amLODIPine 2.5 MG Oral Tab Take 1 tablet by mouth once a day at night. Start on 11/12       aspirin 81 MG Oral  Tab EC Take 1 tablet (81 mg total) by mouth daily.   3/15/2025    Cholecalciferol 125 MCG (5000 UT) Oral Tab Take 1 tablet (5,000 Units total) by mouth 3 (three) times a week. Three times a week. MWF       cyanocobalamin 500 MCG Oral Tab Take 1 tablet (500 mcg total) by mouth daily.       Multiple Vitamins-Minerals (MULTI-VITAMIN/MINERALS) Oral Tab Take 1 tablet by mouth daily.       acetaminophen 500 MG Oral Tab Take 1 tablet (500 mg total) by mouth every 6 (six) hours as needed.      [2]   Allergies  Allergen Reactions    Kiwi Extract ITCHING     Inner ears itch

## 2025-03-17 NOTE — OPERATIVE REPORT
[facility]  Operative Report  3/17/2025     Clay Orellana Patient Status:  Hospital Outpatient Surgery    1945 MRN [id number]   Location [facility] Attending Sudarshan Chase MD   Hosp Day # 0 PCP Sachin Meza MD     Indication: Clay is a 79 year old male with lumbar radiculitis    Preoperative Diagnosis:  Lumbar radiculitis [M54.16]    Postoperative Diagnosis: Same as above.    Procedure performed: LUMBAR INTERLAMINAR EPIDURAL INJECTION with local    Anesthesia: Local  .    EBL: Less than 1 ml.    Procedure Description:  After reviewing the patient's history and performing a focused physical examination, the diagnosis and positive previous diagnostic tests were confirmed and contraindications such as infection and coagulopathy were ruled out.  Following review of allergies and potential side effects and complications, including but not necessarily limited to infection, allergic reaction, local tissue breakdown, nerve injury, post-dural puncture headache and paresis, the patient consented for the procedure.    The patient was brought to the procedure room in prone position.  After sterile prep of the lumbar spine, the L5-S1 interspace was identified with the help of fluoroscopy. Local anesthetic was given by a 25 gauge 1.5 inch needle with 1% lidocaine in that space level.  Thereafter, a 20 gauge Tuohy needle was introduced and advanced under fluoroscopy.  The epidural space was accessed by using loss of resistance to air technique.  The needle position was confirmed with AP and lateral view under fluoroscopy.  Omnipaque 180 was injected in 1 mL volume. A good epidurogram was obtained.  Thereafter, dexamethasone 10 mg with normal saline of 5 mL in total volume of 6 mL was injected through the Tuohy needle.  The needle was flushed with 1 mL lidocaine.  The needle was withdrawn with the stylet intact in situ.  The needle's tip was intact.  The patient tolerated the procedure very  well without significant immediate complication.  The patient's back was cleaned and sterile dressing was applied.  The patient was discharged with an instruction to a responsible adult after discharge criteria were met.  The patient was advised to contact us should any problems happen.  The patient was also informed to go to the emergency room immediately if experiencing severe numbness or weakness in the extremities or experiencing bowel or bladder incontinence.            Complications: None.    Follow up: The patient was followed in the pain clinic as needed basis.          Sudarshan Chase MD

## 2025-03-25 ENCOUNTER — TELEPHONE (OUTPATIENT)
Dept: SURGERY | Facility: CLINIC | Age: 80
End: 2025-03-25

## 2025-03-25 NOTE — TELEPHONE ENCOUNTER
Patient calling back to update Dr. Pringle 1 week after his pain injections with Dr. Chase. States the injections helped a little but not much. He is willing to continue pain injections for now to see if pain improves but would eventually like to discuss possible surgery in the future if no improvement.

## 2025-03-25 NOTE — TELEPHONE ENCOUNTER
Noted patient update below. Noted patient stated he plans to try another injection and will let us know if no improvement in symptoms.     Message sent to patient to thank him for the update and to affirm that he should let us know if any progression in symptoms or if/when he would like to come back to see Dr Pringle.

## 2025-04-02 RX ORDER — FLUTICASONE FUROATE, UMECLIDINIUM BROMIDE AND VILANTEROL TRIFENATATE 100; 62.5; 25 UG/1; UG/1; UG/1
1 POWDER RESPIRATORY (INHALATION) DAILY
Qty: 180 EACH | Refills: 3 | Status: SHIPPED | OUTPATIENT
Start: 2025-04-02

## 2025-04-02 NOTE — TELEPHONE ENCOUNTER
Please review.  Protocol failed / Has no protocol.     Requested Prescriptions   Pending Prescriptions Disp Refills    rivaroxaban 20 MG Oral Tab 90 tablet 0     Sig: Take 1 tablet (20 mg total) by mouth nightly.       There is no refill protocol information for this order

## 2025-04-02 NOTE — TELEPHONE ENCOUNTER
Message added to closed encounter dated 3/1/25:  Patient called about medications hasn't received medications for   rivaroxaban 20 MG Oral Tab  And  90 tablet   fluticasone-umeclidin-vilant (TRELEGY ELLIPTA) 100-62.5-25 MCG/ACT Inhalation Aerosol Powder, Breath Activated \"            New encounter created.

## 2025-04-02 NOTE — TELEPHONE ENCOUNTER
Refill passed per [facility] protocols.    Requested Prescriptions   Pending Prescriptions Disp Refills    fluticasone-umeclidin-vilant (TRELEGY ELLIPTA) 100-62.5-25 MCG/ACT Inhalation Aerosol Powder, Breath Activated 180 each 3     Sig: Inhale 1 puff into the lungs daily.       Asthma & COPD Medication Protocol Passed - 4/2/2025  2:46 PM        Passed - Appointment in past 6 or next 3 months         Passed - Medication is active on med list

## 2025-04-03 ENCOUNTER — HOME HEALTH CHARGES (OUTPATIENT)
Dept: FAMILY MEDICINE CLINIC | Facility: CLINIC | Age: 80
End: 2025-04-03

## 2025-04-03 DIAGNOSIS — I10 ESSENTIAL HYPERTENSION, MALIGNANT: ICD-10-CM

## 2025-04-03 DIAGNOSIS — I48.20 CHRONIC ATRIAL FIBRILLATION (HCC): Primary | ICD-10-CM

## 2025-04-05 ENCOUNTER — MED REC SCAN ONLY (OUTPATIENT)
Dept: FAMILY MEDICINE CLINIC | Facility: CLINIC | Age: 80
End: 2025-04-05

## 2025-04-09 ENCOUNTER — OFFICE VISIT (OUTPATIENT)
Dept: PAIN CLINIC | Facility: CLINIC | Age: 80
End: 2025-04-09
Payer: MEDICARE

## 2025-04-09 VITALS — DIASTOLIC BLOOD PRESSURE: 72 MMHG | HEART RATE: 61 BPM | SYSTOLIC BLOOD PRESSURE: 116 MMHG | OXYGEN SATURATION: 97 %

## 2025-04-09 DIAGNOSIS — M48.062 SPINAL STENOSIS OF LUMBAR REGION WITH NEUROGENIC CLAUDICATION: Primary | ICD-10-CM

## 2025-04-09 PROCEDURE — 99214 OFFICE O/P EST MOD 30 MIN: CPT | Performed by: ANESTHESIOLOGY

## 2025-04-09 PROCEDURE — G2211 COMPLEX E/M VISIT ADD ON: HCPCS | Performed by: ANESTHESIOLOGY

## 2025-04-09 NOTE — PROGRESS NOTES
Name: Clay Orellana   : 1945   DOS: 2025     Pain Clinic Follow Up Visit:     Chief Complaint   Patient presents with    Procedure Follow Up     Follow up post LESI       Clay Orellana is a 79 year old male with advanced lumbar degenerative disease and central canal stenosis.  The patient does have claudication symptoms and is following up after epidural injection.  Reports minimal improvement.  Rates that the pain as 8 out of 10.  It is made worse by standing.  Has no pain with sitting.    Pt denies any chills, fever, or weakness. There is no bladder or bowel incontinence associated with the pain.    REVIEW OF SYSTEMS:  A ten point review of systems was performed with pertinent positives and negatives in the HPI.    Allergies[1]    Current Medications[2]      EXAM:   /72 (BP Location: Left arm, Patient Position: Sitting, Cuff Size: large)   Pulse 61   SpO2 97%   General:  Patient is a(n) 79 year old year old male in no acute distress.  Neurologic:: WNL-Orientation to time, place and person, normal mood & affect, concentration & attention span intact.   Inspection: Uses walker.  Shuffling gait   neck: Upper EXTR range of motion intact  Back: Injection site is clear  Cranial nerve: Grossly intact  Respiratory: Nonlabored    IMAGES:     Lumbar MRI reviewed again with patient and family.  There is advanced multilevel degenerative changes with severe neuroforaminal stenosis and central canal stenosis due to degenerative changes and facet arthropathy    ASSESSMENT AND PLAN:     1. Spinal stenosis of lumbar region with neurogenic claudication      The patient is a 79-year-old gentleman with a history of lumbar degenerative disc disease and low back pain along with claudication symptoms.  The patient is following up after epidural injection.  Reports minor improvement.  Still complains of claudication symptoms with standing and walking.  Given his failure of conservative management, recommend follow-up  with Dr. Rose to discuss surgical decompression.  Patient is in agreement.      Radiology orders and consultations:None  The patient indicates understanding of these issues and agrees to the plan.  No follow-ups on file.    Sudarshan Chase MD, 4/9/2025, 11:18 AM              [1]   Allergies  Allergen Reactions    Kiwi Extract ITCHING     Inner ears itch   [2]   Current Outpatient Medications   Medication Sig Dispense Refill    fluticasone-umeclidin-vilant (TRELEGY ELLIPTA) 100-62.5-25 MCG/ACT Inhalation Aerosol Powder, Breath Activated Inhale 1 puff into the lungs daily. 180 each 3    rivaroxaban 20 MG Oral Tab Take 1 tablet (20 mg total) by mouth nightly. 90 tablet 0    furosemide 20 MG Oral Tab Take 1 tablet (20 mg total) by mouth 2 (two) times daily.      flecainide 100 MG Oral Tab Take 1 tablet (100 mg total) by mouth 2 (two) times daily. 180 tablet 3    tamsulosin 0.4 MG Oral Cap Take 1 capsule (0.4 mg total) by mouth After dinner. 90 capsule 3    atorvastatin 40 MG Oral Tab Take 1 tablet (40 mg total) by mouth nightly. 90 tablet 1    donepezil 10 MG Oral Tab Take 1 tablet (10 mg total) by mouth nightly. 90 tablet 1    finasteride 5 MG Oral Tab Take 1 tablet (5 mg total) by mouth daily. 90 tablet 2    sertraline 25 MG Oral Tab Take 1 tablet (25 mg total) by mouth daily. 90 tablet 3    spironolactone 25 MG Oral Tab 1 tab po MWF 90 tablet 3    metoprolol tartrate 25 MG Oral Tab Take 1 tablet (25 mg total) by mouth 2 (two) times daily. 180 tablet 1    amLODIPine 2.5 MG Oral Tab Take 1 tablet by mouth once a day at night. Start on 11/12      aspirin 81 MG Oral Tab EC Take 1 tablet (81 mg total) by mouth daily.      Cholecalciferol 125 MCG (5000 UT) Oral Tab Take 1 tablet (5,000 Units total) by mouth 3 (three) times a week. Three times a week. MWF      cyanocobalamin 500 MCG Oral Tab Take 1 tablet (500 mcg total) by mouth daily.      Multiple Vitamins-Minerals (MULTI-VITAMIN/MINERALS) Oral Tab Take 1 tablet by  mouth daily.      acetaminophen 500 MG Oral Tab Take 1 tablet (500 mg total) by mouth every 6 (six) hours as needed.

## 2025-04-09 NOTE — PATIENT INSTRUCTIONS
Refill policies:    Allow 2-3 business days for refills; controlled substances may take longer.  Contact your pharmacy at least 5 days prior to running out of medication and have them send an electronic request or submit request through the “request refill” option in your [facility] account.  Refills are not addressed on weekends; covering physicians do not authorize routine medications on weekends.  No narcotics or controlled substances are refilled after noon on Fridays or by on call physicians.  By law, narcotics must be electronically prescribed.  A 30 day supply with no refills is the maximum allowed.  If your prescription is due for a refill, you may be due for a follow up appointment.  To best provide you care, patients receiving routine medications need to be seen at least once a year.  Patients receiving narcotic/controlled substance medications need to be seen at least once every 3 months.  In the event that your preferred pharmacy does not have the requested medication in stock (e.g. Backordered), it is your responsibility to find another pharmacy that has the requested medication available.  We will gladly send a new prescription to that pharmacy at your request.    Scheduling Tests:    If your physician has ordered radiology tests such as MRI or CT scans, please contact Central Scheduling at [phone] right away to schedule the test.  Once scheduled, the [facility] Centralized Referral Team will work with your insurance carrier to obtain pre-certification or prior authorization.  Depending on your insurance carrier, approval may take 3-10 days.  It is highly recommended patients assure they have received an authorization before having a test performed.  If test is done without insurance authorization, patient may be responsible for the entire amount billed.      Precertification and Prior Authorizations:  If your physician has recommended that you have a procedure or additional testing performed the [facility]  Centralized Referral Team will contact your insurance carrier to obtain pre-certification or prior authorization.    You are strongly encouraged to contact your insurance carrier to verify that your procedure/test has been approved and is a COVERED benefit.  Although the [facility] Centralized Referral Team does its due diligence, the insurance carrier gives the disclaimer that \"Although the procedure is authorized, this does not guarantee payment.\"    Ultimately the patient is responsible for payment.   Thank you for your understanding in this matter.  Paperwork Completion:  If you require FMLA or disability paperwork for your recovery, please make sure to either drop it off or have it faxed to our office at [phone]. Be sure the form has your name and date of birth on it.  The form will be faxed to our Forms Department and they will complete it for you.  There is a 25$ fee for all forms that need to be filled out.  Please be aware there is a 10-14 day turnaround time.  You will need to sign a release of information (ZAKIYA) form if your paperwork does not come with one.  You may call the Forms Department with any questions at [phone].  Their fax number is [phone].

## 2025-04-09 NOTE — PROGRESS NOTES
Last procedure: LUMBAR INTERLAMINAR EPIDURAL INJECTION with local   Date: 3/17/25  Percentage of relief obtained: 10%  Duration of relief: Slightly better when he first got it and now the pain has gotten worse     Current Pain Score: 8/10    Narcotic Contract Exp: N/A

## 2025-04-24 ENCOUNTER — OFFICE VISIT (OUTPATIENT)
Dept: SURGERY | Facility: CLINIC | Age: 80
End: 2025-04-24
Payer: MEDICARE

## 2025-04-24 ENCOUNTER — TELEPHONE (OUTPATIENT)
Dept: SURGERY | Facility: CLINIC | Age: 80
End: 2025-04-24

## 2025-04-24 VITALS
DIASTOLIC BLOOD PRESSURE: 80 MMHG | HEART RATE: 60 BPM | SYSTOLIC BLOOD PRESSURE: 124 MMHG | BODY MASS INDEX: 30.8 KG/M2 | OXYGEN SATURATION: 97 % | HEIGHT: 71 IN | WEIGHT: 220 LBS

## 2025-04-24 DIAGNOSIS — M48.062 LUMBAR STENOSIS WITH NEUROGENIC CLAUDICATION: Primary | ICD-10-CM

## 2025-04-24 PROCEDURE — 99214 OFFICE O/P EST MOD 30 MIN: CPT | Performed by: NEUROLOGICAL SURGERY

## 2025-04-24 NOTE — TELEPHONE ENCOUNTER
Patient is scheduled for L3, L4, partial L5 Laminectomies with medial facetectomies on 6.11.25 with  at [facility].     NA pre-op apt scheduled (if sx is more than 30 days from last apt)  Y pre-op apt scheduled with RN spine navigator  Y Surgical instructions reviewed by nursing staff with patient  Y  form completed  Y Surgery order signed   Y Placed sx on surgery sheet  Y Placed on outlook calendar  Y [facility] message sent to patient with sx instructions  Y Faxed pre-op clearance request to PCP [facility] Faxed letter to prescribing provider requesting anticoagulants be held for surgery  Y E-mail sent to [facility]  Y Post-op appointments made  NA PA NOT NEEDED. Routed to PA team to initiate.  Y Post-Op outreach pt reminder placed.   Y Entire Neurosurgery Checklist Completed    Clearances: PCP  PA:MEDICARE A&B  CPT Codes: 14110, 63048X2

## 2025-04-24 NOTE — H&P
Neurosurgery Clinic Visit  2025    Clay Orellana PCP:  Sachin Meza MD    1945 MRN [id number]     HISTORY OF PRESENT ILLNESS:  Clay Orellana is a(n) 79 year old male here for follow-up regarding his lumbar stenosis  Still having back and leg pain when he is up and around  He got an injection  He had very little relief  He would like to proceed with surgical intervention  He is not interested in any intervention of his neck  His arms are working well        PHYSICAL EXAMINATION:  Vital Signs:  /80   Pulse 60   Ht 71\"   Wt 220 lb (99.8 kg)   SpO2 97%   BMI 30.68 kg/m²   Awake alert, orient x 3  Follows commands x 4      REVIEW OF STUDIES:    Lumbar MRI from May reviewed which shows stenosis at L3-4 and 4 5      ASSESSMENT and PLAN:  79-year-old gentleman here with lumbar stenosis neurogenic claudication he would like to proceed with surgery  Discussed L3 and 4 laminectomies with a partial L5 laminectomy with medial facetectomies  Risk and benefits were discussed in detail which include but limited to bleeding, infection, CSF leak, temporary permanent neurologic deficit, need for future surgery, instability, not helping his pain  He wishes to proceed  We will get a new MRI as his last ones about a-year-old, this was ordered  Will plan on doing that surgery with neuromonitoring just today now he is get some stenosis in the neck even though its asymptomatic  Will get PCP and cards clearance  All questions were answered  Patient and wife appreciative        Time spent on counseling/coordination of care:  30 Minutes    Total time spent with patient:  30 Minutes      Brian Pringle MD   [facility]  2025  12:01 PM   Dictated but not proofread

## 2025-04-24 NOTE — PROGRESS NOTES
Established patient:  Reason for follow up:   F/u after injection     Numeric Rating Scale:       Pain at Present:  4/10       Most recent treatments for Current Pain Condition:     03/17/25 LESI-some improvement for a few days

## 2025-04-24 NOTE — TELEPHONE ENCOUNTER
You are scheduled for L3-L4, partial L5 Laminectomies with medial facetectomies on 6.11.25 with  at [facility].     PCP clearance is needed.  We have faxed a request for pre-op clearance to your PCP Dr Meza. Please contact their office for appointment.      CARDIAC clearance is needed.  We have faxed a request for pre-op clearance to your CARDIOLOGIST Dr Champagne. Please contact their office for appointment.    You will need  pre operative labs which will include MRSA/MSSA testing.  You will need to contact the Pre-admission department at [phone] to schedule an appointment for your labs.     The Pre-Admission nurse will review your health history and give you information from the hospital. The nurse will also get you scheduled for all your pre-op testing required for your surgery.     Do not take any blood thinning medications such as over the counter non-steroidal antiinflammatories (advil, aleve, ibuprofen etc.), herbal supplements (garlic,tumeric etc.), vitamin E, fish oil or krill oil for at least 7 days prior to surgery. You may only take Tylenol, Extra Strength Tylenol, Arthritis Tylenol, or prescription Norco or Tramadol for pain if something is needed.    You should have nothing to eat or drink after 11:00pm the night prior to surgery except for the following:    Do drink 12 ounces of regular Gatorade (NOT RED) 12 hours and 4 hours prior to your scheduled surgery time, Do not drink any other liquids (including water) before your surgery. Do not chew gum or eat candies before surgery.  Take 1000 mg of Tylenol (Acetaminophen) 4 hours before your scheduled surgery time, take this with your scheduled Gatorade.    In order to prevent infection, you will need to purchase Hibiclens soap and use it after your regular body soap for 5 days prior to your procedure.  The last shower should be the night before surgery.  This soap can be found at any pharmacy in the first aid section. See detailed  instructions below.      Our office will get prior authorization for surgery through your insurance.     Surgery is usually scheduled as a 1 day admission.  This is an estimate and varies from person to person.  Ultimately, the surgeon will determine when you are ready to be discharged.    The hospital will contact you 1-2 days before surgery with your arrival time.     If you were on blood thinners (such as Coumadin, Pradaxa, Xarelto, etc) prior to surgery that we had you stop for surgery, please make sure you get instructions about when to resume the medication before you are discharged from the hospital.    Post operative appointments to be made 2 weeks before surgery as well as 2 and 6 weeks after surgery.      Your 2 week pre-op surgical telehealth appointment is on 5.28.25 at 9:45 am with the Spine Navigator RN    Your 2 week post-op appointment is on 6.24.25 at 1:15pm  with ZOË Glasgow    Your 6 week post-op appointment is on 7.24.25 at 1:30 pm with Dr Pringle      Restrictions for after surgery:  Lifting restriction of 10 pounds or less.  No bending or twisting.  No prolonged sitting or standing.  Driving is restricted for the first 1-2 weeks (this will vary from surgeon to surgeon.)  Fusion patients may require bracing such as TLSO or LSO brace. Braces are custom made to fit the patient.  Patient's are to keep their incisions covered for the first 3 days post surgery. After that they may leave the incision open to air. It is better for the healing process if the incision is left open to air.   May shower after the third day.  Do not scrub the incision and avoid any lotions or creams to the incision.    Please make sure to arrive at least 15 minutes prior to your scheduled appointment in order to get checked in and roomed in a timely manner.  Depending on provider availability, late patients may be required to reschedule.  REFILLS:  After surgery, please remember that we do have a 48 hour refill  policy that does not include weekends, please make sure to request your medications in a timely manner so that you do not go days without medication.  *Refills should be requested through your pharmacy or through the refill request in Digg (log in, go to medications, then select refill request).  [employer] MESSAGING:  Please remember that our office is closed during the weekend and no one is available for Digg messages. If you have an urgent or emergent matter please go to a walk-in center or the emergency room. Also please remember your Repeatit messages are part of your legal medical record and should not be utilized as a personal email with our providers as it is visible to all [facility] employees. Also, Since [employer] messages are not for emergent matters it may be several days before there is a response to your message.  FMLA/PAPERWORK COMPLETED BY OUR MEDICAL FORMS DEPARTMENT:  If you require FMLA paperwork for your surgery, please make sure to either Drop it off or have it faxed to our office at [phone]. Make sure it has your NAME, , and has your signature. You will need to have a Release of Information on file. To facilitate this process we ask that you requested it at the  on your way out and sign it. Without a signed ZAKIYA or signature on the form we will not be able to fax it and this will cause a delay with your forms. Fees charged for forms are $25 for initial submission and $15 for recertifications. If you have questions on the status of your forms please call the forms department at [phone].  **We do have a 3 week policy for all forms and paperwork, please make sure to allow plenty of time for completion. Same day paperwork will not be completed. **    Spine Navigator  You will have a 30 minute telehealth visit with our spine navigator approximately 2 weeks before your surgery. This visit is NOT optional. This appointment will get booked when you schedule your spinal  surgery.  When speaking with Pre-admissions you will be told about a spine class as it relates to your surgery, this is an OPTIONAL class. The same or similar information will be discussed with you during your telehealth appointment with the spine navigator (Spine Navigator appointment is not optional). However, if you would like to have this information repeated to you or if you would feel more comfortable with additional information, you can elect to schedule this class during your pre-admissions phone call.  The Pre-op Spine Surgery Class is held at [facility] most Wednesdays from 3:30-4:30 pm. Call Pre-admission Testing (PAT) to register at:  [phone]. Please park in the [facility] Parking garage, check in at registration and meet in the [facility] lobby for your escort to class on the Ortho Spine unit. If unable to attend, but would like additional information, the class is available online at [email or web address].     Regarding current visitor information:    Please visit the following website for the detailed and up-to-date visitor screening and restriction policy at [facility] [email or web address].    Hibiclens Bathing  Hibiclens is a body soap that is used before surgery protect you from getting an infection after surgery   Hibiclens comes in a large blue bottle and can be found in most pharmacies in the First Aid supplies  Shower with this daily for FIVE consecutive days before surgery, using the entire bottle over the five days.  The last shower should be the night before surgery.   Steps to bathing with Hibiclens  Do not use Hibiclens on your hair, face or private areas  Wash your hair and face as normal with your usual cleansers  Rinse well  Using a clean wet washcloth apply enough Hibiclens to cover your body. Wash from the neck down avoiding the genital areas and concentrating on the surgical area  Rinse well  Dry yourself with a clean, dry towel  Do not use any  powders, creams, lotions or sprays on your body as these attract bacteria  Deodorant and facial creams are acceptable.   (Laundering/cleaning: Chlorhexidine gluconate skin cleansers will cause stains if used with chlorine releasing products. Rinse completely and use only non-chlorine detergents.)    For Office Use Only:    Medical Clearances Needed: PCP, CARDS  PA: MEDICARE  CPT Codes:

## 2025-04-24 NOTE — PATIENT INSTRUCTIONS
Refill policies:    Allow 2-3 business days for refills; controlled substances may take longer.  Contact your pharmacy at least 5 days prior to running out of medication and have them send an electronic request or submit request through the “request refill” option in your [employer] account.  Refills are not addressed on weekends; covering physicians do not authorize routine medications on weekends.  No narcotics or controlled substances are refilled after noon on Fridays or by on call physicians.  By law, narcotics must be electronically prescribed.  A 30 day supply with no refills is the maximum allowed.  If your prescription is due for a refill, you may be due for a follow up appointment.  To best provide you care, patients receiving routine medications need to be seen at least once a year.  Patients receiving narcotic/controlled substance medications need to be seen at least once every 3 months.  In the event that your preferred pharmacy does not have the requested medication in stock (e.g. Backordered), it is your responsibility to find another pharmacy that has the requested medication available.  We will gladly send a new prescription to that pharmacy at your request.    Scheduling Tests:    If your physician has ordered radiology tests such as MRI or CT scans, please contact Central Scheduling at [phone] right away to schedule the test.  Once scheduled, the [facility] Centralized Referral Team will work with your insurance carrier to obtain pre-certification or prior authorization.  Depending on your insurance carrier, approval may take 3-10 days.  It is highly recommended patients assure they have received an authorization before having a test performed.  If test is done without insurance authorization, patient may be responsible for the entire amount billed.      Precertification and Prior Authorizations:  If your physician has recommended that you have a procedure or additional testing performed the [facility]  Centralized Referral Team will contact your insurance carrier to obtain pre-certification or prior authorization.    You are strongly encouraged to contact your insurance carrier to verify that your procedure/test has been approved and is a COVERED benefit.  Although the [facility] Centralized Referral Team does its due diligence, the insurance carrier gives the disclaimer that \"Although the procedure is authorized, this does not guarantee payment.\"    Ultimately the patient is responsible for payment.   Thank you for your understanding in this matter.  Paperwork Completion:  If you require FMLA or disability paperwork for your recovery, please make sure to either drop it off or have it faxed to our office at [phone]. Be sure the form has your name and date of birth on it.  The form will be faxed to our Forms Department and they will complete it for you.  There is a 25$ fee for all forms that need to be filled out.  Please be aware there is a 10-14 day turnaround time.  You will need to sign a release of information (ZAKIYA) form if your paperwork does not come with one.  You may call the Forms Department with any questions at [phone].  Their fax number is [phone].        You are scheduled for L3-L4, partial L5 Laminectomies with medial facetectomies on 6.11.25 with  at [facility].     PCP clearance is needed.  We have faxed a request for pre-op clearance to your PCP Dr Meza. Please contact their office for appointment.      CARDIAC clearance is needed.  We have faxed a request for pre-op clearance to your CARDIOLOGIST Dr Champagne. Please contact their office for appointment.    You will need  pre operative labs which will include MRSA/MSSA testing.  You will need to contact the Pre-admission department at [phone] to schedule an appointment for your labs.     The Pre-Admission nurse will review your health history and give you information from the hospital. The nurse will also get you  scheduled for all your pre-op testing required for your surgery.     Do not take any blood thinning medications such as over the counter non-steroidal antiinflammatories (advil, aleve, ibuprofen etc.), herbal supplements (garlic,tumeric etc.), vitamin E, fish oil or krill oil for at least 7 days prior to surgery. You may only take Tylenol, Extra Strength Tylenol, Arthritis Tylenol, or prescription Norco or Tramadol for pain if something is needed.    You should have nothing to eat or drink after 11:00pm the night prior to surgery except for the following:    Do drink 12 ounces of regular Gatorade (NOT RED) 12 hours and 4 hours prior to your scheduled surgery time, Do not drink any other liquids (including water) before your surgery. Do not chew gum or eat candies before surgery.  Take 1000 mg of Tylenol (Acetaminophen) 4 hours before your scheduled surgery time, take this with your scheduled Gatorade.    In order to prevent infection, you will need to purchase Hibiclens soap and use it after your regular body soap for 5 days prior to your procedure.  The last shower should be the night before surgery.  This soap can be found at any pharmacy in the first aid section. See detailed instructions below.      Our office will get prior authorization for surgery through your insurance.     Surgery is usually scheduled as a 1 day admission.  This is an estimate and varies from person to person.  Ultimately, the surgeon will determine when you are ready to be discharged.    The hospital will contact you 1-2 days before surgery with your arrival time.     If you were on blood thinners (such as Coumadin, Pradaxa, Xarelto, etc) prior to surgery that we had you stop for surgery, please make sure you get instructions about when to resume the medication before you are discharged from the hospital.    Post operative appointments to be made 2 weeks before surgery as well as 2 and 6 weeks after surgery.      Your 2 week pre-op  surgical telehealth appointment is on 5.28.25 at 9:45 am with the Spine Navigator RN    Your 2 week post-op appointment is on 6.24.25 at 1:15pm  with ZOË Glasgow    Your 6 week post-op appointment is on 7.24.25 at 1:30 pm with Dr Pringle        Restrictions for after surgery:  Lifting restriction of 10 pounds or less.  No bending or twisting.  No prolonged sitting or standing.  Driving is restricted for the first 1-2 weeks (this will vary from surgeon to surgeon.)  Fusion patients may require bracing such as TLSO or LSO brace. Braces are custom made to fit the patient.  Patient's are to keep their incisions covered for the first 3 days post surgery. After that they may leave the incision open to air. It is better for the healing process if the incision is left open to air.   May shower after the third day.  Do not scrub the incision and avoid any lotions or creams to the incision.    Please make sure to arrive at least 15 minutes prior to your scheduled appointment in order to get checked in and roomed in a timely manner.  Depending on provider availability, late patients may be required to reschedule.  REFILLS:  After surgery, please remember that we do have a 48 hour refill policy that does not include weekends, please make sure to request your medications in a timely manner so that you do not go days without medication.  *Refills should be requested through your pharmacy or through the refill request in Aula [employer] (log in, go to medications, then select refill request).  [employer] MESSAGING:  Please remember that our office is closed during the weekend and no one is available for [employer]ula [employer] messages. If you have an urgent or emergent matter please go to a walk-in center or the emergency room. Also please remember your [employer] messages are part of your legal medical record and should not be utilized as a personal email with our providers as it is visible to all [facility] employees. Also, Since [employer]  messages are not for emergent matters it may be several days before there is a response to your message.  FMLA/PAPERWORK COMPLETED BY OUR MEDICAL FORMS DEPARTMENT:  If you require FMLA paperwork for your surgery, please make sure to either Drop it off or have it faxed to our office at [phone]. Make sure it has your NAME, , and has your signature. You will need to have a Release of Information on file. To facilitate this process we ask that you requested it at the  on your way out and sign it. Without a signed ZAKIYA or signature on the form we will not be able to fax it and this will cause a delay with your forms. Fees charged for forms are $25 for initial submission and $15 for recertifications. If you have questions on the status of your forms please call the forms department at [phone].  **We do have a 3 week policy for all forms and paperwork, please make sure to allow plenty of time for completion. Same day paperwork will not be completed. **    Spine Navigator  You will have a 30 minute telehealth visit with our spine navigator approximately 2 weeks before your surgery. This visit is NOT optional. This appointment will get booked when you schedule your spinal surgery.  When speaking with Pre-admissions you will be told about a spine class as it relates to your surgery, this is an OPTIONAL class. The same or similar information will be discussed with you during your telehealth appointment with the spine navigator (Spine Navigator appointment is not optional). However, if you would like to have this information repeated to you or if you would feel more comfortable with additional information, you can elect to schedule this class during your pre-admissions phone call.  The Pre-op Spine Surgery Class is held at [facility] most  from 3:30-4:30 pm. Call Pre-admission Testing (PAT) to register at:  [phone]. Please park in the [facility] Parking garage, check in at registration and  [facility] for your escort to class on the Ortho Spine unit. If unable to attend, but would like additional information, the class is available online at [email or web address].     Regarding current visitor information:    Please visit the following website for the detailed and up-to-date visitor screening and restriction policy at [email or web address].    Hibiclens Bathing  Hibiclens is a body soap that is used before surgery protect you from getting an infection after surgery   Hibiclens comes in a large blue bottle and can be found in most pharmacies in the First Aid supplies  Shower with this daily for FIVE consecutive days before surgery, using the entire bottle over the five days.  The last shower should be the night before surgery.   Steps to bathing with Hibiclens  Do not use Hibiclens on your hair, face or private areas  Wash your hair and face as normal with your usual cleansers  Rinse well  Using a clean wet washcloth apply enough Hibiclens to cover your body. Wash from the neck down avoiding the genital areas and concentrating on the surgical area  Rinse well  Dry yourself with a clean, dry towel  Do not use any powders, creams, lotions or sprays on your body as these attract bacteria  Deodorant and facial creams are acceptable.   (Laundering/cleaning: Chlorhexidine gluconate skin cleansers will cause stains if used with chlorine releasing products. Rinse completely and use only non-chlorine detergents.)    For Office Use Only:    Medical Clearances Needed: PCPDYLAN  PA: MEDICARE  CPT Codes:

## 2025-05-12 ENCOUNTER — OFFICE VISIT (OUTPATIENT)
Dept: FAMILY MEDICINE CLINIC | Facility: CLINIC | Age: 80
End: 2025-05-12
Payer: MEDICARE

## 2025-05-12 ENCOUNTER — TELEPHONE (OUTPATIENT)
Dept: FAMILY MEDICINE CLINIC | Facility: CLINIC | Age: 80
End: 2025-05-12

## 2025-05-12 VITALS
OXYGEN SATURATION: 95 % | HEIGHT: 71 IN | WEIGHT: 221.44 LBS | RESPIRATION RATE: 16 BRPM | BODY MASS INDEX: 31 KG/M2 | DIASTOLIC BLOOD PRESSURE: 72 MMHG | SYSTOLIC BLOOD PRESSURE: 112 MMHG | TEMPERATURE: 97 F | HEART RATE: 80 BPM

## 2025-05-12 DIAGNOSIS — Z01.818 PREOP EXAMINATION: Primary | ICD-10-CM

## 2025-05-12 DIAGNOSIS — I25.10 CORONARY ARTERY DISEASE INVOLVING NATIVE CORONARY ARTERY OF NATIVE HEART WITHOUT ANGINA PECTORIS: ICD-10-CM

## 2025-05-12 DIAGNOSIS — I48.20 CHRONIC ATRIAL FIBRILLATION (HCC): ICD-10-CM

## 2025-05-12 DIAGNOSIS — Z98.2 S/P VP SHUNT: ICD-10-CM

## 2025-05-12 DIAGNOSIS — I48.0 PAROXYSMAL ATRIAL FIBRILLATION (HCC): ICD-10-CM

## 2025-05-12 DIAGNOSIS — M48.062 SPINAL STENOSIS OF LUMBAR REGION WITH NEUROGENIC CLAUDICATION: ICD-10-CM

## 2025-05-12 DIAGNOSIS — G91.2 NPH (NORMAL PRESSURE HYDROCEPHALUS) (HCC): ICD-10-CM

## 2025-05-12 PROCEDURE — 99214 OFFICE O/P EST MOD 30 MIN: CPT | Performed by: FAMILY MEDICINE

## 2025-05-12 NOTE — TELEPHONE ENCOUNTER
Dr. Derrick Siegel with [facility] called looking for HH orders

## 2025-05-12 NOTE — PROGRESS NOTES
Clay Orellana is a 80 year old male who presents for a pre-operative physical exam.   Clay Orellana is scheduled for aLUMBAR 3, LUMBAR 4 LAMINWECTOMIES WITH MEDIAL FACETECTOMIES, PARTIAL LUMBAR 5 LAMINECTOMY WITH MEDIAL FACETECTOMY, NEUROMONITORING   procedure at [facility] on 6/11/25 performed by Dr Rose. Indication: Lumbar stenosis with neurogenic claudication     HPI related to surgery:     History of Present Illness  Clay Orellana is an 80 year old male with lumbar stenosis who presents for pre-operative evaluation and clearance for surgery.    He is scheduled for surgery on June 11th. His cardiologist cleared him for surgery after an EKG performed last week showed no issues.    He has ongoing lumbar stenosis, treated with three lumbar injections, which did not provide significant relief. He experiences pain in his back, legs, hips, shoulders, and neck, with numbness and tingling in his left hand. He also notes stenosis in his upper back.    He takes Lasix almost daily for peripheral edema, though he has skipped doses recently due to decreased swelling. He also takes spironolactone twice a week on Monday, Wednesday, and Friday.    He mentions improved cognitive ability since receiving an injection for back pain.   He previously had home health services, including nursing and assistance with showering, which he wants reinstated.    He has completed both doses of the shingles vaccine, with the first dose in September 2023 and the second dose two to three months later.    No new symptoms other than those related to his back and extremities.        He  has had previous anesthesia:  Yes.  Previous complications:  No    Short Social Hx on File[1]   Past Medical History[2]  Past Surgical History[3]  Allergies: Allergies[4]  Current Medications[5]     REVIEW OF SYSTEMS:   Review of Systems:   Constitutional: No fevers, chills, fatigue or night sweats.  ENT: No mouth pain, neck pain, running nose, headaches or  swollen glands.  Skin: No rashes, pruritus or skin changes,  Respiratory: Denies cough, wheezing or shortness of breath.  CV: Denies chest pain, palpitations, orthopnea, PND or dizziness.  Musculoskeletal: No joint pain, stiffness or swelling.  GI: No nausea, vomiting or diarrhea. No blood in stools.  Neurologic: No seizures, tremors, weakness or numbness     PHYSICAL EXAM:   /72   Pulse 80   Temp 96.8 °F (36 °C) (Temporal)   Resp 16   Ht 5' 11\" (1.803 m)   Wt 221 lb 7 oz (100.4 kg)   SpO2 95%   BMI 30.88 kg/m²      GENERAL: well developed, well nourished, in no apparent distress, ambulating with the help of a walker  SKIN: no rashes, no suspicious lesions  HEENT: atraumatic, normocephalic,   EYES: PERRLA, EOMI, conjunctiva are clear  NECK: supple, no adenopathy, no bruits  CHEST: no chest tenderness  LUNGS: clear to auscultation  CARDIO: RRR without murmur  GI: good BS's, no masses, HSM or tenderness  MUSCULOSKELETAL: back is not tender, FROM of the back  EXTREMITIES: no cyanosis, clubbing or edema  NEURO: Oriented times three, cranial nerves are intact, motor and sensory are grossly intact      LABORATORY DATA:   Preop labs ordered    ASSESSMENT AND PLAN:   Clay Orellana has  significant history of paroxysmal atrial fibrillation, coronary artery disease as well as history of TIA  He was seen by cardiology on 5/6/2025 and cleared for surgery, may hold Xarelto and aspirin 7 days prior to surgery  He is a good surgical candidate. This consult was sent back the referring physician, Dr. Rose.    Assessment:  Encounter Diagnoses   Name Primary?    Preop examination Yes    Chronic atrial fibrillation (HCC)     S/P  shunt     Spinal stenosis of lumbar region with neurogenic claudication     Paroxysmal atrial fibrillation (HCC)     NPH (normal pressure hydrocephalus) (HCC)     Coronary artery disease involving native coronary artery of native heart without angina pectoris          Plan Back pain  Chronic  back pain with associated spinal stenosis, causing numbness and tingling in the left hand. Cleared for surgery on June 11, 2025, with pre-surgical physical therapy recommended to improve muscle condition and aid in recovery.  - Order physical therapy for lower back prior to surgery  - Ensure blood tests are ordered and completed before surgery  - Coordinate with home health for nursing and home aide services    Spinal stenosis  Chronic spinal stenosis affecting upper and lower back, causing arm and leg weakness. Surgery planned to address these issues, with pre-surgical physical therapy expected to enhance muscle condition and improve outcomes.  - Proceed with scheduled surgery on June 11, 2025  - Order MRI on June 4, 2025  - Ensure cardiac clearance is documented  - Order pre-surgical blood tests    Edema  Intermittent edema, particularly in the feet, managed with furosemide (Lasix) and spironolactone. No swelling reported in the last few days. Safety of daily Lasix use confirmed, with kidney function monitored through blood tests.  - Continue furosemide (Lasix) as needed, daily if swelling occurs  - Monitor kidney function with upcoming blood tests  - Adjust Lasix frequency based on blood test results  Orders Placed This Encounter   Procedures    CBC With Differential With Platelet    Comp Metabolic Panel (14)    PTT, Activated [E]    Prothrombin Time (PT) [E]    MSSA and MRSA Culture Screen           Sachin Meza MD          [1]   Social History  Socioeconomic History    Marital status:    Tobacco Use    Smoking status: Former     Types: Cigarettes     Passive exposure: Never    Smokeless tobacco: Never    Tobacco comments:     Has not smoked for about 20 years   Vaping Use    Vaping status: Never Used   Substance and Sexual Activity    Alcohol use: Never     Alcohol/week: 4.0 standard drinks of alcohol     Types: 4 Cans of beer per week    Drug use: Never   Other Topics Concern    Caffeine Concern No     Exercise No    Seat Belt Yes    Special Diet No    Stress Concern Yes    Weight Concern No     Social Drivers of Health     Food Insecurity: Low Risk  (7/8/2024)    Received from [facility]    Food Insecurity     Have there been times that your food ran out, and you didn't have money to get more?: No     Are there times that you worry that this might happen?: No   Transportation Needs: Low Risk  (7/8/2024)    Received from [facility]    Transportation Needs     Has lack of transportation kept you from medical appointments, meetings, work, or from getting things needed for daily living: No   Housing Stability: Low Risk  (7/8/2024)    Received from [facility]    Housing Stability     Are you worried that your electric, gas, oil, or water might be shut off?: No     Are you concerned about having a safe and reliable place to live?: No   [2]   Past Medical History:   Acute nausea with nonbilious vomiting    Anxiety    Arrhythmia    Arthritis    Bilateral inguinal hernia without obstruction or gangrene    BPH (benign prostatic hyperplasia)    Calculus of kidney    Essential hypertension    High blood pressure    High cholesterol    Muscle weakness    Stroke (HCC)    TIA    Visual impairment   [3]   Past Surgical History:  Procedure Laterality Date    Anesth,pacemaker insertion  08/2023    Appendectomy      Appendectomy      Colonoscopy      Colonoscopy N/A 05/03/2022    Procedure: COLONOSCOPY;  Surgeon: Sudarshan Gilbert MD;  Location:  ENDOSCOPY    Colonoscopy      Cyst removal N/A 1990    Cyst removed behind back of neck    Cyst removal Right 11/05/2020    Removed from right upper back - just below shoulder    Hernia surgery  July 2022    Other surgical history  06/17/2024    RIGHT VENTRICULOPERITONEAL SHUNT INSERTION    Skin surgery  2020    Vasectomy  1980   [4]   Allergies  Allergen Reactions    Kiwi Extract ITCHING     Inner ears itch   [5]   Current  Outpatient Medications   Medication Sig Dispense Refill    fluticasone-umeclidin-vilant (TRELEGY ELLIPTA) 100-62.5-25 MCG/ACT Inhalation Aerosol Powder, Breath Activated Inhale 1 puff into the lungs daily. 180 each 3    rivaroxaban 20 MG Oral Tab Take 1 tablet (20 mg total) by mouth nightly. 90 tablet 0    furosemide 20 MG Oral Tab Take 1 tablet (20 mg total) by mouth 2 (two) times daily.      flecainide 100 MG Oral Tab Take 1 tablet (100 mg total) by mouth 2 (two) times daily. 180 tablet 3    atorvastatin 40 MG Oral Tab Take 1 tablet (40 mg total) by mouth nightly. 90 tablet 1    donepezil 10 MG Oral Tab Take 1 tablet (10 mg total) by mouth nightly. 90 tablet 1    finasteride 5 MG Oral Tab Take 1 tablet (5 mg total) by mouth daily. 90 tablet 2    sertraline 25 MG Oral Tab Take 1 tablet (25 mg total) by mouth daily. 90 tablet 3    spironolactone 25 MG Oral Tab 1 tab po MWF 90 tablet 3    metoprolol tartrate 25 MG Oral Tab Take 1 tablet (25 mg total) by mouth 2 (two) times daily. 180 tablet 1    amLODIPine 2.5 MG Oral Tab Take 1 tablet by mouth once a day at night. Start on 11/12      aspirin 81 MG Oral Tab EC Take 1 tablet (81 mg total) by mouth daily.      Cholecalciferol 125 MCG (5000 UT) Oral Tab Take 1 tablet (5,000 Units total) by mouth 3 (three) times a week. Three times a week. MWF      cyanocobalamin 500 MCG Oral Tab Take 1 tablet (500 mcg total) by mouth daily.      Multiple Vitamins-Minerals (MULTI-VITAMIN/MINERALS) Oral Tab Take 1 tablet by mouth daily.      acetaminophen 500 MG Oral Tab Take 1 tablet (500 mg total) by mouth every 6 (six) hours as needed.      tamsulosin 0.4 MG Oral Cap Take 1 capsule (0.4 mg total) by mouth After dinner. 90 capsule 3

## 2025-05-12 NOTE — TELEPHONE ENCOUNTER
[facility]  called looking for home health order .  Needs for  PT  and skilled nursing . Due to medicare restrictions order needs to state that .

## 2025-05-23 RX ORDER — METOPROLOL TARTRATE 25 MG/1
25 TABLET, FILM COATED ORAL 2 TIMES DAILY
Qty: 180 TABLET | Refills: 3 | Status: SHIPPED | OUTPATIENT
Start: 2025-05-23

## 2025-05-23 RX ORDER — DONEPEZIL HYDROCHLORIDE 10 MG/1
10 TABLET, FILM COATED ORAL NIGHTLY
Qty: 90 TABLET | Refills: 3 | Status: SHIPPED | OUTPATIENT
Start: 2025-05-23

## 2025-05-23 RX ORDER — ATORVASTATIN CALCIUM 40 MG/1
40 TABLET, FILM COATED ORAL NIGHTLY
Qty: 90 TABLET | Refills: 3 | Status: SHIPPED | OUTPATIENT
Start: 2025-05-23

## 2025-05-28 ENCOUNTER — NURSE ONLY (OUTPATIENT)
Age: 80
End: 2025-05-28
Payer: MEDICARE

## 2025-05-28 DIAGNOSIS — Z71.9 ENCOUNTER FOR EDUCATION: Primary | ICD-10-CM

## 2025-05-28 NOTE — PROGRESS NOTES
RN Spine Navigator Education for Clay     If you have received instructions from your surgeon that are different from those listed below, please follow your physician's instructions.    You are scheduled for a Lumbar decompression with Dr. Pringle on 6/11/25.      Patient Surgical Goals: Patient is hoping for symptom improvement.     Before Your Surgery    Choose a Care Partner  Patient attended spine navigator visit with care partner.  Care partner is Aniya. Your care partner(s) should be able to provide transportation to and from the hospital. They should be able to help at home for the first week after discharge, including helping you with meals, medication, and dressing changes. It is strongly recommended that someone stays with you for 24 hours after anesthesia if going home the day of surgery.    Clearance Before Surgery-PCP: patient states clearance was given as well as CARDS  You will need to see your primary care provider within 30 days before surgery. Please make sure this appointment is at least a week before surgery as more testing or doctor visits may be ordered. Presurgical testing may include labs, nasal swab, imaging, EKG.   Make sure that you complete all preadmission testing so that surgery does not get delayed.    Home Environment  Assessed home status: bedroom and bathroom are on first floor.   It is recommended that you prepare your home by putting clean sheets on your bed, freezing meals, and putting frequently used items at waist level.   Prevent falls by removing items that could cause you to trip, adding nightlights and adding a nonskid mat in shower.   Assistive devices can be purchased at a medical supply store or online including canes, walkers, toileting devices (commodes, raised toilet seats, toilet paper wiping aid), long handled sponge, shower chair or tub transfer bench, grabber/reacher tool, sock aid, long handled shoehorn, if needed.      Tobacco, Nicotine and Marijuana Use   Not  applicable    Medications to Stop   For 7-10 days before surgery do not take any blood thinning medications. This includes non-steroidal anti-inflammatories or NSAIDs (Advil, Aleve, Motrin, ibuprofen, naproxen, meloxicam, diclofenac, celecoxib, etc.), aspirin (unless told otherwise by your cardiologist or surgeon), herbal supplements and vitamins (garlic, turmeric, vitamin E, fish oil or krill oil, etc.). You may only take Tylenol or prescribed narcotic medication if needed for pain.   Other medications to stop include: none    Leading Up to Day of Surgery  Five days before surgery wash with Hibiclens soap after your regular body soap every day. Do not put use Hibiclens on your face, hair or privates. Your last shower should be the night before surgery.  One-two business days before surgery, the preadmission testing staff will call you and let you know what time to arrive, where to park, when to take your Tylenol and Gatorade, and will provide any additional instructions.   After 11pm the day before surgery, do not eat or drink anything (including water, gum, or candies) except for Tylenol and Gatorade.   Drink 12 ounces of regular Gatorade (NOT RED) 12 hours prior to your scheduled surgery time. Drink your second 12 ounces of regular Gatorade and take 1000 mg of Tylenol (acetaminophen) 4 hours before your scheduled surgery time.     Items to Bring to the Hospital   Bring insurance card, ID, advanced directive, or medical power of  paperwork, loose fitting clothing, shoes with a back that can accommodate swollen feet, long phone charging cord, glasses and dentures.  Do not bring jewelry, valuables, or medications.     In the Hospital     Operative Day and Hospital Stay  In the preoperative area, you will change into a gown, have an IV placed in your hand or arm by the nurse, and sign any consent paperwork that is needed for your procedure. You will speak to the surgeon and anesthesiologist. It is important  to tell the doctors and nurses if you have had any significant side effects from pain medications or anesthesia such as a rash or severe nausea.    In the operating room, the anesthesiologist will attach monitors, give you oxygen through a mask, and give you medicine through the IV to fall asleep. After you are sleeping, the breathing tube will be placed. You will wake up on the stretcher.     During the surgery, your care partner can sit in the surgical waiting area. There are TV screens in that area that keep them informed of your progress. If the procedure is at [facility], there is a person who can speak to them about your surgical progress.     In the recovery room, monitors will be attached that check your heart rate, blood pressure and oxygen levels. While you may not remember this part, a nurse is with you and constantly checking on your condition. Medications for pain and nausea will be given if you need them. Your family is not allowed in the recovery room. When you are ready to leave the recovery room, you will go to the same day surgery discharge area. Your family may join you there as you continue to wake up. You will be offered something to eat and drink and be given pain pills if needed. You will get your discharge instructions from the nurse and go home from there.  Preventing surgical complications  It is important to follow all instructions before and after surgery to decrease the risks of surgery and prevent complications.     Blood clots: walk, and do ankle pumps at home  Infection: wash with Hibiclens before surgery, wash your hands, don't touch your incision  Pneumonia: take deep breaths and cough  Nausea/vomiting: start with liquids and small meals and do not take pills on an empty stomach  Constipation: drink water and walk frequently  If you are prone to constipation, start an over the counter stool softener while taking your narcotic medication.  After surgery if you have gone 3-4 days without  a bowel movement, we recommend you use either a suppository or an enema (follow the packaging instructions for use). The longer you stay constipated the more painful and difficult it will be to relieve your constipation.      Tell your nurse if you are experiencing nausea or vomiting as they have medications to help treat these.      At home     Understanding Pain After Surgery  We will do our best to manage your pain after surgery, but it is not possible to be completely pain-free. There will be operative pain in your surgical site. Pain in the arms or legs may be one of the first things to improve. Numbness, weakness, and tingling should improve over time. However, there can be a temporary increase in symptoms in the first few days due to inflammation from surgery.   Pain medications will be prescribed to take home at discharge. The goal of pain medicine after surgery is to make your pain tolerable, not to make you pain free. We encourage you to use the medication prescribed to you after your surgery, but please take the lowest possible dose to manage your pain. Taking high doses of narcotics can cause side effects. Transition to plain Tylenol when your pain improves. If you are at [facility], your prescriptions can be filled by our pharmacy and brought to you bedside. You may get more continuous pain relief by alternating between medications if you have multiple instead of taking them at the same time. Write down when you have taken a medication as it may be difficult to remember after a few doses.    Post operative medication   Tylenol (acetaminophen): take for pain. Do not take more than 3000 mg - 4000 mg in 24 hours because it can damage your liver.   Narcotics: take for moderate to severe pain. Do not drink alcohol or drive while taking narcotics. Some narcotic medications (Norco, Tylenol #3, Percocet, Vicodin) contain Tylenol (acetaminophen). Make sure to not exceed the maximum dose if you are taking  additional Tylenol with these medications.  Muscle relaxers: take for muscle cramping. These can cause drowsiness.    NSAIDS (Advil, Aleve, Motrin, ibuprofen, naproxen, meloxicam, diclofenac, celecoxib, etc.) or aspirin: Do not take these unless your physician says it is ok. For a fusion, it may be several months before you can take NSAIDS.  Stool softeners: take to prevent constipation while you are taking narcotic medications. You can get these over the counter at the pharmacy. You may use laxatives, which are stronger, if needed.    If you believe you will need more of medication your surgeon has prescribed to you, request a refill through your pharmacy or through the refill request in [facility] (log in, go to medications, then select refill request) at least two business days before you run out of medication.     Nonpharmacologic pain management   You may use ice on your incision for 20 minutes every hour to help with pain and swelling. Do not place ice directly on your skin. You can use heat to sooth your muscles but avoid placing heat directly on your incision. Make frequent position changes. You can do gentle stretching while avoiding significant bending or twisting. Use deep breathing techniques and distractions such as TV, music, reading, or games.     Movement restrictions  After surgery, no bending or twisting. Do not lift anything over 10lbs (about the weight of a gallon of milk) or lift anything over your shoulders. Avoid pulling or pushing. You may use stairs while holding the handrail.  It is ok to ride in the car but refrain from driving or traveling long distances until cleared to do so by your surgeon. You may not drive while taking narcotics or muscle relaxants.     Post Op Exercise   Walk frequently. Start with walking short distances and increase as you start to feel better. Do ankle pumps (bending at your ankles, bring your feet towards your head then point them towards the ground) 15-20 times  every hour when awake to help prevent blood clots.     Your surgeon will let you know at your post operative appointment if you are ready to decrease your movement restrictions and increase your exercise. If you have questions in between appointments about lessening your restrictions, please contact the office.     You and your doctor will discuss how you are feeling as you heal and decide if outpatient physical therapy or a medical fitness referral is needed.    Caring for your incision  Always wash your hands before touching your incision. Your incision will be closed with sutures under the skin and skin glue or Steri-Strips (thin white bandages) on top of the skin. Do not attempt to peal off Skin glue/Steri-Strips as they will come off on their own. If the incision is closed with sutures or staples on top of the skin, they will be removed at a post op appointment. The incision may be covered with a gauze dressing that can come off after three days. Once the original gauze dressing is removed, we prefer that you leave your incision open to air (without a gauze dressing). If the incision has drainage or is rubbing against your clothes or brace, you may place gauze and medical tape over it. Change the gauze and medical tape daily. Look at your incision daily to check for signs of infection.   You can shower three days after your surgery or sooner if your surgeon allows. We recommend the care partner be present during the first shower for safety. Let soapy water run over the incision, but do not scrub it or spray it directly. Gently pat it dry after with a clean towel. Do not apply any creams or lotions to the incision. Do not soak in a tub, pool, or any body of water until your incision is fully healed.    Signs of Infection   Check your incision daily for swelling, redness, drainage, pus, bad smell, or opening of the incision.     When to Call for Assistance  Call the Neurosurgery Office ([phone] Option #2) if  you experience signs of infection, opening of the incision, continuous nausea or vomiting, poor pain control despite using the pain medication as directed, a sudden increase in pain, temperature over 101F, or with any concerns, unanswered questions, or new problems, such as new numbness/weakness/tingling.  Call 911 or go to the nearest emergency room if you experience chest pain, difficulty breathing, loss of bowel or bladder control, extreme drowsiness, or any other life-threatening situation.       Please remember that the office of Dr. Pringle is closed on the weekends, holidays, and there is no one in the office from 4:30 pm to 8 am. If you have an urgent matter that needs attention you will need to go to the emergency room or immediate care for assistance. If it is an urgent matter during work hours please make sure to call the office of Dr. Pringle as [facility] messages are not meant for Urgent/Emergent situations. MyChart messages can take 5-7 days for a response.    Follow-up Plan   Appointments with Dr. Pringle or Teodora at 2 and 6 weeks    Questions: Clay acknowledged all information provided and had no questions at this time.     If you would like clarification regarding anything discussed today, you can call your surgeons office and speak with one of the nursing staff. If you feel you require and additional appointment to review information again you can call the RN Spine Navigator at [phone] #[phone] to schedule. If you have questions about your upcoming surgery, changes in your symptoms, or if you need to refill your medications please do not call the RN spine navigator, you will need to speak with someone from your surgeons office.     Spine navigator spent 22 minutes conducting a virtual visit to provide education. Thank you for letting the RN Spine Navigator participate in your care.

## 2025-05-30 NOTE — TELEPHONE ENCOUNTER
CARDS presurgical clearance received per OV note dated 5.5.25 obtained via Care Everywhere, \"Patient may proceed with spine surgery. He will need to coordinate with our device clinic since he has a pacemaker. He also will need a longer period of time being off Xarelto. I would recommend holding aspirin and Xarelto both 7 days before the surgery\"

## 2025-05-30 NOTE — TELEPHONE ENCOUNTER
PCP presurgical clearance received per OV note in chart dated 5.12.25, \"He was seen by cardiology on 5/6/2025 and cleared for surgery, may hold Xarelto and aspirin 7 days prior to surgery  He is a good surgical candidate.\"

## 2025-06-03 NOTE — IMAGING NOTE
Clay is scheduled for an MRI with pacemaker on Wednesday 6/4/25 at 1:45pm    MRI location, [address] [place] [address], and arrival time of 1:15pm confirmed w/ patient via telephone.

## 2025-06-04 ENCOUNTER — HOSPITAL ENCOUNTER (OUTPATIENT)
Dept: MRI IMAGING | Facility: HOSPITAL | Age: 80
Discharge: HOME | End: 2025-06-04
Attending: NEUROLOGICAL SURGERY
Payer: MEDICARE

## 2025-06-04 ENCOUNTER — LAB ENCOUNTER (OUTPATIENT)
Dept: LAB | Facility: HOSPITAL | Age: 80
End: 2025-06-04
Attending: NEUROLOGICAL SURGERY
Payer: MEDICARE

## 2025-06-04 ENCOUNTER — TELEPHONE (OUTPATIENT)
Dept: SURGERY | Facility: CLINIC | Age: 80
End: 2025-06-04

## 2025-06-04 ENCOUNTER — HOSPITAL ENCOUNTER (OUTPATIENT)
Dept: GENERAL RADIOLOGY | Facility: HOSPITAL | Age: 80
Discharge: HOME | End: 2025-06-04
Attending: NEUROLOGICAL SURGERY
Payer: MEDICARE

## 2025-06-04 VITALS — OXYGEN SATURATION: 90 % | HEART RATE: 70 BPM | DIASTOLIC BLOOD PRESSURE: 60 MMHG | SYSTOLIC BLOOD PRESSURE: 123 MMHG

## 2025-06-04 DIAGNOSIS — M48.062 LUMBAR STENOSIS WITH NEUROGENIC CLAUDICATION: ICD-10-CM

## 2025-06-04 DIAGNOSIS — M48.062 SPINAL STENOSIS OF LUMBAR REGION WITH NEUROGENIC CLAUDICATION: ICD-10-CM

## 2025-06-04 DIAGNOSIS — Z13.6 SCREENING FOR CARDIOVASCULAR CONDITION: ICD-10-CM

## 2025-06-04 DIAGNOSIS — Z00.00 ENCOUNTER FOR ANNUAL HEALTH EXAMINATION: ICD-10-CM

## 2025-06-04 DIAGNOSIS — Z13.1 SCREENING FOR DIABETES MELLITUS (DM): ICD-10-CM

## 2025-06-04 DIAGNOSIS — Z01.818 PREOP EXAMINATION: ICD-10-CM

## 2025-06-04 LAB
ALBUMIN SERPL-MCNC: 4.1 G/DL (ref 3.2–4.8)
ALBUMIN/GLOB SERPL: 1.8 {RATIO} (ref 1–2)
ALP LIVER SERPL-CCNC: 51 U/L (ref 45–117)
ALT SERPL-CCNC: 28 U/L (ref 10–49)
ANION GAP SERPL CALC-SCNC: 7 MMOL/L (ref 0–18)
ANTIBODY SCREEN: NEGATIVE
APTT PPP: 41.8 SECONDS (ref 23–36)
AST SERPL-CCNC: 20 U/L (ref ?–34)
ATRIAL RATE: 60 BPM
BASOPHILS # BLD AUTO: 0.03 X10(3) UL (ref 0–0.2)
BASOPHILS NFR BLD AUTO: 0.3 %
BILIRUB SERPL-MCNC: 0.5 MG/DL (ref 0.2–1.1)
BUN BLD-MCNC: 30 MG/DL (ref 9–23)
CALCIUM BLD-MCNC: 9 MG/DL (ref 8.7–10.6)
CHLORIDE SERPL-SCNC: 107 MMOL/L (ref 98–112)
CHOLEST SERPL-MCNC: 101 MG/DL (ref ?–200)
CO2 SERPL-SCNC: 25 MMOL/L (ref 21–32)
CREAT BLD-MCNC: 1.74 MG/DL (ref 0.7–1.3)
EGFRCR SERPLBLD CKD-EPI 2021: 39 ML/MIN/1.73M2 (ref 60–?)
EOSINOPHIL # BLD AUTO: 0.14 X10(3) UL (ref 0–0.7)
EOSINOPHIL NFR BLD AUTO: 1.4 %
ERYTHROCYTE [DISTWIDTH] IN BLOOD BY AUTOMATED COUNT: 12.5 %
EST. AVERAGE GLUCOSE BLD GHB EST-MCNC: 120 MG/DL (ref 68–126)
FASTING PATIENT LIPID ANSWER: NO
FASTING STATUS PATIENT QL REPORTED: NO
GLOBULIN PLAS-MCNC: 2.3 G/DL (ref 2–3.5)
GLUCOSE BLD-MCNC: 89 MG/DL (ref 70–99)
HBA1C MFR BLD: 5.8 % (ref ?–5.7)
HCT VFR BLD AUTO: 38.2 % (ref 39–53)
HDLC SERPL-MCNC: 41 MG/DL (ref 40–59)
HGB BLD-MCNC: 13.2 G/DL (ref 13–17.5)
IMM GRANULOCYTES # BLD AUTO: 0.11 X10(3) UL (ref 0–1)
IMM GRANULOCYTES NFR BLD: 1.1 %
INR BLD: 2.26 (ref 0.8–1.2)
LDLC SERPL CALC-MCNC: 45 MG/DL (ref ?–100)
LYMPHOCYTES # BLD AUTO: 1.33 X10(3) UL (ref 1–4)
LYMPHOCYTES NFR BLD AUTO: 13.7 %
MCH RBC QN AUTO: 32.4 PG (ref 26–34)
MCHC RBC AUTO-ENTMCNC: 34.6 G/DL (ref 31–37)
MCV RBC AUTO: 93.6 FL (ref 80–100)
MONOCYTES # BLD AUTO: 0.71 X10(3) UL (ref 0.1–1)
MONOCYTES NFR BLD AUTO: 7.3 %
NEUTROPHILS # BLD AUTO: 7.36 X10 (3) UL (ref 1.5–7.7)
NEUTROPHILS # BLD AUTO: 7.36 X10(3) UL (ref 1.5–7.7)
NEUTROPHILS NFR BLD AUTO: 76.2 %
NONHDLC SERPL-MCNC: 60 MG/DL (ref ?–130)
OSMOLALITY SERPL CALC.SUM OF ELEC: 294 MOSM/KG (ref 275–295)
P AXIS: 48 DEGREES
P-R INTERVAL: 312 MS
PLATELET # BLD AUTO: 253 10(3)UL (ref 150–450)
POTASSIUM SERPL-SCNC: 4.7 MMOL/L (ref 3.5–5.1)
PROT SERPL-MCNC: 6.4 G/DL (ref 5.7–8.2)
PROTHROMBIN TIME: 25.1 SECONDS (ref 11.6–14.8)
Q-T INTERVAL: 482 MS
QRS DURATION: 182 MS
QTC CALCULATION (BEZET): 482 MS
R AXIS: 118 DEGREES
RBC # BLD AUTO: 4.08 X10(6)UL (ref 3.8–5.8)
RH BLOOD TYPE: POSITIVE
SODIUM SERPL-SCNC: 139 MMOL/L (ref 136–145)
T AXIS: 48 DEGREES
TRIGL SERPL-MCNC: 69 MG/DL (ref 30–149)
TSI SER-ACNC: 0.69 UIU/ML (ref 0.55–4.78)
VENTRICULAR RATE: 60 BPM
VLDLC SERPL CALC-MCNC: 10 MG/DL (ref 0–30)
WBC # BLD AUTO: 9.7 X10(3) UL (ref 4–11)

## 2025-06-04 PROCEDURE — 86901 BLOOD TYPING SEROLOGIC RH(D): CPT

## 2025-06-04 PROCEDURE — 93005 ELECTROCARDIOGRAM TRACING: CPT

## 2025-06-04 PROCEDURE — 84443 ASSAY THYROID STIM HORMONE: CPT

## 2025-06-04 PROCEDURE — 86900 BLOOD TYPING SEROLOGIC ABO: CPT

## 2025-06-04 PROCEDURE — 93010 ELECTROCARDIOGRAM REPORT: CPT | Performed by: INTERNAL MEDICINE

## 2025-06-04 PROCEDURE — 85025 COMPLETE CBC W/AUTO DIFF WBC: CPT

## 2025-06-04 PROCEDURE — 72148 MRI LUMBAR SPINE W/O DYE: CPT | Performed by: NEUROLOGICAL SURGERY

## 2025-06-04 PROCEDURE — 80061 LIPID PANEL: CPT

## 2025-06-04 PROCEDURE — 87081 CULTURE SCREEN ONLY: CPT

## 2025-06-04 PROCEDURE — 71046 X-RAY EXAM CHEST 2 VIEWS: CPT | Performed by: NEUROLOGICAL SURGERY

## 2025-06-04 PROCEDURE — 86850 RBC ANTIBODY SCREEN: CPT

## 2025-06-04 PROCEDURE — 36415 COLL VENOUS BLD VENIPUNCTURE: CPT

## 2025-06-04 PROCEDURE — 83036 HEMOGLOBIN GLYCOSYLATED A1C: CPT

## 2025-06-04 PROCEDURE — 85610 PROTHROMBIN TIME: CPT

## 2025-06-04 PROCEDURE — 85730 THROMBOPLASTIN TIME PARTIAL: CPT

## 2025-06-04 PROCEDURE — 80053 COMPREHEN METABOLIC PANEL: CPT

## 2025-06-04 RX ORDER — RIVAROXABAN 20 MG/1
20 TABLET, FILM COATED ORAL NIGHTLY
Qty: 90 TABLET | Refills: 3 | Status: SHIPPED | OUTPATIENT
Start: 2025-06-04

## 2025-06-04 NOTE — PROGRESS NOTES
Your hemoglobin A1c is 5.8 reflecting good control of your sugars  Your creatinine has slightly worsened compared to last year -it is 1.74, keep yourself hydrated, drink at least 6 to 8 glasses of water every day, your INR is 2.26 which is considered high, possibly due to Xarelto  Hold Xarelto 2 days prior to surgery

## 2025-06-04 NOTE — TELEPHONE ENCOUNTER
Message below noted.    Patient currently getting MRI at was advised to get shunt check within 24 hours. Patient scheduled for surgery 6/11/25 with Dr. Pringle.    Routed to Provider.

## 2025-06-04 NOTE — IMAGING NOTE
Pt scheduled for MR today. Abbott device. Device changed to MR settings per EP orders. Pt tolerated scan without any problems. Changed back to original settings. Discharged to home.

## 2025-06-04 NOTE — TELEPHONE ENCOUNTER
Abnormal results fax received from Aaron HERNANDEZ re: PT/ INR and PTT. Will be repeated STAT on admit

## 2025-06-04 NOTE — TELEPHONE ENCOUNTER
Pt's daughter states pt is having presurgery MRI today,  does pt need to have shunt checked after MRI?Transferred call to nurse.Per nursing,  office will reach out to daughter to advise when to come in to office for shunt check.

## 2025-06-05 ENCOUNTER — OFFICE VISIT (OUTPATIENT)
Dept: SURGERY | Facility: CLINIC | Age: 80
End: 2025-06-05
Payer: MEDICARE

## 2025-06-05 VITALS
HEIGHT: 71 IN | HEART RATE: 60 BPM | BODY MASS INDEX: 30.8 KG/M2 | SYSTOLIC BLOOD PRESSURE: 118 MMHG | WEIGHT: 220 LBS | DIASTOLIC BLOOD PRESSURE: 62 MMHG

## 2025-06-05 DIAGNOSIS — Z98.2 S/P VP SHUNT: Primary | ICD-10-CM

## 2025-06-05 DIAGNOSIS — M48.062 LUMBAR STENOSIS WITH NEUROGENIC CLAUDICATION: ICD-10-CM

## 2025-06-05 PROCEDURE — 99212 OFFICE O/P EST SF 10 MIN: CPT

## 2025-06-05 NOTE — TELEPHONE ENCOUNTER
Patient's daughter calling to schedule appointment for shunt check. Per RN Boubacar gutierrez to OB for today 1p with Dori. Patient scheduled.

## 2025-06-05 NOTE — PATIENT INSTRUCTIONS
Refill policies:    Allow 2-3 business days for refills; controlled substances may take longer.  Contact your pharmacy at least 5 days prior to running out of medication and have them send an electronic request or submit request through the “request refill” option in your nextsocial account.  Refills are not addressed on weekends; covering physicians do not authorize routine medications on weekends.  No narcotics or controlled substances are refilled after noon on Fridays or by on call physicians.  By law, narcotics must be electronically prescribed.  A 30 day supply with no refills is the maximum allowed.  If your prescription is due for a refill, you may be due for a follow up appointment.  To best provide you care, patients receiving routine medications need to be seen at least once a year.  Patients receiving narcotic/controlled substance medications need to be seen at least once every 3 months.  In the event that your preferred pharmacy does not have the requested medication in stock (e.g. Backordered), it is your responsibility to find another pharmacy that has the requested medication available.  We will gladly send a new prescription to that pharmacy at your request.    Scheduling Tests:    If your physician has ordered radiology tests such as MRI or CT scans, please contact Central Scheduling at [phone] right away to schedule the test.  Once scheduled, the [facility] Centralized Referral Team will work with your insurance carrier to obtain pre-certification or prior authorization.  Depending on your insurance carrier, approval may take 3-10 days.  It is highly recommended patients assure they have received an authorization before having a test performed.  If test is done without insurance authorization, patient may be responsible for the entire amount billed.      Precertification and Prior Authorizations:  If your physician has recommended that you have a procedure or additional testing performed the [facility]  Centralized Referral Team will contact your insurance carrier to obtain pre-certification or prior authorization.    You are strongly encouraged to contact your insurance carrier to verify that your procedure/test has been approved and is a COVERED benefit.  Although the [facility] Centralized Referral Team does its due diligence, the insurance carrier gives the disclaimer that \"Although the procedure is authorized, this does not guarantee payment.\"    Ultimately the patient is responsible for payment.   Thank you for your understanding in this matter.  Paperwork Completion:  If you require FMLA or disability paperwork for your recovery, please make sure to either drop it off or have it faxed to our office at [phone]. Be sure the form has your name and date of birth on it.  The form will be faxed to our Forms Department and they will complete it for you.  There is a 25$ fee for all forms that need to be filled out.  Please be aware there is a 10-14 day turnaround time.  You will need to sign a release of information (ZAKIYA) form if your paperwork does not come with one.  You may call the Forms Department with any questions at [phone].  Their fax number is [phone].

## 2025-06-05 NOTE — PROGRESS NOTES
Neurosurgery Follow-Up Visit  25     Clay Orellana PCP:  Sachin Meza MD    1945 MRN [id number]     Reason for Visit: Shunt Check post-MRI    HPI     Clay Orellana  is a very pleasant 80 year old male s/p right  shunt insertion by Dr. Pringle 24 who presents for a shunt check following completion of pre-op MRI lumbar spine yesterday.  Patient is scheduled for L3-L4 laminectomies with medial facetectomies and partial L5 laminectomy with medial facetectomy with Dr. Pringle on 25. Patient has no new neurological complaints or deficits. He is accompanied today by his wife and daughter.     SHUNT DATA     Shunt Type:                    Medtronic catheter, strata valve, initial setting 1.5   Insertion:                         24, Dr. Pringle  Adjustments:  24: Set between 1.5-2.0 s/p MRI. Readjusted back to 0.5     10/24/24: Set at 0.5. Kept at 0.5   24: Set at 1.0, adjusted to 0.5    24: Set at 1.5. Adjusted to 1.0.    PAST MEDICAL HISTORY     Past Medical History[1]   Past Surgical History[2]   Family History[3]   Social Hx on file[4]   Allergies[5]     CURRENT MEDICATIONS     Cannot display prior to admission medications because the patient has not been admitted in this contact.      Prescriptions Prior to Admission[6]    REVIEW OF SYSTEMS     Comprehensive review of systems done. Negative except what is outlined in the above HPI.     PHYSICAL EXAMIMATION     VITALS: /62 (BP Location: Left arm, Patient Position: Sitting, Cuff Size: large)   Pulse 60   Ht 71\"   Wt 220 lb (99.8 kg)   BMI 30.68 kg/m²     GENERAL:  Patient is in no acute distress, non-toxic appearing, speech fluent, mood appropriate    HEENT:  Normocephalic, atraumatic    RESP: Non-labored, easy, even    CV: NSR on tele    NEUROLOGICAL:  Alert and oriented x 3.  Speech fluent. Comprehension intact.  PERRLA +3 brisk,  EOMI.  VFF.  Face is symmetrical. Tongue is midline.  Uvula and palate elevate  symmetrically. Shoulder shrug normal bilaterally. Remaining CN's GI.  Sensation to light touch is intact bilaterally.  Gait intact, ambulates with walker.   Shunt reservoir fills and empties appropriately.     UPPER EXTREMITY STRENGTH:    Deltoid  Biceps  Triceps     Finger abduction   Right 5 5 5 5 5   Left 5 5 5 5 5     LOWER EXTREMITY STRENGTH:    Iliospoas  Hamstrings  Quads  D-flexion  P-flexion EHL     Right 5 5 5 5 5 5     Left 5 5 5 5 5 5     IMAGING     MRI SPINE LUMBAR (CPT=72148)  Result Date: 6/4/2025  CONCLUSION:   1. L3-4 moderate broad-based degenerative disc bulge causing moderate central canal stenosis and moderate lateral recess/subarticular zone narrowing bilaterally.  This is slightly worsened since previous study.  2. L4-5 degenerative disc bulge osteophyte complex extending to the right lateral aspect of the disc space causing moderate to severe right neural foraminal and subarticular zone narrowing and mild to moderate central canal stenosis.  This is stable since previous study.  3. Mild degenerative changes noted at L1-2, L2-3, and L5-S1.   LOCATION:  [facility]   Dictated by (CST): Onesimo Cisse MD on 6/04/2025 at 3:15 PM     Finalized by (CST): Onesimo Cisse MD on 6/04/2025 at 3:23 PM       XR CHEST PA + LAT CHEST (KUG=60000)  Result Date: 6/4/2025  CONCLUSION:  1. Small right-sided pleural effusion right basilar atelectasis/infiltrates. 2. Hyperexpansion of the lungs.    LOCATION:  [id number]   Dictated by (CST): Virginia Blair MD on 6/04/2025 at 12:44 PM     Finalized by (CST): Virginia Blair MD on 6/04/2025 at 12:46 PM        MEDICAL DECISION MAKING     ASSESSMENT:  1. S/p 6/17/24: Dr. Pringle: Right  shunt insertion    2. Lumbar stenosis with neurogenic claudication      PLAN:  Shunt noted to be at 2.5 after MRI. Adjusted to previous setting of 0.5 and confirmed.   Proceed with lumbar laminectomy as planned next week.     Total visit time: 15 minutes; More than 50% spent  coordinating care, counseling, reviewing imaging and discussing medication therapy.     Dori Ramirez, APRN-NP  [facility]  [address], [address]  [place] [address]  [phone]  1/22/2024 10:54 AM            [1]   Past Medical History:   Acute nausea with nonbilious vomiting    Anxiety    Arrhythmia    Arthritis    Back problem    Bilateral inguinal hernia without obstruction or gangrene    BPH (benign prostatic hyperplasia)    Calculus of kidney    Essential hypertension    High blood pressure    High cholesterol    Muscle weakness    Neuropathy    Stroke (HCC)    TIA    Visual impairment   [2]   Past Surgical History:  Procedure Laterality Date    Anesth,pacemaker insertion  08/2023    Appendectomy      Appendectomy      Cardiac pacemaker placement      Colonoscopy      Colonoscopy N/A 05/03/2022    Procedure: COLONOSCOPY;  Surgeon: Sudarshan Gilbert MD;  Location:  ENDOSCOPY    Colonoscopy      Cyst removal N/A 1990    Cyst removed behind back of neck    Cyst removal Right 11/05/2020    Removed from right upper back - just below shoulder    Hernia surgery  July 2022    Other surgical history  06/17/2024    RIGHT VENTRICULOPERITONEAL SHUNT INSERTION    Other surgical history      Head shunt sx    Skin surgery  2020    Vasectomy  1980   [3]   Family History  Problem Relation Age of Onset    Heart Attack Father     Diabetes Mother     Other (DM) Sister     Other (ALS) Brother     No Known Problems Maternal Grandmother     No Known Problems Maternal Grandfather     No Known Problems Paternal Grandmother     No Known Problems Paternal Grandfather     Other (smoker) Brother     Other (MS) Sister     No Known Problems Son     No Known Problems Daughter    [4]   Social History  Socioeconomic History    Marital status:    Tobacco Use    Smoking status: Former     Types: Cigarettes     Passive exposure: Never    Smokeless tobacco: Never    Tobacco comments:     Has not smoked for  about 20 years   Vaping Use    Vaping status: Never Used   Substance and Sexual Activity    Alcohol use: Not Currently     Alcohol/week: 4.0 standard drinks of alcohol     Types: 4 Cans of beer per week    Drug use: Never   Other Topics Concern    Caffeine Concern No    Exercise No    Seat Belt Yes    Special Diet No    Stress Concern Yes    Weight Concern No   [5]   Allergies  Allergen Reactions    Kiwi Extract ITCHING     Inner ears itch   [6] (Not in a hospital admission)

## 2025-06-06 ENCOUNTER — ANESTHESIA EVENT (OUTPATIENT)
Dept: SURGERY | Facility: HOSPITAL | Age: 80
End: 2025-06-06
Payer: MEDICARE

## 2025-06-10 NOTE — H&P
History & Physical Examination    Patient Name: Clay Orellana  MRN: [id number]  [facility]: [id number]  YOB: 1945    Diagnosis: Lumbar stenosis     Present Illness: Clay Orellana is an 80M presenting with back pain worse with standing and walking found on lumbar MRI to have lumbar stenosis at L4-5 worse than L3-4. No changes since last visit with Dr. Pringle    Has been NPO    Stopped his Aspirin and Xarelto 6/4  Denies recent illness such as fevers or GI symptoms.  INR/PTT pending today, last INR 6/4 2.26  CBC and CMP from 6/4 with GFR 39, Cr 1.74    Current Hospital Medications[1]    Allergies: Allergies[2]    Past Medical History[3]  Past Surgical History[4]  Family History[5]  Social History     Tobacco Use    Smoking status: Former     Types: Cigarettes     Passive exposure: Never    Smokeless tobacco: Never    Tobacco comments:     Has not smoked for about 20 years   Substance Use Topics    Alcohol use: Not Currently     Alcohol/week: 4.0 standard drinks of alcohol     Types: 4 Cans of beer per week       SYSTEM Check if Review is Normal Check if Physical Exam is Normal If not normal, please explain:   HEENT [X] [X]    NECK & BACK [X] [X]    HEART [X] [X]    LUNGS [X] [X]    ABDOMEN [X] [X]    UROGENITAL [ ] [ ] deferred   EXTREMITIES [X] [X]    Pedal Pulses        Neurological Exam:  Mental status: Oriented to person, place, and time   Speech: Fluent, no dysarthria  CN: II-XII grossly intact  Memory and comprehension: Intact   Motor: No drift, no focal arm or leg weakness.   Sensory: Intact to light touch    [ x ] I have discussed the risks and benefits and alternatives with the patient/family.  They understand and agree to proceed with plan of care.  [ x ] I have reviewed the History and Physical done within the last 30 days.  Any changes noted above.    LESVIA [facility]  6/11/2025 7:25 AM             [1]   No current facility-administered medications for this encounter.    [2]   Allergies  Allergen Reactions    Kiwi Extract ITCHING     Inner ears itch   [3]   Past Medical History:   Acute nausea with nonbilious vomiting    Anxiety    Arrhythmia    Arthritis    Back problem    Bilateral inguinal hernia without obstruction or gangrene    BPH (benign prostatic hyperplasia)    Calculus of kidney    Essential hypertension    High blood pressure    High cholesterol    Muscle weakness    Neuropathy    Stroke (HCC)    TIA    Visual impairment   [4]   Past Surgical History:  Procedure Laterality Date    Anesth,pacemaker insertion  08/2023    Appendectomy      Appendectomy      Cardiac pacemaker placement      Colonoscopy      Colonoscopy N/A 05/03/2022    Procedure: COLONOSCOPY;  Surgeon: Sudarshan Gilbert MD;  Location:  ENDOSCOPY    Colonoscopy      Cyst removal N/A 1990    Cyst removed behind back of neck    Cyst removal Right 11/05/2020    Removed from right upper back - just below shoulder    Hernia surgery  July 2022    Other surgical history  06/17/2024    RIGHT VENTRICULOPERITONEAL SHUNT INSERTION    Other surgical history      Head shunt sx    Skin surgery  2020    Vasectomy  1980   [5]   Family History  Problem Relation Age of Onset    Heart Attack Father     Diabetes Mother     Other (DM) Sister     Other (ALS) Brother     No Known Problems Maternal Grandmother     No Known Problems Maternal Grandfather     No Known Problems Paternal Grandmother     No Known Problems Paternal Grandfather     Other (smoker) Brother     Other (MS) Sister     No Known Problems Son     No Known Problems Daughter

## 2025-06-11 ENCOUNTER — ANESTHESIA (OUTPATIENT)
Dept: SURGERY | Facility: HOSPITAL | Age: 80
End: 2025-06-11
Payer: MEDICARE

## 2025-06-11 ENCOUNTER — HOSPITAL ENCOUNTER (OUTPATIENT)
Facility: HOSPITAL | Age: 80
Discharge: HOME HEALTH | End: 2025-06-12
Attending: NEUROLOGICAL SURGERY | Admitting: NEUROLOGICAL SURGERY
Payer: MEDICARE

## 2025-06-11 ENCOUNTER — APPOINTMENT (OUTPATIENT)
Dept: GENERAL RADIOLOGY | Facility: HOSPITAL | Age: 80
End: 2025-06-11
Attending: NEUROLOGICAL SURGERY
Payer: MEDICARE

## 2025-06-11 DIAGNOSIS — M48.062 SPINAL STENOSIS OF LUMBAR REGION WITH NEUROGENIC CLAUDICATION: Primary | ICD-10-CM

## 2025-06-11 LAB
APTT PPP: 28.6 SECONDS (ref 23–36)
INR BLD: 1.08 (ref 0.8–1.2)
PROTHROMBIN TIME: 14.1 SECONDS (ref 11.6–14.8)

## 2025-06-11 PROCEDURE — 99214 OFFICE O/P EST MOD 30 MIN: CPT | Performed by: HOSPITALIST

## 2025-06-11 PROCEDURE — 76000 FLUOROSCOPY <1 HR PHYS/QHP: CPT | Performed by: NEUROLOGICAL SURGERY

## 2025-06-11 RX ORDER — ACETAMINOPHEN 500 MG
500 TABLET ORAL EVERY 6 HOURS PRN
Status: DISCONTINUED | OUTPATIENT
Start: 2025-06-11 | End: 2025-06-12

## 2025-06-11 RX ORDER — FLECAINIDE ACETATE 50 MG/1
100 TABLET ORAL 2 TIMES DAILY
Status: DISCONTINUED | OUTPATIENT
Start: 2025-06-11 | End: 2025-06-12

## 2025-06-11 RX ORDER — CHOLECALCIFEROL (VITAMIN D3) 25 MCG
5000 TABLET ORAL
Status: DISCONTINUED | OUTPATIENT
Start: 2025-06-11 | End: 2025-06-12

## 2025-06-11 RX ORDER — CYCLOBENZAPRINE HCL 5 MG
2.5 TABLET ORAL EVERY 6 HOURS PRN
Status: DISCONTINUED | OUTPATIENT
Start: 2025-06-11 | End: 2025-06-12

## 2025-06-11 RX ORDER — GARLIC EXTRACT 500 MG
1 CAPSULE ORAL DAILY
COMMUNITY

## 2025-06-11 RX ORDER — DONEPEZIL HYDROCHLORIDE 10 MG/1
10 TABLET, FILM COATED ORAL NIGHTLY
Status: DISCONTINUED | OUTPATIENT
Start: 2025-06-11 | End: 2025-06-12

## 2025-06-11 RX ORDER — ATORVASTATIN CALCIUM 40 MG/1
40 TABLET, FILM COATED ORAL NIGHTLY
Status: DISCONTINUED | OUTPATIENT
Start: 2025-06-11 | End: 2025-06-12

## 2025-06-11 RX ORDER — OXYCODONE HYDROCHLORIDE 5 MG/1
5 TABLET ORAL EVERY 4 HOURS PRN
Qty: 30 TABLET | Refills: 0 | Status: SHIPPED | OUTPATIENT
Start: 2025-06-11 | End: 2025-06-18

## 2025-06-11 RX ORDER — SODIUM CHLORIDE, SODIUM LACTATE, POTASSIUM CHLORIDE, CALCIUM CHLORIDE 600; 310; 30; 20 MG/100ML; MG/100ML; MG/100ML; MG/100ML
INJECTION, SOLUTION INTRAVENOUS CONTINUOUS
Status: DISCONTINUED | OUTPATIENT
Start: 2025-06-11 | End: 2025-06-11 | Stop reason: HOSPADM

## 2025-06-11 RX ORDER — HYDROMORPHONE HYDROCHLORIDE 1 MG/ML
0.2 INJECTION, SOLUTION INTRAMUSCULAR; INTRAVENOUS; SUBCUTANEOUS EVERY 5 MIN PRN
Status: DISCONTINUED | OUTPATIENT
Start: 2025-06-11 | End: 2025-06-11 | Stop reason: HOSPADM

## 2025-06-11 RX ORDER — TAMSULOSIN HYDROCHLORIDE 0.4 MG/1
0.4 CAPSULE ORAL
Status: DISCONTINUED | OUTPATIENT
Start: 2025-06-11 | End: 2025-06-12

## 2025-06-11 RX ORDER — SERTRALINE HYDROCHLORIDE 25 MG/1
25 TABLET, FILM COATED ORAL DAILY
Status: DISCONTINUED | OUTPATIENT
Start: 2025-06-12 | End: 2025-06-12

## 2025-06-11 RX ORDER — LIDOCAINE HYDROCHLORIDE 10 MG/ML
INJECTION, SOLUTION EPIDURAL; INFILTRATION; INTRACAUDAL; PERINEURAL AS NEEDED
Status: DISCONTINUED | OUTPATIENT
Start: 2025-06-11 | End: 2025-06-11 | Stop reason: SURG

## 2025-06-11 RX ORDER — SPIRONOLACTONE 25 MG/1
25 TABLET ORAL DAILY
Status: DISCONTINUED | OUTPATIENT
Start: 2025-06-11 | End: 2025-06-11

## 2025-06-11 RX ORDER — ONDANSETRON 2 MG/ML
INJECTION INTRAMUSCULAR; INTRAVENOUS AS NEEDED
Status: DISCONTINUED | OUTPATIENT
Start: 2025-06-11 | End: 2025-06-11 | Stop reason: SURG

## 2025-06-11 RX ORDER — BUPIVACAINE HYDROCHLORIDE AND EPINEPHRINE 2.5; 5 MG/ML; UG/ML
INJECTION, SOLUTION EPIDURAL; INFILTRATION; INTRACAUDAL; PERINEURAL AS NEEDED
Status: DISCONTINUED | OUTPATIENT
Start: 2025-06-11 | End: 2025-06-11 | Stop reason: HOSPADM

## 2025-06-11 RX ORDER — DOCUSATE SODIUM 100 MG/1
100 CAPSULE, LIQUID FILLED ORAL 2 TIMES DAILY
Status: DISCONTINUED | OUTPATIENT
Start: 2025-06-11 | End: 2025-06-12

## 2025-06-11 RX ORDER — FINASTERIDE 5 MG/1
5 TABLET, FILM COATED ORAL DAILY
Status: DISCONTINUED | OUTPATIENT
Start: 2025-06-12 | End: 2025-06-12

## 2025-06-11 RX ORDER — DEXAMETHASONE SODIUM PHOSPHATE 4 MG/ML
VIAL (ML) INJECTION AS NEEDED
Status: DISCONTINUED | OUTPATIENT
Start: 2025-06-11 | End: 2025-06-11 | Stop reason: SURG

## 2025-06-11 RX ORDER — SODIUM CHLORIDE, SODIUM LACTATE, POTASSIUM CHLORIDE, CALCIUM CHLORIDE 600; 310; 30; 20 MG/100ML; MG/100ML; MG/100ML; MG/100ML
INJECTION, SOLUTION INTRAVENOUS CONTINUOUS
Status: DISCONTINUED | OUTPATIENT
Start: 2025-06-11 | End: 2025-06-11

## 2025-06-11 RX ORDER — DIPHENHYDRAMINE HYDROCHLORIDE 50 MG/ML
25 INJECTION, SOLUTION INTRAMUSCULAR; INTRAVENOUS EVERY 4 HOURS PRN
Status: DISCONTINUED | OUTPATIENT
Start: 2025-06-11 | End: 2025-06-12

## 2025-06-11 RX ORDER — DIPHENHYDRAMINE HCL 25 MG
25 CAPSULE ORAL EVERY 4 HOURS PRN
Status: DISCONTINUED | OUTPATIENT
Start: 2025-06-11 | End: 2025-06-12

## 2025-06-11 RX ORDER — HYDROMORPHONE HYDROCHLORIDE 1 MG/ML
0.8 INJECTION, SOLUTION INTRAMUSCULAR; INTRAVENOUS; SUBCUTANEOUS EVERY 2 HOUR PRN
Status: DISCONTINUED | OUTPATIENT
Start: 2025-06-11 | End: 2025-06-12

## 2025-06-11 RX ORDER — HYDROMORPHONE HYDROCHLORIDE 1 MG/ML
0.4 INJECTION, SOLUTION INTRAMUSCULAR; INTRAVENOUS; SUBCUTANEOUS EVERY 2 HOUR PRN
Status: DISCONTINUED | OUTPATIENT
Start: 2025-06-11 | End: 2025-06-12

## 2025-06-11 RX ORDER — METOPROLOL TARTRATE 25 MG/1
25 TABLET, FILM COATED ORAL
Status: DISCONTINUED | OUTPATIENT
Start: 2025-06-11 | End: 2025-06-12

## 2025-06-11 RX ORDER — AMLODIPINE BESYLATE 2.5 MG/1
2.5 TABLET ORAL DAILY
Status: DISCONTINUED | OUTPATIENT
Start: 2025-06-12 | End: 2025-06-12

## 2025-06-11 RX ORDER — LABETALOL HYDROCHLORIDE 5 MG/ML
5 INJECTION, SOLUTION INTRAVENOUS EVERY 5 MIN PRN
Status: DISCONTINUED | OUTPATIENT
Start: 2025-06-11 | End: 2025-06-11 | Stop reason: HOSPADM

## 2025-06-11 RX ORDER — SENNOSIDES 8.6 MG
17.2 TABLET ORAL NIGHTLY
Status: DISCONTINUED | OUTPATIENT
Start: 2025-06-11 | End: 2025-06-12

## 2025-06-11 RX ORDER — HYDROMORPHONE HYDROCHLORIDE 1 MG/ML
0.6 INJECTION, SOLUTION INTRAMUSCULAR; INTRAVENOUS; SUBCUTANEOUS EVERY 5 MIN PRN
Status: DISCONTINUED | OUTPATIENT
Start: 2025-06-11 | End: 2025-06-11 | Stop reason: HOSPADM

## 2025-06-11 RX ORDER — ACETAMINOPHEN 500 MG
1000 TABLET ORAL ONCE
Status: DISCONTINUED | OUTPATIENT
Start: 2025-06-11 | End: 2025-06-11 | Stop reason: HOSPADM

## 2025-06-11 RX ORDER — ONDANSETRON 2 MG/ML
4 INJECTION INTRAMUSCULAR; INTRAVENOUS EVERY 6 HOURS PRN
Status: DISCONTINUED | OUTPATIENT
Start: 2025-06-11 | End: 2025-06-11 | Stop reason: HOSPADM

## 2025-06-11 RX ORDER — SODIUM CHLORIDE 9 MG/ML
INJECTION, SOLUTION INTRAVENOUS CONTINUOUS
Status: DISCONTINUED | OUTPATIENT
Start: 2025-06-11 | End: 2025-06-12

## 2025-06-11 RX ORDER — ROCURONIUM BROMIDE 10 MG/ML
INJECTION, SOLUTION INTRAVENOUS AS NEEDED
Status: DISCONTINUED | OUTPATIENT
Start: 2025-06-11 | End: 2025-06-11 | Stop reason: SURG

## 2025-06-11 RX ORDER — ACETAMINOPHEN 10 MG/ML
INJECTION, SOLUTION INTRAVENOUS
Status: COMPLETED
Start: 2025-06-11 | End: 2025-06-11

## 2025-06-11 RX ORDER — MULTIVITAMIN WITH IRON
500 TABLET ORAL DAILY
Status: DISCONTINUED | OUTPATIENT
Start: 2025-06-12 | End: 2025-06-12

## 2025-06-11 RX ORDER — ONDANSETRON 2 MG/ML
4 INJECTION INTRAMUSCULAR; INTRAVENOUS EVERY 6 HOURS PRN
Status: DISCONTINUED | OUTPATIENT
Start: 2025-06-11 | End: 2025-06-12

## 2025-06-11 RX ORDER — BISACODYL 10 MG
10 SUPPOSITORY, RECTAL RECTAL
Status: DISCONTINUED | OUTPATIENT
Start: 2025-06-11 | End: 2025-06-12

## 2025-06-11 RX ORDER — OXYCODONE HYDROCHLORIDE 5 MG/1
5 TABLET ORAL EVERY 4 HOURS PRN
Status: DISCONTINUED | OUTPATIENT
Start: 2025-06-11 | End: 2025-06-12

## 2025-06-11 RX ORDER — ALBUTEROL SULFATE 0.83 MG/ML
2.5 SOLUTION RESPIRATORY (INHALATION) AS NEEDED
Status: DISCONTINUED | OUTPATIENT
Start: 2025-06-11 | End: 2025-06-11 | Stop reason: HOSPADM

## 2025-06-11 RX ORDER — METOCLOPRAMIDE HYDROCHLORIDE 5 MG/ML
5 INJECTION INTRAMUSCULAR; INTRAVENOUS EVERY 8 HOURS PRN
Status: DISCONTINUED | OUTPATIENT
Start: 2025-06-11 | End: 2025-06-12

## 2025-06-11 RX ORDER — CYCLOBENZAPRINE HCL 5 MG
2.5 TABLET ORAL EVERY 6 HOURS PRN
Qty: 30 TABLET | Refills: 0 | Status: SHIPPED | OUTPATIENT
Start: 2025-06-11

## 2025-06-11 RX ORDER — SODIUM PHOSPHATE, DIBASIC AND SODIUM PHOSPHATE, MONOBASIC 7; 19 G/230ML; G/230ML
1 ENEMA RECTAL ONCE AS NEEDED
Status: DISCONTINUED | OUTPATIENT
Start: 2025-06-11 | End: 2025-06-12

## 2025-06-11 RX ORDER — PSEUDOEPHEDRINE HCL 30 MG
100 TABLET ORAL 2 TIMES DAILY
Qty: 30 CAPSULE | Refills: 0 | Status: SHIPPED | OUTPATIENT
Start: 2025-06-11

## 2025-06-11 RX ORDER — ACETAMINOPHEN 10 MG/ML
1000 INJECTION, SOLUTION INTRAVENOUS ONCE AS NEEDED
Status: COMPLETED | OUTPATIENT
Start: 2025-06-11 | End: 2025-06-11

## 2025-06-11 RX ORDER — POLYETHYLENE GLYCOL 3350 17 G/17G
17 POWDER, FOR SOLUTION ORAL DAILY PRN
Status: DISCONTINUED | OUTPATIENT
Start: 2025-06-11 | End: 2025-06-12

## 2025-06-11 RX ORDER — NALOXONE HYDROCHLORIDE 0.4 MG/ML
0.08 INJECTION, SOLUTION INTRAMUSCULAR; INTRAVENOUS; SUBCUTANEOUS AS NEEDED
Status: DISCONTINUED | OUTPATIENT
Start: 2025-06-11 | End: 2025-06-11 | Stop reason: HOSPADM

## 2025-06-11 RX ORDER — SPIRONOLACTONE 25 MG/1
25 TABLET ORAL
Status: DISCONTINUED | OUTPATIENT
Start: 2025-06-13 | End: 2025-06-12

## 2025-06-11 RX ORDER — DIAZEPAM 10 MG/2ML
2.5 INJECTION, SOLUTION INTRAMUSCULAR; INTRAVENOUS ONCE
Status: DISCONTINUED | OUTPATIENT
Start: 2025-06-11 | End: 2025-06-11 | Stop reason: HOSPADM

## 2025-06-11 RX ORDER — EPHEDRINE SULFATE 50 MG/ML
INJECTION INTRAVENOUS AS NEEDED
Status: DISCONTINUED | OUTPATIENT
Start: 2025-06-11 | End: 2025-06-11 | Stop reason: SURG

## 2025-06-11 RX ORDER — DOXEPIN HYDROCHLORIDE 50 MG/1
1 CAPSULE ORAL DAILY
Status: DISCONTINUED | OUTPATIENT
Start: 2025-06-11 | End: 2025-06-12

## 2025-06-11 RX ORDER — FLUTICASONE PROPIONATE AND SALMETEROL 250; 50 UG/1; UG/1
1 POWDER RESPIRATORY (INHALATION) 2 TIMES DAILY
Status: DISCONTINUED | OUTPATIENT
Start: 2025-06-12 | End: 2025-06-12

## 2025-06-11 RX ORDER — FUROSEMIDE 20 MG/1
20 TABLET ORAL DAILY PRN
Status: DISCONTINUED | OUTPATIENT
Start: 2025-06-11 | End: 2025-06-12

## 2025-06-11 RX ORDER — OXYCODONE HYDROCHLORIDE 10 MG/1
10 TABLET ORAL EVERY 4 HOURS PRN
Status: DISCONTINUED | OUTPATIENT
Start: 2025-06-11 | End: 2025-06-12

## 2025-06-11 RX ORDER — HYDROMORPHONE HYDROCHLORIDE 1 MG/ML
INJECTION, SOLUTION INTRAMUSCULAR; INTRAVENOUS; SUBCUTANEOUS
Status: COMPLETED
Start: 2025-06-11 | End: 2025-06-11

## 2025-06-11 RX ORDER — LACTOBACILLUS ACIDOPHILUS 500MM CELL
1 CAPSULE ORAL DAILY
Status: DISCONTINUED | OUTPATIENT
Start: 2025-06-11 | End: 2025-06-12

## 2025-06-11 RX ORDER — HYDROMORPHONE HYDROCHLORIDE 1 MG/ML
0.4 INJECTION, SOLUTION INTRAMUSCULAR; INTRAVENOUS; SUBCUTANEOUS EVERY 5 MIN PRN
Status: DISCONTINUED | OUTPATIENT
Start: 2025-06-11 | End: 2025-06-11 | Stop reason: HOSPADM

## 2025-06-11 RX ADMIN — DEXAMETHASONE SODIUM PHOSPHATE 4 MG: 4 MG/ML VIAL (ML) INJECTION at 08:52:00

## 2025-06-11 RX ADMIN — SODIUM CHLORIDE, SODIUM LACTATE, POTASSIUM CHLORIDE, CALCIUM CHLORIDE: 600; 310; 30; 20 INJECTION, SOLUTION INTRAVENOUS at 10:11:00

## 2025-06-11 RX ADMIN — LIDOCAINE HYDROCHLORIDE 50 MG: 10 INJECTION, SOLUTION EPIDURAL; INFILTRATION; INTRACAUDAL; PERINEURAL at 08:16:00

## 2025-06-11 RX ADMIN — EPHEDRINE SULFATE 5 MG: 50 INJECTION INTRAVENOUS at 09:24:00

## 2025-06-11 RX ADMIN — ONDANSETRON 4 MG: 2 INJECTION INTRAMUSCULAR; INTRAVENOUS at 09:59:00

## 2025-06-11 RX ADMIN — ROCURONIUM BROMIDE 5 MG: 10 INJECTION, SOLUTION INTRAVENOUS at 08:16:00

## 2025-06-11 NOTE — ANESTHESIA POSTPROCEDURE EVALUATION
[facility]    Clay Orellana Patient Status:  Outpatient in a Bed   Age/Gender 80 year old male MRN [id number]   Location [facility] SURGERY Attending Brian Pringle MD   Hosp Day # 0 PCP Sachin Meza MD       Anesthesia Post-op Note    LUMBAR 3-  LUMBAR 4 LAMINECTOMIES WITH MEDIAL FACETECTOMIES, PARTIAL LUMBAR 5 LAMINECTOMY WITH MEDIAL FACETECTOMY, NEURO MONITORING    Procedure Summary       Date: 06/11/25 Room / Location:  MAIN OR [address] [place] [address] MAIN OR    Anesthesia Start: 0811 Anesthesia Stop: 1015    Procedures:       LUMBAR 3-  LUMBAR 4 LAMINECTOMIES WITH MEDIAL FACETECTOMIES, PARTIAL LUMBAR 5 LAMINECTOMY WITH MEDIAL FACETECTOMY, NEURO MONITORING (Spine Lumbar)      INTRAOPERATIVE NEURO MONITORING (Spine Lumbar) Diagnosis:       Lumbar stenosis with neurogenic claudication      (Lumbar stenosis with neurogenic claudication [M48.062])    Surgeons: Brian Pringle MD Anesthesiologist: Ariel Machado MD    Anesthesia Type: general ASA Status: 3            Anesthesia Type: general    Vitals Value Taken Time   /56 06/11/25 10:16   Temp 97.2 06/11/25 10:16   Pulse 60 06/11/25 10:16   Resp 11 06/11/25 10:16   SpO2 96 06/11/25 10:16           Patient Location: PACU    Anesthesia Type: general    Airway Patency: patent and extubated    Postop Pain Control: adequate    Nausea/Vomiting: none    Cardiopulmonary/Hydration status: stable euvolemic    Complications: no apparent anesthesia related complications    Postop vital signs: stable    Dental Exam: Unchanged from Preop    Patient to be discharged from PACU when criteria met.

## 2025-06-11 NOTE — BRIEF OP NOTE
Pre-Operative Diagnosis: Lumbar stenosis with neurogenic claudication [M48.062]     Post-Operative Diagnosis: Lumbar stenosis with neurogenic claudication [M48.062]      Procedure Performed:   LUMBAR 3-  LUMBAR 4 LAMINECTOMIES WITH MEDIAL FACETECTOMIES, PARTIAL LUMBAR 5 LAMINECTOMY WITH MEDIAL FACETECTOMY, NEURO MONITORING    Surgeons and Role:     * Brian Pringle MD - Primary    Assistant(s):  Surgical Assistant.: Yvonne Fuentes, RAMA     Surgical Findings: See dictation     Specimen: None     Estimated Blood Loss: Blood Output: 30 mL (6/11/2025  9:43 AM)        Brian Pringle MD  6/11/2025  10:13 AM

## 2025-06-11 NOTE — CONSULTS
[place] [place] HOSPITALIST  CONSULT     Clay Orellana Patient Status:  Outpatient in a Bed    1945 MRN [id number]   Location [facility] 3SW-A Attending Brian Pringle MD   Hosp Day # 0 PCP Sachin Meza MD     Reason for consult: medical management     Requested by: Dr. Pringle     Subjective:   History of Present Illness:     Clay Orellana is a 80 year old male with a PMH of HTN, HLD, TIA and BPH who presents for a surgical procedure.    Patient seen post procedure, doing well.  Pain controlled, no n/v.  Denies recent fever, cough, congestion.  No other acute complaints.      History/Other:    Past Medical History:  Past Medical History[1]  Past Surgical History:   Past Surgical History[2]   Family History:   Family History[3]  Social History:    reports that he has quit smoking. His smoking use included cigarettes. He has never been exposed to tobacco smoke. He has never used smokeless tobacco. He reports that he does not currently use alcohol after a past usage of about 4.0 standard drinks of alcohol per week. He reports that he does not use drugs.     Allergies: Allergies[4]    Medications:  Medications Ordered Prior to Encounter[5]    Review of Systems:   A comprehensive review of systems was completed.    Pertinent positives and negatives noted in the HPI.    Objective:   Physical Exam:    BP (!) 173/76 (BP Location: Left arm)   Pulse 60   Temp 97.9 °F (36.6 °C) (Oral)   Resp 18   Ht 5' 11\" (1.803 m)   Wt 222 lb 6.4 oz (100.9 kg)   SpO2 96%   BMI 31.02 kg/m²   General: No acute distress, Alert  Respiratory: No rhonchi, no wheezes  Cardiovascular: S1, S2. Regular rate and rhythm  Abdomen: Soft, NT/ND, +BS  Neuro: No new focal deficits  Extremities: No edema    Results:    Labs:      Labs Last 24 Hours:  Recent Labs   Lab 25  0726   INR 1.08       No results for input(s): \"GLU\", \"BUN\", \"CREATSERUM\", \"GFRAA\", \"GFRNAA\", \"CA\", \"ALB\", \"NA\", \"K\", \"CL\", \"CO2\", \"ALKPHO\", \"AST\", \"ALT\", \"BILT\",  \"TP\" in the last 168 hours.    Recent Labs   Lab 06/11/25  0726   PTP 14.1   INR 1.08       No results for input(s): \"TROP\", \"CK\" in the last 168 hours.      Imaging: Imaging data reviewed in Epic.    Assessment & Plan:      #Lumbar stenosis  S/p L3-L4 laminectomy   Post op care per neurosurgery  PT/OT  Pain control, anti-emetics  SCD for dvt proph    #Essential HTN  Amlodipine    #Paroxysmal a. Fib  Flecainide,  metoprolol  Xarelto on hold - resume once cleared by surgery     #COPD  No wheeze  Trelegy     #TIA hx  ASA on hold  Statin    #HLD  Statin    #Dementia   Flecainide     #Depression  Sertraline     #BPH  Flomax         Plan of care discussed with patient, family, nurse     Rashard Pires MD  6/11/2025    The 21st Century Cures Act makes medical notes like these available to patients in the interest of transparency. Please be advised this is a medical document. Medical documents are intended to carry relevant information, facts as evident, and the clinical opinion of the practitioner. The medical note is intended as peer to peer communication and may appear blunt or direct. It is written in medical language and may contain abbreviations or verbiage that are unfamiliar.            [1]   Past Medical History:   Acute nausea with nonbilious vomiting    Anxiety    Arrhythmia    Arthritis    Back problem    Bilateral inguinal hernia without obstruction or gangrene    BPH (benign prostatic hyperplasia)    Calculus of kidney    Essential hypertension    High blood pressure    High cholesterol    Muscle weakness    Neuropathy    Stroke (HCC)    TIA    Visual impairment   [2]   Past Surgical History:  Procedure Laterality Date    Anesth,pacemaker insertion  08/2023    Appendectomy      Appendectomy      Cardiac pacemaker placement      Colonoscopy      Colonoscopy N/A 05/03/2022    Procedure: COLONOSCOPY;  Surgeon: Sudarshan Gilbert MD;  Location:  ENDOSCOPY    Colonoscopy      Cyst removal N/A 1990    Cyst removed behind  back of neck    Cyst removal Right 11/05/2020    Removed from right upper back - just below shoulder    Hernia surgery  July 2022    Other surgical history  06/17/2024    RIGHT VENTRICULOPERITONEAL SHUNT INSERTION    Other surgical history      Head shunt sx    Skin surgery  2020    Vasectomy  1980   [3]   Family History  Problem Relation Age of Onset    Heart Attack Father     Diabetes Mother     Other (DM) Sister     Other (ALS) Brother     No Known Problems Maternal Grandmother     No Known Problems Maternal Grandfather     No Known Problems Paternal Grandmother     No Known Problems Paternal Grandfather     Other (smoker) Brother     Other (MS) Sister     No Known Problems Son     No Known Problems Daughter    [4]   Allergies  Allergen Reactions    Kiwi Extract ITCHING     Inner ears itch   [5]   No current facility-administered medications on file prior to encounter.     Current Outpatient Medications on File Prior to Encounter   Medication Sig Dispense Refill    acidophilus-pectin Oral Cap Take 1 capsule by mouth daily.      fluticasone-umeclidin-vilant (TRELEGY ELLIPTA) 100-62.5-25 MCG/ACT Inhalation Aerosol Powder, Breath Activated Inhale 1 puff into the lungs daily. 180 each 3    flecainide 100 MG Oral Tab Take 1 tablet (100 mg total) by mouth 2 (two) times daily. 180 tablet 3    tamsulosin 0.4 MG Oral Cap Take 1 capsule (0.4 mg total) by mouth After dinner. 90 capsule 3    finasteride 5 MG Oral Tab Take 1 tablet (5 mg total) by mouth daily. 90 tablet 2    sertraline 25 MG Oral Tab Take 1 tablet (25 mg total) by mouth daily. 90 tablet 3    spironolactone 25 MG Oral Tab 1 tab po MWF 90 tablet 3    amLODIPine 2.5 MG Oral Tab Take 1 tablet (2.5 mg total) by mouth daily.      aspirin 81 MG Oral Tab EC Take 1 tablet (81 mg total) by mouth in the morning.      Cholecalciferol 125 MCG (5000 UT) Oral Tab Take 1 tablet (5,000 Units total) by mouth 3 (three) times a week. Three times a week. MWF      cyanocobalamin  500 MCG Oral Tab Take 1 tablet (500 mcg total) by mouth in the morning.      Multiple Vitamins-Minerals (MULTI-VITAMIN/MINERALS) Oral Tab Take 1 tablet by mouth in the morning.      acetaminophen 500 MG Oral Tab Take 1 tablet (500 mg total) by mouth every 6 (six) hours as needed.      furosemide 20 MG Oral Tab Take 1 tablet (20 mg total) by mouth daily. (Patient taking differently: Take 1 tablet (20 mg total) by mouth daily as needed.)

## 2025-06-11 NOTE — ANESTHESIA PROCEDURE NOTES
Airway  Date/Time: 6/11/2025 8:20 AM  Reason: elective      General Information and Staff   Patient location during procedure: OR  Anesthesiologist: Ariel Machado MD  Performed: anesthesiologist   Performed by: Ariel Machado MD  Authorized by: Ariel Machado MD        Indications and Patient Condition  Indications for airway management: anesthesia  Sedation level: deep      Preoxygenated: yesPatient position: sniffing    Mask difficulty assessment: 1 - vent by mask    Final Airway Details    Final airway type: endotracheal airway    Successful airway: ETT  Cuffed: yes   Successful intubation technique: direct laryngoscopy  Endotracheal tube insertion site: oral  Blade: GlideScope  Blade size: #4  ETT size (mm): 7.5    Cormack-Lehane Classification: grade I - full view of glottis  Placement verified by: capnometry   Measured from: lips  Number of attempts at approach: 1    Additional Comments  Glidescope was used due to patient's neck stiffness and limited ROM. Patient trachea dive posteriorly right after vocal cord. Mild challenging advancing ETT.

## 2025-06-11 NOTE — PLAN OF CARE
Patient A&Ox4, VSS on room air. Patient is reporting that his pain is controlled. Scant amount of drainage proximal aspect of surgical site, 3x3 dressing placed. Patient standing at bedside and voiding. Plan for PT/OT to see.

## 2025-06-11 NOTE — OPERATIVE REPORT
Operative Note    Patient Name: Clay Orellana    Preoperative Diagnosis: Lumbar stenosis with neurogenic claudication [M48.062]    Postoperative Diagnosis: same    Primary Surgeon: Brian Pringle MD    Assistant: Yvonne Fuentes RSA     Procedures: LUMBAR 3- LUMBAR 4 LAMINECTOMIES WITH MEDIAL FACETECTOMIES, PARTIAL LUMBAR 5 LAMINECTOMY WITH MEDIAL FACETECTOMY, NEURO MONITORING     Surgical Findings: See dictation    Anesthesia: General    Complications: none    Implants: None    Specimen: None    Drains: None    Condition: Stable    Estimated Blood Loss: 30 mL    Indication for Procedure: 80-year-old gentleman with lumbar stenosis and neurogenic claudication.  Patient failed nonoperative treatments.  Patient was seen in clinic and after several discussion was to proceed with surgical decompression.    Procedure: Patient properly identified and brought back to the OR from 16.  Patient placed under general anesthesia by anesthesia staff.  Patient placed on the OR table, positioned and all pressure points padded.  Fluoroscopy shot to confirm the levels.  Patient was sterilely prepped and draped in usual fashion.  10 mL of quarter percent Marcaine with epinephrine was given as local anesthetic.  Incision made and dissection down to fascia.  A subperiosteal dissection was performed along L3, L4 and the top of L5.  Deep retractors were placed.  The spinous processes of L3 and L4 were removed with rongeurs.  The inferior lamina of L4 was delineated with a curette.  We then used Kerrisons curettes to perform a laminectomy.  Once was done the dura was nicely decompressed centrally.  We then took off the superior part of L5.  Once this was complete the central decompression was complete.  Then turned attention to the lateral gutters.  Both sides at L3-4 were severely stenotic.  But we turned our attention to 23,34 and 4 5 lateral recesses.  The medial facetectomies were completed.  Once this was complete  the dura was nicely decompressed and the gutters were nicely decompressed.  Hemostasis was achieved.  Fluoroscopy confirmed above and below are compression sites.  The wound was then closed in layers.  The patient was taken off the OR table.  Patient awakened by anesthesia and taken recovery    Dictated but not proofread

## 2025-06-11 NOTE — ANESTHESIA PROCEDURE NOTES
Arterial Line    Date/Time: 6/11/2025 8:22 AM    Performed by: Ariel Machado MD  Authorized by: Ariel Machado MD    General Information and Staff    Procedure Start:  6/11/2025 8:22 AM  Procedure End:  6/11/2025 8:25 AM  Anesthesiologist:  Ariel Machado MD  Performed By:  Anesthesiologist  Patient Location:  OR  Indication: continuous blood pressure monitoring and blood sampling needed    Site Identification: real time ultrasound guided and surface landmarks    Preanesthetic Checklist: 2 patient identifiers, IV checked, risks and benefits discussed, monitors and equipment checked, pre-op evaluation, timeout performed, anesthesia consent and sterile technique used    Procedure Details    Catheter Size:  20 G  Catheter Length:  1 and 3/4 inch  Catheter Type:  Arrow  Seldinger Technique?: Yes    Laterality:  Left  Site:  Radial artery  Site Prep: chlorhexidine    Line Secured:  Tape and Tegaderm    Assessment    Events: patient tolerated procedure well with no complications      Medications  6/11/2025 8:22 AM      Additional Comments    Confirmed patency of ulnar artery prior to cannulation of radial artery.

## 2025-06-12 VITALS
OXYGEN SATURATION: 96 % | BODY MASS INDEX: 31.13 KG/M2 | HEART RATE: 61 BPM | DIASTOLIC BLOOD PRESSURE: 61 MMHG | HEIGHT: 71 IN | WEIGHT: 222.38 LBS | RESPIRATION RATE: 18 BRPM | TEMPERATURE: 98 F | SYSTOLIC BLOOD PRESSURE: 121 MMHG

## 2025-06-12 LAB
ANION GAP SERPL CALC-SCNC: 12 MMOL/L (ref 0–18)
BUN BLD-MCNC: 19 MG/DL (ref 9–23)
CALCIUM BLD-MCNC: 8.6 MG/DL (ref 8.7–10.6)
CHLORIDE SERPL-SCNC: 102 MMOL/L (ref 98–112)
CO2 SERPL-SCNC: 21 MMOL/L (ref 21–32)
CREAT BLD-MCNC: 1.23 MG/DL (ref 0.7–1.3)
EGFRCR SERPLBLD CKD-EPI 2021: 59 ML/MIN/1.73M2 (ref 60–?)
ERYTHROCYTE [DISTWIDTH] IN BLOOD BY AUTOMATED COUNT: 12.9 %
GLUCOSE BLD-MCNC: 132 MG/DL (ref 70–99)
HCT VFR BLD AUTO: 35 % (ref 39–53)
HGB BLD-MCNC: 12.1 G/DL (ref 13–17.5)
MAGNESIUM SERPL-MCNC: 1.9 MG/DL (ref 1.6–2.6)
MCH RBC QN AUTO: 32.6 PG (ref 26–34)
MCHC RBC AUTO-ENTMCNC: 34.6 G/DL (ref 31–37)
MCV RBC AUTO: 94.3 FL (ref 80–100)
OSMOLALITY SERPL CALC.SUM OF ELEC: 284 MOSM/KG (ref 275–295)
PLATELET # BLD AUTO: 241 10(3)UL (ref 150–450)
POTASSIUM SERPL-SCNC: 4.7 MMOL/L (ref 3.5–5.1)
RBC # BLD AUTO: 3.71 X10(6)UL (ref 3.8–5.8)
SODIUM SERPL-SCNC: 135 MMOL/L (ref 136–145)
WBC # BLD AUTO: 17.3 X10(3) UL (ref 4–11)

## 2025-06-12 PROCEDURE — 99214 OFFICE O/P EST MOD 30 MIN: CPT | Performed by: HOSPITALIST

## 2025-06-12 RX ORDER — ASPIRIN 81 MG/1
81 TABLET ORAL DAILY
Status: SHIPPED | COMMUNITY
Start: 2025-06-18

## 2025-06-12 RX ORDER — ACETAMINOPHEN 500 MG
1000 TABLET ORAL EVERY 6 HOURS PRN
Status: DISCONTINUED | OUTPATIENT
Start: 2025-06-12 | End: 2025-06-12

## 2025-06-12 NOTE — CM/SW NOTE
06/12/25 1225   Choice of Post-Acute Provider   Informed patient of right to choose their preferred provider Yes   List of appropriate post-acute services provided to patient/family with quality data No - Declined list   Patient/family choice Epic    Information given to Patient;Spouse/Significant other;Daughter       Received notification that pt is current with [facility]. Contacted agency and received confirmation pt is current with their agency and services can be resumed at DC. Met with pt/family at bedside and pt did confirm his preference to resume Epic HH at DC. No other needs/concerns at this time. / to remain available for support and/or discharge planning.     Katarzyna Carballo, [facility]  Discharge Planner  [phone]

## 2025-06-12 NOTE — PROGRESS NOTES
[facility]  Neurosurgery Progress Note    Clay Orellana Patient Status:  Outpatient in a Bed    1945 MRN [id number]   Location [facility] 3SW-A Attending Brian Pringle MD   Hosp Day # 0 PCP Sachin Meza MD     PRINCIPLE PROBLEM:   POD #1 s/p L3-L4 laminectomies with medial facetectomies, partial L5 laminectomy with medial facetectomy    SUBJECTIVE     Patient resting comfortably in bed. He is feeling well overall. Reports minimal pain; states he has only taken Tylenol thus far. Had some nausea overnight but now resolved. He would like to go home today.     OBJECTIVE/PHYSICAL EXAM     VITALS:  /61 (BP Location: Left arm)   Pulse 59   Temp 97.7 °F (36.5 °C) (Oral)   Resp 18   Ht 71\"   Wt 222 lb 6.4 oz (100.9 kg)   SpO2 96%   BMI 31.02 kg/m²     GENERAL: No acute distress, non-toxic appearing, mood appropriate    HEENT: Normocephalic, atraumatic    RESP:  Respirations non-labored, even    CV:  NSR on tele    NEURO: Alert and oriented x3.  Sensation to light touch is intact bilaterally.  BALDWIN x 4. Gait deferred.     SKIN: Surgical incision approximated with glue, remains C/D/I without drainage, erythema, edema.    LABS     Lab Results   Component Value Date    WBC 17.3 2025    HGB 12.1 2025    HCT 35.0 2025    .0 2025    CREATSERUM 1.23 2025    BUN 19 2025     2025    K 4.7 2025     2025    CO2 21.0 2025     2025    CA 8.6 2025    MG 1.9 2025     IMAGING     XR FLUOROSCOPY C-ARM TIME LESS THAN 1 HOUR (CPT=76000)  Result Date: 2025  CONCLUSION:  Fluoroscopy and images are provided for and L4 laminectomies.  The final film reveals a probe posterior to the L5 pedicle and posterior the L2-3 disc space level.  laminectomy.   LOCATION:  [place]    Dictated by (CST): German Hernández MD on 2025 at 10:27 AM     Finalized by (CST): German Hernández MD on 2025 at 10:29 AM        ASSESSMENT & PLAN     ASSESSMENT:  POD #1 s/p L3-L4 laminectomies with medial facetectomies, partial L5 laminectomy with medial facetectomy    PLAN:  Pain medications as ordered  Encourage use of IS  Bowel regimen as needed  PT/OT  Medical management per hospitalist  DVT prophylaxis - SCDs, TEDs. May start DVT chemoprophlyaxis on POD #5.  Okay to DC from a Neurosurgical standpoint once cleared by other services  Discharge instructions reviewed with patient  F/u appointments scheduled  Medications sent to [facility]    The above plan was discussed with Dr. Pringle.    A total of 10 minutes of visit time (exclusible of billable procedures) was administered.  >50 % of time spent counseling/coordinating care.  Is this a shared or split note between Advanced Practice Provider and Physician? Yes    Dori Ramirez, APRN-NP  [facility]  6/12/2025, 8:47 AM    Patient seen examined with nurse practitioner  Case discussed  He is doing well  His legs feel good  He would like to go home  Will get PT OT  Okay to discharge later today  Instructions given

## 2025-06-12 NOTE — PLAN OF CARE
Aox4. VSS. On Room air. . IS encouraged. Telemetry monitoring. SCDs on BLE. Voiding to bathroom. Tolerating diet. Pain and nausea controlled with prn meds. Dressing to back C/D/I. Ambulating with GB and walker x1 assist. Plan is to see PT/OT. POC reviewed with pt and safety measures in place.

## 2025-06-12 NOTE — DISCHARGE INSTRUCTIONS
Sometimes managing your health at home requires assistance.  The [facility] team has recognized your preference to use [facility]. They can be reached by phone at [phone]. The fax number for your reference is [phone].. A representative from the home health agency will contact you or your family to schedule your first visit.      Hold  XARELTO until POD 5 (6/16/25)    Lumbar Laminectomy Discharge Instructions  (Dr. Pringle)    Activity  Restrictions  No twisting, pulling, pushing or lifting > 10 lbs.  No lifting anything over your head.  No bending at the waist  - you can bend at the knees but keep your back straight.    Sitting  Sit with your knees at about the same level as your hips; use a footstool if needed.  Do not sit in soft or overstuffed chairs. Firm chairs with straight backs give better support.   Recliners are OK but may need to add pillows in order to not sink into the chair.  Use a raised toilet seat if needed.  Change position every 20-30 minutes when sitting (example: after sitting, stand, then you can sit again for another 20-30 minutes).    Walking is your best exercise.  Listen to your body.  Walk several times a day  Smaller distances are better.  Your goal is to increase the distance you walk each day.  Remember to listen to your body    Sex  After 2 weeks, when comfortable and approved by your surgeon.  Stop if causing pain.    Driving  Usually allowed after  2-3 weeks;  check with physician at first office visit.  Do Not drive while taking narcotics or muscle relaxants.  Adhere to sitting restrictions.    Stairs  Climb stairs as needed    Incision site care and dressing  Changes as directed by your surgeon    IF DERMABOND (GLUE)  May leave open to air or apply and change dressing once a day using dry sterile gauze and paper tape. May prevent clothing from rubbing incision.   Watch incision for any redness, drainage, increased warmth or opening of the incision.    Call surgeon if you notice any of these.  No lotions or ointments on or near incision.     Always wash hands before and after dressing changes.                                Bathing  No tub baths, pools, or saunas for 6 weeks and until cleared by surgeon.  When able to shower, you may remove gauze dressing beforehand.  After showering, pat incision dry and may apply new dry dressing.  Do not apply any lotions or ointments to incision.         Return to work  When cleared by surgeon. Discuss specific work activities with your surgeon at follow up visit.  Light to sedentary work may be possible 2-3 weeks post op  Heavy work may be possible 6 weeks post op.    Sleeping  Use a firm mattress.  Lay on side or back, not stomach.  May use pillow under knees, between legs or behind back if lying on your side.  Log roll to turn.    Diet and constipation prevention  Drink six to eight glasses liquid (water, juice) per day.  Eat a high fiber diet (bran, vegetables, fruit).  Use an over the counter stool softener such as Colace or senakot while taking narcotics to prevent constipation; use laxatives such as Miralax or Milk of Magnesia as needed.  An enema or suppository may be necessary if above measures do not work.        No smoking  Smoking will inhibit healing.  Even one cigarette a day will cause problems.  Chewing tobacco, nicotine gum or patches will also inhibit healing.      Pain Management  Relaxation techniques  A way to focus your attention other than on your pain. Your brain makes endorphins that are a natural body chemical that can decrease pain. Some examples of relaxation techniques are deep breathing, listening to music or meditating.  May use Cold therapy for 20 minutes multiple times per day.  Be sure there is a cloth barrier between skin and cold therapy.  Medication  No NSAIDS until OK with your surgeon.   NSAIDS (non-steroidal anti- inflammatory) include: Motrin, ibuprofen, Advil, Aleve, Naprosyn, Indocin,  Nuprin, Vioxx, Celebrex, Bextra.(COMPLETE LIST IN GUIDEBOOK APPENDIX)  Tylenol (acetaminophen) is okay. Caution if your pain med contains Tylenol (acetaminophen); maximum 3 gms of Tylenol (acetaminophen) in 24 hours.  A single aspirin for the heart is ok if ordered by your physician.  Take muscle relaxants and pain medications as prescribed allowing 30-45 minutes to take effect.  Do NOT take alcohol while on pain medication.  Monitor need for pain medication closely  Call pharmacy or physician office at least five days before out of pain medication. If calling physician office after 3 pm, it will be handled on the next business day.    Post op office visit  Schedule 2 weeks after surgery with surgeon as directed at discharge  Schedule one week follow up with Primary Care Physician. Review all medications.      When to contact your surgeon  Temp > 101F; Take your temperature twice a day  Increased pain, swelling, redness, or any drainage to incision.  Separation of incision.  Sudden reappearance of pain that won’t go away with pain medication.  New numbness or weakness to arms or legs.  Difficulty urinating or having bowel movements  Headaches that worsen when standing and resolve when laying flat.    Go directly to the ER or CALL 911 if you:  become short of breath  have chest pain  cough up blood  have unexplained anxiety with breathing     Never smoker

## 2025-06-12 NOTE — PROGRESS NOTES
[facility]   part of [facility]     Hospitalist Progress Note     Clay Orellana Patient Status:  Outpatient in a Bed    1945 MRN [id number]   Location [facility] 3SW-A Attending Brian Pringle MD   Hosp Day # 0 PCP Sachin Meza MD     Chief Complaint: neck pain    Subjective:     Patient is feeling well today, pain is controlled.  No n/v, no cp or sob.  No other complaints.      Objective:    Review of Systems:   A comprehensive review of systems was completed; pertinent positive and negatives stated in subjective.    Vital signs:  Temp:  [97.2 °F (36.2 °C)-97.7 °F (36.5 °C)] 97.7 °F (36.5 °C)  Pulse:  [59-63] 61  Resp:  [18] 18  BP: (121-160)/(58-66) 121/61  SpO2:  [92 %-96 %] 96 %    Physical Exam:    General: No acute distress  Respiratory: No wheezes, no rhonchi  Cardiovascular: S1, S2, regular rate and rhythm  Abdomen: Soft, Non-tender, non-distended, positive bowel sounds  Neuro: No new focal deficits.   Extremities: No edema    Diagnostic Data:    Labs:  Recent Labs   Lab 25  0726 25  0405   WBC  --  17.3*   HGB  --  12.1*   MCV  --  94.3   PLT  --  241.0   INR 1.08  --        Recent Labs   Lab 25  0405   *   BUN 19   CREATSERUM 1.23   CA 8.6*   *   K 4.7      CO2 21.0       Estimated Glomerular Filtration Rate: 59 mL/min/1.73m2 (A) (result from lab).    No results for input(s): \"TROP\", \"TROPHS\", \"CK\" in the last 168 hours.    Recent Labs   Lab 25  0726   PTP 14.1   INR 1.08                  Microbiology    No results found for this visit on 25.      Imaging: Reviewed in Epic.    Medications: Scheduled Medications[1]    Assessment & Plan:      Medically cleared for discharge if okay with primary team    #Lumbar stenosis  S/p L3-L4 laminectomy   Post op care per neurosurgery  PT/OT  Pain control, anti-emetics  SCD for dvt proph     #Essential HTN  Amlodipine    #Leukocytosis  Reactive  Afebrile, no cough, no dysuria  Monitor off abx      #Paroxysmal a. Fib  Flecainide,  metoprolol  Xarelto on hold - resume once cleared by surgery      #COPD  No wheeze  Trelegy      #TIA hx  ASA on hold  Statin     #HLD  Statin     #Dementia   Flecainide      #Depression  Sertraline      #BPH  Flomax         Rashard Pires MD    Supplementary Documentation:     Quality:  DVT Mechanical Prophylaxis: FAB hose, SCDs,    DVT Pharmacologic Prophylaxis   Medication   None                Code Status: Full Code  Dotson: No urinary catheter in place  Dotson Duration (in days):   Central line:    JAQUELINE: 6/12/2025    Discharge is dependent on: PT/OT  At this point Mr. Orellana is expected to be discharge to: Home    The 21st Century Cures Act makes medical notes like these available to patients in the interest of transparency. Please be advised this is a medical document. Medical documents are intended to carry relevant information, facts as evident, and the clinical opinion of the practitioner. The medical note is intended as peer to peer communication and may appear blunt or direct. It is written in medical language and may contain abbreviations or verbiage that are unfamiliar.                         [1]    acidophilus  1 capsule Oral Daily    amLODIPine  2.5 mg Oral Daily    atorvastatin  40 mg Oral Nightly    cholecalciferol  5,000 Units Oral Once per day on Monday Wednesday Friday    cyanocobalamin  500 mcg Oral Daily    donepezil  10 mg Oral Nightly    finasteride  5 mg Oral Daily    flecainide  100 mg Oral BID    metoprolol tartrate  25 mg Oral 2x Daily(Beta Blocker)    multivitamin  1 tablet Oral Daily    sertraline  25 mg Oral Daily    tamsulosin  0.4 mg Oral After dinner    sennosides  17.2 mg Oral Nightly    docusate sodium  100 mg Oral BID    [START ON 6/13/2025] spironolactone  25 mg Oral Once per day on Monday Wednesday Friday

## 2025-06-12 NOTE — CM/SW NOTE
06/12/25 1100   CM/SW Referral Data   Referral Source Social Work (self-referral)   Reason for Referral Discharge planning   Informant EMR;Clinical Staff Member   Discharge Needs   Anticipated D/C needs Home health care       Patient is an 81 y/o man admitted s/p lumbar lami. Informed by therapy that pt would benefit from [facility] services at DC. Referral sent to  providers via [employer]. Await responses for further DC Planning. / to remain available for support and/or discharge planning.     Katarzyna Carballo [facility]  Discharge Planner  [phone]

## 2025-06-12 NOTE — OCCUPATIONAL THERAPY NOTE
OCCUPATIONAL THERAPY EVALUATION - INPATIENT     Room Number: 369/369-A  Evaluation Date: 6/12/2025  Type of Evaluation: Initial  Presenting Problem: s/p LUMBAR 3-  LUMBAR 4 LAMINECTOMIES WITH MEDIAL FACETECTOMIES, PARTIAL LUMBAR 5 LAMINECTOMY WITH MEDIAL FACETECTOMY, NEURO MONITORING 6/11 Dr. Pringle    Physician Order: IP Consult to Occupational Therapy  Reason for Therapy: ADL/IADL Dysfunction and Discharge Planning    OCCUPATIONAL THERAPY ASSESSMENT   Patient is currently functioning near baseline with toileting, upper body dressing, lower body dressing, bed mobility, transfers, static sitting balance, dynamic sitting balance, static standing balance, dynamic standing balance, maintaining seated position, functional standing tolerance, energy conservation strategies, and aerobic capacity. Prior to admission, patient's baseline is modIND with dressing and toileting, has assist with bathing 2x/week from  nurse.  Patient is requiring minimum assistance as a result of the following impairments: decreased functional strength, decreased functional reach, decreased endurance, pain, and impaired standing balance. Occupational Therapy will continue to follow for duration of hospitalization.    Patient will benefit from continued skilled OT Services at discharge to promote prior level of function and safety with additional support and return home with home health OT    History Related to Current Admission: Patient is a 80 year old male admitted on 6/11/2025 with Presenting Problem: s/p LUMBAR 3-  LUMBAR 4 LAMINECTOMIES WITH MEDIAL FACETECTOMIES, PARTIAL LUMBAR 5 LAMINECTOMY WITH MEDIAL FACETECTOMY, NEURO MONITORING 6/11 Dr. Pringle. Co-Morbidities : HTN, HLD, TIA    WEIGHT BEARING RESTRICTION       Recommendations for nursing staff:   Transfers: up x 1 assist, CGA-Ally, RW  Toileting location: Toilet with raised over toilet commode    EVALUATION SESSION:  Patient Start of Session: semi supine in bed    FUNCTIONAL  TRANSFER ASSESSMENT  Sit to Stand: Edge of Bed; Chair  Edge of Bed: Contact Guard Assist  Chair: Contact Guard Assist  Toilet Transfer: Contact Guard Assist    BED MOBILITY  Supine to Sit : Minimal Assist  Sit to Supine (OT): Not Tested  Scooting: Supervision    BALANCE ASSESSMENT  Static Sitting: Supervision  Static Standing: Contact Guard Assist    FUNCTIONAL ADL ASSESSMENT  Grooming Seated: Modified Independent (combed hair)  UB Dressing Seated: Minimal Assist (to pull down shirt)  LB Dressing Seated: Minimal Assist (to guide feet through pant hole)  Toileting Seated: Supervision    ACTIVITY TOLERANCE: WFL; pt participated in dynamic standing activity to increase tolerance for standing activity/household distances; pt tolerated ~ 8 min with use of Rw and CGA, safely seated in chair at end of session.                         O2 SATURATIONS       COGNITION  Overall Cognitive Status:  WFL - within functional limits    Upper Extremity   ROM: within functional limits  Strength: within functional limits    EDUCATION PROVIDED  Patient Education : Role of Occupational Therapy; Plan of Care; Discharge Recommendations; DME Recommendations; Functional Transfer Techniques; Fall Prevention; Surgical Precautions; Posture/Positioning; Energy Conservation; Proper Body Mechanics  Patient's Response to Education: Verbalized Understanding; Requires Further Education  Family/Caregiver Education : -- (Same as above)  Family/Caregiver's Response to Education: Verbalized Understanding    Equipment used: RW, chair follow for safety  Would benefit from additional trial yes     Therapist comments: RN cleared pt for session, received semi supine in bed. Pt educated on spinal precautions to incorporate into ADL to promote safety and healing during ADL, pt verbalized understanding, demonstrated good active recall of precautions throughout session. Pt educated on use of AE/DME to promote precautions and safety during ADL, pt verbalized  understanding and in agreement, will continue to further educate pt in next sessions. Pt spouse reports she is able to assist pt as needed with recovery.     Patient End of Session: Up in chair, Needs met, Call light within reach, RN aware of session/findings, All patient questions and concerns addressed, Hospital anti-slip socks, Alarm set, Family present    OCCUPATIONAL PROFILE    HOME SITUATION  Type of Home: Independent living facility ([facility])  Home Layout: One level (elevator)  Lives With: Spouse, Staff 24 hours    Toilet and Equipment: Comfort height toilet  Shower/Tub and Equipment: Walk-in shower, Shower chair, Grab bar  Other Equipment: Reacher (uses a rollator for longer distances, RW for around apartment)          Drives: No (dtr drives)       Prior Level of Function: modIND with ADLs, has assist with bathing 2x week, can manage dressing and toileting on his own. Pt uses a rollator for mobility.     SUBJECTIVE   \"We call it an all-inclusive resort\" re: [facility]    PAIN ASSESSMENT  Rating: Unable to rate  Location: lower back pain with OOB mobility  Management Techniques: Repositioning, Breathing techniques, Body mechanics    OBJECTIVE  Precautions: Spine  Fall Risk: High fall risk    ASSESSMENTS    AM-PAC ‘6-Clicks’ Inpatient Daily Activity Short Form  -   Putting on and taking off regular lower body clothing?: A Little  -   Bathing (including washing, rinsing, drying)?: A Little  -   Toileting, which includes using toilet, bedpan or urinal? : None  -   Putting on and taking off regular upper body clothing?: A Little  -   Taking care of personal grooming such as brushing teeth?: None  -   Eating meals?: None    AM-PAC Score:  Score: 21  Approx Degree of Impairment: 32.79%  Standardized Score (AM-PAC Scale): 44.27    ADDITIONAL TESTS     NEUROLOGICAL FINDINGS      COGNITION ASSESSMENTS     PLAN  OT Device Recommendations: Reacher, Long-handled shoehorn  OT Treatment Plan: Balance  activities, Energy conservation/work simplification techniques, ADL training, IADL training, Functional transfer training, UE strengthening/ROM, Endurance training, Patient/Family education, Patient/Family training, Equipment eval/education, Compensatory technique education, Continued evaluation  Rehab Potential : Good  Frequency: 3-5x/week  Number of Visits to Meet Established Goals: 2    ADL Goals   Patient will perform lower body dressing:  with supervision and with adaptive equipment PRN  Patient will perform toileting: with supervision    Functional Transfer Goals  Patient will transfer from supine to sit:  with supervision  Patient will transfer to toilet:  with supervision    Additional Goals  Pt will state spinal precautions and adhere during ADL.    Patient Evaluation Complexity Level:   Occupational Profile/Medical History LOW - Brief history including review of medical or therapy records    Specific performance deficits impacting engagement in ADL/IADL LOW  1 - 3 performance deficits    Client Assessment/Performance Deficits MODERATE - Comorbidities and min to mod modifications of tasks    Clinical Decision Making LOW - Analysis of occupational profile, problem-focused assessments, limited treatment options    Overall Complexity LOW     OT Session Time: 25 minutes  Self-Care Home Management: 15 minutes

## 2025-06-12 NOTE — PROGRESS NOTES
AVS reviewed, IV removed , meds delivered , family at bedside, will dc home w/ [facility], family at bedside , Xarelto to begin on pod#5 & baby ASA to restart on pod#6.

## 2025-06-12 NOTE — PLAN OF CARE
A&oriented x4 . VSS. . IS encouraged. Telemetry monitoring. SCDs. Ankle pumps encouraged. Tolerating diet. Voiding. Pain managed with prn Tylenol. Incision to back KRISTY. Ambulating with x1 assist and RW. Plan is DC home . Patient updated and in agreement with plan of care. Safety precautions in place. Instructed patient to call for assistance, call light within reach.

## 2025-06-12 NOTE — PHYSICAL THERAPY NOTE
PHYSICAL THERAPY EVALUATION - INPATIENT     Room Number: 369/369-A  Evaluation Date: 6/12/2025  Type of Evaluation: Initial  Physician Order: PT Eval and Treat    Presenting Problem: s/p L3-L4 laminectomies with medial facetectomies, partial L5 laminectomy with medial facetectomy  Co-Morbidities : HTN, HLD, TIA  Reason for Therapy: Mobility Dysfunction and Discharge Planning    PHYSICAL THERAPY ASSESSMENT   Patient is a 80 year old male admitted 6/11/2025 for s/p L3-L4 laminectomies with medial facetectomies, partial L5 laminectomy with medial facetectomy.  Prior to admission, patient's baseline is Mod I.  Patient is currently functioning near baseline with bed mobility, transfers, and gait.  Patient is requiring contact guard assist as a result of the following impairments: decreased endurance/aerobic capacity, pain, impaired gait balance, and decreased muscular endurance.  Physical Therapy will continue to follow for duration of hospitalization.    Patient will benefit from continued skilled PT Services at discharge to promote prior level of function.  Anticipate patient will return home with home health PT.    PLAN DURING HOSPITALIZATION  Nursing Mobility Recommendation : 1 Assist  PT Device Recommendation: Rolling walker  PT Treatment Plan: Bed mobility, Body mechanics, Gait training, Strengthening, Stair training, Transfer training, Balance training  Rehab Potential : Good  Frequency (Obs): 3-5x/week     CURRENT GOALS    Goal #1 Patient is able to demonstrate supine - sit EOB @ level: supervision     Goal #2 Patient is able to demonstrate transfers EOB to/from C at assistance level: supervision     Goal #3 Patient is able to ambulate 150 feet with assist device: walker - rolling at assistance level: supervision     Goal #4    Goal #5    Goal #6    Goal Comments: Goals established on 6/12/2025      PHYSICAL THERAPY MEDICAL/SOCIAL HISTORY  History related to current admission: Patient is a 80 year old male  admitted on 6/11/2025 from Home for s/p L3-L4 laminectomies with medial facetectomies, partial L5 laminectomy with medial facetectomy.      HOME SITUATION  Type of Home: Independent living facility ([facility])  Home Layout: One level (elevator)                     Lives With: Spouse, Staff 24 hours    Drives: No (dtr drives)          Prior Level of [place]: Pt states that he lives in an ILF. Pt reports that he uses a RW in his room, and a rollator in the community/hallways. Pt states that he receives showering assistance but is mostly Mod I for dressing.    SUBJECTIVE  \"I feel better\"    OBJECTIVE  Precautions: Spine  Fall Risk: High fall risk    WEIGHT BEARING RESTRICTION     PAIN ASSESSMENT  Rating: Unable to rate  Location: site of the incision  Management Techniques: Activity promotion    COGNITION  Overall Cognitive Status:  WFL - within functional limits    RANGE OF MOTION AND STRENGTH ASSESSMENT  Upper extremity ROM and strength are within functional limits     Lower extremity ROM is within functional limits     Lower extremity strength is within functional limits     BALANCE  Static Sitting: Fair +  Dynamic Sitting: Fair +  Static Standing: Fair  Dynamic Standing: Fair -    ADDITIONAL TESTS                                    ACTIVITY TOLERANCE                         O2 WALK       NEUROLOGICAL FINDINGS                        AM-PAC '6-Clicks' INPATIENT SHORT FORM - BASIC MOBILITY  How much difficulty does the patient currently have...  Patient Difficulty: Turning over in bed (including adjusting bedclothes, sheets and blankets)?: A Little   Patient Difficulty: Sitting down on and standing up from a chair with arms (e.g., wheelchair, bedside commode, etc.): A Little   Patient Difficulty: Moving from lying on back to sitting on the side of the bed?: A Little   How much help from another person does the patient currently need...   Help from Another: Moving to and from a bed to a chair  (including a wheelchair)?: A Little   Help from Another: Need to walk in hospital room?: A Little   Help from Another: Climbing 3-5 steps with a railing?: A Little     AM-PAC Score:  Raw Score: 18   Approx Degree of Impairment: 46.58%   Standardized Score (AM-PAC Scale): 43.63   CMS Modifier (G-Code): CK    FUNCTIONAL ABILITY STATUS  Gait Assessment   Functional Mobility/Gait Assessment  Gait Assistance: Contact guard assist  Distance (ft): 75,75  Assistive Device: Rolling walker  Pattern: Shuffle    Skilled Therapy Provided     Bed Mobility:  Rolling: NT  Supine to sit: CGA   Sit to supine: NT     Transfer Mobility:  Sit to stand: CGA   Stand to sit: CGA  Gait = CGA 75ft x 2 using RW     Therapist's Comments: Pt was observed with no LOB when ambulating. Pt did require 1 seated rest break while ambulating.     Exercise/Education Provided:  Bed mobility  Body mechanics  Gait training  Transfer training    Patient End of Session: Up in chair, Needs met, Call light within reach, RN aware of session/findings, All patient questions and concerns addressed, Alarm set      Patient Evaluation Complexity Level:  History Moderate - 1 or 2 personal factors and/or co-morbidities   Examination of body systems Low -  addressing 1-2 elements   Clinical Presentation Low- Stable   Clinical Decision Making Low Complexity       PT Session Time: 23 minutes  Therapeutic Activity: 15 minutes

## 2025-06-17 ENCOUNTER — HOME HEALTH CHARGES (OUTPATIENT)
Dept: FAMILY MEDICINE CLINIC | Facility: CLINIC | Age: 80
End: 2025-06-17

## 2025-06-17 DIAGNOSIS — M48.062 SPINAL STENOSIS, LUMBAR REGION WITH NEUROGENIC CLAUDICATION: Primary | ICD-10-CM

## 2025-06-17 DIAGNOSIS — G91.2 IDIOPATHIC NORMAL PRESSURE HYDROCEPHALUS (INPH) (HCC): ICD-10-CM

## 2025-06-18 ENCOUNTER — TELEPHONE (OUTPATIENT)
Dept: FAMILY MEDICINE CLINIC | Facility: CLINIC | Age: 80
End: 2025-06-18

## 2025-06-18 ENCOUNTER — TELEMEDICINE (OUTPATIENT)
Dept: FAMILY MEDICINE CLINIC | Facility: CLINIC | Age: 80
End: 2025-06-18
Payer: MEDICARE

## 2025-06-18 DIAGNOSIS — N39.42 URINARY INCONTINENCE WITHOUT SENSORY AWARENESS: ICD-10-CM

## 2025-06-18 DIAGNOSIS — G89.18 POSTOPERATIVE PAIN AFTER SPINAL SURGERY: ICD-10-CM

## 2025-06-18 DIAGNOSIS — Z98.890 S/P SPINAL SURGERY: ICD-10-CM

## 2025-06-18 DIAGNOSIS — M48.062 SPINAL STENOSIS OF LUMBAR REGION WITH NEUROGENIC CLAUDICATION: Primary | ICD-10-CM

## 2025-06-18 PROCEDURE — 99495 TRANSJ CARE MGMT MOD F2F 14D: CPT | Performed by: FAMILY MEDICINE

## 2025-06-18 RX ORDER — OXYCODONE HYDROCHLORIDE 5 MG/1
5 TABLET ORAL EVERY 4 HOURS PRN
Qty: 60 TABLET | Refills: 0 | Status: SHIPPED | OUTPATIENT
Start: 2025-06-18

## 2025-06-18 NOTE — PROGRESS NOTES
Please note that the following visit was completed using two-way, real-time interactive audio and video communication.  This has been done in good [place] to provide continuity of care in the best interest of the provider-patient relationship, due to the ongoing public health crisis/national emergency and because of restrictions of visitation.  There are limitations of this visit, as no physical exam could be performed.  Visit was planned and structured to allow sufficient time to address the issues presented.  Billing for this visit takes into account review of history, labs, medications, radiology tests , consultations and/or decision making.  Appropriate medical decision-making and tests are ordered as detailed in the assessment and plan of care.       Subjective:   Clay Orellana is a 80 year old male who presents for hospital follow up.   He was discharged from Inpatient hospital, [facility] to Assisted living   Admit Date: 6/11/2025  Discharge Date: 6/12/2025  Hospital Discharge Diagnosis:  Spinal stenosis of lumbar region with neurogenic claudication  S/p  LUMBAR 3-  LUMBAR 4 LAMINECTOMIES WITH MEDIAL FACETECTOMIES, PARTIAL LUMBAR 5 LAMINECTOMY WITH MEDIAL FACETECTOMY, NEURO MONITORING       Interactive contact within 2 business days post discharge first initiated on Date: 6/16/2025    I accessed [employer] and/or Care Everywhere and personally reviewed the following for the recent hospitalization: provider notes, consults, summaries, labs and other test results and the pertinent findings are documented below.     History of Present Illness  Clay Orellana is an 80 year old male who presents with postoperative pain management following recent back surgery.    He underwent back surgery and is experiencing significant postoperative pain. He has been taking oxycodone 5 mg every four hours, initially starting at a lower dose but increasing due to pain severity. He is about to run out of his prescription, having only  four pills left. He also takes Tylenol, sometimes two at a time, alongside oxycodone.    He started taking aspirin and Xarelto recently, with the first dose being either yesterday or the day before. He was discharged from the hospital on June 12, 2025, after a one-night stay.    He reports difficulty with urination, experiencing leakage and only passing small amounts when he does urinate. Despite this, he is able to get up and go to the bathroom, although it is uncomfortable. No other urinary problems are reported.    He also experiences neck and shoulder pain with tingling in his fingers, which he associates with the upper spine. This was not expected to be resolved by the recent surgery.        History/Other:   Current Medications:  Medication Reconciliation:  I am aware of an inpatient discharge within the last 30 days.  The discharge medication list has been reconciled with the patient's current medication list and reviewed by me. See medication list for additions of new medication, and changes to current doses of medications and discontinued medications.  Active Meds, Sig Only[1]    Review of Systems: As per HPI  GENERAL: weight stable, energy stable, no sweating  SKIN: denies any unusual skin lesions  EYES: denies blurred vision or double vision  HEENT: denies nasal congestion, sinus pain or ST  LUNGS: denies shortness of breath with exertion  CARDIOVASCULAR: denies chest pain on exertion or palpitations  GI: denies abdominal pain, denies heartburn, denies diarrhea  MUSCULOSKELETAL: denies pain, normal range of motion of extremities  NEURO: denies headaches, denies dizziness, denies weakness  PSYCHE: denies depression or anxiety  HEMATOLOGIC: denies hx of anemia, or bruising, denies bleeding  ENDOCRINE: denies thyroid history  ALL/ASTHMA: denies hx of allergy or asthma    Objective:   No LMP for male patient.  Estimated body mass index is 31.02 kg/m² as calculated from the following:    Height as of 6/11/25:  5' 11\" (1.803 m).    Weight as of 6/11/25: 222 lb 6.4 oz (100.9 kg).   There were no vitals taken for this visit.   Physical Exam:  alert, appears stated age and cooperative, Normocephalic, without obvious abnormality, atraumatic, Speaking in full sentences comfortably, Normal work of breathing, Grossly normal and age appropriate, normal, logical connections and person, place and time/date     Assessment & Plan:   1. Spinal stenosis of lumbar region with neurogenic claudication (Primary)  -     oxyCODONE HCl; Take 1 tablet (5 mg total) by mouth every 4 (four) hours as needed for Pain.  Dispense: 60 tablet; Refill: 0  2. S/P spinal surgery  3. Postoperative pain after spinal surgery  4. Urinary incontinence without sensory awareness  Significant postoperative pain following recent back surgery, primarily in the back and exacerbated by movement. Slight improvement noted. Currently on oxycodone and acetaminophen for pain management, with plans to reduce medication as recovery progresses.  - Prescribe 60 tablets of oxycodone 5 mg to [facility] pharmacy in [place].  - Instruct to take one acetaminophen with oxycodone, not exceeding 2 grams of acetaminophen per day.    Postoperative physical therapy  Requires postoperative physical therapy following back surgery. Awaiting specific orders from the neurosurgeon regarding activity restrictions. Emphasized importance of initiating therapy soon to prevent scar tissue formation.  - Instruct to contact [facility] to initiate physical therapy and ensure he contacts the neurosurgeon for specific therapy orders.  A referral for home health with PT was sent prior to the surgery    Urinary incontinence  Urinary incontinence characterized by leakage and low volume urination, likely related to recent back surgery due to nerve involvement and swelling. Anticipates improvement as swelling decreases.  - Encourage activity to aid recovery.    Neck and shoulder pain  Ongoing neck and shoulder  pain with tingling in the fingers, attributed to upper spine issues. Recent back surgery not expected to address this issue. Considering future evaluation.    Follow-up  Follow-up appointment with neurosurgeon, Dr. Ruiz, scheduled for June 24th.  - Ensure follow-up with neurosurgeon on June 24th.          No follow-ups on file.          [1]   Outpatient Medications Marked as Taking for the 6/18/25 encounter (Telemedicine) with Sachin Meza MD   Medication Sig    oxyCODONE 5 MG Oral Tab Take 1 tablet (5 mg total) by mouth every 4 (four) hours as needed for Pain.

## 2025-06-18 NOTE — TELEPHONE ENCOUNTER
LOV today    No orders for PT   OV note not complete   Please advise on orders, need to fax to HH

## 2025-06-24 ENCOUNTER — OFFICE VISIT (OUTPATIENT)
Dept: SURGERY | Facility: CLINIC | Age: 80
End: 2025-06-24
Payer: MEDICARE

## 2025-06-24 VITALS
DIASTOLIC BLOOD PRESSURE: 60 MMHG | SYSTOLIC BLOOD PRESSURE: 104 MMHG | HEIGHT: 71 IN | WEIGHT: 222 LBS | TEMPERATURE: 97 F | HEART RATE: 60 BPM | BODY MASS INDEX: 31.08 KG/M2 | OXYGEN SATURATION: 95 %

## 2025-06-24 DIAGNOSIS — Z98.890 S/P LUMBAR LAMINECTOMY: Primary | ICD-10-CM

## 2025-06-24 DIAGNOSIS — Z98.890 POST-OPERATIVE STATE: ICD-10-CM

## 2025-06-24 DIAGNOSIS — Z98.2 S/P VP SHUNT: ICD-10-CM

## 2025-06-24 PROCEDURE — 99024 POSTOP FOLLOW-UP VISIT: CPT | Performed by: PHYSICIAN ASSISTANT

## 2025-06-24 NOTE — PATIENT INSTRUCTIONS
Refill policies:    Allow 2-3 business days for refills; controlled substances may take longer.  Contact your pharmacy at least 5 days prior to running out of medication and have them send an electronic request or submit request through the “request refill” option in your [employer] account.  Refills are not addressed on weekends; covering physicians do not authorize routine medications on weekends.  No narcotics or controlled substances are refilled after noon on Fridays or by on call physicians.  By law, narcotics must be electronically prescribed.  A 30 day supply with no refills is the maximum allowed.  If your prescription is due for a refill, you may be due for a follow up appointment.  To best provide you care, patients receiving routine medications need to be seen at least once a year.  Patients receiving narcotic/controlled substance medications need to be seen at least once every 3 months.  In the event that your preferred pharmacy does not have the requested medication in stock (e.g. Backordered), it is your responsibility to find another pharmacy that has the requested medication available.  We will gladly send a new prescription to that pharmacy at your request.    Scheduling Tests:    If your physician has ordered radiology tests such as MRI or CT scans, please contact Central Scheduling at [phone] right away to schedule the test.  Once scheduled, the [facility] Centralized Referral Team will work with your insurance carrier to obtain pre-certification or prior authorization.  Depending on your insurance carrier, approval may take 3-10 days.  It is highly recommended patients assure they have received an authorization before having a test performed.  If test is done without insurance authorization, patient may be responsible for the entire amount billed.      Precertification and Prior Authorizations:  If your physician has recommended that you have a procedure or additional testing performed the [facility]  Centralized Referral Team will contact your insurance carrier to obtain pre-certification or prior authorization.    You are strongly encouraged to contact your insurance carrier to verify that your procedure/test has been approved and is a COVERED benefit.  Although the [facility] Centralized Referral Team does its due diligence, the insurance carrier gives the disclaimer that \"Although the procedure is authorized, this does not guarantee payment.\"    Ultimately the patient is responsible for payment.   Thank you for your understanding in this matter.  Paperwork Completion:  If you require FMLA or disability paperwork for your recovery, please make sure to either drop it off or have it faxed to our office at [phone]. Be sure the form has your name and date of birth on it.  The form will be faxed to our Forms Department and they will complete it for you.  There is a 25$ fee for all forms that need to be filled out.  Please be aware there is a 10-14 day turnaround time.  You will need to sign a release of information (ZAKIYA) form if your paperwork does not come with one.  You may call the Forms Department with any questions at [phone].  Their fax number is [phone].

## 2025-06-24 NOTE — PROGRESS NOTES
Established patient:  Reason for follow up:   2 week post op, sx 06/11/25    Numeric Rating Scale:        Pain at Present: 0/10

## 2025-07-01 ENCOUNTER — TELEPHONE (OUTPATIENT)
Dept: FAMILY MEDICINE CLINIC | Facility: CLINIC | Age: 80
End: 2025-07-01

## 2025-07-01 NOTE — TELEPHONE ENCOUNTER
Received a fax from [facility] requesting refill on patient's oxycodone.  Reviewed chart.  Patient is three weeks post op for Laminectomies with medial facetectomies. Ordered initially by Neurosurgery.  One refill given by Dr. Meza on 6/18/25 during Telemedicine visit for postoperative pain.  Patient was seen in the office by Neurosurgery on 6/24/25. No mention of pain assessment in office visit note.  Called Frieda durán at [facility] and advised Dr. Meza is out of the office.  Instructed to call Neurosurgery for post op pain medication recommendations. Nurse verbalizes understanding.

## 2025-07-22 ENCOUNTER — OFFICE VISIT (OUTPATIENT)
Dept: SURGERY | Facility: CLINIC | Age: 80
End: 2025-07-22
Payer: MEDICARE

## 2025-07-22 VITALS
OXYGEN SATURATION: 95 % | WEIGHT: 222 LBS | SYSTOLIC BLOOD PRESSURE: 114 MMHG | HEIGHT: 71 IN | DIASTOLIC BLOOD PRESSURE: 62 MMHG | BODY MASS INDEX: 31.08 KG/M2 | HEART RATE: 60 BPM

## 2025-07-22 DIAGNOSIS — M48.062 LUMBAR STENOSIS WITH NEUROGENIC CLAUDICATION: Primary | ICD-10-CM

## 2025-07-22 PROBLEM — F03.90 DEMENTIA (HCC): Status: RESOLVED | Noted: 2024-06-17 | Resolved: 2025-07-22

## 2025-07-22 PROBLEM — R41.3 MEMORY IMPAIRMENT: Status: RESOLVED | Noted: 2024-06-17 | Resolved: 2025-07-22

## 2025-07-22 PROCEDURE — 99024 POSTOP FOLLOW-UP VISIT: CPT | Performed by: NEUROLOGICAL SURGERY

## 2025-07-22 NOTE — PATIENT INSTRUCTIONS
Refill policies:    Allow 2-3 business days for refills; controlled substances may take longer.  Contact your pharmacy at least 5 days prior to running out of medication and have them send an electronic request or submit request through the “request refill” option in your [employer] account.  Refills are not addressed on weekends; covering physicians do not authorize routine medications on weekends.  No narcotics or controlled substances are refilled after noon on Fridays or by on call physicians.  By law, narcotics must be electronically prescribed.  A 30 day supply with no refills is the maximum allowed.  If your prescription is due for a refill, you may be due for a follow up appointment.  To best provide you care, patients receiving routine medications need to be seen at least once a year.  Patients receiving narcotic/controlled substance medications need to be seen at least once every 3 months.  In the event that your preferred pharmacy does not have the requested medication in stock (e.g. Backordered), it is your responsibility to find another pharmacy that has the requested medication available.  We will gladly send a new prescription to that pharmacy at your request.    Scheduling Tests:    If your physician has ordered radiology tests such as MRI or CT scans, please contact Central Scheduling at [phone] right away to schedule the test.  Once scheduled, the [facility] Centralized Referral Team will work with your insurance carrier to obtain pre-certification or prior authorization.  Depending on your insurance carrier, approval may take 3-10 days.  It is highly recommended patients assure they have received an authorization before having a test performed.  If test is done without insurance authorization, patient may be responsible for the entire amount billed.      Precertification and Prior Authorizations:  If your physician has recommended that you have a procedure or additional testing performed the [facility]  Centralized Referral Team will contact your insurance carrier to obtain pre-certification or prior authorization.    You are strongly encouraged to contact your insurance carrier to verify that your procedure/test has been approved and is a COVERED benefit.  Although the [facility] Centralized Referral Team does its due diligence, the insurance carrier gives the disclaimer that \"Although the procedure is authorized, this does not guarantee payment.\"    Ultimately the patient is responsible for payment.   Thank you for your understanding in this matter.  Paperwork Completion:  If you require FMLA or disability paperwork for your recovery, please make sure to either drop it off or have it faxed to our office at [phone]. Be sure the form has your name and date of birth on it.  The form will be faxed to our Forms Department and they will complete it for you.  There is a 25$ fee for all forms that need to be filled out.  Please be aware there is a 10-14 day turnaround time.  You will need to sign a release of information (ZAKIYA) form if your paperwork does not come with one.  You may call the Forms Department with any questions at [phone].  Their fax number is [phone].

## 2025-07-22 NOTE — PROGRESS NOTES
Established patient:  Reason for follow up:   6 week post op, sx 06/11/25  Pt is doing well, denies any new pain or symptoms. Pt continues to report low back/leg pain when walking or standing.     Numeric Rating Scale:        Pain at Present: 0/10

## 2025-07-22 NOTE — PROGRESS NOTES
Neurosurgery Clinic Visit  2025    Clay Orellana PCP:  Sachin Meza MD    1945 MRN [id number]     HISTORY OF PRESENT ILLNESS:  Clay Orellana is a(n) 80 year old male here for 6-week follow-up status post lumbar laminectomies  He is doing pretty well  He has noticed some improvement in his walking  His family is noted that he is moving around the primary much better  He is here for follow-up      PHYSICAL EXAMINATION:  Vital Signs:  /62   Pulse 60   Ht 71\"   Wt 222 lb (100.7 kg)   SpO2 95%   BMI 30.96 kg/m²   Awake alert, orient x 3  Follows commands x 4  Incision well-healed    REVIEW OF STUDIES:    None      ASSESSMENT and PLAN:  78-year-old gentleman status post lumbar laminectomies  He is doing very well  I removed his restrictions  He like to do more home therapy  An order was written  Will see her back in 3 months see how he is doing  All questions were answered  Patient and family appreciative        Time spent on counseling/coordination of care:  15 Minutes    Total time spent with patient:  15 Minutes      Brian Pringle MD   [facility] [place]  2025  9:52 AM   Dictated but not proofread

## 2025-08-11 RX ORDER — FINASTERIDE 5 MG/1
5 TABLET, FILM COATED ORAL DAILY
Qty: 90 TABLET | Refills: 3 | Status: SHIPPED | OUTPATIENT
Start: 2025-08-11

## 2025-08-19 ENCOUNTER — HOME HEALTH CHARGES (OUTPATIENT)
Dept: FAMILY MEDICINE CLINIC | Facility: CLINIC | Age: 80
End: 2025-08-19

## 2025-08-19 DIAGNOSIS — G91.2 IDIOPATHIC NORMAL PRESSURE HYDROCEPHALUS (INPH) (HCC): ICD-10-CM

## 2025-08-19 DIAGNOSIS — M48.062 PSEUDOCLAUDICATION SYNDROME: Primary | ICD-10-CM

## 2025-09-07 ENCOUNTER — HOME HEALTH CHARGES (OUTPATIENT)
Dept: FAMILY MEDICINE CLINIC | Facility: CLINIC | Age: 80
End: 2025-09-07

## 2025-09-07 DIAGNOSIS — G91.2 IDIOPATHIC NORMAL PRESSURE HYDROCEPHALUS (INPH) (HCC): ICD-10-CM

## 2025-09-07 DIAGNOSIS — M48.062 PSEUDOCLAUDICATION SYNDROME: Primary | ICD-10-CM

## (undated) DIAGNOSIS — R35.1 NOCTURIA: ICD-10-CM

## (undated) DIAGNOSIS — K40.20 BILATERAL INGUINAL HERNIA WITHOUT OBSTRUCTION OR GANGRENE, RECURRENCE NOT SPECIFIED: Primary | ICD-10-CM

## (undated) DIAGNOSIS — R39.12 WEAK URINARY STREAM: ICD-10-CM

## (undated) DIAGNOSIS — N40.0 ENLARGED PROSTATE: Primary | ICD-10-CM

## (undated) DIAGNOSIS — R39.9 LOWER URINARY TRACT SYMPTOMS: ICD-10-CM

## (undated) DIAGNOSIS — Z02.9 ENCOUNTERS FOR UNSPECIFIED ADMINISTRATIVE PURPOSE: ICD-10-CM

## (undated) DIAGNOSIS — I48.91 ATRIAL FIBRILLATION WITH RVR (HCC): ICD-10-CM

## (undated) DIAGNOSIS — R35.0 FREQUENT URINATION: Primary | ICD-10-CM

## (undated) DEVICE — STANDARD HYPODERMIC NEEDLE,POLYPROPYLENE HUB: Brand: MONOJECT

## (undated) DEVICE — SPONGE,NEURO,0.5"X1.5",XR,STRL,LF,10/PK: Brand: MEDLINE

## (undated) DEVICE — TROCAR: Brand: KII FIOS FIRST ENTRY

## (undated) DEVICE — SOLUTION IRRIG 1000ML 0.9% NACL USP BTL

## (undated) DEVICE — 40580 - THE PINK PAD - ADVANCED TRENDELENBURG POSITIONING KIT: Brand: 40580 - THE PINK PAD - ADVANCED TRENDELENBURG POSITIONING KIT

## (undated) DEVICE — MARKER SKIN PREP RESIST STRL

## (undated) DEVICE — PROXIMATE SKIN STAPLERS (35 WIDE) CONTAINS 35 STAINLESS STEEL STAPLES (FIXED HEAD): Brand: PROXIMATE

## (undated) DEVICE — DRAPE,TOWEL,LARGE,INVISISHIELD: Brand: MEDLINE

## (undated) DEVICE — ZZ- DISC- NOSUB-SOLUTION RUBBING 4OZ 70% ISO ALC CLR

## (undated) DEVICE — GLOVE,SURG,SENSICARE,ALOE,LF,PF,7: Brand: MEDLINE

## (undated) DEVICE — COVER,LIGHT,CAMERA,HARD,1/PK,STRL: Brand: MEDLINE

## (undated) DEVICE — TROCAR: Brand: KII SHIELDED BLADED ACCESS SYSTEM

## (undated) DEVICE — MINOP DISPOSABLE INTRODUCER 19FR: Brand: AESCULAP

## (undated) DEVICE — SUT VCRL RAPIDE 3-0 18IN ABSRB UD L19MM

## (undated) DEVICE — BANDAGE ADH 1INX3IN NAT FAB N ADH PD CURAD

## (undated) DEVICE — SLEEVE COMPR MD KNEE LEN SGL USE KENDALL SCD

## (undated) DEVICE — ENDOPATH ULTRA VERESS INSUFFLATION NEEDLES WITH LUER LOCK CONNECTORS: Brand: ENDOPATH

## (undated) DEVICE — GRABBER GRASPER TIP, DISPOSABLE: Brand: RENEW

## (undated) DEVICE — GLOVE SUR 7.5 SENSICARE PI PIP GRN PWD F

## (undated) DEVICE — GLOVE,SURG,SENSICARE SLT,LF,PF,8: Brand: MEDLINE

## (undated) DEVICE — SLEEVE KENDALL SCD EXPRESS MED

## (undated) DEVICE — MONOFILAMENT ABSORBABLE SUTURE: Brand: MAXON

## (undated) DEVICE — PAIN TRAY: Brand: MEDLINE INDUSTRIES, INC.

## (undated) DEVICE — TROCAR: Brand: KII® SLEEVE

## (undated) DEVICE — SUT VICRYL 5-0 PC-1 J834G

## (undated) DEVICE — CATHETER IV 14GA L5.25IN ANGIOCATH STR FEP

## (undated) DEVICE — 9.0MM ACORN

## (undated) DEVICE — E-Z CLEAN, NON-STICK, PTFE COATED, ELECTROSURGICAL BLADE ELECTRODE, MODIFIED EXTENDED INSULATION, 4 INCH (10.2 CM): Brand: MEGADYNE

## (undated) DEVICE — GENERAL LAPAROS CDS-LF: Brand: MEDLINE INDUSTRIES, INC.

## (undated) DEVICE — GOWN SUR 2XL LEV 4 BLU W/ WHT V NK BND AERO

## (undated) DEVICE — SUT COAT VCRL+ 3-0 18IN RB-1 ABSRB VLT ANTIBA

## (undated) DEVICE — SKIN REG/FINE DUAL MARKER, RULER, LABELS: Brand: MEDLINE

## (undated) DEVICE — HUNTER GASPER TIP, DISPOSABLE: Brand: RENEW

## (undated) DEVICE — HOOK 859-160 5.5 RIGHT ANGLED TI: Brand: CD HORIZON® SPINAL SYSTEM

## (undated) DEVICE — REMOVER LOT 4OZ N IRRIG UNSCNT SFT MOIST LIQ

## (undated) DEVICE — MINI ENDOCUT SCISSOR TIP, DISPOSABLE: Brand: RENEW

## (undated) DEVICE — ELECTRODE ES L10.2CM BLDE L4IN EXT MPLR OPN

## (undated) DEVICE — LIGHT HANDLE

## (undated) DEVICE — SUT PERMA- 2-0 30IN SH NABSRB BLK L26MM 1/

## (undated) DEVICE — SHEET, DRAPE, SPLIT, STERILE: Brand: MEDLINE

## (undated) DEVICE — SOLUTION  .9 1000ML BTL

## (undated) DEVICE — BLADE ELECTRODE: Brand: EDGE

## (undated) DEVICE — SUT MCRYL 4-0 18IN PS-2 ABSRB UD 19MM 3/8 CIR

## (undated) DEVICE — ADHESIVE SKIN TOP FOR WND CLSR DERMBND ADV

## (undated) DEVICE — PAD SACRAL PREMIUM 12X12X1

## (undated) DEVICE — LAMINECTOMY CDS: Brand: MEDLINE INDUSTRIES, INC.

## (undated) DEVICE — ANTISEPTIC 4OZ 70% ISO ALC

## (undated) DEVICE — SYRINGE MED 10ML LL TIP W/O SFTY DISP

## (undated) DEVICE — DRAPE,LAPAROTOMY,PCH,STERILE: Brand: MEDLINE

## (undated) DEVICE — SYRINGE 10ML SLIP TIP LOSS OF RESIST PLAS

## (undated) DEVICE — 4-PORT MANIFOLD: Brand: NEPTUNE 2

## (undated) DEVICE — GAUZE,SPONGE,4"X4",12PLY,STERILE,LF,2'S: Brand: MEDLINE

## (undated) DEVICE — SUT VCRL 2-0 18IN ABSRB UD CR L26MM CT-2

## (undated) DEVICE — NEEDLE SPNL 22GA L5IN BLK HUB QNCKE BVL DISP

## (undated) DEVICE — 1200CC GUARDIAN II: Brand: GUARDIAN

## (undated) DEVICE — GAUZE,SPONGE,USP,4"X4",12PLY,STRL,10/PK: Brand: MEDLINE INDUSTRIES, INC.

## (undated) DEVICE — FILTERLINE NASAL ADULT O2/CO2

## (undated) DEVICE — TRAY SURESTEP 16 BARDEX UMETR

## (undated) DEVICE — APPLICATOR CHLORAPREP 26ML

## (undated) DEVICE — 1016 S-DRAPE IRRIG POUCH 10/BOX: Brand: STERI-DRAPE™

## (undated) DEVICE — SUT VCRL 0 18IN CT-1 ABSRB VLT CR L36MM 1/2

## (undated) DEVICE — TROCAR: Brand: KII SLEEVE

## (undated) DEVICE — GLOVE SUR 7 SENSICARE PI PIP CRM PWD F

## (undated) DEVICE — COVER LT HNDL RIG FOR SUR CAM DISP

## (undated) DEVICE — AVANOS* TUOHY EPIDURAL NEEDLE: Brand: AVANOS

## (undated) DEVICE — 3.0MM PRECISION NEURO (MATCH HEAD)

## (undated) DEVICE — VISUALIZATION SYSTEM: Brand: CLEARIFY

## (undated) DEVICE — GLOVE SUR 7.5 SENSICARE PIP WHT PWD F

## (undated) DEVICE — MONITORING NEUROPHYSIOLOGICAL

## (undated) DEVICE — STERILE POLYISOPRENE POWDER-FREE SURGICAL GLOVES: Brand: PROTEXIS

## (undated) DEVICE — VIOLET BRAIDED (POLYGLACTIN 910), SYNTHETIC ABSORBABLE SUTURE: Brand: COATED VICRYL

## (undated) DEVICE — EXOFIN PRECISION PEN HIGH VISCOSITY TOPICAL SKIN ADHESIVE: Brand: EXOFIN PRECISION PEN, 1G

## (undated) DEVICE — INSULATED BLADE ELECTRODE: Brand: EDGE

## (undated) DEVICE — SUT VCRL 3-0 18IN PS-2 ABSRB UD L19MM 3/8 CIR

## (undated) DEVICE — SPONGE STICK WITH PVP-I: Brand: KENDALL

## (undated) DEVICE — Device

## (undated) DEVICE — C-ARM: Brand: UNBRANDED

## (undated) DEVICE — ENDOSCOPY PACK - LOWER: Brand: MEDLINE INDUSTRIES, INC.

## (undated) DEVICE — SUT PERMA- 0 18IN NABSRB BLK TIE SILK

## (undated) DEVICE — TRAY CATH 16FR F INCL BARDX IC COMPLT CARE

## (undated) DEVICE — Device: Brand: INTELLICART™

## (undated) DEVICE — APPLICATOR PREP 26ML CHG 2% ISO ALC 70%

## (undated) DEVICE — Device: Brand: DEFENDO AIR/WATER/SUCTION AND BIOPSY VALVE

## (undated) DEVICE — CODMAN® SURGICAL PATTIES 1/2" X 1/2" (1.27CM X 1.27CM): Brand: CODMAN®

## (undated) DEVICE — SYRINGE MED 20ML STD CLR PLAS LL TIP N CTRL

## (undated) DEVICE — 3M™ RED DOT™ MONITORING ELECTRODE WITH FOAM TAPE AND STICKY GEL, 50/BAG, 20/CASE, 72/PLT 2570: Brand: RED DOT™

## (undated) DEVICE — MARKER SKIN PREP-RESISTANT ST: Brand: MEDLINE INDUSTRIES, INC.

## (undated) DEVICE — COVER,MAYO STAND,STERILE: Brand: MEDLINE

## (undated) NOTE — Clinical Note
STANLEYI, TCM template completed. Wife states that she will make sure pt is on the list to see you next time you come to [place] for a follow up

## (undated) NOTE — LETTER
[employer] Cardiac Device Communication Tool    Preop to complete    [facility] ([facility]) Phone: [phone] Fax: [phone]   Patient Name Clay Orellana   Patient  - AGE - SEX 1945 - A: 80 y - male   Surgical Date 2025   Surgical Procedure LUMBAR 3, LUMBAR 4 LAMINECTOMIES WITH MEDIAL FACETECTOMIES, PARTIAL LUMBAR 5 LAMINECTOMY WITH MEDIAL FACETECTOMY, NEURO MONITORING   Surgical Location [facility]   Type of cautery anticipated: Monopolar   Surgical procedure > 2 hours      Device Clinic to Complete the Information Below, Sign and Fax to [phone]    Pacemaker or ICD    Atrial or atrial-ventricular lead?        Indication for device    Is patient pacemaker dependent?    Has pt had routine f/u and is battery life > 3 months    Is ICD programmed to inhibit therapy w/magnet?    Does device have rate response or other sensor?      Surgical Procedure above [place] [place] Surgical Procedure @ [place] [place] and below   < 6 in. from ICD: Reprogram therapies OFF w/  asynchronous pacing if PM dependent  < 6 in. from PM: Reprogram asynchronous if PM   dependent ICD: No Change  PM: No Change   > 6 in. from ICD: Magnet*  > 6 in. from PM:  No Change*  * If PM dependent observe for pacing inhibition and minimize cautery if inhibition is seen                                              Bipolar cautery: No Change     Cardiac Device Management Plan (check one)     ___  Reprogram (PAT Dept to provide Rep w/ arrival date/time)   ___ Magnet    ___ No Change    Comments: ___________________________________________________________________        Signature: ________________________________ Date: ____________ Time: ______________     Print Name: ___________________________________

## (undated) NOTE — IP AVS SNAPSHOT
Patient Demographics     Address  [facility] [address]  [facility] [address] Phone  [phone] (Home) *Preferred*  [phone] (Mobile) E-mail Address  [email or web address]      Patient Contacts     Name Relation Home Work Mobile    AKBAR MEDEROS Spouse [phone]    Thuy Andres Daughter [phone]      Allergies as of 6/25/2024  Review status set to Review Complete on 6/21/2024       Noted Reaction Type Reactions    Kiwi Extract 11/11/2020    ITCHING    Inner ears itch      Code Status Information     Code Status    Full Code      Patient Instructions    None     Patient Isolation Status     None to display      Microbiology Results (All)     Procedure Component Value Units Date/Time    Blood Culture [[id number]] Collected: 06/21/24 1511    Order Status: Completed Lab Status: Preliminary result Updated: 06/24/24 2201    Specimen: Blood,peripheral      Blood Culture Result No Growth 3 Days    Narrative:      Anaerobic Bottle Volume - 8 mL  Aerobic Bottle Volume - 11 mL    Blood Culture [[id number]] Collected: 06/21/24 1511    Order Status: Completed Lab Status: Preliminary result Updated: 06/24/24 2201    Specimen: Blood,peripheral      Blood Culture Result No Growth 3 Days    Sputum culture [[id number]] Collected: 06/21/24 1749    Order Status: Sent Lab Status: No result     Specimen: Other from Sputum       Immunizations     Name Date      Covid-19 Moderna 11/22/21     Covid-19 Moderna 04/09/21     Covid-19 Moderna 03/12/21     Covid-19 Moderna Booster 07/05/22     Covid-19 Pfizer Bivalent 10/02/22     INFLUENZA 09/13/23     INFLUENZA 10/12/22     INFLUENZA 10/11/22     INFLUENZA 09/28/21     INFLUENZA 09/28/21     INFLUENZA 09/22/20     INFLUENZA 10/09/19     INFLUENZA 09/12/18     INFLUENZA 09/11/18     INFLUENZA 11/07/17     INFLUENZA 11/06/17     INFLUENZA 09/13/16     INFLUENZA 10/09/15     INFLUENZA 09/20/14     INFLUENZA 10/18/13     INFLUENZA 10/07/08      Pneumococcal (Prevnar 13) 10/09/15     Pneumococcal Vaccine (Prevnar 20) 09/14/23     Pneumovax 23 09/11/18     Zoster Vaccine Recombinant Adjuvanted (Shingrix) 09/14/23        Follow-up Information     Sachin Meza MD. Schedule an appointment as soon as possible for a visit in 1 week(s).    Specialties: Family Medicine, IP Consult to Primary Care  Why: For routine follow-up after hospitilization  Contact information:  [address]  [address]  [facility] [address] [phone]             Brian Pringle MD. Call in 1 week(s).    Specialty: NEUROSURGERY  Why: For follow-up after hospitilization  Contact information:  [address]  [facility] [address] [phone]                        Your Home Meds List      TAKE these medications       Instructions Authorizing Provider Morning Afternoon Evening As Needed   acetaminophen 500 MG Tabs  Commonly known as: Tylenol Extra Strength      Take 1 tablet (500 mg total) by mouth every 6 (six) hours as needed.          acidophilus-pectin Caps  Commonly known as: Probiotic  Next dose due: Next dose is due in am on 6/26/2024      Take 1 capsule by mouth daily.          amoxicillin clavulanate 875-125 MG Tabs  Commonly known as: Augmentin  Next dose due: Next dose is due this evening    STOP TAKING THIS MEDICINE ON jUNE 27TH      Take 1 tablet by mouth 2 (two) times daily for 2 days.  Stop taking on: June 27, 2024   Rigo Williamson         aspirin 81 MG Tbec  Next dose due: Next dose is due in am of 6/26      Take 1 tablet (81 mg total) by mouth daily.   David Navarro         atorvastatin 40 MG Tabs  Commonly known as: Lipitor  Next dose due: Next dose is due this evening      Take 1 tablet (40 mg total) by mouth nightly.   Sachin Meza         Cholecalciferol 125 MCG (5000 UT) Tabs  Commonly known as: VITAMIN D-3      Take 1 tablet (5,000 Units total) by mouth 3 (three) times a week. Three times a week. MWF          cyanocobalamin 500 MCG  Tabs  Commonly known as: Vitamin B12  Next dose due: Next dose due in am of 6/26      Take 1 tablet (500 mcg total) by mouth daily.          donepezil 10 MG Tabs  Commonly known as: Aricept  Next dose due: Next dose due in am of 6/26      Take 1 tablet (10 mg total) by mouth nightly.   Vinny Preciado         finasteride 5 MG Tabs  Commonly known as: Proscar  Next dose due: Next dose due in am of 6/26/24      TAKE ONE TABLET BY MOUTH ONE TIME DAILY   Naina Sosa         flecainide 100 MG Tabs  Commonly known as: Tambocor  Next dose due: Next dose due this weekend      Take 1 tablet (100 mg total) by mouth 2 (two) times daily.          ipratropium 0.03 % Soln  Commonly known as: Atrovent  Next dose due: Next dose due in am of 6/26/24      2 sprays by Nasal route Q12H.          metoprolol tartrate 25 MG Tabs  Commonly known as: Lopressor  Next dose due: Next dose is due this evening      Take 1 tablet (25 mg total) by mouth 2 (two) times daily.          midodrine 5 MG Tabs  Commonly known as: ProAmatine  Next dose due: Next dose is due this evening      Take 1 tablet (5 mg total) by mouth in the morning and 1 tablet (5 mg total) at noon and 1 tablet (5 mg total) in the evening.   David Navarro         Multi-Vitamin/Minerals Tabs  Next dose due: Next dose due in am of 6/26      Take 1 tablet by mouth daily.          Polyethylene Glycol 3350 17 g Pack  Commonly known as: MIRALAX  Next dose due: Next dose is due in am of 6/26.      STOP TAKING THIS MEDICINE ON JULY 25TH      Take 17 g by mouth daily.  Stop taking on: July 25, 2024   Rigo Williamson         rivaroxaban 20 MG Tabs  Commonly known as: Xarelto  Next dose due: Next dose due this evening      Take 1 tablet (20 mg total) by mouth nightly.   David Navarro         sennosides 8.6 MG Tabs  Commonly known as: Senokot  Next dose due: NEXT DOSE DUE THIS EVENING.  Notes to patient: STOP TAKING THIS MEDICATION ON jULY 25. 2024      Take 1 tablet (8.6 mg  total) by mouth 2 (two) times daily.  Stop taking on: July 25, 2024   Rigo Williamson         sertraline 25 MG Tabs  Commonly known as: Zoloft  Next dose due: Next dose is due in am of 6/26      Take 1 tablet (25 mg total) by mouth daily.   Sachin Meza         tamsulosin 0.4 MG Caps  Commonly known as: Flomax  Next dose due: Next dose due this evening      Take 1 capsule (0.4 mg total) by mouth After dinner.   Sachin Trevizoan         Trelegy Ellipta 100-62.5-25 MCG/ACT Aepb  Generic drug: fluticasone-umeclidin-vilant  Next dose due: Next dose due in am of 6/26      Inhale 1 puff into the lungs daily.                Where to Get Your Medications      These medications were sent to [id number] - [place], [place] - [facility] [address] [phone], [phone]  [facility] [address] [facility] [address]    Phone: [phone]   amoxicillin clavulanate 875-125 MG Tabs  Polyethylene Glycol 3350 17 g Pack  sennosides 8.6 MG Tabs           0701-8538-A - MAR ACTION REPORT  (last 48 hrs)    ** SITE UNKNOWN **     Order ID Medication Name Action Time Action Reason Comments    [id number] acetaminophen (Tylenol Extra Strength) tab 500 mg 06/25/24 1019 Given      [id number] acidophilus (Probiotic) cap/tab 1 capsule 06/24/24 0930 Given      [id number] acidophilus (Probiotic) cap/tab 1 capsule 06/25/24 1010 Given      [id number] aspirin DR tab 81 mg 06/24/24 0909 Given      [id number] aspirin DR tab 81 mg 06/25/24 1011 Given      [id number] atorvastatin (Lipitor) tab 40 mg 06/23/24 2152 Given      [id number] atorvastatin (Lipitor) tab 40 mg 06/24/24 2108 Given      [id number] cefTRIAXone (Rocephin) 1 g in sodium chloride 0.9% 100 mL IVPB-ADDV 06/23/24 2152 New Bag      [id number] cefTRIAXone (Rocephin) 1 g in sodium chloride 0.9% 100 mL IVPB-ADDV 06/24/24 2108 New Bag      [id number] cefTRIAXone (Rocephin) 1 g in sodium chloride 0.9% 100 mL IVPB-ADDV 06/25/24 1700 New Bag  give early per Dr. Williamson before dc    [id number] cholecalciferol (Vitamin  D3) tab 5,000 Units 06/24/24 0909 Given      [id number] cyanocobalamin (Vitamin B12) tab 500 mcg 06/24/24 0909 Given      [id number] cyanocobalamin (Vitamin B12) tab 500 mcg 06/25/24 1011 Given      [id number] donepezil (Aricept) tab 10 mg 06/23/24 2152 Given      [id number] donepezil (Aricept) tab 10 mg 06/24/24 2108 Given      [id number] finasteride (Proscar) tab 5 mg 06/24/24 0909 Given      [id number] finasteride (Proscar) tab 5 mg 06/25/24 1011 Given      [id number] flecainide (Tambocor) tab 100 mg 06/23/24 2152 Given      [id number] flecainide (Tambocor) tab 100 mg 06/24/24 0909 Given      [id number] flecainide (Tambocor) tab 100 mg 06/24/24 2108 Given      [id number] flecainide (Tambocor) tab 100 mg 06/25/24 1011 Given      [id number] fluticasone-umeclidin-vilant (Trelegy Ellipta) 100-62.5-25 MCG/ACT inhaler 1 puff 06/24/24 0910 Given      [id number] fluticasone-umeclidin-vilant (Trelegy Ellipta) 100-62.5-25 MCG/ACT inhaler 1 puff 06/25/24 0636 Given  pt would like inhaler now    [id number] guaiFENesin ER (Mucinex) 12 hr tab 600 mg 06/23/24 2152 Given      [id number] guaiFENesin ER (Mucinex) 12 hr tab 600 mg 06/24/24 0909 Given      [id number] guaiFENesin ER (Mucinex) 12 hr tab 600 mg 06/24/24 2108 Given      [id number] guaiFENesin ER (Mucinex) 12 hr tab 600 mg 06/25/24 1011 Given      [id number] metoprolol tartrate (Lopressor) tab 25 mg 06/23/24 1842 Given      [id number] metoprolol tartrate (Lopressor) tab 25 mg 06/24/24 0612 Given      [id number] metoprolol tartrate (Lopressor) tab 25 mg 06/24/24 1813 Given      [id number] metoprolol tartrate (Lopressor) tab 25 mg 06/25/24 0535 Given      [id number] metoprolol tartrate (Lopressor) tab 25 mg 06/25/24 1700 Given      [id number] multivitamin with minerals (Thera M Plus) tab 1 tablet 06/24/24 0909 Given      [id number] multivitamin with minerals (Thera M Plus) tab 1 tablet 06/25/24 1010 Given      [id number] ondansetron (Zofran) 4 MG/2ML injection 4 mg 06/23/24 1843 Given       [id number] ondansetron (Zofran) 4 MG/2ML injection 4 mg 06/24/24 0914 Given      [id number] ondansetron (Zofran) 4 MG/2ML injection 4 mg 06/24/24 2116 Given      [id number] ondansetron (Zofran) 4 MG/2ML injection 4 mg 06/25/24 0532 Given      [id number] oxyCODONE immediate release tab 5 mg 06/23/24 1842 Given      [id number] oxyCODONE immediate release tab 5 mg 06/24/24 0909 Given      [id number] oxyCODONE immediate release tab 5 mg 06/24/24 1524 Given      [id number] oxyCODONE immediate release tab 5 mg 06/24/24 2116 Given      [id number] oxyCODONE immediate release tab 5 mg 06/25/24 0532 Given      [id number] polyethylene glycol (PEG 3350) (Miralax) 17 g oral packet 17 g 06/23/24 1843 Given      [id number] polyethylene glycol (PEG 3350) (Miralax) 17 g oral packet 17 g 06/24/24 0908 Given      [id number] polyethylene glycol (PEG 3350) (Miralax) 17 g oral packet 17 g 06/25/24 1257 Given      [id number] rivaroxaban (Xarelto) tab 20 mg 06/23/24 2152 Given      [id number] rivaroxaban (Xarelto) tab 20 mg 06/24/24 2108 Given      [id number] sennosides (Senokot) tab 8.6 mg 06/25/24 1257 Given      [id number] sertraline (Zoloft) tab 25 mg 06/24/24 0930 Given      [id number] sertraline (Zoloft) tab 25 mg 06/25/24 1010 Given      [id number] sodium chloride (Saline Mist) 0.65 % nasal solution 1 spray 06/24/24 0635 Given      [id number] sodium chloride (Saline Mist) 0.65 % nasal solution 1 spray 06/25/24 0635 Given      [id number] sodium chloride (Saline Mist) 0.65 % nasal solution 1 spray 06/25/24 1020 Given      [id number] tamsulosin (Flomax) cap 0.4 mg 06/23/24 1842 Given      [id number] tamsulosin (Flomax) cap 0.4 mg 06/24/24 1813 Given      [id number] tamsulosin (Flomax) cap 0.4 mg 06/25/24 1700 Given              Recent Vital Signs    Flowsheet Row Most Recent Value   /73 Filed at 06/25/2024 1200   Pulse 65 Filed at 06/25/2024 1200   Resp 12 Filed at 06/25/2024 1200   Temp 97.6 °F (36.4 °C) Filed at 06/25/2024 1200   SpO2 95 % Filed at  06/25/2024 1200      Patient's Most Recent Weight    Flowsheet Row Most Recent Value   Patient Weight 93.1 kg (205 lb 4 oz)         Lab Results Last 24 Hours      CBC With Differential With Platelet [[id number]] (Abnormal)  Resulted: 06/25/24 1306, Result status: Final result   Ordering provider: Rigo Williamson MD  06/25/24 0832 Resulting lab: [facility] ([facility])   Narrative:  The following orders were created for panel order CBC With Differential With Platelet.  Procedure                               Abnormality         Status                     ---------                               -----------         ------                     CBC W/ DIFFERENTIAL[[id number]]          Abnormal            Final result                 Please view results for these tests on the individual orders.    Specimen Information    Type Source Collected On   Blood — 06/25/24 1129            Basic Metabolic Panel (8) [[id number]] (Abnormal)  Resulted: 06/25/24 1240, Result status: Final result   Ordering provider: Rigo Williamson MD  06/25/24 0832 Resulting lab: [facility] ([facility])    Specimen Information    Type Source Collected On   Blood — 06/25/24 1129          Components    Component Value Reference Range Flag Lab   Glucose 105 70 - 99 mg/dL H [facility])   Sodium 137 136 - 145 mmol/L — [facility])   Potassium 3.9 3.5 - 5.1 mmol/L — [facility])   Chloride 103 98 - 112 mmol/L — [facility])   CO2 27.0 21.0 - 32.0 mmol/L — [facility])   Anion Gap 7 0 - 18 mmol/L — [facility] ([facility])   BUN 18 9 - 23 mg/dL — [facility] ([facility])   Creatinine 0.85 0.70 - 1.30 mg/dL — [facility])   Calcium, Total 8.1 8.5 - 10.1 mg/dL L [facility] ([facility])   Calculated Osmolality 286 275 - 295 mOsm/kg — [facility] ([facility])   eGFR-Cr 87 >=60 mL/min/1.73m2 — [facility])            Testing Performed By     Lab - Abbreviation Name Director Address Valid Date Range    139 -  [place] [facility] ([facility]) [facility] ([facility]) Goldberg, Cathryn A. MD [address] [place] [address] [id number] 20 1441 - Present            Pending Labs     Order Current Status    Sputum culture Collected (24 1749)    Blood Culture Preliminary result    Blood Culture Preliminary result         H&P - H&P Note      H&P signed by Rosalina Obrien DO at 2024  5:25 PM  Version 1 of 1    Author: Rosalina Obrien DO Service: — Author Type: Physician    Filed: 2024  5:25 PM Date of Service: 2024  4:32 PM Status: Signed    : Rosalina Obrien DO (Physician)         [facility]  [facility]     Clay Orellana Patient Status:  Emergency    1945 MRN [id number]   Location [facility] EMERGENCY DEPARTMENT Attending Grabiel Auguste*   Hosp Day # 0 PCP Sachin Meza MD     Chief Complaint: weakness    Subjective:    History of Present Illness:     Clay Orellana is a 79 year old male with PMHX BPH/ HTN/ HLD/ CVA/ NPH (s/p  shunt)/ COPD/ A-fib/ CAD who presented to the hospital for weakness. He recently had a  shunt placed on  and was sent home on the . For the past 3 days, noticed progressive weakness as well as a cough and a fever of 100.4 F. He has been so weak he is having problems ambulating. He also began to have increased low back pain from normal which was dull and rated 7/10 with no alleviating or exacerbating factors. He had increased nocturia without dysuria or change in urgency, He notes a headache since his surgery which has been unchanged since discharge. He denied any new paresthesia, specific muscle weakness, or visual changes. His mentation has been at his baseline as well. He denied any dyspnea, chest pain, palpitations, abdominal pain, nausea, emesis, diarrhea.    History/Other:    Past Medical History:  Past Medical History:    Acute nausea with nonbilious vomiting    Anxiety    Arrhythmia    Arthritis    Bilateral  inguinal hernia without obstruction or gangrene    BPH (benign prostatic hyperplasia)    Calculus of kidney    Essential hypertension    High cholesterol    Muscle weakness    Stroke (HCC)    TIA    Visual impairment     Past Surgical History:   Past Surgical History:   Procedure Laterality Date    Anesth,pacemaker insertion  08/2023    Appendectomy      Appendectomy      Colonoscopy      Colonoscopy N/A 05/03/2022    Procedure: COLONOSCOPY;  Surgeon: Sudarshan Gilbert MD;  Location:  ENDOSCOPY    Colonoscopy      Cyst removal N/A 1990    Cyst removed behind back of neck    Cyst removal Right 11/05/2020    Removed from right upper back - just below shoulder    Hernia surgery  July 2022    Skin surgery  2020    Vasectomy  1980      Family History:   Family History   Problem Relation Age of Onset    Heart Attack Father     Diabetes Mother     Other (DM) Sister     Other (ALS) Brother     No Known Problems Maternal Grandmother     No Known Problems Maternal Grandfather     No Known Problems Paternal Grandmother     No Known Problems Paternal Grandfather     Other (smoker) Brother     Other (MS) Sister     No Known Problems Son     No Known Problems Daughter      Social History:    reports that he has quit smoking. His smoking use included cigarettes. He has never been exposed to tobacco smoke. He has never used smokeless tobacco. He reports that he does not drink alcohol and does not use drugs.     Allergies:   Allergies   Allergen Reactions    Kiwi Extract ITCHING     Inner ears itch       Medications:    Current Facility-Administered Medications on File Prior to Encounter   Medication Dose Route Frequency Provider Last Rate Last Admin    [COMPLETED] ceFAZolin (Ancef) 2g in 10mL IV syringe premix  2 g Intravenous Once Brian Pringle MD   2 g at 06/17/24 0815    [COMPLETED] ceFAZolin (Ancef) 2g in 10mL IV syringe premix  2 g Intravenous Q8H Teodora Castle PA-C 120 mL/hr at 06/18/24 0002 2 g at 06/18/24 0002     [COMPLETED] labetalol (Trandate) 5 mg/mL injection             [COMPLETED] sodium chloride 0.9 % IV bolus 500 mL  500 mL Intravenous Once Janeth Galvin  mL/hr at 05/07/24 0750 500 mL at 05/07/24 0750    [COMPLETED] gadoterate meglumine (Dotarem) 10 MMOL/20ML injection 20 mL  20 mL Intravenous ONCE PRN David Bustillo MD   20 mL at 05/07/24 1130    [COMPLETED] fentaNYL (Sublimaze) 50 mcg/mL injection             [COMPLETED] hydrALAzine (Apresoline) 20 mg/mL injection             [COMPLETED] ceFAZolin (Ancef) 2 g/20mL IV syringe premix             [COMPLETED] acetaminophen (Tylenol) tab 650 mg  650 mg Oral Once David Bustillo MD   650 mg at 05/05/24 0857    [COMPLETED] metoprolol tartrate (Lopressor) tab 25 mg  25 mg Oral Once Milan Wilson DO   25 mg at 05/03/24 0104    [COMPLETED] enoxaparin (Lovenox) 100 MG/ML SUBQ injection 90 mg  1 mg/kg Subcutaneous 2 times per day Angel Cristobal MD   90 mg at 05/04/24 2009    [COMPLETED] HYDROcodone-acetaminophen (Norco) 5-325 MG per tab 1 tablet  1 tablet Oral Once Gabriel Lange DO   1 tablet at 05/01/24 1811     Current Outpatient Medications on File Prior to Encounter   Medication Sig Dispense Refill    metoprolol tartrate 25 MG Oral Tab Take 1 tablet (25 mg total) by mouth 2 (two) times daily.      tamsulosin 0.4 MG Oral Cap Take 1 capsule (0.4 mg total) by mouth After dinner.      sertraline (ZOLOFT) 25 MG Oral Tab Take 1 tablet (25 mg total) by mouth daily. 30 tablet 1    atorvastatin 40 MG Oral Tab Take 1 tablet (40 mg total) by mouth nightly. 90 tablet 3    midodrine 5 MG Oral Tab Take 1 tablet (5 mg total) by mouth in the morning and 1 tablet (5 mg total) at noon and 1 tablet (5 mg total) in the evening. 90 tablet 0    aspirin 81 MG Oral Tab EC Take 1 tablet (81 mg total) by mouth daily.      rivaroxaban 20 MG Oral Tab Take 1 tablet (20 mg total) by mouth nightly.      donepezil 10 MG Oral Tab Take 1 tablet (10 mg total) by mouth nightly.  90 tablet 1    fluticasone-umeclidin-vilant (TRELEGY ELLIPTA) 100-62.5-25 MCG/ACT Inhalation Aerosol Powder, Breath Activated Inhale 1 puff into the lungs daily.      ipratropium 0.03 % Nasal Solution 2 sprays by Nasal route Q12H.      flecainide 100 MG Oral Tab Take 1 tablet (100 mg total) by mouth 2 (two) times daily.      finasteride 5 MG Oral Tab TAKE ONE TABLET BY MOUTH ONE TIME DAILY 90 tablet 2    Cholecalciferol 125 MCG (5000 UT) Oral Tab Take 1 tablet (5,000 Units total) by mouth 3 (three) times a week. Three times a week. MWF      cyanocobalamin 500 MCG Oral Tab Take 1 tablet (500 mcg total) by mouth daily.      Multiple Vitamins-Minerals (MULTI-VITAMIN/MINERALS) Oral Tab Take 1 tablet by mouth daily.      Acidophilus/Pectin Oral Cap Take 1 capsule by mouth daily.      acetaminophen 500 MG Oral Tab Take 1 tablet (500 mg total) by mouth every 6 (six) hours as needed.         Review of Systems:   A comprehensive review of systems was completed.    Pertinent positives and negatives noted in the HPI.    Objective:   Physical Exam:    BP (!) 156/95   Pulse 68   Temp 98.1 °F (36.7 °C) (Oral)   Resp 18   SpO2 93%   General: No acute distress, Alert  Respiratory: No rhonchi, no wheezes, on room air, normal work of breathing  Cardiovascular: S1, S2. Regular rate and rhythm  Abdomen: Soft, Non-tender, non-distended, positive bowel sounds  Neuro: tremulous in arms >legs, muscle strength 3/5 with right hip flexion, sensation equal to light touch bilaterally, EOM intact bl, tongue protrudes midline, uvula midline  Extremities: No edema    Results:    Labs:      Labs Last 24 Hours:    Recent Labs   Lab 06/18/24  0442 06/21/24  1511   RBC 3.63* 3.74*   HGB 11.7* 12.3*   HCT 35.2* 35.8*   MCV 97.0 95.7   MCH 32.2 32.9   MCHC 33.2 34.4   RDW 12.3 12.0   NEPRELIM  --  13.48*   WBC 16.1* 15.7*   .0 204.0       Recent Labs   Lab 06/18/24  0442 06/21/24  1511   * 119*   BUN 22 24*   CREATSERUM 1.03 1.15    EGFRCR 73 64   CA 8.2* 8.4*   ALB  --  2.7*    138   K 4.3 4.1    107   CO2 21.0 25.0   ALKPHO  --  70   AST  --  17   ALT  --  16   BILT  --  0.7   TP  --  6.1*       Lab Results   Component Value Date    INR 1.08 06/17/2024    INR 1.81 (H) 06/13/2024    INR 1.07 05/06/2024       No results for input(s): \"TROP\", \"TROPHS\", \"CK\" in the last 168 hours.    No results for input(s): \"TROP\", \"PBNP\" in the last 168 hours.    No results for input(s): \"PCT\" in the last 168 hours.    Imaging: Imaging data reviewed in Epic.    Assessment & Plan:      #Multifocal PNA with weakness  -chest xray reviewed and showing bilateral scattered infiltrate  -Shunt xray reviewed and no significant kink present  -met 1 SIRS criteria: WBC 15.7 (16.1 post-op on 6/18)  -weakness 2/2 post-operative PNA, unlikely to be encephalitis/ meningitis as no neurologic deficits or headache, r/o shunt malfunction  -CT brain pending to confirm placement of shunt and ensure no worsening hydrocephalus  -antibiotics: azithromycin, roephin  -Tylenol prn, duo nebs prn, mucinex  -blood cx pending  -obtain sputum cx  -maintain SpO2 >88% given hx COPD  #NPH s/p  shunt    #Normocytic anemia  -hgb 12.3: baseline 11-13  -no signs acute bleeding  -cont to monitor CBC    #Hypoalbuminemia  -albumin 2.7    #HLD  -cont home atorvastatin    #Dementia  -cont home donepezil    #BPH  -cont hme finasteride, tamsulosin    #A-fib  -cont home flecainide, metoprolol, rivaroxaban    #Aniety  -cont home sertraline    #COPD  -cont home trelegy      Plan of care discussed with ED physician    Rosalina Obrien DO    Supplementary Documentation:     The 21st Century Cures Act makes medical notes like these available to patients in the interest of transparency. Please be advised this is a medical document. Medical documents are intended to carry relevant information, facts as evident, and the clinical opinion of the practitioner. The medical note is intended as peer to peer  communication and may appear blunt or direct. It is written in medical language and may contain abbreviations or verbiage that are unfamiliar.                                   Electronically signed by Rosalina Obrien DO on 2024  5:25 PM              Consults - MD Consult Notes      Consults signed by Jenny Dutta MD at 2024  6:42 PM     Author: Jenny Dutta MD Service: Physical Medicine and Rehabilitation Author Type: Physician    Filed: 2024  6:42 PM Date of Service: 2024  6:36 PM Status: Signed    : Jenny Dutta MD (Physician)     Consult Orders    1. Consult to Physical Medicine Rehab [[id number]] ordered by Rigo Williamson MD at 24 0950             PHYSICAL MEDICINE AND REHABILITATION CONSULTATION       Location [place] [id number] Attending Rigo Williamson, *   Hosp Day # 3 PCP Sachin Meza MD     Patient Identification  Clay Orellana is a 79 year old male.  :  1945  Admit Date:  2024  Attending Provider:  Rigo Williamson, *                                  Primary Care Physician:  Sachin Meza MD   Admitting Diagnosis: Weakness generalized [R53.1]  Multifocal pneumonia [J18.9]    CC: Impaired mobility and ADL dysfunction secondary to Multifocal pneumonia         HPI: Clay Orellana is a 79 year old male with PMHX BPH/ HTN/ HLD/ CVA/ NPH (s/p  shunt)/ COPD/ A-fib/ CAD who presented to the hospital for weakness. He recently had a  shunt placed on  and was sent home on the . Had been at  - for lumbar drain trial and at that point had progressed to supervision. NSGY assessed patient and did not feel this was shunt malfunction. Deconditioning due to multi-focal PNA.       PM&R has now been consulted in order to assess patient's functional status and make recommendations for rehabilitation plan of care.     ROS:  All other systems were reviewed and are negative except as noted under HPI    Current medications: Full medication  list has been personally reviewed and include:   [COMPLETED] sodium phosphate 15 mmol in 0.9% NaCl 100mL IVPB premix  15 mmol Intravenous Once    polyethylene glycol (PEG 3350) (Miralax) 17 g oral packet 17 g  17 g Oral Daily PRN    ondansetron (Zofran) 4 MG/2ML injection 4 mg  4 mg Intravenous Q6H PRN    sodium chloride (Saline Mist) 0.65 % nasal solution 1 spray  1 spray Each Nare Q3H PRN    [COMPLETED] polyethylene glycol (PEG 3350) (Miralax) 17 g oral packet 17 g  17 g Oral Once    [COMPLETED] sodium chloride 0.9 % IV bolus 1,000 mL  1,000 mL Intravenous Once    [COMPLETED] ampicillin-sulbactam (Unasyn) 3 g in sodium chloride 0.9% 100mL IVPB-ADD  3 g Intravenous Once    [COMPLETED] azithromycin (Zithromax) 500 mg in sodium chloride 0.9% 250mL IVPB premix  500 mg Intravenous Once    [] ondansetron (Zofran) 4 MG/2ML injection 4 mg  4 mg Intravenous Q4H PRN    acidophilus (Probiotic) cap/tab 1 capsule  1 capsule Oral Daily    aspirin DR tab 81 mg  81 mg Oral Daily    atorvastatin (Lipitor) tab 40 mg  40 mg Oral Nightly    cholecalciferol (Vitamin D3) tab 5,000 Units  5,000 Units Oral Once per day on     cyanocobalamin (Vitamin B12) tab 500 mcg  500 mcg Oral Daily    donepezil (Aricept) tab 10 mg  10 mg Oral Nightly    finasteride (Proscar) tab 5 mg  5 mg Oral Daily    flecainide (Tambocor) tab 100 mg  100 mg Oral BID    fluticasone-umeclidin-vilant (Trelegy Ellipta) 100-62.5-25 MCG/ACT inhaler 1 puff  1 puff Inhalation Daily    metoprolol tartrate (Lopressor) tab 25 mg  25 mg Oral 2x Daily(Beta Blocker)    multivitamin with minerals (Thera M Plus) tab 1 tablet  1 tablet Oral Daily    rivaroxaban (Xarelto) tab 20 mg  20 mg Oral Nightly    sertraline (Zoloft) tab 25 mg  25 mg Oral Daily    tamsulosin (Flomax) cap 0.4 mg  0.4 mg Oral After dinner    acetaminophen (Tylenol Extra Strength) tab 500 mg  500 mg Oral Q4H PRN    guaiFENesin ER (Mucinex) 12 hr tab 600 mg  600 mg Oral BID     ipratropium-albuterol (Duoneb) 0.5-2.5 (3) MG/3ML inhalation solution 3 mL  3 mL Nebulization Q6H PRN    cefTRIAXone (Rocephin) 1 g in sodium chloride 0.9% 100 mL IVPB-ADDV  1 g Intravenous Q24H    azithromycin (Zithromax) tab 500 mg  500 mg Oral Daily    oxyCODONE immediate release tab 5 mg  5 mg Oral Q6H PRN    [COMPLETED] acetaminophen (Tylenol Extra Strength) tab 1,000 mg  1,000 mg Oral Once       Allergies:  Allergies   Allergen Reactions    Kiwi Extract ITCHING     Inner ears itch       Past Medical History:  Past Medical History:    Acute nausea with nonbilious vomiting    Anxiety    Arrhythmia    Arthritis    Bilateral inguinal hernia without obstruction or gangrene    BPH (benign prostatic hyperplasia)    Calculus of kidney    Essential hypertension    High blood pressure    High cholesterol    Muscle weakness    Stroke (HCC)    TIA    Visual impairment       Past Surgical History:  Past Surgical History:   Procedure Laterality Date    Anesth,pacemaker insertion  08/2023    Appendectomy      Appendectomy      Colonoscopy      Colonoscopy N/A 05/03/2022    Procedure: COLONOSCOPY;  Surgeon: Sudarshan Gilbert MD;  Location:  ENDOSCOPY    Colonoscopy      Cyst removal N/A 1990    Cyst removed behind back of neck    Cyst removal Right 11/05/2020    Removed from right upper back - just below shoulder    Hernia surgery  July 2022    Skin surgery  2020    Vasectomy  1980       Family History:   Family History   Problem Relation Age of Onset    Heart Attack Father     Diabetes Mother     Other (DM) Sister     Other (ALS) Brother     No Known Problems Maternal Grandmother     No Known Problems Maternal Grandfather     No Known Problems Paternal Grandmother     No Known Problems Paternal Grandfather     Other (smoker) Brother     Other (MS) Sister     No Known Problems Son     No Known Problems Daughter          Social History:  Social History     Socioeconomic History    Marital status:    Tobacco Use     Smoking status: Former     Types: Cigarettes     Passive exposure: Never    Smokeless tobacco: Never    Tobacco comments:     Has not smoked for about 20 years   Vaping Use    Vaping status: Never Used   Substance and Sexual Activity    Alcohol use: Never     Alcohol/week: 4.0 standard drinks of alcohol     Types: 4 Cans of beer per week    Drug use: Never   Other Topics Concern    Caffeine Concern No    Exercise No    Seat Belt Yes    Special Diet No    Stress Concern Yes    Weight Concern No       FUNCTIONAL STATUS:  Premorbid functional status/Living Situation-   HOME SITUATION  Type of Home: Independent living facility ([facility])   Home Layout: One level     Lives With: Spouse  Drives: No  Patient Owned Equipment: Rollator;Rolling walker  Patient Regularly Uses: Glasses       Current functional Status:   BED MOBILITY  Rolling: Moderate Assist  Supine to Sit : Moderate Assist  Scooting: MOD A     BALANCE ASSESSMENT  Static Sitting: Contact Guard Assist  Sitting Bilateral: Moderate Assist  Static Standing: Minimal Assist  Standing Bilateral: Moderate Assist     FUNCTIONAL ADL ASSESSMENT  LB Dressing Seated: Maximum Assist  LB Dressing Standing: Maximum Assist      OBJECTIVE:    /65 (BP Location: Right arm)   Pulse 60   Temp 98.7 °F (37.1 °C) (Oral)   Resp 16   Wt 205 lb 4 oz (93.1 kg)   SpO2 95%   BMI 28.63 kg/m²   Body mass index is 28.63 kg/m².   General: well nourished, NAD  Eyes: conjunctiva and lids intact, pupils are equal and round  ENMT: external inspection of ears and nose within normal limits. Normal functional hearing  Neck: supple, symmetrical, no thyromegaly appreciated  Lymph: no cervical and no axillary lymphadenopathy  Lungs: Non labored on 2L NC, no wheezing appreciated, No accessory muscle noted  Heart: Reg Rate, no edema noted in BLE  Abdomen: soft, non-tender, non-distended. No hepatomegaly appreciated.  Extrems: no clubbing/cyanosis noted in digits or nails  Psych: awake,alert,  oriented; normal mood and affect  MSK: PROM of BUE full; MMT 5/5 bilateral UE and LE; no dislocation noted BUE  Neuro: CN 2-12 grossly intact, sensation intact to LT in BUE/BLE;  speech clear and fluent. Follows one step commands readily    Data Review:    Lab Results   Component Value Date    PHOS 3.3 06/24/2024       No results found.    ASSESSMENT:    Deficits of self care and mobility secondary to   Principal Problem:    Multifocal pneumonia  Active Problems:    Weakness generalized    S/P  shunt      Recommendations: acute rehab    This patient meets medical and rehab criteria to qualify for acute inpatient rehab:  This patient will require an intensive and coordinated interdisciplinary team approach  Patient requires and shall be able to participate in 3 hours of therapy 5 days a week.  The patient is reasonably expected to actively participate in and benefit significantly from the intensive rehabilitation therapy program within a prescribed period of time of 14 days, with goal of discharge to community.   This patient requires active and ongoing intervention of multiple therapy disciplines including PT, OT, speech-language pathology, therapeutic recreation, rehab psychology, rehab case management, with weekly interdisciplinary team meetings to address barriers to progress and revise treatment plan as needed.  This patient will require require physician supervision by rehabilitation physician with face-to-face visit at least 3 days a week to assess the patient both medically and functionally.  Anticipated goals are to improve from min-mod assist to mod I  level to facilitate safe community discharge.  24 hr rehab nursing also beneficial for medication management, pressure sore prevention, bowel and bladder management, skin management.       Advanced directives reviewed and in chart.     Thank you for allowing me to participate in the care of this patient.     Jenny Dutta MD, FAAPM&R       Electronically  signed by Jenny Dutta MD on 6/24/2024  6:42 PM           D/C Summary    No notes of this type exist for this encounter.        Physical Therapy Notes (last 72 hours)      Physical Therapy Note signed by Marjan Montenegro PTA at 6/25/2024 10:46 AM  Version 1 of 1    Author: Marjan Montenegro PTA Service: Rehab Author Type: Physical Therapy Assistant    Filed: 6/25/2024 10:46 AM Date of Service: 6/25/2024 10:44 AM Status: Signed    : Marjan Montenegro PTA (Physical Therapy Assistant)          PHYSICAL THERAPY TREATMENT NOTE - INPATIENT    Room Number: 7621/7621-A     Session: 1     Number of Visits to Meet Established Goals: 7    Presenting Problem: increased weakness --> pneumonia  Co-Morbidities : NPH s/p  shunt placement, JENNA, dementia, PAF,TIA, lumbar spinal stenosis    PHYSICAL THERAPY MEDICAL/SOCIAL HISTORY  History related to current admission: Patient is a 79 year old male admitted on 6/21/2024 from home for increased weakness.  Pt diagnosed with pneumonia.     HOME SITUATION  Type of Home: Independent living facility ([facility])   Home Layout: One level     Lives With: Spouse  Drives: No  Patient Owned Equipment: Rollator;Rolling walker  Patient Regularly Uses: Glasses     Prior Level of [place]:   Pt is typically supervision assist w/ adl's, gait w/ rolling walker w/I the apartment and rollator for longer distances. Wife provides supervision and will push pt down to dining room in his rollator if he cannot make it.  Pt reports that's more often lately.  Pt has a bath aide to assist w/ showering.    ASSESSMENT   Patient demonstrates fair progress this session, goals  remain in progress.    Patient continues to function below baseline with bed mobility, transfers, gait, and standing prolonged periods.  Contributing factors to remaining limitations include decreased functional strength, decreased endurance/aerobic capacity, and decreased muscular endurance.  Next session anticipate patient to  progress gait and standing prolonged periods.  Physical Therapy will continue to follow patient for duration of hospitalization.    Patient continues to benefit from continued skilled PT services: to facilitate return to prior level of function as patient demonstrates high motivation with excellent tolerance to an intensive therapy program .    PLAN  PT Treatment Plan: Bed mobility;Body mechanics;Coordination;Endurance;Energy conservation;Patient education;Family education;Gait training;Neuromuscular re-educate;Range of motion;Strengthening;Transfer training;Balance training  Rehab Potential : Fair  Frequency (Obs): 3-5x/week    CURRENT GOALS        Goal #1 Patient is able to demonstrate supine - sit EOB @ level: supervision      Goal #2 Patient is able to demonstrate transfers Sit to/from Stand at assistance level: supervision      Goal #3 Patient is able to ambulate 50 feet with assist device: walker - rolling at assistance level: supervision      Goal #4     Goal #5     Goal #6     Goal Comments: Goals established on 2024 all goals ongoing     SUBJECTIVE  \"I am trying but I can work with you for 30minutes at a time\"    OBJECTIVE  Precautions: Bed/chair alarm    WEIGHT BEARING RESTRICTION  Weight Bearing Restriction: None                PAIN ASSESSMENT   Ratin  Location: denied       BALANCE                                                                                                                       Static Sitting: Fair +  Dynamic Sitting: Fair           Static Standing: Fair -  Dynamic Standing: Poor +    ACTIVITY TOLERANCE                         O2 WALK         AM-PAC '6-Clicks' INPATIENT SHORT FORM - BASIC MOBILITY  How much difficulty does the patient currently have...  Patient Difficulty: Turning over in bed (including adjusting bedclothes, sheets and blankets)?: A Lot   Patient Difficulty: Sitting down on and standing up from a chair with arms (e.g., wheelchair, bedside  commode, etc.): A Little   Patient Difficulty: Moving from lying on back to sitting on the side of the bed?: A Lot   How much help from another person does the patient currently need...   Help from Another: Moving to and from a bed to a chair (including a wheelchair)?: A Little   Help from Another: Need to walk in hospital room?: A Little   Help from Another: Climbing 3-5 steps with a railing?: Total       AM-PAC Score:  Raw Score: 14   Approx Degree of Impairment: 61.29%   Standardized Score (AM-PAC Scale): 38.1   CMS Modifier (G-Code): CL    FUNCTIONAL ABILITY STATUS  Gait Assessment   Functional Mobility/Gait Assessment  Gait Assistance: Minimum assistance  Distance (ft): 30 x 2  Assistive Device: Rolling walker  Pattern: R Foot drag;L Foot drag;Shuffle    Skilled Therapy Provided    Bed Mobility:  Rolling: SBA   Supine<>Sit: SBA   Sit<>Supine: NT     Transfer Mobility:  Sit<>Stand: CGA  Stand<>Sit: CGA   Gait: Min A with RW    Therapist's Comments: NA      THERAPEUTIC EXERCISES  Lower Extremity Alternating marching     Upper Extremity Elbow flex/ext and  - open/close     Position Sitting     Repetitions   10   Sets   1     Patient End of Session: Up in chair;Needs met;Call light within reach;RN aware of session/findings;All patient questions and concerns addressed;Alarm set    PT Session Time: 23 minutes  Gait Training: 15 minutes  Therapeutic Activity: 8 minutes  Therapeutic Exercise: 0 minutes   Neuromuscular Re-education: 0 minutes                 Physical Therapy Note signed by Rachel Ruiz PT at 6/22/2024  5:28 PM  Version 1 of 1    Author: Rachel Ruiz PT Service: Rehab Author Type: Physical Therapist    Filed: 6/22/2024  5:28 PM Date of Service: 6/22/2024  5:07 PM Status: Signed    : Rachel Ruiz PT (Physical Therapist)       PHYSICAL THERAPY EVALUATION - INPATIENT     Room Number: 7621/7621-A  Evaluation Date: 6/22/2024  Type of Evaluation: Initial  Physician Order: PT Eval and  Treat    Presenting Problem: increased weakness --> pneumonia  Co-Morbidities : NPH s/p  shunt placement, JENNA, dementia, PAF,TIA, lumbar spinal stenosis  Reason for Therapy: Mobility Dysfunction and Discharge Planning    Recent Admissions:  6/17-6/18  shunt placement, DC home with [facility]  5/1-5/13 Fall, weakness, lumbar drain trial > [place]    PHYSICAL THERAPY ASSESSMENT   Patient is currently functioning below baseline with bed mobility, transfers, and gait.  Prior to admission, patient's baseline is supervision w/ RW.  Patient is requiring moderate assist and maximum assist as a result of the following impairments: decreased functional strength, decreased endurance/aerobic capacity, impaired static and dynamic sitting/standing balance, impaired coordination, impaired motor planning, decreased muscular endurance, and cognitive deficits (incr processing time).  Physical Therapy will continue to follow for duration of hospitalization.    Patient will benefit from continued skilled PT Services to promote return to prior level of function and safety with continuous assistance and gradual rehabilitative therapy .    PLAN  PT Treatment Plan: Bed mobility;Body mechanics;Coordination;Endurance;Energy conservation;Patient education;Family education;Gait training;Neuromuscular re-educate;Range of motion;Strengthening;Transfer training;Balance training  Rehab Potential : Fair  Frequency (Obs): 3-5x/week  Number of Visits to Meet Established Goals: 7      CURRENT GOALS    Goal #1 Patient is able to demonstrate supine - sit EOB @ level: supervision     Goal #2 Patient is able to demonstrate transfers Sit to/from Stand at assistance level: supervision     Goal #3 Patient is able to ambulate 50 feet with assist device: walker - rolling at assistance level: supervision     Goal #4    Goal #5    Goal #6    Goal Comments: Goals established on 6/22/2024    PHYSICAL THERAPY MEDICAL/SOCIAL HISTORY  History related to current  admission: Patient is a 79 year old male admitted on 2024 from home for increased weakness.  Pt diagnosed with pneumonia.    HOME SITUATION  Type of Home: [facility] [place]   Home Layout: One level                Lives With: Spouse  Drives: No  Patient Owned Equipment: Rollator;Rolling walker  Patient Regularly Uses: Glasses    Prior Level of [place]:   Pt is typically supervision assist w/ adl's, gait w/ rolling walker w/I the apartment and rollator for longer distances. Wife provides supervision and will push pt down to dining room in his rollator if he cannot make it.  Pt reports that's more often lately.  Pt has a bath aide to assist w/ showering.     SUBJECTIVE  \"My feet need to be touching the ground in order for me to stand.\" - his feet were touching the ground    OBJECTIVE  Precautions: Bed/chair alarm  Fall Risk: High fall risk    WEIGHT BEARING RESTRICTION  Weight Bearing Restriction: None                PAIN ASSESSMENT  Ratin          COGNITION  Arousal/Alertness:  appropriate responses to stimuli  Attention Span:  difficulty dividing attention  Orientation Level:  oriented x4  Memory:  impaired working memory, decreased long term memory, and decreased short term memory  Following Commands:  follows one step commands with increased time and follows one step commands with repetition  Initiation: cues to initiate tasks and hand over hand to initiate tasks  Motor Planning: impaired  Safety Judgement:  decreased awareness of need for assistance and decreased awareness of need for safety  Awareness of Errors:  assistance required to identify errors made, assistance required to correct errors made, and decreased awareness of errors   Awareness of Deficits:  decreased awareness of deficits  Problem Solving:  assistance required to identify errors made, assistance required to generate solutions, and assistance required to implement solutions    RANGE OF MOTION AND STRENGTH  ASSESSMENT  Upper extremity ROM and strength see OT note for specifics    Lower extremity ROM is within functional limits     Lower extremity strength is within functional limits except for the following:    Right Hip flexion  3/5  Left Hip flexion  4/5  Right Knee extension  3-/5  Left Knee extension  4-/5  Right Knee flexion  4-/5  Left Knee flexion  4/5  Right Dorsiflexion  3+/5  Left Dorsiflexion  4/5      BALANCE  Static Sitting: Fair -  Dynamic Sitting: Fair -  Static Standing: Poor  Dynamic Standing: Poor -    ADDITIONAL TESTS                                    ACTIVITY TOLERANCE                         O2 WALK  Oxygen Therapy  SPO2% on Room Air at Rest: 86  SPO2% on Oxygen at Rest: 93  At rest oxygen flow (liters per minute): 2    NEUROLOGICAL FINDINGS                        AM-PAC '6-Clicks' INPATIENT SHORT FORM - BASIC MOBILITY  How much difficulty does the patient currently have...  Patient Difficulty: Turning over in bed (including adjusting bedclothes, sheets and blankets)?: A Lot   Patient Difficulty: Sitting down on and standing up from a chair with arms (e.g., wheelchair, bedside commode, etc.): A Lot   Patient Difficulty: Moving from lying on back to sitting on the side of the bed?: A Lot   How much help from another person does the patient currently need...   Help from Another: Moving to and from a bed to a chair (including a wheelchair)?: A Lot   Help from Another: Need to walk in hospital room?: A Lot   Help from Another: Climbing 3-5 steps with a railing?: Total       AM-PAC Score:  Raw Score: 11   Approx Degree of Impairment: 72.57%   Standardized Score (AM-PAC Scale): 33.86   CMS Modifier (G-Code): CL    FUNCTIONAL ABILITY STATUS  Gait Assessment   Functional Mobility/Gait Assessment  Gait Assistance: Not tested    Skilled Therapy Provided     Bed Mobility:  Rolling: NT  Supine to sit: w/ HOB elevated maxA to reach static sitting EOB   Sit to supine: NT     Transfer Mobility:  Sit to stand:  modA x 2 to RW   Stand to sit: modA x 2  Gait = NT    Therapist's Comments:   Patient presents sitting up in bed. Discussed role and goal of physical therapy in hospital setting. Pt in agreement to session. Reports he hasn't changed positions in over 24 hours. Reports he can barely move his legs because he is so weak. Throughout session, noted incr time to complete all tasks, incr processing time, poor motor planing.   Bed mobility at modA- incr time to complete task, and incr cues for sequencing of task. Once sitting EOB, had loss of balance to R side requiring modA to maintain midline. Pt cued on reaching midline sitting with upright posture- required cues throughout session to maintain. Able to maintain static sitting balance with close SBA/CGA. With height of bed slightly elevated, modA x 2 to RW. Pt noted to have lean to the left in standing, requiring verbal and tactile cues for midline posture. In standing, required tactile initiation to assist with sequencing/motor planning of side stepping and RW management/navigation. Pt returned to sitting EOB. Had loss of balance to left side in sitting. Sit to stand again modA x 2 to RW. Again demonstrates lean to the left. Completed side steps to the chair at modA x 2 - continues to have poor motor planning, requiring facilitation at hips to weight shift in order to side step. Upright in chair at end of session.   Recommend nursing staff use merry steady for transfers.     Exercise/Education Provided:  Bed mobility  Body mechanics  Energy conservation  Functional activity tolerated  Gait training  Neuromuscular re-educate  Posture  Transfer training    Patient End of Session: Up in chair;Needs met;Call light within reach;RN aware of session/findings;All patient questions and concerns addressed;Alarm set;Discussed recommendations with /    Patient Evaluation Complexity Level:  History Moderate - 1 or 2 personal factors and/or co-morbidities    Examination of body systems Moderate - addressing a total of 3 or more elements   Clinical Presentation Moderate - Evolving   Clinical Decision Making Moderate - Evolving     PT Session Time: 40 minutes  Therapeutic Activity: 15 minutes  Neuromuscular Re-education: 15 minutes                Occupational Therapy Notes (last 72 hours)      Occupational Therapy Note signed by Christiano Crump OT at 6/25/2024  1:22 PM  Version 2 of 2    Author: Christiano Crump OT Service: Rehab Author Type: Occupational Therapist    Filed: 6/25/2024  1:22 PM Date of Service: 6/25/2024  9:35 AM Status: Addendum    : Christiano Crump OT (Occupational Therapist)    Related Notes: Original Note by Christiano Crump OT (Occupational Therapist) filed at 6/25/2024  9:46 AM       OCCUPATIONAL THERAPY TREATMENT NOTE - INPATIENT     Room Number: 7621/7621-A  Session: 2  Number of Visits to Meet Established Goals: 5    Presenting Problem: multifocal pneumonia  Prior Level of Function:   Patient lives at [facility] with his spouse and HH aide for showering. H is typically able to perform ADLs and functional mobility with use of rollator PRN at supervision level. Reports needing increased assistance from spouse recently with toileting, dressing, and mobility.     ASSESSMENT   Patient demonstrates good  progress this session, goals remain in progress.    Patient continues to function below baseline with toileting, lower body dressing, bed mobility, transfers, static standing balance, dynamic standing balance, and functional standing tolerance.   Contributing factors to remaining limitations include decreased functional strength, decreased endurance, impaired coordination, impaired motor planning, and decreased muscular endurance.  Next session anticipate patient to progress toileting, lower body dressing, bed mobility, transfers, static standing balance, dynamic standing balance, and functional standing tolerance.  Occupational Therapy will  continue to follow patient for duration of hospitalization.    Patient continues to benefit from continued skilled OT services: to facilitate return to prior level of function as patient demonstrates high motivation with excellent tolerance to an intensive therapy program .   Pt made good gains with bed mobility this session. Pt would benefit from an intensive therapy program for good progress with ADLs and functional transfers.              History: Patient is a 79 year old male admitted on 6/21/2024 with Presenting Problem: multifocal pneumonia. Co-Morbidities : NPH s/p  shunt placement, JENNA, dementia, PAF,TIA, lumbar spinal stenosis    Recent hospitalizations:  6/17-6/18  shunt placement, DC home with [facility]  5/1-5/13 Fall, weakness, lumbar drain trial > [place]    WEIGHT BEARING RESTRICTION  Weight Bearing Restriction: None                Recommendations for nursing staff:   Transfers: 1 person  Toileting location: toilet    TREATMENT SESSION:  Patient Start of Session: semi supine in bed  FUNCTIONAL TRANSFER ASSESSMENT  Sit to Stand: Edge of Bed; Chair  Edge of Bed: Stand-by Assist (pulling from merrydolores dalton)  Chair: Contact Guard Assist    BED MOBILITY  Rolling: Supervision  Supine to Sit : Supervision  Sit to Supine (OT): Supervision  Scooting: SBA    BALANCE ASSESSMENT  Static Sitting: Supervision  Sitting Bilateral: Minimal Assist  Static Standing: Contact Guard Assist  Standing Bilateral: Minimal Assist    FUNCTIONAL ADL ASSESSMENT  LB Dressing Seated: Minimal Assist  LB Dressing Standing: Minimal Assist  Toileting Standing: Moderate Assist (simulated)      ACTIVITY TOLERANCE: WFL                         O2 SATURATIONS       EDUCATION PROVIDED  Patient: Role of Occupational Therapy; Plan of Care; Discharge Recommendations; Functional Transfer Techniques; Fall Prevention; Posture/Positioning; Energy Conservation; Proper Body Mechanics  Patient's Response to Education: Verbalized Understanding; Returned  Demonstration      Equipment used: RW  Demonstrates functional use    Therapist comments: Pt received semi supine in bed, pleasant and cooperative for OT session. Pt agreeable for OOB activity/mobility in order to improve ADLs and functional transfers. Pt performs bed mobility at SB A to sit EOB with cues provided for hand placement and increased time required for command processing. Pt requires SBA to scoot to EOB in preparation to engage in sit to stand. Pt engages in sit to stand transfer, pulling into standing from merry stedy requiring SBA and is then transferred to bedside chair. Pt then engages in sit to stand transfer from chair to RW requiring CGA and good demo of hand/feet placement. Pt engages in 2 bouts of short distance functional mobility with RW requiring min A and close chair follow in preparation for ambulatory toilet transfer. Pt tolerated well and left in chair with all needs.  Pt seen in afternoon per PMR request for ADL scores. Pt demos bed mobility at supervision to sit EOB. While seated EOB, pt demos footwear management at supervision to doff socks and requires min A to don. Pt engages in simulated LB dressing requiring min A to advance up past hips in standing. Pt also engages in simulated toileting pericare hygiene in standing requiring mod A for posterior reaching. Pt returns to supine at supervision.    Patient End of Session: Up in chair;Needs met;Call light within reach;RN aware of session/findings;All patient questions and concerns addressed;Alarm set    SUBJECTIVE  Pt pleasant and cooperative for OT.    PAIN ASSESSMENT  Ratin  Location: denies  Management Techniques: Repositioning;Relaxation     OBJECTIVE  Precautions: Bed/chair alarm    AM-PAC ‘6-Clicks’ Inpatient Daily Activity Short Form  -   Putting on and taking off regular lower body clothing?: A Lot  -   Bathing (including washing, rinsing, drying)?: A Lot  -   Toileting, which includes using toilet, bedpan or urinal? : A  Lot  -   Putting on and taking off regular upper body clothing?: A Lot  -   Taking care of personal grooming such as brushing teeth?: A Little  -   Eating meals?: A Little    AM-PAC Score:  Score: 14  Approx Degree of Impairment: 59.67%  Standardized Score (AM-PAC Scale): 33.39    PLAN  OT Treatment Plan: Endurance training;UE strengthening/ROM;Functional transfer training;ADL training;Balance activities;Energy conservation/work simplification techniques;Cognitive reorientation;Patient/Family education;Patient/Family training;Equipment eval/education;Compensatory technique education;Fine motor coordination activities  Rehab Potential : Good  Frequency: 3-5x/week    OT Goals:     All goals ongoing 06/25    ADL Goals   Patient will perform grooming: with stand by assist and while standing at sink  Patient will perform lower body dressing:  with min assist and with adaptive equipment PRN  Patient will perform toileting: with min assist     Functional Transfer Goals  Patient will transfer from sit to supine:  with stand by assist- goal met 06/25  Patient will transfer from supine to sit:  with stand by assist  Patient will transfer to toilet:  with mod assist     UE Exercise Program Goal  Patient will be supervision with bilateral AROM HEP (home exercise program).    OT Session Time: 30 minutes  Therapeutic Activity: 15 minutes (AM) + 15 minutes (PM)             Occupational Therapy Note signed by Christiano Crump OT at 6/25/2024  9:46 AM  Version 1 of 2    Author: Christiano Crump OT Service: Rehab Author Type: Occupational Therapist    Filed: 6/25/2024  9:46 AM Date of Service: 6/25/2024  9:35 AM Status: Signed    : Christiano Crump OT (Occupational Therapist)    Related Notes: Addendum by Christiano Crump OT (Occupational Therapist) filed at 6/25/2024  1:22 PM       OCCUPATIONAL THERAPY TREATMENT NOTE - INPATIENT     Room Number: 7621/7621-A  Session: 2  Number of Visits to Meet Established Goals: 5    Presenting  Problem: multifocal pneumonia  Prior Level of Function:   Patient lives at [facility] with his spouse and  aide for showering. H is typically able to perform ADLs and functional mobility with use of rollator PRN at supervision level. Reports needing increased assistance from spouse recently with toileting, dressing, and mobility.     ASSESSMENT   Patient demonstrates good  progress this session, goals remain in progress.    Patient continues to function below baseline with toileting, lower body dressing, bed mobility, transfers, static standing balance, dynamic standing balance, and functional standing tolerance.   Contributing factors to remaining limitations include decreased functional strength, decreased endurance, impaired coordination, impaired motor planning, and decreased muscular endurance.  Next session anticipate patient to progress toileting, lower body dressing, bed mobility, transfers, static standing balance, dynamic standing balance, and functional standing tolerance.  Occupational Therapy will continue to follow patient for duration of hospitalization.    Patient continues to benefit from continued skilled OT services: to facilitate return to prior level of function as patient demonstrates high motivation with excellent tolerance to an intensive therapy program .   Pt made good gains with bed mobility this session. Pt would benefit from an intensive therapy program for good progress with ADLs and functional transfers.              History: Patient is a 79 year old male admitted on 6/21/2024 with Presenting Problem: multifocal pneumonia. Co-Morbidities : NPH s/p  shunt placement, JENNA, dementia, PAF,TIA, lumbar spinal stenosis    Recent hospitalizations:  6/17-6/18  shunt placement, DC home with [facility]  5/1-5/13 Fall, weakness, lumbar drain trial > [place]    WEIGHT BEARING RESTRICTION  Weight Bearing Restriction: None                Recommendations for nursing staff:   Transfers: 1  person  Toileting location: toilet    TREATMENT SESSION:  Patient Start of Session: semi supine in bed  FUNCTIONAL TRANSFER ASSESSMENT  Sit to Stand: Edge of Bed; Chair  Edge of Bed: Stand-by Assist (pulling from merry stedy)  Chair: Contact Guard Assist    BED MOBILITY  Rolling: Stand-by Assist  Supine to Sit : Stand-by Assist  Scooting: SBA    BALANCE ASSESSMENT  Static Sitting: Supervision  Sitting Bilateral: Minimal Assist  Static Standing: Contact Guard Assist  Standing Bilateral: Minimal Assist    FUNCTIONAL ADL ASSESSMENT  LB Dressing Seated: Not Tested  LB Dressing Standing: Not Tested      ACTIVITY TOLERANCE: WFL                         O2 SATURATIONS       EDUCATION PROVIDED  Patient: Role of Occupational Therapy; Plan of Care; Discharge Recommendations; Functional Transfer Techniques; Fall Prevention; Posture/Positioning; Energy Conservation; Proper Body Mechanics  Patient's Response to Education: Verbalized Understanding; Returned Demonstration      Equipment used: RW  Demonstrates functional use    Therapist comments: Pt received semi supine in bed, pleasant and cooperative for OT session. Pt agreeable for OOB activity/mobility in order to improve ADLs and functional transfers. Pt performs bed mobility at SB A to sit EOB with cues provided for hand placement and increased time required for command processing. Pt requires SBA to scoot to EOB in preparation to engage in sit to stand. Pt engages in sit to stand transfer, pulling into standing from merry stedy requiring SBA and is then transferred to bedside chair. Pt then engages in sit to stand transfer from chair to RW requiring CGA and good demo of hand/feet placement. Pt engages in 2 bouts of short distance functional mobility with RW requiring min A and close chair follow in preparation for ambulatory toilet transfer. Pt tolerated well and left in chair with all needs.  Patient End of Session: Up in chair;Needs met;Call light within reach;RN aware of  session/findings;All patient questions and concerns addressed;Alarm set    SUBJECTIVE  Pt pleasant and cooperative for OT.    PAIN ASSESSMENT  Ratin  Location: denies  Management Techniques: Repositioning;Relaxation     OBJECTIVE  Precautions: Bed/chair alarm    AM-PAC ‘6-Clicks’ Inpatient Daily Activity Short Form  -   Putting on and taking off regular lower body clothing?: A Lot  -   Bathing (including washing, rinsing, drying)?: A Lot  -   Toileting, which includes using toilet, bedpan or urinal? : A Lot  -   Putting on and taking off regular upper body clothing?: A Lot  -   Taking care of personal grooming such as brushing teeth?: A Little  -   Eating meals?: A Little    AM-PAC Score:  Score: 14  Approx Degree of Impairment: 59.67%  Standardized Score (AM-PAC Scale): 33.39    PLAN  OT Treatment Plan: Endurance training;UE strengthening/ROM;Functional transfer training;ADL training;Balance activities;Energy conservation/work simplification techniques;Cognitive reorientation;Patient/Family education;Patient/Family training;Equipment eval/education;Compensatory technique education;Fine motor coordination activities  Rehab Potential : Good  Frequency: 3-5x/week    OT Goals:     All goals ongoing     ADL Goals   Patient will perform grooming: with stand by assist and while standing at sink  Patient will perform lower body dressing:  with min assist and with adaptive equipment PRN  Patient will perform toileting: with min assist     Functional Transfer Goals  Patient will transfer from sit to supine:  with stand by assist- goal met   Patient will transfer from supine to sit:  with stand by assist  Patient will transfer to toilet:  with mod assist     UE Exercise Program Goal  Patient will be supervision with bilateral AROM HEP (home exercise program).    OT Session Time: 15 minutes  Therapeutic Activity: 15 minutes             Occupational Therapy Note signed by Christiano Crump OT at 2024  2:44 PM   Version 1 of 1    Author: Christiano Crump OT Service: Rehab Author Type: Occupational Therapist    Filed: 6/24/2024  2:44 PM Date of Service: 6/24/2024  2:34 PM Status: Signed    : Christiano Crump OT (Occupational Therapist)       OCCUPATIONAL THERAPY TREATMENT NOTE - INPATIENT     Room Number: 7621/7621-A  Session: 1   Number of Visits to Meet Established Goals: 5    Presenting Problem: multifocal pneumonia  Prior Level of Function:   Patient lives at [facility] with his spouse and HH aide for showering. H is typically able to perform ADLs and functional mobility with use of rollator PRN at supervision level. Reports needing increased assistance from spouse recently with toileting, dressing, and mobility.     ASSESSMENT   Patient demonstrates good  progress this session, goals remain in progress.    Patient continues to function below baseline with toileting, lower body dressing, bed mobility, transfers, static standing balance, dynamic standing balance, and functional standing tolerance.   Contributing factors to remaining limitations include decreased functional strength, decreased endurance, impaired coordination, impaired motor planning, and decreased muscular endurance.  Next session anticipate patient to progress toileting, lower body dressing, bed mobility, transfers, static standing balance, dynamic standing balance, and functional standing tolerance.  Occupational Therapy will continue to follow patient for duration of hospitalization.    Patient continues to benefit from continued skilled OT services: to facilitate return to prior level of function as patient demonstrates high motivation with excellent tolerance to an intensive therapy program .   Pt made good gains with bed mobility this session. Pt would benefit from an intensive therapy program for good progress with ADLs and functional transfers.              History: Patient is a 79 year old male admitted on 6/21/2024 with Presenting  Problem: multifocal pneumonia. Co-Morbidities : NPH s/p  shunt placement, JENNA, dementia, PAF,TIA, lumbar spinal stenosis    Recent hospitalizations:  6/17-6/18  shunt placement, DC home with [facility]  5/1-5/13 Fall, weakness, lumbar drain trial > [place]    WEIGHT BEARING RESTRICTION  Weight Bearing Restriction: None                Recommendations for nursing staff:   Transfers: merry dalton  Toileting location: bedside commode    TREATMENT SESSION:  Patient Start of Session: semi supine in bed  FUNCTIONAL TRANSFER ASSESSMENT  Sit to Stand: Edge of Bed; Chair  Edge of Bed: Contact Guard Assist (pulling to merry dalton)  Chair: Minimal Assist    BED MOBILITY  Rolling: Minimal Assist  Supine to Sit : Moderate Assist  Scooting: Min A    BALANCE ASSESSMENT  Static Sitting: Supervision  Sitting Bilateral: Minimal Assist  Static Standing: Minimal Assist  Standing Bilateral: Minimal Assist    FUNCTIONAL ADL ASSESSMENT  LB Dressing Seated: Not Tested  LB Dressing Standing: Not Tested      ACTIVITY TOLERANCE: WFL                         O2 SATURATIONS       EDUCATION PROVIDED  Patient: Role of Occupational Therapy; Plan of Care; Discharge Recommendations; Functional Transfer Techniques; Fall Prevention; Posture/Positioning; Energy Conservation; Proper Body Mechanics  Patient's Response to Education: Verbalized Understanding; Returned Demonstration      Equipment used: RW  Demonstrates functional use, Would benefit from additional trial     Therapist comments: Pt received semi supine in bed, pleasant and cooperative for OT session. Pt agreeable for OOB activity/mobility in order to improve ADLs and functional transfers. Pt performs bed mobility at mod A to sit EOB with cues provided for hand placement. Pt requires min A to scoot to EOB in preparation to engage in sit to stand. Pt engages in sit to stand transfer, pulling into standing from merry dalton requiring CGA and is then transferred to bedside chair. Pt then engages in  multiple bouts of sit to stand transfers from chair to RW requiring min A and good demo of hand/feet placement. Pt with good awareness as pt states that he has to shift his weight forward or else he will start to lean backwards. As noted above, pt making good progress and would benefit from an intensive therapy program.   Patient End of Session: Up in chair;Needs met;Call light within reach;RN aware of session/findings;All patient questions and concerns addressed;Alarm set    SUBJECTIVE  Pt pleasant and cooperative for OT.    PAIN ASSESSMENT  Rating: Other (Comment)  Location: incision  Management Techniques: Repositioning;Relaxation     OBJECTIVE  Precautions: Bed/chair alarm    AM-PAC ‘6-Clicks’ Inpatient Daily Activity Short Form  -   Putting on and taking off regular lower body clothing?: A Lot  -   Bathing (including washing, rinsing, drying)?: A Lot  -   Toileting, which includes using toilet, bedpan or urinal? : A Lot  -   Putting on and taking off regular upper body clothing?: A Lot  -   Taking care of personal grooming such as brushing teeth?: A Little  -   Eating meals?: A Little    AM-PAC Score:  Score: 14  Approx Degree of Impairment: 59.67%  Standardized Score (AM-PAC Scale): 33.39    PLAN  OT Treatment Plan: Endurance training;UE strengthening/ROM;Functional transfer training;ADL training;Balance activities;Energy conservation/work simplification techniques;Cognitive reorientation;Patient/Family education;Patient/Family training;Equipment eval/education;Compensatory technique education;Fine motor coordination activities  Rehab Potential : Good  Frequency: 3-5x/week    OT Goals:     All goals ongoing 06/24    ADL Goals   Patient will perform grooming: with stand by assist and while standing at sink  Patient will perform lower body dressing:  with min assist and with adaptive equipment PRN  Patient will perform toileting: with min assist     Functional Transfer Goals  Patient will transfer from sit to  supine:  with stand by assist  Patient will transfer from supine to sit:  with stand by assist  Patient will transfer to toilet:  with mod assist     UE Exercise Program Goal  Patient will be supervision with bilateral AROM HEP (home exercise program).    OT Session Time: 25 minutes  Therapeutic Activity: 25 minutes                   Video Swallow Study Notes    No notes of this type exist for this encounter.     SLP Notes    No notes of this type exist for this encounter.     Future Appointments        Provider Department [place]    6/27/2024 8:45 AM Teodora Castle PA-C [facility]    7/18/2024 11:00 AM Vinny Preciado MD [facility]    8/1/2024 2:15 PM Teodora Castle PA-C [facility]      Multidisciplinary Problems     Active Goals     Not on file

## (undated) NOTE — LETTER
Your patient was recently seen at the [facility] for a hospital follow-up visit. The visit note is attached. Please contact the clinic with any questions at [phone].     Thank you,  DREW Maldonado

## (undated) NOTE — LETTER
25      RE: Clay Orellana    : 1945    Dear Dr. Meza,    Your patient is being scheduled for a pain management procedure at [facility].    Procedure:  Lumbar Epidural Steroid Injection.  Date of Procedure: TBD -pending medical clearance.  Physician: Dr. Chase- Anesthesiologist     Your patient is currently taking Xarelto. Dr. Chase usually recommends this medication to be held for 3 days prior to injection.     Please verify patient is cleared to proceed with pain management procedures.      Clearance Approved   ____________    Clearance Denied       ____________      Comments: ______________________________________________________    Signature: ________________________________  Date: _________________       If you have any questions please feel free to contact our office at [phone], option # 3.    Please fax this clearance request to our office at fax # [phone] or send electronically.       Thank you,      [facility] Staff

## (undated) NOTE — LETTER
Patient Name: Clay Orellana  Surgery Date: 6/17/2024  Medical Record: [id number] CSN: [id number]      Surgeon(s):  Brian Pringle MD  Consent Procedure: RIGHT VENTRICULOPERITONEAL SHUNT INSERTION  Anesthesia Type: General    Please instruct patient on stoppage of anticoagulant prior to this procedure  Send anticoagulant management instructions    Thank you  LEIGH Hester  Preadmission Testing  [phone]  [phone] fax

## (undated) NOTE — LETTER
[employer] Cardiac Device Communication Tool    Preop to complete    [facility] Phone: [phone] Fax: [phone]   Patient Name Clay Orellana   Patient  - AGE - SEX 1945 - A: 80 y - male   Surgical Date 2025   Surgical Procedure LUMBAR 3, LUMBAR 4 LAMINECTOMIES WITH MEDIAL FACETECTOMIES, PARTIAL LUMBAR 5 LAMINECTOMY WITH MEDIAL FACETECTOMY, NEURO MONITORING   Surgical Location [facility]   Type of cautery anticipated: Monopolar   Surgical procedure > 2 hours      Device Clinic to Complete the Information Below, Sign and Fax to [phone]    Pacemaker or ICD    Atrial or atrial-ventricular lead?        Indication for device    Is patient pacemaker dependent?    Has pt had routine f/u and is battery life > 3 months    Is ICD programmed to inhibit therapy w/magnet?    Does device have rate response or other sensor?      Surgical Procedure above [place] Surgical Procedure @ [place] and below   < 6 in. from ICD: Reprogram therapies OFF w/  asynchronous pacing if PM dependent  < 6 in. from PM: Reprogram asynchronous if PM   dependent ICD: No Change  PM: No Change   > 6 in. from ICD: Magnet*  > 6 in. from PM:  No Change*  * If PM dependent observe for pacing inhibition and minimize cautery if inhibition is seen                                              Bipolar cautery: No Change     Cardiac Device Management Plan (check one)     ___  Reprogram (PAT Dept to provide Rep w/ arrival date/time)   ___ Magnet    ___ No Change    Comments: ___________________________________________________________________        Signature: ________________________________ Date: ____________ Time: ______________     Print Name: ___________________________________

## (undated) NOTE — LETTER
OUTSIDE TESTING RESULT REQUEST     IMPORTANT: FOR YOUR IMMEDIATE ATTENTION  Please FAX all test results listed below to: [phone]     Testing already done on or about: ***     * * * * If testing is NOT complete, arrange with patient A.S.A.P. * * * *      Patient Name: Clay Orellana  Surgery Date: 2024  Medical Record: [id number]  CSN: [id number]  : 1945 - A: 79 y     Sex: male  Surgeon(s):  Brain Pringle MD  Procedure: RIGHT VENTRICULOPERITONEAL SHUNT INSERTION  Anesthesia Type: General     Surgeon: Brian Pringle MD     The following Testing and Time Line are REQUIRED PER ANESTHESIA     {EDW PAT SENDOUT:4426}      Thank You,   Sent by:***

## (undated) NOTE — LETTER
[employer] Cardiac Device Communication Tool    Preop to complete    [facility]) Phone: [phone] Fax: [phone]   Patient Name Clay Orellana   Patient  - AGE - SEX 1945 - A: 79 y - male   Surgical Date 2024   Surgical Procedure RIGHT VENTRICULOPERITONEAL SHUNT INSERTION   Surgical Location [facility]   Type of cautery anticipated: Monopolar   Surgical procedure > 2 hours      Device Clinic to Complete the Information Below, Sign and Fax to [phone]    Pacemaker or ICD    Atrial or atrial-ventricular lead?        Indication for device    Is patient pacemaker dependent?    Has pt had routine f/u and is battery life > 3 months    Is ICD programmed to inhibit therapy w/magnet?    Does device have rate response or other sensor?      Surgical Procedure above [facility] Surgical Procedure @ [facility] and below   < 6 in. from ICD: Reprogram therapies OFF w/  asynchronous pacing if PM dependent  < 6 in. from PM: Reprogram asynchronous if PM   dependent ICD: No Change  PM: No Change   > 6 in. from ICD: Magnet*  > 6 in. from PM:  No Change*  * If PM dependent observe for pacing inhibition and minimize cautery if inhibition is seen                                              Bipolar cautery: No Change     Cardiac Device Management Plan (check one)     ___  Reprogram (PAT Dept to provide Rep w/ arrival date/time)   ___ Magnet    ___ No Change    Comments: ___________________________________________________________________        Signature: ________________________________ Date: ____________ Time: ______________     Print Name: ___________________________________

## (undated) NOTE — LETTER
[address]  [address]  Consent for Procedure/Sedation  Date: 5/6/24         Time: 0800    I hereby authorize Dr Booker, my physician and his/her assistants (if applicable), which may include medical students, residents, and/or fellows, to perform the following surgical operation/ procedure and administer such anesthesia as may be determined necessary by my physician: Lumbar Drain Insertion on Clay Orellana  2.   I recognize that during the surgical operation/procedure, unforeseen conditions may necessitate additional or different procedures than those listed above.  I, therefore, further authorize and request that the above-named surgeon, assistants, or designees perform such procedures as are, in their judgment, necessary and desirable.    3.   My surgeon/physician has discussed prior to my surgery the potential benefits, risks and side effects of this procedure; the likelihood of achieving goals; and potential problems that might occur during recuperation.  They also discussed reasonable alternatives to the procedure, including risks, benefits, and side effects related to the alternatives and risks related to not receiving this procedure.  I have had all my questions answered and I acknowledge that no guarantee has been made as to the result that may be obtained.    4.   Should the need arise during my operation/procedure, which includes change of level of care prior to discharge, I also consent to the administration of blood and/or blood products.  Further, I understand that despite careful testing and screening of blood or blood products by collecting agencies, I may still be subject to ill effects as a result of receiving a blood transfusion and/or blood products.  The following are some, but not all, of the potential risks that can occur: fever and allergic reactions, hemolytic reactions, transmission of diseases such as Hepatitis, AIDS and Cytomegalovirus (CMV) and  fluid overload.  In the event that I wish to have an autologous transfusion of my own blood, or a directed donor transfusion, I will discuss this with my physician.   Check only if Refusing Blood or Blood Products  I understand refusal of blood or blood products as deemed necessary by my physician may have serious consequences to my condition to include possible death. I hereby assume responsibility for my refusal and release the hospital, its personnel, and my physicians from any responsibility for the consequences of my refusal.         o  Refuse         5.   I authorize the use of any specimen, organs, tissues, body parts or foreign objects that may be removed from my body during the operation/procedure for diagnosis, research or teaching purposes and their subsequent disposal by hospital authorities.  I also authorize the release of specimen test results and/or written reports to my treating physician on the hospital medical staff or other referring or consulting physicians involved in my care, at the discretion of the Pathologist or my treating physician.    6.   I consent to the photographing or videotaping of the operations or procedures to be performed, including appropriate portions of my body for medical, scientific, or educational purposes, provided my identity is not revealed by the pictures or by descriptive texts accompanying them.  If the procedure has been photographed/videotaped, the surgeon will obtain the original picture, image, videotape or CD.  The hospital will not be responsible for storage, release or maintenance of the picture, image, tape or CD.    7.   I consent to the presence of a  or observers in the operating room as deemed necessary by my physician or their designees.    8.   I recognize that in the event my procedure results in extended X-Ray/fluoroscopy time, I may develop a skin reaction.    9. If I have a Do Not Attempt Resuscitation (DNAR) order in place, that  status will be suspended while in the operating room, procedural suite, and during the recovery period unless otherwise explicitly stated by me (or a person authorized to consent on my behalf). The surgeon or my attending physician will determine when the applicable recovery period ends for purposes of reinstating the DNAR order.  10. Patients having a sterilization procedure: I understand that if the procedure is successful the results will be permanent and it will therefore be impossible for me to inseminate, conceive, or bear children.  I also understand that the procedure is intended to result in sterility, although the result has not been guaranteed.   11. I acknowledge that my physician has explained sedation/analgesia administration to me including the risk and benefits I consent to the administration of sedation/analgesia as may be necessary or desirable in the judgment of my physician.    I CERTIFY THAT I HAVE READ AND FULLY UNDERSTAND THE ABOVE CONSENT TO OPERATION and/or OTHER PROCEDURE.        ____________________________________       _________________________________      ______________________________  Signature of Patient         Signature of Responsible Person        Printed Name of Responsible Person        ____________________________________      _________________________________      ______________________________       Signature of Witness          Relationship to Patient                       Date                                       Time  Patient Name: Clay KELLY Esteban  : 1945    Reviewed: 2024   Printed: May 6, 2024  Medical Record #: [id number] Page 1 of 1

## (undated) NOTE — LETTER
22    Patient: Guy Noe  : 1945 Visit date: 2022    Dear  Abdoul Pina MD    Thank you for referring Guy Noe to my practice. Please find my assessment and plan below. Assessment   Bilateral inguinal hernia without obstruction or gangrene, recurrence not specified  (primary encounter diagnosis)  Encounter for screening colonoscopy      Plan   The patient presents in consultation of Dr. Erum Morin for a colonoscopy and for bilateral inguinal hernias. The patient states he has not had a colonoscopy in 12 years. His last colonoscopy was performed by me. He has no history of colon polyps or family history of colon polyps/cancer. The patient denies blood, mucus, black/tarry stools. The patient states he is chronically constipated. He typically has 2-3 bowel movements per week. He takes a combination of MiraLAX, Dulcolax and Metamucil for his constipation. He states he only takes the stool softeners approximately 1 time per week. The patient denies abdominal pain or distention. He denies fevers, chills, nausea or vomiting. The patient is also here for bilateral inguinal hernias. The patient is unsure how long he has had these hernias, but states that he became symptomatic in 2022. The patient states his right inguinal hernia is more symptomatic than his left inguinal hernia. He has intermittent achy pain. The pain does not radiate. The pain is worse after sleeping on his abdomen or right side. He takes Tylenol for his pain. The patient states he does notice a small bulges in his groin bilaterally. He states he is unsure if it has gotten bigger. The bulge reduces manually. He denies strangulation of his hernias. The patient had a CT of his abdomen and pelvis on 3/11/2022. I have personally reviewed his CT scan and provided my own interpretation. He has large bilateral inguinal hernias.   His left inguinal hernia contains a portion of the sigmoid colon. His right renal hernia contains portions of the small bowel and is significantly larger than the left inguinal hernia. There is no evidence of strangulation or obstruction. The patient has a past medical history significant for benign prostatic hyperplasia, hypertension and arthritis. The patient has had an appendectomy. He has had no other prior abdominal operations. He is not currently taking any blood thinners. The patient has no first or second-degree relatives with a history of colon polyps or colon cancer. The patient has no first or second-degree relatives with a history of uterine cancer. Clinical examination of the abdomen reveals it to be soft, nondistended, nontender, bowel sounds are normal activity normal pitch. There  is no rebounding tenderness or guarding. There are no signs of ascites or peritonitis. The liver and spleen are nonpalpable. There are no palpable masses. There are no umbilical or incisional hernias. The patient has bilateral inguinal hernias, with the right being larger than the left. Testes are descended, equal, and normal size bilaterally. Penis is normal and circumcised. He has well-healed laparoscopic incisions from a prior appendectomy. The patient will be scheduled to undergo a colonoscopy. Following his colonoscopy he will be scheduled to undergo a laparoscopic bilateral inguinal hernia repair with mesh.       Sincerely,   Mychal Fontenot MD

## (undated) NOTE — LETTER
Christine Reyes  5/2/1945          To Whom it may Concern,    Christine Reyes is our mutual patient who is being treated with Gallia Mantis for HTN, BPH, and     afibrillation. This patient will need to complete CISTERNOGRAM testing during which Gallia Mantis will be held. We are requesting clearance from cardiology and confirmation that Gallia Mantis will can be held     and recommended parameters. Please fax clearance letter/form to this office @ [phone] Attn Porsche Rodgers PA-C. Should you have any questions or concerns, please call [phone]. Thank you for your cooperation in this matter.               Hailey Cook RN  [facility]

## (undated) NOTE — LETTER
[facility]  Phone: [phone]  OUTSIDE TESTING RESULT REQUEST      TO:   Dr.Kousik Champagne  Today's Date: 24    FAX #: [phone]     IMPORTANT: FOR YOUR IMMEDIATE ATTENTION  Please FAX all test results listed below to: [phone]     Testing already done on or about: Appointment with you 2024     * * * * If testing is NOT complete, arrange with patient A.S.A.P. * * * *      Patient Name: Clay Orellana  Surgery Date: 2024    CSN: [id number]  Medical Record: [id number]   : 1945 - A: 79 y      Sex: male  Surgeon(s):  Brian Pringle MD  Procedure: RIGHT VENTRICULOPERITONEAL SHUNT INSERTION  Anesthesia Type: General     Surgeon: Brian Pringle MD       The following Testing and Time Line are REQUIRED PER ANESTHESIA     EKG READ AND SIGNED WITHIN   90 days  Please instruct patient when to stop anticoagulant-Xarelto  Please fax office note      Thank You,   Sent by:LEIGH Hester  [facility]      CONFIDENTIALITY NOTICE  This transmission is intended only for the use of the individual or entity to which it is addressed and may contain information that is privileged and confidential.  If the reader of this message is not the intended recipient, you are hereby notified that any disclosure, distribution, or copying of this information is strictly prohibited.  If you have received this transmission in error, please notify us immediately by telephone, and return the original documents to us at the address listed above.